# Patient Record
Sex: MALE | Race: BLACK OR AFRICAN AMERICAN | Employment: OTHER
[De-identification: names, ages, dates, MRNs, and addresses within clinical notes are randomized per-mention and may not be internally consistent; named-entity substitution may affect disease eponyms.]

---

## 2017-01-20 ENCOUNTER — OFFICE VISIT (OUTPATIENT)
Dept: INTERNAL MEDICINE | Facility: CLINIC | Age: 47
End: 2017-01-20

## 2017-01-20 VITALS
DIASTOLIC BLOOD PRESSURE: 90 MMHG | SYSTOLIC BLOOD PRESSURE: 125 MMHG | BODY MASS INDEX: 33.87 KG/M2 | HEART RATE: 90 BPM | HEIGHT: 67 IN | WEIGHT: 215.8 LBS

## 2017-01-20 DIAGNOSIS — K21.9 GASTROESOPHAGEAL REFLUX DISEASE WITHOUT ESOPHAGITIS: Primary | ICD-10-CM

## 2017-01-20 DIAGNOSIS — I10 ESSENTIAL HYPERTENSION: ICD-10-CM

## 2017-01-20 DIAGNOSIS — E11.9 TYPE 2 DIABETES MELLITUS WITHOUT COMPLICATION, WITHOUT LONG-TERM CURRENT USE OF INSULIN (HCC): ICD-10-CM

## 2017-01-20 DIAGNOSIS — Z11.4 SCREENING FOR HIV WITHOUT PRESENCE OF RISK FACTORS: ICD-10-CM

## 2017-01-20 DIAGNOSIS — M51.06 INTERVERTEBRAL LUMBAR DISC DISORDER WITH MYELOPATHY, LUMBAR REGION: Chronic | ICD-10-CM

## 2017-01-20 DIAGNOSIS — E78.00 PURE HYPERCHOLESTEROLEMIA: ICD-10-CM

## 2017-01-20 PROCEDURE — 99203 OFFICE O/P NEW LOW 30 MIN: CPT | Performed by: INTERNAL MEDICINE

## 2017-01-20 RX ORDER — ACETAMINOPHEN 500 MG
500 TABLET ORAL 3 TIMES DAILY PRN
Qty: 90 TABLET | Refills: 2 | Status: SHIPPED | OUTPATIENT
Start: 2017-01-20 | End: 2018-04-09

## 2017-01-20 RX ORDER — TIZANIDINE HYDROCHLORIDE 2 MG/1
2 CAPSULE, GELATIN COATED ORAL 2 TIMES DAILY PRN
Qty: 60 CAPSULE | Refills: 2 | Status: SHIPPED | OUTPATIENT
Start: 2017-01-20 | End: 2017-11-28 | Stop reason: SDUPTHER

## 2017-01-20 RX ORDER — MELOXICAM 7.5 MG/1
7.5 TABLET ORAL DAILY
Qty: 30 TABLET | Refills: 2 | Status: SHIPPED | OUTPATIENT
Start: 2017-01-20 | End: 2017-11-28 | Stop reason: SDUPTHER

## 2017-01-20 RX ORDER — LISINOPRIL 2.5 MG/1
2.5 TABLET ORAL DAILY
Qty: 30 TABLET | Refills: 2 | Status: SHIPPED | OUTPATIENT
Start: 2017-01-20 | End: 2017-11-28 | Stop reason: SDUPTHER

## 2017-01-20 RX ORDER — SIMVASTATIN 40 MG
40 TABLET ORAL NIGHTLY
Qty: 30 TABLET | Refills: 2 | Status: SHIPPED | OUTPATIENT
Start: 2017-01-20 | End: 2017-11-28 | Stop reason: SDUPTHER

## 2017-01-20 RX ORDER — GABAPENTIN 400 MG/1
400 CAPSULE ORAL 3 TIMES DAILY
Qty: 90 CAPSULE | Refills: 2 | Status: SHIPPED | OUTPATIENT
Start: 2017-01-20 | End: 2017-04-17 | Stop reason: SDUPTHER

## 2017-01-20 RX ORDER — OMEPRAZOLE 20 MG/1
20 CAPSULE, DELAYED RELEASE ORAL DAILY
Qty: 30 CAPSULE | Refills: 2 | Status: SHIPPED | OUTPATIENT
Start: 2017-01-20 | End: 2017-11-28 | Stop reason: SDUPTHER

## 2017-01-20 ASSESSMENT — PATIENT HEALTH QUESTIONNAIRE - PHQ9
1. LITTLE INTEREST OR PLEASURE IN DOING THINGS: 0
SUM OF ALL RESPONSES TO PHQ QUESTIONS 1-9: 0
2. FEELING DOWN, DEPRESSED OR HOPELESS: 0
SUM OF ALL RESPONSES TO PHQ9 QUESTIONS 1 & 2: 0

## 2017-03-06 ENCOUNTER — HOSPITAL ENCOUNTER (OUTPATIENT)
Age: 47
Setting detail: SPECIMEN
Discharge: HOME | End: 2017-03-06
Payer: MEDICAID

## 2017-03-06 DIAGNOSIS — Z11.4 SCREENING FOR HIV WITHOUT PRESENCE OF RISK FACTORS: ICD-10-CM

## 2017-03-06 DIAGNOSIS — E11.9 TYPE 2 DIABETES MELLITUS WITHOUT COMPLICATION, WITHOUT LONG-TERM CURRENT USE OF INSULIN (HCC): ICD-10-CM

## 2017-03-06 DIAGNOSIS — E78.00 PURE HYPERCHOLESTEROLEMIA: ICD-10-CM

## 2017-03-06 LAB
CHOLESTEROL/HDL RATIO: 4.2
CHOLESTEROL: 127 MG/DL
HDLC SERPL-MCNC: 30 MG/DL
HIV AG/AB: NONREACTIVE
LDL CHOLESTEROL: 49 MG/DL (ref 0–130)
TRIGL SERPL-MCNC: 238 MG/DL
VLDLC SERPL CALC-MCNC: ABNORMAL MG/DL (ref 1–30)

## 2017-03-06 PROCEDURE — 80061 LIPID PANEL: CPT

## 2017-03-06 PROCEDURE — 36415 COLL VENOUS BLD VENIPUNCTURE: CPT

## 2017-03-06 PROCEDURE — 87389 HIV-1 AG W/HIV-1&-2 AB AG IA: CPT

## 2017-04-17 DIAGNOSIS — M51.06 INTERVERTEBRAL LUMBAR DISC DISORDER WITH MYELOPATHY, LUMBAR REGION: Chronic | ICD-10-CM

## 2017-04-17 RX ORDER — GABAPENTIN 400 MG/1
CAPSULE ORAL
Qty: 90 CAPSULE | Refills: 0 | Status: SHIPPED | OUTPATIENT
Start: 2017-04-17 | End: 2017-11-28 | Stop reason: SDUPTHER

## 2017-04-18 DIAGNOSIS — M51.06 INTERVERTEBRAL LUMBAR DISC DISORDER WITH MYELOPATHY, LUMBAR REGION: Chronic | ICD-10-CM

## 2017-04-20 RX ORDER — GABAPENTIN 400 MG/1
CAPSULE ORAL
Qty: 90 CAPSULE | Refills: 0 | Status: SHIPPED | OUTPATIENT
Start: 2017-04-20 | End: 2017-07-17 | Stop reason: SDUPTHER

## 2017-04-25 ENCOUNTER — HOSPITAL ENCOUNTER (OUTPATIENT)
Age: 47
Setting detail: SPECIMEN
Discharge: HOME | End: 2017-04-25
Payer: MEDICAID

## 2017-04-25 LAB
ABSOLUTE EOS #: 0.2 K/UL (ref 0–0.4)
ABSOLUTE LYMPH #: 2.7 K/UL (ref 1–4.8)
ABSOLUTE MONO #: 0.7 K/UL (ref 0.1–1.2)
ALBUMIN SERPL-MCNC: 4.6 G/DL (ref 3.5–5.2)
ALBUMIN/GLOBULIN RATIO: 1.6 (ref 1–2.5)
ALP BLD-CCNC: 89 U/L (ref 40–129)
ALT SERPL-CCNC: 22 U/L (ref 5–41)
ANION GAP SERPL CALCULATED.3IONS-SCNC: 17 MMOL/L (ref 9–17)
AST SERPL-CCNC: 24 U/L
BASOPHILS # BLD: 1 %
BASOPHILS ABSOLUTE: 0.1 K/UL (ref 0–0.2)
BILIRUB SERPL-MCNC: 0.34 MG/DL (ref 0.3–1.2)
BUN BLDV-MCNC: 7 MG/DL (ref 6–20)
BUN/CREAT BLD: ABNORMAL (ref 9–20)
CALCIUM SERPL-MCNC: 9.6 MG/DL (ref 8.6–10.4)
CHLORIDE BLD-SCNC: 97 MMOL/L (ref 98–107)
CO2: 28 MMOL/L (ref 20–31)
CREAT SERPL-MCNC: 0.98 MG/DL (ref 0.7–1.2)
DIFFERENTIAL TYPE: NORMAL
EOSINOPHILS RELATIVE PERCENT: 3 %
ESTIMATED AVERAGE GLUCOSE: 126 MG/DL
GFR AFRICAN AMERICAN: >60 ML/MIN
GFR NON-AFRICAN AMERICAN: >60 ML/MIN
GFR SERPL CREATININE-BSD FRML MDRD: ABNORMAL ML/MIN/{1.73_M2}
GFR SERPL CREATININE-BSD FRML MDRD: ABNORMAL ML/MIN/{1.73_M2}
GLUCOSE BLD-MCNC: 99 MG/DL (ref 70–99)
HBA1C MFR BLD: 6 % (ref 4–6)
HCT VFR BLD CALC: 45.3 % (ref 41–53)
HEMOGLOBIN: 15.5 G/DL (ref 13.5–17.5)
LYMPHOCYTES # BLD: 34 %
MCH RBC QN AUTO: 27.4 PG (ref 26–34)
MCHC RBC AUTO-ENTMCNC: 34.2 G/DL (ref 31–37)
MCV RBC AUTO: 80.1 FL (ref 80–100)
MONOCYTES # BLD: 9 %
PDW BLD-RTO: 13.8 % (ref 12.5–15.4)
PLATELET # BLD: 212 K/UL (ref 140–450)
PLATELET ESTIMATE: NORMAL
PMV BLD AUTO: 9.8 FL (ref 6–12)
POTASSIUM SERPL-SCNC: 4.8 MMOL/L (ref 3.7–5.3)
RBC # BLD: 5.65 M/UL (ref 4.5–5.9)
RBC # BLD: NORMAL 10*6/UL
SEG NEUTROPHILS: 53 %
SEGMENTED NEUTROPHILS ABSOLUTE COUNT: 4.1 K/UL (ref 1.8–7.7)
SODIUM BLD-SCNC: 142 MMOL/L (ref 135–144)
TOTAL PROTEIN: 7.5 G/DL (ref 6.4–8.3)
TSH SERPL DL<=0.05 MIU/L-ACNC: 1.31 MIU/L (ref 0.3–5)
WBC # BLD: 7.8 K/UL (ref 3.5–11)
WBC # BLD: NORMAL 10*3/UL

## 2017-05-30 ENCOUNTER — HOSPITAL ENCOUNTER (OUTPATIENT)
Dept: CT IMAGING | Age: 47
Discharge: HOME | End: 2017-05-30
Payer: MEDICAID

## 2017-05-30 DIAGNOSIS — R41.3 MEMORY LOSS: ICD-10-CM

## 2017-05-30 PROCEDURE — 6360000004 HC RX CONTRAST MEDICATION: Performed by: NURSE PRACTITIONER

## 2017-05-30 PROCEDURE — 70470 CT HEAD/BRAIN W/O & W/DYE: CPT

## 2017-05-30 RX ADMIN — IOVERSOL 100 ML: 741 INJECTION INTRA-ARTERIAL; INTRAVENOUS at 17:19

## 2017-07-17 DIAGNOSIS — M51.06 INTERVERTEBRAL LUMBAR DISC DISORDER WITH MYELOPATHY, LUMBAR REGION: Chronic | ICD-10-CM

## 2017-07-17 RX ORDER — GABAPENTIN 400 MG/1
CAPSULE ORAL
Qty: 90 CAPSULE | Refills: 1 | Status: SHIPPED | OUTPATIENT
Start: 2017-07-17 | End: 2017-09-12 | Stop reason: SDUPTHER

## 2017-09-12 DIAGNOSIS — M51.06 INTERVERTEBRAL LUMBAR DISC DISORDER WITH MYELOPATHY, LUMBAR REGION: Chronic | ICD-10-CM

## 2017-09-12 RX ORDER — GABAPENTIN 400 MG/1
CAPSULE ORAL
Qty: 90 CAPSULE | Refills: 1 | Status: SHIPPED | OUTPATIENT
Start: 2017-09-12 | End: 2017-11-27 | Stop reason: SDUPTHER

## 2017-11-27 DIAGNOSIS — M51.06 INTERVERTEBRAL LUMBAR DISC DISORDER WITH MYELOPATHY, LUMBAR REGION: Chronic | ICD-10-CM

## 2017-11-27 RX ORDER — GABAPENTIN 400 MG/1
CAPSULE ORAL
Qty: 90 CAPSULE | Refills: 1 | Status: SHIPPED | OUTPATIENT
Start: 2017-11-27 | End: 2018-04-09 | Stop reason: SDUPTHER

## 2017-11-28 ENCOUNTER — OFFICE VISIT (OUTPATIENT)
Dept: INTERNAL MEDICINE | Age: 47
End: 2017-11-28
Payer: MEDICAID

## 2017-11-28 VITALS
DIASTOLIC BLOOD PRESSURE: 87 MMHG | BODY MASS INDEX: 29.82 KG/M2 | HEIGHT: 67 IN | WEIGHT: 190 LBS | SYSTOLIC BLOOD PRESSURE: 143 MMHG | HEART RATE: 92 BPM

## 2017-11-28 DIAGNOSIS — I10 ESSENTIAL HYPERTENSION: ICD-10-CM

## 2017-11-28 DIAGNOSIS — R73.03 PREDIABETES: ICD-10-CM

## 2017-11-28 DIAGNOSIS — E78.00 PURE HYPERCHOLESTEROLEMIA: ICD-10-CM

## 2017-11-28 DIAGNOSIS — M51.06 INTERVERTEBRAL LUMBAR DISC DISORDER WITH MYELOPATHY, LUMBAR REGION: Primary | Chronic | ICD-10-CM

## 2017-11-28 DIAGNOSIS — Z23 NEEDS FLU SHOT: ICD-10-CM

## 2017-11-28 DIAGNOSIS — K21.9 GASTROESOPHAGEAL REFLUX DISEASE WITHOUT ESOPHAGITIS: ICD-10-CM

## 2017-11-28 DIAGNOSIS — F17.200 SMOKER: ICD-10-CM

## 2017-11-28 LAB — HBA1C MFR BLD: 5.6 %

## 2017-11-28 PROCEDURE — 90688 IIV4 VACCINE SPLT 0.5 ML IM: CPT | Performed by: INTERNAL MEDICINE

## 2017-11-28 PROCEDURE — 4004F PT TOBACCO SCREEN RCVD TLK: CPT | Performed by: STUDENT IN AN ORGANIZED HEALTH CARE EDUCATION/TRAINING PROGRAM

## 2017-11-28 PROCEDURE — 99213 OFFICE O/P EST LOW 20 MIN: CPT | Performed by: STUDENT IN AN ORGANIZED HEALTH CARE EDUCATION/TRAINING PROGRAM

## 2017-11-28 PROCEDURE — 83036 HEMOGLOBIN GLYCOSYLATED A1C: CPT | Performed by: STUDENT IN AN ORGANIZED HEALTH CARE EDUCATION/TRAINING PROGRAM

## 2017-11-28 PROCEDURE — 90471 IMMUNIZATION ADMIN: CPT | Performed by: INTERNAL MEDICINE

## 2017-11-28 PROCEDURE — G8419 CALC BMI OUT NRM PARAM NOF/U: HCPCS | Performed by: STUDENT IN AN ORGANIZED HEALTH CARE EDUCATION/TRAINING PROGRAM

## 2017-11-28 PROCEDURE — G8484 FLU IMMUNIZE NO ADMIN: HCPCS | Performed by: STUDENT IN AN ORGANIZED HEALTH CARE EDUCATION/TRAINING PROGRAM

## 2017-11-28 PROCEDURE — G8427 DOCREV CUR MEDS BY ELIG CLIN: HCPCS | Performed by: STUDENT IN AN ORGANIZED HEALTH CARE EDUCATION/TRAINING PROGRAM

## 2017-11-28 RX ORDER — MELOXICAM 7.5 MG/1
7.5 TABLET ORAL DAILY
Qty: 30 TABLET | Refills: 3 | Status: SHIPPED | OUTPATIENT
Start: 2017-11-28 | End: 2018-03-18 | Stop reason: SDUPTHER

## 2017-11-28 RX ORDER — NICOTINE 21 MG/24HR
1 PATCH, TRANSDERMAL 24 HOURS TRANSDERMAL EVERY 24 HOURS
Qty: 30 PATCH | Refills: 3 | Status: SHIPPED | OUTPATIENT
Start: 2017-11-28 | End: 2018-12-10

## 2017-11-28 RX ORDER — SIMVASTATIN 40 MG
40 TABLET ORAL NIGHTLY
Qty: 30 TABLET | Refills: 3 | Status: SHIPPED | OUTPATIENT
Start: 2017-11-28 | End: 2018-03-18 | Stop reason: SDUPTHER

## 2017-11-28 RX ORDER — OMEPRAZOLE 20 MG/1
20 CAPSULE, DELAYED RELEASE ORAL DAILY
Qty: 30 CAPSULE | Refills: 2 | Status: SHIPPED | OUTPATIENT
Start: 2017-11-28 | End: 2018-08-16 | Stop reason: SDUPTHER

## 2017-11-28 RX ORDER — LISINOPRIL 2.5 MG/1
2.5 TABLET ORAL DAILY
Qty: 30 TABLET | Refills: 2 | Status: SHIPPED | OUTPATIENT
Start: 2017-11-28 | End: 2018-02-16 | Stop reason: SDUPTHER

## 2017-11-28 RX ORDER — TIZANIDINE HYDROCHLORIDE 2 MG/1
2 CAPSULE, GELATIN COATED ORAL 2 TIMES DAILY PRN
Qty: 60 CAPSULE | Refills: 3 | Status: SHIPPED | OUTPATIENT
Start: 2017-11-28 | End: 2018-10-04

## 2017-11-28 RX ORDER — GABAPENTIN 400 MG/1
CAPSULE ORAL
Qty: 90 CAPSULE | Refills: 1 | Status: SHIPPED | OUTPATIENT
Start: 2017-11-28 | End: 2018-03-18 | Stop reason: SDUPTHER

## 2017-11-28 ASSESSMENT — ENCOUNTER SYMPTOMS
SORE THROAT: 0
HEARTBURN: 0
SPUTUM PRODUCTION: 0
COUGH: 0
BLURRED VISION: 1
HEMOPTYSIS: 0
SINUS PAIN: 0
ORTHOPNEA: 0
VOMITING: 0
BACK PAIN: 1
NAUSEA: 0
SHORTNESS OF BREATH: 0
ABDOMINAL PAIN: 0
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
## 2017-12-06 ENCOUNTER — HOSPITAL ENCOUNTER (OUTPATIENT)
Dept: PHYSICAL THERAPY | Age: 47
Setting detail: THERAPIES SERIES
Discharge: HOME | End: 2017-12-06
Payer: MEDICAID

## 2017-12-06 NOTE — FLOWSHEET NOTE
[x] Reny Peres        Outpatient Physical                Therapy       [address]       Phone: [phone]       Fax: [phone] [] [facility] at [address] [facility]       Phone: [phone]       Fax: [phone] [] Breanna. [address] [place] [address]      Phone: [phone]      Fax:  [phone]     Physical Therapy Cancel/No Show note    Date: 2017  Patient: Ariana Marr  : 1970  MRN: [id number]    Cancels/No Shows to date:     For today's appointment patient:  []  Cancelled  []  Rescheduled appointment  [x]  No-show     Reason given by patient:  []  Patient ill  []  Conflicting appointment  []  No transportation    []  Conflict with work  []  No reason given  []  Weather related  []  Other:      Comments:   Evaluation not performed. []  Next appointment was confirmed    Electronically signed by: Lindsey Garces.  Teo Sherman

## 2018-01-23 ENCOUNTER — APPOINTMENT (OUTPATIENT)
Dept: GENERAL RADIOLOGY | Age: 48
End: 2018-01-23
Payer: MEDICAID

## 2018-01-23 ENCOUNTER — HOSPITAL ENCOUNTER (EMERGENCY)
Age: 48
Discharge: HOME | End: 2018-01-23
Attending: EMERGENCY MEDICINE
Payer: MEDICAID

## 2018-01-23 VITALS
RESPIRATION RATE: 21 BRPM | BODY MASS INDEX: 28.98 KG/M2 | HEART RATE: 84 BPM | DIASTOLIC BLOOD PRESSURE: 94 MMHG | TEMPERATURE: 97.3 F | WEIGHT: 185 LBS | OXYGEN SATURATION: 98 % | SYSTOLIC BLOOD PRESSURE: 146 MMHG

## 2018-01-23 DIAGNOSIS — J18.9 PNEUMONIA DUE TO ORGANISM: Primary | ICD-10-CM

## 2018-01-23 DIAGNOSIS — J02.9 ACUTE PHARYNGITIS, UNSPECIFIED ETIOLOGY: ICD-10-CM

## 2018-01-23 PROCEDURE — 6370000000 HC RX 637 (ALT 250 FOR IP): Performed by: EMERGENCY MEDICINE

## 2018-01-23 PROCEDURE — 99283 EMERGENCY DEPT VISIT LOW MDM: CPT

## 2018-01-23 PROCEDURE — 94640 AIRWAY INHALATION TREATMENT: CPT

## 2018-01-23 PROCEDURE — 71046 X-RAY EXAM CHEST 2 VIEWS: CPT

## 2018-01-23 RX ORDER — IPRATROPIUM BROMIDE AND ALBUTEROL SULFATE 2.5; .5 MG/3ML; MG/3ML
1 SOLUTION RESPIRATORY (INHALATION)
Status: DISCONTINUED | OUTPATIENT
Start: 2018-01-23 | End: 2018-01-24 | Stop reason: HOSPADM

## 2018-01-23 RX ORDER — ALBUTEROL SULFATE 90 UG/1
2 AEROSOL, METERED RESPIRATORY (INHALATION)
Status: DISCONTINUED | OUTPATIENT
Start: 2018-01-23 | End: 2018-01-24 | Stop reason: HOSPADM

## 2018-01-23 RX ORDER — PREDNISONE 20 MG/1
50 TABLET ORAL ONCE
Status: COMPLETED | OUTPATIENT
Start: 2018-01-23 | End: 2018-01-23

## 2018-01-23 RX ORDER — IBUPROFEN 800 MG/1
800 TABLET ORAL EVERY 8 HOURS PRN
Qty: 30 TABLET | Refills: 0 | Status: SHIPPED | OUTPATIENT
Start: 2018-01-23 | End: 2018-04-09

## 2018-01-23 RX ORDER — DOXYCYCLINE 100 MG/1
100 TABLET ORAL 2 TIMES DAILY
Qty: 20 TABLET | Refills: 0 | Status: SHIPPED | OUTPATIENT
Start: 2018-01-23 | End: 2018-02-02

## 2018-01-23 RX ORDER — DOXYCYCLINE HYCLATE 100 MG
100 TABLET ORAL ONCE
Status: COMPLETED | OUTPATIENT
Start: 2018-01-23 | End: 2018-01-23

## 2018-01-23 RX ORDER — PREDNISONE 50 MG/1
50 TABLET ORAL DAILY
Qty: 4 TABLET | Refills: 0 | Status: SHIPPED | OUTPATIENT
Start: 2018-01-23 | End: 2018-04-09

## 2018-01-23 RX ORDER — IBUPROFEN 800 MG/1
800 TABLET ORAL ONCE
Status: COMPLETED | OUTPATIENT
Start: 2018-01-23 | End: 2018-01-23

## 2018-01-23 RX ORDER — BENZONATATE 100 MG/1
100 CAPSULE ORAL 3 TIMES DAILY PRN
Qty: 15 CAPSULE | Refills: 0 | Status: SHIPPED | OUTPATIENT
Start: 2018-01-23 | End: 2018-01-30

## 2018-01-23 RX ORDER — ALBUTEROL SULFATE 2.5 MG/3ML
5 SOLUTION RESPIRATORY (INHALATION)
Status: DISCONTINUED | OUTPATIENT
Start: 2018-01-23 | End: 2018-01-24 | Stop reason: HOSPADM

## 2018-01-23 RX ORDER — BENZONATATE 100 MG/1
200 CAPSULE ORAL ONCE
Status: COMPLETED | OUTPATIENT
Start: 2018-01-23 | End: 2018-01-23

## 2018-01-23 RX ADMIN — BENZONATATE 200 MG: 100 CAPSULE ORAL at 22:14

## 2018-01-23 RX ADMIN — DOXYCYCLINE HYCLATE 100 MG: 100 TABLET, COATED ORAL at 23:09

## 2018-01-23 RX ADMIN — IBUPROFEN 800 MG: 800 TABLET, FILM COATED ORAL at 22:14

## 2018-01-23 RX ADMIN — ALBUTEROL SULFATE 2 PUFF: 90 AEROSOL, METERED RESPIRATORY (INHALATION) at 22:58

## 2018-01-23 RX ADMIN — PREDNISONE 50 MG: 20 TABLET ORAL at 22:14

## 2018-01-23 RX ADMIN — BENZOCAINE, MENTHOL 1 LOZENGE: 15; 3.6 LOZENGE ORAL at 22:14

## 2018-01-23 ASSESSMENT — ENCOUNTER SYMPTOMS
VOMITING: 0
EYE DISCHARGE: 0
ABDOMINAL PAIN: 0
CHEST TIGHTNESS: 0
SHORTNESS OF BREATH: 0
RHINORRHEA: 1
DIARRHEA: 0
NAUSEA: 0
COUGH: 1
SORE THROAT: 1

## 2018-01-23 ASSESSMENT — PAIN DESCRIPTION - DESCRIPTORS: DESCRIPTORS: SORE

## 2018-01-23 ASSESSMENT — PAIN SCALES - GENERAL
PAINLEVEL_OUTOF10: 10
PAINLEVEL_OUTOF10: 10

## 2018-01-23 ASSESSMENT — PAIN DESCRIPTION - LOCATION: LOCATION: THROAT

## 2018-01-24 NOTE — PROGRESS NOTES
Inhaler Education        [x] Served spacer    [x] Provided and reviewed booklet   [x] Good return demonstration per patient

## 2018-01-24 NOTE — ED PROVIDER NOTES
[id number]  [phone]  Go to   If symptoms worsen      DISCHARGE MEDICATIONS:  Discharge Medication List as of 1/23/2018 10:48 PM      START taking these medications    Details   Benzocaine-Menthol (CEPACOL EXTRA STRENGTH) 15-2.6 MG LOZG lozenge Take 1 lozenge by mouth every 2 hours as needed for Sore Throat, Disp-50 lozenge, R-0Print      benzonatate (TESSALON PERLES) 100 MG capsule Take 1 capsule by mouth 3 times daily as needed for Cough, Disp-15 capsule, R-0Print      ibuprofen (ADVIL;MOTRIN) 800 MG tablet Take 1 tablet by mouth every 8 hours as needed for Pain or Fever, Disp-30 tablet, R-0Print      doxycycline monohydrate (ADOXA) 100 MG tablet Take 1 tablet by mouth 2 times daily for 10 days Take with food. , Disp-20 tablet, R-0Print      predniSONE (DELTASONE) 50 MG tablet Take 1 tablet by mouth daily, Disp-4 tablet, R-0Print               Ananya Gamboa D.O.   PGY III,  Emergency Medicine Resident.   01/24/18    12:46 AM    (Please note that portions of this note were completed with a voice recognition program.  Efforts were made to edit the dictations but occasionally words are mis-transcribed.)       [address] DO  Resident  01/24/18 [id number]

## 2018-02-16 DIAGNOSIS — I10 ESSENTIAL HYPERTENSION: ICD-10-CM

## 2018-02-19 RX ORDER — LISINOPRIL 2.5 MG/1
TABLET ORAL
Qty: 30 TABLET | Refills: 2 | Status: SHIPPED | OUTPATIENT
Start: 2018-02-19 | End: 2018-04-09 | Stop reason: SDUPTHER

## 2018-03-10 ENCOUNTER — APPOINTMENT (OUTPATIENT)
Dept: GENERAL RADIOLOGY | Age: 48
End: 2018-03-10
Payer: MEDICAID

## 2018-03-10 ENCOUNTER — APPOINTMENT (OUTPATIENT)
Dept: CT IMAGING | Age: 48
End: 2018-03-10
Payer: MEDICAID

## 2018-03-10 ENCOUNTER — HOSPITAL ENCOUNTER (EMERGENCY)
Age: 48
Discharge: HOME | End: 2018-03-10
Attending: EMERGENCY MEDICINE
Payer: MEDICAID

## 2018-03-10 VITALS
HEART RATE: 67 BPM | DIASTOLIC BLOOD PRESSURE: 89 MMHG | OXYGEN SATURATION: 97 % | SYSTOLIC BLOOD PRESSURE: 159 MMHG | WEIGHT: 195 LBS | TEMPERATURE: 97.5 F | RESPIRATION RATE: 20 BRPM | BODY MASS INDEX: 30.54 KG/M2

## 2018-03-10 DIAGNOSIS — R10.9 ABDOMINAL PAIN, UNSPECIFIED ABDOMINAL LOCATION: Primary | ICD-10-CM

## 2018-03-10 LAB
-: NORMAL
ABSOLUTE EOS #: 0.09 K/UL (ref 0–0.44)
ABSOLUTE IMMATURE GRANULOCYTE: <0.03 K/UL (ref 0–0.3)
ABSOLUTE LYMPH #: 2.69 K/UL (ref 1.1–3.7)
ABSOLUTE MONO #: 0.68 K/UL (ref 0.1–1.2)
ALBUMIN SERPL-MCNC: 4.3 G/DL (ref 3.5–5.2)
ALBUMIN/GLOBULIN RATIO: 1.4 (ref 1–2.5)
ALP BLD-CCNC: 86 U/L (ref 40–129)
ALT SERPL-CCNC: 18 U/L (ref 5–41)
AMORPHOUS: NORMAL
ANION GAP SERPL CALCULATED.3IONS-SCNC: 12 MMOL/L (ref 9–17)
AST SERPL-CCNC: 19 U/L
BACTERIA: NORMAL
BASOPHILS # BLD: 1 % (ref 0–2)
BASOPHILS ABSOLUTE: 0.07 K/UL (ref 0–0.2)
BILIRUB SERPL-MCNC: 0.62 MG/DL (ref 0.3–1.2)
BILIRUBIN DIRECT: 0.21 MG/DL
BILIRUBIN URINE: ABNORMAL
BILIRUBIN, INDIRECT: 0.41 MG/DL (ref 0–1)
BUN BLDV-MCNC: 8 MG/DL (ref 6–20)
BUN/CREAT BLD: ABNORMAL (ref 9–20)
CALCIUM SERPL-MCNC: 9.4 MG/DL (ref 8.6–10.4)
CASTS UA: NORMAL /LPF (ref 0–8)
CHLORIDE BLD-SCNC: 100 MMOL/L (ref 98–107)
CO2: 27 MMOL/L (ref 20–31)
COLOR: ABNORMAL
COMMENT UA: ABNORMAL
CREAT SERPL-MCNC: 0.9 MG/DL (ref 0.7–1.2)
CRYSTALS, UA: NORMAL /HPF
DIFFERENTIAL TYPE: NORMAL
EOSINOPHILS RELATIVE PERCENT: 1 % (ref 1–4)
EPITHELIAL CELLS UA: NORMAL /HPF (ref 0–5)
GFR AFRICAN AMERICAN: >60 ML/MIN
GFR NON-AFRICAN AMERICAN: >60 ML/MIN
GFR SERPL CREATININE-BSD FRML MDRD: ABNORMAL ML/MIN/{1.73_M2}
GFR SERPL CREATININE-BSD FRML MDRD: ABNORMAL ML/MIN/{1.73_M2}
GLOBULIN: NORMAL G/DL (ref 1.5–3.8)
GLUCOSE BLD-MCNC: 115 MG/DL (ref 70–99)
GLUCOSE URINE: NEGATIVE
HCT VFR BLD CALC: 46.9 % (ref 40.7–50.3)
HEMOGLOBIN: 15.2 G/DL (ref 13–17)
IMMATURE GRANULOCYTES: 0 %
KETONES, URINE: ABNORMAL
LEUKOCYTE ESTERASE, URINE: ABNORMAL
LIPASE: 41 U/L (ref 13–60)
LYMPHOCYTES # BLD: 29 % (ref 24–43)
MCH RBC QN AUTO: 27 PG (ref 25.2–33.5)
MCHC RBC AUTO-ENTMCNC: 32.4 G/DL (ref 28.4–34.8)
MCV RBC AUTO: 83.5 FL (ref 82.6–102.9)
MONOCYTES # BLD: 7 % (ref 3–12)
MUCUS: NORMAL
NITRITE, URINE: NEGATIVE
NRBC AUTOMATED: 0 PER 100 WBC
OTHER OBSERVATIONS UA: NORMAL
PDW BLD-RTO: 13.5 % (ref 11.8–14.4)
PH UA: 5.5 (ref 5–8)
PLATELET # BLD: 229 K/UL (ref 138–453)
PLATELET ESTIMATE: NORMAL
PMV BLD AUTO: 10.9 FL (ref 8.1–13.5)
POTASSIUM SERPL-SCNC: 3.7 MMOL/L (ref 3.7–5.3)
PROTEIN UA: ABNORMAL
RBC # BLD: 5.62 M/UL (ref 4.21–5.77)
RBC # BLD: NORMAL 10*6/UL
RBC UA: NORMAL /HPF (ref 0–4)
RENAL EPITHELIAL, UA: NORMAL /HPF
SEG NEUTROPHILS: 62 % (ref 36–65)
SEGMENTED NEUTROPHILS ABSOLUTE COUNT: 5.84 K/UL (ref 1.5–8.1)
SODIUM BLD-SCNC: 139 MMOL/L (ref 135–144)
SPECIFIC GRAVITY UA: 1.03 (ref 1–1.03)
TOTAL PROTEIN: 7.4 G/DL (ref 6.4–8.3)
TRICHOMONAS: NORMAL
TURBIDITY: CLEAR
URINE HGB: NEGATIVE
UROBILINOGEN, URINE: NORMAL
WBC # BLD: 9.4 K/UL (ref 3.5–11.3)
WBC # BLD: NORMAL 10*3/UL
WBC UA: NORMAL /HPF (ref 0–5)
YEAST: NORMAL

## 2018-03-10 PROCEDURE — 96375 TX/PRO/DX INJ NEW DRUG ADDON: CPT

## 2018-03-10 PROCEDURE — 87086 URINE CULTURE/COLONY COUNT: CPT

## 2018-03-10 PROCEDURE — 2580000003 HC RX 258: Performed by: EMERGENCY MEDICINE

## 2018-03-10 PROCEDURE — 6360000004 HC RX CONTRAST MEDICATION: Performed by: EMERGENCY MEDICINE

## 2018-03-10 PROCEDURE — 74177 CT ABD & PELVIS W/CONTRAST: CPT

## 2018-03-10 PROCEDURE — 71045 X-RAY EXAM CHEST 1 VIEW: CPT

## 2018-03-10 PROCEDURE — 80076 HEPATIC FUNCTION PANEL: CPT

## 2018-03-10 PROCEDURE — 85025 COMPLETE CBC W/AUTO DIFF WBC: CPT

## 2018-03-10 PROCEDURE — 96376 TX/PRO/DX INJ SAME DRUG ADON: CPT

## 2018-03-10 PROCEDURE — 6360000002 HC RX W HCPCS: Performed by: EMERGENCY MEDICINE

## 2018-03-10 PROCEDURE — 81001 URINALYSIS AUTO W/SCOPE: CPT

## 2018-03-10 PROCEDURE — 99285 EMERGENCY DEPT VISIT HI MDM: CPT

## 2018-03-10 PROCEDURE — 96374 THER/PROPH/DIAG INJ IV PUSH: CPT

## 2018-03-10 PROCEDURE — 80048 BASIC METABOLIC PNL TOTAL CA: CPT

## 2018-03-10 PROCEDURE — 83690 ASSAY OF LIPASE: CPT

## 2018-03-10 RX ORDER — ONDANSETRON 2 MG/ML
4 INJECTION INTRAMUSCULAR; INTRAVENOUS ONCE
Status: COMPLETED | OUTPATIENT
Start: 2018-03-10 | End: 2018-03-10

## 2018-03-10 RX ORDER — DICYCLOMINE HCL 20 MG
20 TABLET ORAL EVERY 6 HOURS
Qty: 28 TABLET | Refills: 0 | Status: SHIPPED | OUTPATIENT
Start: 2018-03-10 | End: 2018-04-09 | Stop reason: ALTCHOICE

## 2018-03-10 RX ORDER — MORPHINE SULFATE 4 MG/ML
4 INJECTION, SOLUTION INTRAMUSCULAR; INTRAVENOUS ONCE
Status: COMPLETED | OUTPATIENT
Start: 2018-03-10 | End: 2018-03-10

## 2018-03-10 RX ORDER — 0.9 % SODIUM CHLORIDE 0.9 %
1000 INTRAVENOUS SOLUTION INTRAVENOUS ONCE
Status: COMPLETED | OUTPATIENT
Start: 2018-03-10 | End: 2018-03-10

## 2018-03-10 RX ADMIN — MORPHINE SULFATE 4 MG: 4 INJECTION INTRAVENOUS at 06:01

## 2018-03-10 RX ADMIN — IOPAMIDOL 75 ML: 755 INJECTION, SOLUTION INTRAVENOUS at 08:51

## 2018-03-10 RX ADMIN — ONDANSETRON 4 MG: 2 INJECTION INTRAMUSCULAR; INTRAVENOUS at 06:01

## 2018-03-10 RX ADMIN — MORPHINE SULFATE 4 MG: 4 INJECTION INTRAVENOUS at 08:20

## 2018-03-10 RX ADMIN — SODIUM CHLORIDE 1000 ML: 9 INJECTION, SOLUTION INTRAVENOUS at 06:02

## 2018-03-10 ASSESSMENT — ENCOUNTER SYMPTOMS
CHEST TIGHTNESS: 0
NAUSEA: 1
VOMITING: 1
DIARRHEA: 1
COUGH: 0
ABDOMINAL PAIN: 1
BACK PAIN: 0
SHORTNESS OF BREATH: 0

## 2018-03-10 ASSESSMENT — PAIN DESCRIPTION - PAIN TYPE
TYPE: ACUTE PAIN

## 2018-03-10 ASSESSMENT — PAIN DESCRIPTION - LOCATION
LOCATION: ABDOMEN

## 2018-03-10 ASSESSMENT — PAIN DESCRIPTION - ORIENTATION: ORIENTATION: LEFT

## 2018-03-10 ASSESSMENT — PAIN DESCRIPTION - FREQUENCY: FREQUENCY: CONTINUOUS

## 2018-03-10 ASSESSMENT — PAIN SCALES - GENERAL
PAINLEVEL_OUTOF10: 7
PAINLEVEL_OUTOF10: 8
PAINLEVEL_OUTOF10: 9
PAINLEVEL_OUTOF10: 8
PAINLEVEL_OUTOF10: 9

## 2018-03-10 ASSESSMENT — PAIN DESCRIPTION - PROGRESSION
CLINICAL_PROGRESSION: NOT CHANGED
CLINICAL_PROGRESSION: GRADUALLY IMPROVING

## 2018-03-10 ASSESSMENT — PAIN DESCRIPTION - DESCRIPTORS: DESCRIPTORS: ACHING;SHARP

## 2018-03-10 NOTE — ED NOTES
Pt arrives to ED with c/o LUQ abd pain. Pt states pain started today with 2 episodes of emesis. Pt states he has a history of pancreatitis and this feels like a flare up. Pt states he last had alcohol on Thursday. Pt states he had it under control for about 9 years b/c he stopped drinking. Pt states he started back up drinking d/t marital issues with his wife. Pt denies taking any meds for pain. Pt A&Ox4, resp even and non labored. Pt placed on, blood pressure cuff, pulse ox, alarms set.      Criselda Mane RN  03/10/18 [id number]

## 2018-03-10 NOTE — ED PROVIDER NOTES
vascular congestion. Visualized osseous structures appear intact and grossly unremarkable, given the non dedicated imaging. Resolved patchy airspace opacity in the periphery of the left mid lung is likely on the basis of resolved pneumonia with focus of residual scar. No new focal airspace consolidation or pulmonary vascular congestion. RECENT VITALS:     Temp: 97.5 °F (36.4 °C),  Pulse: 67, Resp: 20, BP: (!) 159/89, SpO2: 97 %    This patient is a 41 y.o. Male with abdominal pain and nausea. Lab work negative. Urinalysis pending. Plan is to follow-up with lab work and reassess. 8:18 AM  Patient was reassessed and continued to have epigastric belly pain. On exam, patient did exhibit some voluntary guarding in the epigastric region. As such, we'll plan to obtain a CT of the abdomen, treat his pain and reassess. 9:44 AM  Patient reassessed and updated on imaging studies. She is feeling a little better. Stated he felt comfortable going home. He is advised to follow-up with his PCP. Discussed if symptoms worsen or do not improve, should return to the ED. Patient demonstrated his understanding and is agreeable to plan. He stable for discharge. OUTSTANDING TASKS / RECOMMENDATIONS:    1. Follow-up with urinalysis and revisit disposition     FINAL IMPRESSION:     1.  Abdominal pain, unspecified abdominal location        DISPOSITION:         DISPOSITION:  [x]  Discharge   []  Transfer -    []  Admission -     []  Against Medical Advice   []  Eloped   FOLLOW-UP: Mbonu Josephine Claude, MD  [address] [place] [address]  [phone]    Schedule an appointment as soon as possible for a visit in 2 days       DISCHARGE MEDICATIONS: Discharge Medication List as of 3/10/2018  9:45 AM      START taking these medications    Details   dicyclomine (BENTYL) 20 MG tablet Take 1 tablet by mouth every 6 hours, Disp-28 tablet, R-0Print                 Cyndy Hampton MD  Emergency Medicine

## 2018-03-10 NOTE — ED PROVIDER NOTES
[facility] ED  Emergency Department Encounter  Emergency Medicine Resident     Pt Name: Mansi Christensen  MRN: [id number]  [place] 1970  Date of evaluation: 3/10/18  PCP:  Iram Jackson MD    [address] [place] [address]       Chief Complaint   Patient presents with    Abdominal Pain         HISTORY OF PRESENT ILLNESS  (Location/Symptom, Timing/Onset, Context/Setting, Quality, Duration, Modifying Factors, Severity.)      Mansi Christensen is a 41 y.o. male who presents with Abdominal pain and nausea. Patient states he has a history of pancreatitis. States he had several episodes ending 10 years ago when he quit drinking. Patient states he began drinking again recently due to troubles at home. States that beginning Wednesday he was started having epigastric and left upper quadrant pain. Since Wednesday he has 2 episodes of nonbloody nonbilious emesis as well as some loose stools. Denies blood or bile in the stools or vomit. Patient states he tried drinking ginger ale which has not helped. Patient is concerned he may be having pancreatitis flare. Patient states that he's had no alcohol today. Denies chest pain or shortness of breath, denies back pain and hematuria does state he has had dysuria starting today. PAST MEDICAL / SURGICAL / SOCIAL / FAMILY HISTORY      has a past medical history of Arthritis; Chronic sinusitis; Eczema; Environmental allergies; GERD (gastroesophageal reflux disease); Hyperlipidemia; Hypertension; and Snores. has a past surgical history that includes Upper gastrointestinal endoscopy and Colonoscopy. Social History     Social History    Marital status: Legally      Spouse name: N/A    Number of children: N/A    Years of education: N/A     Occupational History    Not on file.      Social History Main Topics    Smoking status: Current Every Day Smoker     Packs/day: 0.50     Years: 20.00     Types: Cigarettes    Smokeless tobacco: Never Used   [facility] Alcohol use No      Comment: quit 7 years ago    Drug use: No    Sexual activity: Not on file     Other Topics Concern    Not on file     Social History Narrative    No narrative on file       Patient advised to stop smoking or to avoid tobacco use. Family History   Problem Relation Age of Onset    Cancer Paternal Grandmother     High Blood Pressure Mother         Allergies:  Patient has no known allergies. Home Medications:  Prior to Admission medications    Medication Sig Start Date End Date Taking?  Authorizing Provider   lisinopril (PRINIVIL;ZESTRIL) 2.5 MG tablet take 1 tablet by mouth once daily 2/19/18   Iram Fox MD   Benzocaine-Menthol (CEPACOL EXTRA STRENGTH) 15-2.6 MG LOZG lozenge Take 1 lozenge by mouth every 2 hours as needed for Sore Throat 1/23/18   Jolly M Bejide, DO   ibuprofen (ADVIL;MOTRIN) 800 MG tablet Take 1 tablet by mouth every 8 hours as needed for Pain or Fever 1/23/18   Jolly M Bejide, DO   predniSONE (DELTASONE) 50 MG tablet Take 1 tablet by mouth daily 1/23/18   Jolly M Bejide, DO   gabapentin (NEURONTIN) 400 MG capsule Take 1 capsule by mouth 3 times daily 11/28/17   Sneha Gentile MD   omeprazole (PRILOSEC) 20 MG delayed release capsule Take 1 capsule by mouth daily 11/28/17   Sneha Gentile MD   simvastatin (ZOCOR) 40 MG tablet Take 1 tablet by mouth nightly 11/28/17   Sneha Gentile MD   tiZANidine (ZANAFLEX) 2 MG capsule Take 1 capsule by mouth 2 times daily as needed for Muscle spasms 11/28/17   Sneha Gentile MD   metFORMIN (GLUCOPHAGE) 500 MG tablet Take 1 tablet by mouth 2 times daily (with meals) 11/28/17   Sneha Gentile MD   meloxicam (MOBIC) 7.5 MG tablet Take 1 tablet by mouth daily 11/28/17   Sneha Gentile MD   nicotine (NICODERM CQ) 14 MG/24HR Place 1 patch onto the skin every 24 hours 11/28/17   Sneha Gentile MD   gabapentin (NEURONTIN) 400 MG capsule take 1 capsule by mouth three times a day 11/27/17   Iram CASTILLO Sangeetha Avila MD   acetaminophen (APAP EXTRA STRENGTH) 500 MG tablet Take 1 tablet by mouth 3 times daily as needed for Pain 1/20/17   Iram Gardner MD   Heating Pads (HEATING PAD DRY HEAT) PADS 1 Dose by Does not apply route every 6 hours as needed (pain/spasm) 10/8/16   Hardik Mendenhall,        REVIEW OF SYSTEMS    (2-9 systems for level 4, 10 or more for level 5)      Review of Systems   Constitutional: Negative for chills and fever. Respiratory: Negative for cough, chest tightness and shortness of breath. Cardiovascular: Negative for chest pain and leg swelling. Gastrointestinal: Positive for abdominal pain, diarrhea, nausea and vomiting. Genitourinary: Positive for dysuria. Negative for decreased urine volume, discharge, flank pain, hematuria, penile pain and urgency. Musculoskeletal: Negative for arthralgias and back pain. Skin: Negative for rash and wound. Allergic/Immunologic: Negative for food allergies and immunocompromised state. Neurological: Negative for dizziness, syncope, weakness and headaches. Psychiatric/Behavioral: Negative for suicidal ideas. The patient is not nervous/anxious. PHYSICAL EXAM   (up to 7 for level 4, 8 or more for level 5)      INITIAL VITALS:   BP (!) 170/92   Pulse 67   Temp 97.5 °F (36.4 °C) (Oral)   Resp 20   Wt 195 lb (88.5 kg)   SpO2 96%   BMI 30.54 kg/m²     Physical Exam   Constitutional: He is oriented to person, place, and time. He appears well-developed and well-nourished. No distress. HENT:   Head: Normocephalic and atraumatic. Right Ear: External ear normal.   Left Ear: External ear normal.   Nose: Nose normal.   Eyes: Conjunctivae and EOM are normal. Pupils are equal, round, and reactive to light. Right eye exhibits no discharge. Left eye exhibits no discharge. Neck: Normal range of motion. Neck supple. No JVD present. No tracheal deviation present. Cardiovascular: Normal rate, regular rhythm and normal heart sounds.   Exam NOT REPORTED    CBC Auto Differential   Result Value Ref Range    WBC 9.4 3.5 - 11.3 k/uL    RBC 5.62 4.21 - 5.77 m/uL    Hemoglobin 15.2 13.0 - 17.0 g/dL    Hematocrit 46.9 40.7 - 50.3 %    MCV 83.5 82.6 - 102.9 fL    MCH 27.0 25.2 - 33.5 pg    MCHC 32.4 28.4 - 34.8 g/dL    RDW 13.5 11.8 - 14.4 %    Platelets 977 254 - 192 k/uL    MPV 10.9 8.1 - 13.5 fL    NRBC Automated 0.0 0.0 per 100 WBC    Differential Type NOT REPORTED     Seg Neutrophils 62 36 - 65 %    Lymphocytes 29 24 - 43 %    Monocytes 7 3 - 12 %    Eosinophils % 1 1 - 4 %    Basophils 1 0 - 2 %    Immature Granulocytes 0 0 %    Segs Absolute 5.84 1.50 - 8.10 k/uL    Absolute Lymph # 2.69 1.10 - 3.70 k/uL    Absolute Mono # 0.68 0.10 - 1.20 k/uL    Absolute Eos # 0.09 0.00 - 0.44 k/uL    Basophils # 0.07 0.00 - 0.20 k/uL    Absolute Immature Granulocyte <0.03 0.00 - 0.30 k/uL    WBC Morphology NOT REPORTED     RBC Morphology NOT REPORTED     Platelet Estimate NOT REPORTED    Hepatic Function Panel   Result Value Ref Range    Alb 4.3 3.5 - 5.2 g/dL    Alkaline Phosphatase 86 40 - 129 U/L    ALT 18 5 - 41 U/L    AST 19 <40 U/L    Total Bilirubin 0.62 0.3 - 1.2 mg/dL    Bilirubin, Direct 0.21 <0.31 mg/dL    Bilirubin, Indirect 0.41 0.00 - 1.00 mg/dL    Total Protein 7.4 6.4 - 8.3 g/dL    Globulin NOT REPORTED 1.5 - 3.8 g/dL    Albumin/Globulin Ratio 1.4 1.0 - 2.5   Lipase   Result Value Ref Range    Lipase 41 13 - 60 U/L       IMPRESSION: 65-year-old male with a history of hepatitis presents with left upper quadrant pain as well as vomiting ×3 days. Patient has a history of pancreatitis. Also states he's been having dysuria. Physical exam reveals a well-appearing 65-year-old male blood pressure 170/92 remainder vital signs are normal and stable. Lungs sounds clear Auscultation, heart sounds regular rate and rhythm without murmur, abdomen is soft nondistended no rebound or guarding mild tenderness in the epigastrium and left upper quadrants.   Remainder

## 2018-03-11 LAB
CULTURE: NO GROWTH
CULTURE: NORMAL
Lab: NORMAL
SPECIMEN DESCRIPTION: NORMAL
STATUS: NORMAL

## 2018-03-13 ENCOUNTER — TELEPHONE (OUTPATIENT)
Dept: INTERNAL MEDICINE | Age: 48
End: 2018-03-13

## 2018-03-13 NOTE — TELEPHONE ENCOUNTER
----- Message from Ab Frausto sent at 3/13/2018  8:47 AM EDT -----  Contact: pt  [facility] @ 3:13pm in regards to wanting to change his physician back to Dr. Guillaume Jaimes. Per message he likes the way she practices and is more comfortable with Dr. Guillaume Jaimes. He currently sees Dr. Diann Fortune and has an appt scheduled with him for 3/21/18. Please call patient at [phone] and advise. Thank you.

## 2018-03-18 DIAGNOSIS — M51.06 INTERVERTEBRAL LUMBAR DISC DISORDER WITH MYELOPATHY, LUMBAR REGION: Chronic | ICD-10-CM

## 2018-03-18 DIAGNOSIS — R73.03 PREDIABETES: ICD-10-CM

## 2018-03-18 DIAGNOSIS — E78.00 PURE HYPERCHOLESTEROLEMIA: ICD-10-CM

## 2018-03-19 RX ORDER — MELOXICAM 7.5 MG/1
TABLET ORAL
Qty: 30 TABLET | Refills: 3 | Status: SHIPPED | OUTPATIENT
Start: 2018-03-19 | End: 2018-12-10

## 2018-03-19 RX ORDER — SIMVASTATIN 40 MG
TABLET ORAL
Qty: 30 TABLET | Refills: 3 | Status: SHIPPED | OUTPATIENT
Start: 2018-03-19 | End: 2018-07-18 | Stop reason: SDUPTHER

## 2018-03-19 RX ORDER — GABAPENTIN 400 MG/1
CAPSULE ORAL
Qty: 90 CAPSULE | Refills: 1 | Status: SHIPPED | OUTPATIENT
Start: 2018-03-19 | End: 2018-05-14 | Stop reason: SDUPTHER

## 2018-04-09 ENCOUNTER — OFFICE VISIT (OUTPATIENT)
Dept: INTERNAL MEDICINE | Age: 48
End: 2018-04-09
Payer: MEDICAID

## 2018-04-09 VITALS
HEART RATE: 83 BPM | BODY MASS INDEX: 28.25 KG/M2 | SYSTOLIC BLOOD PRESSURE: 154 MMHG | DIASTOLIC BLOOD PRESSURE: 90 MMHG | HEIGHT: 67 IN | WEIGHT: 180 LBS

## 2018-04-09 DIAGNOSIS — M51.06 INTERVERTEBRAL LUMBAR DISC DISORDER WITH MYELOPATHY, LUMBAR REGION: Chronic | ICD-10-CM

## 2018-04-09 DIAGNOSIS — R73.03 PREDIABETES: Primary | ICD-10-CM

## 2018-04-09 DIAGNOSIS — I10 ESSENTIAL HYPERTENSION: ICD-10-CM

## 2018-04-09 PROCEDURE — 99213 OFFICE O/P EST LOW 20 MIN: CPT | Performed by: INTERNAL MEDICINE

## 2018-04-09 PROCEDURE — G8427 DOCREV CUR MEDS BY ELIG CLIN: HCPCS | Performed by: INTERNAL MEDICINE

## 2018-04-09 PROCEDURE — 4004F PT TOBACCO SCREEN RCVD TLK: CPT | Performed by: INTERNAL MEDICINE

## 2018-04-09 PROCEDURE — 99211 OFF/OP EST MAY X REQ PHY/QHP: CPT

## 2018-04-09 PROCEDURE — G8419 CALC BMI OUT NRM PARAM NOF/U: HCPCS | Performed by: INTERNAL MEDICINE

## 2018-04-09 RX ORDER — TIZANIDINE 2 MG/1
TABLET ORAL
Refills: 0 | COMMUNITY
Start: 2018-03-19 | End: 2018-04-09 | Stop reason: SDUPTHER

## 2018-04-09 RX ORDER — LISINOPRIL 5 MG/1
5 TABLET ORAL DAILY
Qty: 30 TABLET | Refills: 2 | Status: SHIPPED | OUTPATIENT
Start: 2018-04-09 | End: 2018-07-18 | Stop reason: SDUPTHER

## 2018-04-09 ASSESSMENT — PATIENT HEALTH QUESTIONNAIRE - PHQ9
7. TROUBLE CONCENTRATING ON THINGS, SUCH AS READING THE NEWSPAPER OR WATCHING TELEVISION: 3
SUM OF ALL RESPONSES TO PHQ QUESTIONS 1-9: 18
9. THOUGHTS THAT YOU WOULD BE BETTER OFF DEAD, OR OF HURTING YOURSELF: 0
6. FEELING BAD ABOUT YOURSELF - OR THAT YOU ARE A FAILURE OR HAVE LET YOURSELF OR YOUR FAMILY DOWN: 2
1. LITTLE INTEREST OR PLEASURE IN DOING THINGS: 3
2. FEELING DOWN, DEPRESSED OR HOPELESS: 1
4. FEELING TIRED OR HAVING LITTLE ENERGY: 3
3. TROUBLE FALLING OR STAYING ASLEEP: 3
5. POOR APPETITE OR OVEREATING: 3
8. MOVING OR SPEAKING SO SLOWLY THAT OTHER PEOPLE COULD HAVE NOTICED. OR THE OPPOSITE, BEING SO FIGETY OR RESTLESS THAT YOU HAVE BEEN MOVING AROUND A LOT MORE THAN USUAL: 0
SUM OF ALL RESPONSES TO PHQ9 QUESTIONS 1 & 2: 4
10. IF YOU CHECKED OFF ANY PROBLEMS, HOW DIFFICULT HAVE THESE PROBLEMS MADE IT FOR YOU TO DO YOUR WORK, TAKE CARE OF THINGS AT HOME, OR GET ALONG WITH OTHER PEOPLE: 0

## 2018-05-08 ENCOUNTER — HOSPITAL ENCOUNTER (OUTPATIENT)
Age: 48
Setting detail: SPECIMEN
Discharge: HOME | End: 2018-05-08
Payer: MEDICAID

## 2018-05-08 DIAGNOSIS — R73.03 PREDIABETES: ICD-10-CM

## 2018-05-08 LAB
CHOLESTEROL/HDL RATIO: 2.3
CHOLESTEROL: 140 MG/DL
CREATININE URINE: 426.4 MG/DL (ref 39–259)
HDLC SERPL-MCNC: 62 MG/DL
LDL CHOLESTEROL: 8 MG/DL (ref 0–130)
MICROALBUMIN/CREAT 24H UR: 18 MG/L
MICROALBUMIN/CREAT UR-RTO: 4 MCG/MG CREAT
TRIGL SERPL-MCNC: 351 MG/DL
VLDLC SERPL CALC-MCNC: ABNORMAL MG/DL (ref 1–30)

## 2018-05-08 PROCEDURE — 82570 ASSAY OF URINE CREATININE: CPT

## 2018-05-08 PROCEDURE — 82043 UR ALBUMIN QUANTITATIVE: CPT

## 2018-05-08 PROCEDURE — 80061 LIPID PANEL: CPT

## 2018-05-08 PROCEDURE — 36415 COLL VENOUS BLD VENIPUNCTURE: CPT

## 2018-05-14 DIAGNOSIS — M51.06 INTERVERTEBRAL LUMBAR DISC DISORDER WITH MYELOPATHY, LUMBAR REGION: Chronic | ICD-10-CM

## 2018-05-14 RX ORDER — GABAPENTIN 400 MG/1
CAPSULE ORAL
Qty: 90 CAPSULE | Refills: 1 | Status: SHIPPED | OUTPATIENT
Start: 2018-05-14 | End: 2018-07-18 | Stop reason: SDUPTHER

## 2018-05-21 ENCOUNTER — OFFICE VISIT (OUTPATIENT)
Dept: INTERNAL MEDICINE | Age: 48
End: 2018-05-21
Payer: MEDICAID

## 2018-05-21 VITALS
DIASTOLIC BLOOD PRESSURE: 68 MMHG | SYSTOLIC BLOOD PRESSURE: 138 MMHG | BODY MASS INDEX: 27.1 KG/M2 | HEART RATE: 80 BPM | WEIGHT: 173 LBS

## 2018-05-21 DIAGNOSIS — F17.200 SMOKER: ICD-10-CM

## 2018-05-21 DIAGNOSIS — M25.562 CHRONIC PAIN OF LEFT KNEE: ICD-10-CM

## 2018-05-21 DIAGNOSIS — R73.03 PREDIABETES: Primary | ICD-10-CM

## 2018-05-21 DIAGNOSIS — M51.06 INTERVERTEBRAL LUMBAR DISC DISORDER WITH MYELOPATHY, LUMBAR REGION: Chronic | ICD-10-CM

## 2018-05-21 DIAGNOSIS — G89.29 CHRONIC PAIN OF LEFT KNEE: ICD-10-CM

## 2018-05-21 LAB — HBA1C MFR BLD: 5.8 %

## 2018-05-21 PROCEDURE — G8419 CALC BMI OUT NRM PARAM NOF/U: HCPCS | Performed by: INTERNAL MEDICINE

## 2018-05-21 PROCEDURE — 83036 HEMOGLOBIN GLYCOSYLATED A1C: CPT | Performed by: INTERNAL MEDICINE

## 2018-05-21 PROCEDURE — G8427 DOCREV CUR MEDS BY ELIG CLIN: HCPCS | Performed by: INTERNAL MEDICINE

## 2018-05-21 PROCEDURE — 99213 OFFICE O/P EST LOW 20 MIN: CPT | Performed by: INTERNAL MEDICINE

## 2018-05-21 PROCEDURE — 4004F PT TOBACCO SCREEN RCVD TLK: CPT | Performed by: INTERNAL MEDICINE

## 2018-05-21 PROCEDURE — 99214 OFFICE O/P EST MOD 30 MIN: CPT | Performed by: INTERNAL MEDICINE

## 2018-05-21 RX ORDER — GINSENG 100 MG
CAPSULE ORAL
Qty: 1 TUBE | Refills: 0 | Status: SHIPPED | OUTPATIENT
Start: 2018-05-21 | End: 2018-05-31

## 2018-05-21 RX ORDER — VARENICLINE TARTRATE 25 MG
KIT ORAL
Qty: 1 EACH | Refills: 0 | Status: SHIPPED | OUTPATIENT
Start: 2018-05-21 | End: 2018-12-10

## 2018-06-08 DIAGNOSIS — M25.562 LEFT KNEE PAIN, UNSPECIFIED CHRONICITY: Primary | ICD-10-CM

## 2018-06-15 RX ORDER — TIZANIDINE 2 MG/1
TABLET ORAL
Qty: 60 TABLET | Refills: 2 | Status: SHIPPED | OUTPATIENT
Start: 2018-06-15 | End: 2018-09-13 | Stop reason: SDUPTHER

## 2018-07-18 DIAGNOSIS — E78.00 PURE HYPERCHOLESTEROLEMIA: ICD-10-CM

## 2018-07-18 DIAGNOSIS — I10 ESSENTIAL HYPERTENSION: ICD-10-CM

## 2018-07-18 RX ORDER — LISINOPRIL 5 MG/1
TABLET ORAL
Qty: 30 TABLET | Refills: 2 | Status: SHIPPED | OUTPATIENT
Start: 2018-07-18 | End: 2018-10-18 | Stop reason: SDUPTHER

## 2018-07-18 RX ORDER — SIMVASTATIN 40 MG
TABLET ORAL
Qty: 30 TABLET | Refills: 3 | Status: SHIPPED | OUTPATIENT
Start: 2018-07-18 | End: 2018-11-17 | Stop reason: SDUPTHER

## 2018-08-16 DIAGNOSIS — K21.9 GASTROESOPHAGEAL REFLUX DISEASE WITHOUT ESOPHAGITIS: ICD-10-CM

## 2018-08-16 RX ORDER — OMEPRAZOLE 20 MG/1
CAPSULE, DELAYED RELEASE ORAL
Qty: 30 CAPSULE | Refills: 2 | Status: SHIPPED | OUTPATIENT
Start: 2018-08-16 | End: 2018-11-17 | Stop reason: SDUPTHER

## 2018-08-16 NOTE — TELEPHONE ENCOUNTER
E-Scribe request for OMEPRAZOLE DR 20 MG CAPSULE. Pt last seen 5/21/18    Next Visit Date:  Future Appointments  Date Time Provider Antno Masseyi   10/4/2018 1:30 PM Guru Calderon MD [address] [facility]   Topic Date Due    Diabetic retinal exam  10/14/1982    Flu vaccine (1) 09/01/2018    Potassium monitoring  03/10/2019    Creatinine monitoring  03/10/2019    Diabetic foot exam  04/09/2019    Diabetic microalbuminuria test  05/08/2019    Lipid screen  05/08/2019    A1C test (Diabetic or Prediabetic)  05/21/2019    DTaP/Tdap/Td vaccine (2 - Td) 10/08/2026    Pneumococcal med risk  Completed    HIV screen  Completed             (applicable per patient's age: Cancer Screenings, Depression Screening, Fall Risk Screening, Immunizations)    Hemoglobin A1C (%)   Date Value   05/21/2018 5.8   11/28/2017 5.6   04/25/2017 6.0     Microalb/Crt.  Ratio (mcg/mg creat)   Date Value   05/08/2018 4     LDL Cholesterol (mg/dL)   Date Value   05/08/2018 8     AST (U/L)   Date Value   03/10/2018 19     ALT (U/L)   Date Value   03/10/2018 18     BUN (mg/dL)   Date Value   03/10/2018 8      (goal A1C is < 7)   (goal LDL is <100) need 30-50% reduction from baseline     BP Readings from Last 3 Encounters:   05/21/18 138/68   04/09/18 (!) 154/90   03/10/18 (!) 159/89    (goal /80)      All Future Testing planned in [facility]:  Lab Frequency Next Occurrence   XR KNEE LEFT (MIN 4 VIEWS) Once 08/16/2018            Patient Active Problem List:     Intervertebral lumbar disc disorder with myelopathy, lumbar region     Gastroesophageal reflux disease without esophagitis     Essential hypertension     Pure hypercholesterolemia     Prediabetes

## 2018-09-13 DIAGNOSIS — M51.06 INTERVERTEBRAL LUMBAR DISC DISORDER WITH MYELOPATHY, LUMBAR REGION: Chronic | ICD-10-CM

## 2018-09-13 RX ORDER — GABAPENTIN 400 MG/1
CAPSULE ORAL
Qty: 90 CAPSULE | Refills: 1 | Status: SHIPPED | OUTPATIENT
Start: 2018-09-13 | End: 2018-11-17 | Stop reason: SDUPTHER

## 2018-09-13 RX ORDER — TIZANIDINE 2 MG/1
TABLET ORAL
Qty: 60 TABLET | Refills: 2 | Status: SHIPPED | OUTPATIENT
Start: 2018-09-13 | End: 2018-12-14 | Stop reason: SDUPTHER

## 2018-10-04 ENCOUNTER — OFFICE VISIT (OUTPATIENT)
Dept: INTERNAL MEDICINE | Age: 48
End: 2018-10-04
Payer: MEDICAID

## 2018-10-04 VITALS
SYSTOLIC BLOOD PRESSURE: 139 MMHG | DIASTOLIC BLOOD PRESSURE: 88 MMHG | BODY MASS INDEX: 30.73 KG/M2 | HEIGHT: 62 IN | HEART RATE: 94 BPM | WEIGHT: 167 LBS

## 2018-10-04 DIAGNOSIS — R73.03 PREDIABETES: ICD-10-CM

## 2018-10-04 DIAGNOSIS — L30.9 ECZEMA, UNSPECIFIED TYPE: ICD-10-CM

## 2018-10-04 DIAGNOSIS — F32.A DEPRESSION, UNSPECIFIED DEPRESSION TYPE: ICD-10-CM

## 2018-10-04 DIAGNOSIS — I10 ESSENTIAL HYPERTENSION: Primary | ICD-10-CM

## 2018-10-04 DIAGNOSIS — Z23 FLU VACCINE NEED: ICD-10-CM

## 2018-10-04 PROCEDURE — 4004F PT TOBACCO SCREEN RCVD TLK: CPT | Performed by: INTERNAL MEDICINE

## 2018-10-04 PROCEDURE — G8427 DOCREV CUR MEDS BY ELIG CLIN: HCPCS | Performed by: INTERNAL MEDICINE

## 2018-10-04 PROCEDURE — G8417 CALC BMI ABV UP PARAM F/U: HCPCS | Performed by: INTERNAL MEDICINE

## 2018-10-04 PROCEDURE — G8482 FLU IMMUNIZE ORDER/ADMIN: HCPCS | Performed by: INTERNAL MEDICINE

## 2018-10-04 PROCEDURE — 90686 IIV4 VACC NO PRSV 0.5 ML IM: CPT | Performed by: INTERNAL MEDICINE

## 2018-10-04 PROCEDURE — 99214 OFFICE O/P EST MOD 30 MIN: CPT | Performed by: INTERNAL MEDICINE

## 2018-10-04 PROCEDURE — 99211 OFF/OP EST MAY X REQ PHY/QHP: CPT | Performed by: INTERNAL MEDICINE

## 2018-10-04 RX ORDER — FOLIC ACID 1 MG/1
1 TABLET ORAL
COMMUNITY
Start: 2018-09-28 | End: 2018-12-10

## 2018-10-04 RX ORDER — DIAPER,BRIEF,INFANT-TODD,DISP
EACH MISCELLANEOUS
COMMUNITY
Start: 2018-09-27 | End: 2018-10-27

## 2018-10-04 RX ORDER — TRIAMCINOLONE ACETONIDE 1 MG/ML
LOTION TOPICAL
Qty: 2 BOTTLE | Refills: 1 | Status: SHIPPED | OUTPATIENT
Start: 2018-10-04 | End: 2018-12-10 | Stop reason: SDUPTHER

## 2018-10-04 RX ORDER — BUPROPION HYDROCHLORIDE 75 MG/1
75 TABLET ORAL 2 TIMES DAILY
Qty: 60 TABLET | Refills: 3 | Status: SHIPPED | OUTPATIENT
Start: 2018-10-04 | End: 2019-02-10 | Stop reason: SDUPTHER

## 2018-10-04 RX ORDER — PROMETHAZINE HYDROCHLORIDE 25 MG/1
25 TABLET ORAL
COMMUNITY
Start: 2018-09-27 | End: 2018-10-27

## 2018-10-04 RX ORDER — HYDROXYZINE HYDROCHLORIDE 25 MG/1
25 TABLET, FILM COATED ORAL EVERY 8 HOURS PRN
Qty: 30 TABLET | Refills: 0 | Status: SHIPPED | OUTPATIENT
Start: 2018-10-04 | End: 2018-10-18 | Stop reason: SDUPTHER

## 2018-10-04 NOTE — PATIENT INSTRUCTIONS
RTC on 12/10/18 . Medications e-scribe to pharmacy of pt's choice. Referral for dermatology sent to [facility]  they will call pt for appt, copy of referral with number and address given to pt. Pt should call referral number if not heard from within a couple of weeks. Referral for Opthalmology given to pt, pt will call and make appt at place of choice. An After Visit Summary was printed and given to the patient.  AMENA

## 2018-10-18 DIAGNOSIS — I10 ESSENTIAL HYPERTENSION: ICD-10-CM

## 2018-10-18 RX ORDER — LISINOPRIL 5 MG/1
TABLET ORAL
Qty: 30 TABLET | Refills: 0 | Status: SHIPPED | OUTPATIENT
Start: 2018-10-18 | End: 2018-11-17 | Stop reason: SDUPTHER

## 2018-10-18 RX ORDER — HYDROXYZINE HYDROCHLORIDE 25 MG/1
TABLET, FILM COATED ORAL
Qty: 30 TABLET | Refills: 0 | Status: SHIPPED | OUTPATIENT
Start: 2018-10-18 | End: 2018-11-17 | Stop reason: SDUPTHER

## 2018-10-18 NOTE — TELEPHONE ENCOUNTER
Hydroxyzine and lisinopril pending for refill         Health Maintenance   Topic Date Due    Diabetic retinal exam  10/14/1982    Potassium monitoring  03/10/2019    Creatinine monitoring  03/10/2019    Diabetic foot exam  04/09/2019    Diabetic microalbuminuria test  05/08/2019    Lipid screen  05/08/2019    A1C test (Diabetic or Prediabetic)  05/21/2019    DTaP/Tdap/Td vaccine (2 - Td) 10/08/2026    Flu vaccine  Completed    Pneumococcal med risk  Completed    HIV screen  Completed             (applicable per patient's age: Cancer Screenings, Depression Screening, Fall Risk Screening, Immunizations)    Hemoglobin A1C (%)   Date Value   05/21/2018 5.8   11/28/2017 5.6   04/25/2017 6.0     Microalb/Crt.  Ratio (mcg/mg creat)   Date Value   05/08/2018 4     LDL Cholesterol (mg/dL)   Date Value   05/08/2018 8     AST (U/L)   Date Value   03/10/2018 19     ALT (U/L)   Date Value   03/10/2018 18     BUN (mg/dL)   Date Value   03/10/2018 8      (goal A1C is < 7)   (goal LDL is <100) need 30-50% reduction from baseline     BP Readings from Last 3 Encounters:   10/04/18 139/88   05/21/18 138/68   04/09/18 (!) 154/90    (goal /80)      All Future Testing planned in [facility]:      Next Visit Date:  Future Appointments  Date Time Provider Anton Bledsoe   [facility]2018 2:[facility]   12[facility]10/2018 2:30 [facility]            Patient Active Problem List:     Depression     Intervertebral lumbar disc disorder with myelopathy, lumbar region     Gastroesophageal reflux disease without esophagitis     Essential hypertension     Pure hypercholesterolemia     Prediabetes

## 2018-11-17 DIAGNOSIS — M51.06 INTERVERTEBRAL LUMBAR DISC DISORDER WITH MYELOPATHY, LUMBAR REGION: Chronic | ICD-10-CM

## 2018-11-17 DIAGNOSIS — E78.00 PURE HYPERCHOLESTEROLEMIA: ICD-10-CM

## 2018-11-17 DIAGNOSIS — K21.9 GASTROESOPHAGEAL REFLUX DISEASE WITHOUT ESOPHAGITIS: ICD-10-CM

## 2018-11-17 DIAGNOSIS — I10 ESSENTIAL HYPERTENSION: ICD-10-CM

## 2018-11-19 RX ORDER — SIMVASTATIN 40 MG
TABLET ORAL
Qty: 30 TABLET | Refills: 0 | Status: SHIPPED | OUTPATIENT
Start: 2018-11-19 | End: 2018-12-15 | Stop reason: SDUPTHER

## 2018-11-19 RX ORDER — GABAPENTIN 400 MG/1
CAPSULE ORAL
Qty: 90 CAPSULE | Refills: 0 | Status: SHIPPED | OUTPATIENT
Start: 2018-11-19 | End: 2018-12-15 | Stop reason: SDUPTHER

## 2018-11-19 RX ORDER — LISINOPRIL 5 MG/1
TABLET ORAL
Qty: 30 TABLET | Refills: 0 | Status: SHIPPED | OUTPATIENT
Start: 2018-11-19 | End: 2018-12-15 | Stop reason: SDUPTHER

## 2018-11-19 RX ORDER — HYDROXYZINE HYDROCHLORIDE 25 MG/1
TABLET, FILM COATED ORAL
Qty: 30 TABLET | Refills: 0 | Status: SHIPPED | OUTPATIENT
Start: 2018-11-19 | End: 2018-12-10

## 2018-11-19 RX ORDER — OMEPRAZOLE 20 MG/1
CAPSULE, DELAYED RELEASE ORAL
Qty: 30 CAPSULE | Refills: 0 | Status: SHIPPED | OUTPATIENT
Start: 2018-11-19 | End: 2018-12-15 | Stop reason: SDUPTHER

## 2018-12-10 ENCOUNTER — OFFICE VISIT (OUTPATIENT)
Dept: INTERNAL MEDICINE | Age: 48
End: 2018-12-10
Payer: MEDICAID

## 2018-12-10 ENCOUNTER — HOSPITAL ENCOUNTER (OUTPATIENT)
Age: 48
Setting detail: SPECIMEN
Discharge: HOME | End: 2018-12-10
Payer: MEDICAID

## 2018-12-10 VITALS
HEART RATE: 100 BPM | WEIGHT: 178 LBS | SYSTOLIC BLOOD PRESSURE: 142 MMHG | DIASTOLIC BLOOD PRESSURE: 94 MMHG | BODY MASS INDEX: 32.56 KG/M2

## 2018-12-10 DIAGNOSIS — R73.03 PREDIABETES: ICD-10-CM

## 2018-12-10 DIAGNOSIS — F17.200 SMOKER: ICD-10-CM

## 2018-12-10 DIAGNOSIS — F32.A DEPRESSION, UNSPECIFIED DEPRESSION TYPE: Primary | ICD-10-CM

## 2018-12-10 DIAGNOSIS — R20.2 NUMBNESS AND TINGLING OF RIGHT ARM: ICD-10-CM

## 2018-12-10 DIAGNOSIS — I10 ESSENTIAL HYPERTENSION: ICD-10-CM

## 2018-12-10 DIAGNOSIS — R20.0 NUMBNESS AND TINGLING OF RIGHT ARM: ICD-10-CM

## 2018-12-10 DIAGNOSIS — K21.9 GASTROESOPHAGEAL REFLUX DISEASE WITHOUT ESOPHAGITIS: ICD-10-CM

## 2018-12-10 DIAGNOSIS — R21 RASH: ICD-10-CM

## 2018-12-10 LAB
ALBUMIN SERPL-MCNC: 4.1 G/DL (ref 3.5–5.2)
ALBUMIN/GLOBULIN RATIO: 1.4 (ref 1–2.5)
ALP BLD-CCNC: 118 U/L (ref 40–129)
ALT SERPL-CCNC: 60 U/L (ref 5–41)
AST SERPL-CCNC: 108 U/L
BILIRUB SERPL-MCNC: 0.25 MG/DL (ref 0.3–1.2)
BILIRUBIN DIRECT: 0.12 MG/DL
BILIRUBIN, INDIRECT: 0.13 MG/DL (ref 0–1)
GLOBULIN: ABNORMAL G/DL (ref 1.5–3.8)
TOTAL PROTEIN: 7.1 G/DL (ref 6.4–8.3)

## 2018-12-10 PROCEDURE — 99211 OFF/OP EST MAY X REQ PHY/QHP: CPT | Performed by: INTERNAL MEDICINE

## 2018-12-10 PROCEDURE — G8427 DOCREV CUR MEDS BY ELIG CLIN: HCPCS | Performed by: INTERNAL MEDICINE

## 2018-12-10 PROCEDURE — 36415 COLL VENOUS BLD VENIPUNCTURE: CPT

## 2018-12-10 PROCEDURE — G8482 FLU IMMUNIZE ORDER/ADMIN: HCPCS | Performed by: INTERNAL MEDICINE

## 2018-12-10 PROCEDURE — G8417 CALC BMI ABV UP PARAM F/U: HCPCS | Performed by: INTERNAL MEDICINE

## 2018-12-10 PROCEDURE — 99214 OFFICE O/P EST MOD 30 MIN: CPT | Performed by: INTERNAL MEDICINE

## 2018-12-10 PROCEDURE — 80076 HEPATIC FUNCTION PANEL: CPT

## 2018-12-10 PROCEDURE — 4004F PT TOBACCO SCREEN RCVD TLK: CPT | Performed by: INTERNAL MEDICINE

## 2018-12-10 RX ORDER — TRIAMCINOLONE ACETONIDE 1 MG/ML
LOTION TOPICAL
Qty: 2 BOTTLE | Refills: 1 | Status: SHIPPED | OUTPATIENT
Start: 2018-12-10 | End: 2018-12-17

## 2018-12-10 RX ORDER — BLOOD PRESSURE TEST KIT
1 KIT MISCELLANEOUS DAILY
Qty: 1 KIT | Refills: 0 | Status: SHIPPED | OUTPATIENT
Start: 2018-12-10 | End: 2019-04-23

## 2018-12-10 NOTE — PATIENT INSTRUCTIONS
RTC on 3/11/19. Script for lab given to pt, no fasting required. Pt will get labs done before next appt. Referral for Physical Therapy given to pt along with some locations, pt will have to chose location and make their own appt. Please take Referral to appt. Printed script for BP Kit given to pt. An After Visit Summary was printed and given to the patient. CB    LABORATORY INSTRUCTIONS    Your doctor has ordered blood or urine testing. You can get this testing done at the Lab located on the first floor of the [facility], or at any other [facility]. Please stop at Main Registration, before going to the lab, as you must be registered first.     Please get this lab done before your next visit.     You may eat or drink before this test.

## 2018-12-15 DIAGNOSIS — K21.9 GASTROESOPHAGEAL REFLUX DISEASE WITHOUT ESOPHAGITIS: ICD-10-CM

## 2018-12-15 DIAGNOSIS — E78.00 PURE HYPERCHOLESTEROLEMIA: ICD-10-CM

## 2018-12-15 DIAGNOSIS — M51.06 INTERVERTEBRAL LUMBAR DISC DISORDER WITH MYELOPATHY, LUMBAR REGION: Chronic | ICD-10-CM

## 2018-12-15 DIAGNOSIS — I10 ESSENTIAL HYPERTENSION: ICD-10-CM

## 2018-12-17 RX ORDER — OMEPRAZOLE 20 MG/1
CAPSULE, DELAYED RELEASE ORAL
Qty: 30 CAPSULE | Refills: 0 | Status: SHIPPED | OUTPATIENT
Start: 2018-12-17 | End: 2019-01-16 | Stop reason: SDUPTHER

## 2018-12-17 RX ORDER — SIMVASTATIN 40 MG
TABLET ORAL
Qty: 30 TABLET | Refills: 0 | Status: SHIPPED | OUTPATIENT
Start: 2018-12-17 | End: 2019-01-16 | Stop reason: SDUPTHER

## 2018-12-17 RX ORDER — HYDROXYZINE HYDROCHLORIDE 25 MG/1
TABLET, FILM COATED ORAL
Qty: 30 TABLET | Refills: 0 | Status: SHIPPED | OUTPATIENT
Start: 2018-12-17 | End: 2019-04-23 | Stop reason: SDUPTHER

## 2018-12-17 RX ORDER — LISINOPRIL 5 MG/1
TABLET ORAL
Qty: 30 TABLET | Refills: 0 | Status: SHIPPED | OUTPATIENT
Start: 2018-12-17 | End: 2019-01-16 | Stop reason: SDUPTHER

## 2018-12-17 RX ORDER — GABAPENTIN 400 MG/1
CAPSULE ORAL
Qty: 90 CAPSULE | Refills: 0 | Status: SHIPPED | OUTPATIENT
Start: 2018-12-17 | End: 2019-01-16 | Stop reason: SDUPTHER

## 2019-02-10 DIAGNOSIS — M51.06 INTERVERTEBRAL LUMBAR DISC DISORDER WITH MYELOPATHY, LUMBAR REGION: Chronic | ICD-10-CM

## 2019-02-11 RX ORDER — TIZANIDINE 2 MG/1
TABLET ORAL
Qty: 60 TABLET | Refills: 1 | Status: SHIPPED | OUTPATIENT
Start: 2019-02-11 | End: 2019-04-08 | Stop reason: SDUPTHER

## 2019-02-11 RX ORDER — BUPROPION HYDROCHLORIDE 75 MG/1
TABLET ORAL
Qty: 60 TABLET | Refills: 3 | Status: SHIPPED | OUTPATIENT
Start: 2019-02-11 | End: 2019-04-23

## 2019-04-10 ENCOUNTER — TELEPHONE (OUTPATIENT)
Dept: INTERNAL MEDICINE | Age: 49
End: 2019-04-10

## 2019-04-10 NOTE — LETTER
[address]  [address] [facility] [address] [facility]  Phone: [phone]  Fax: [phone]    Myra Cazares MD        April 10, 2019     Patient: Malou Kyle   YOB: 1970   Date of Visit: 4/10/2019       To Whom It May Concern: It is my medical opinion that Malou Kyle would benefit from having a comfort pet because of his medical problems. If you have any questions or concerns, please don't hesitate to call.     Sincerely,        Myra Cazares MD

## 2019-04-10 NOTE — TELEPHONE ENCOUNTER
Patient is calling stating that he has a puppy and his apartment is going to charge him with a deposit or make him get rid of the dog -- he is asking to get a letter stating that he could use a \"comfort pet\" due to having disabilities. Please write the letter if appropriate -- he would like it faxed to [phone] anamika Quinn or Divya Jose David    He states he needs it by Monday or they will make him get rid of the dog. Health Maintenance   Topic Date Due    Potassium monitoring  03/10/2019    Creatinine monitoring  03/10/2019    A1C test (Diabetic or Prediabetic)  05/21/2019    Lipid screen  05/08/2023    DTaP/Tdap/Td vaccine (2 - Td) 10/08/2026    Flu vaccine  Completed    Pneumococcal 0-64 years Vaccine  Completed    HIV screen  Completed             (applicable per patient's age: Cancer Screenings, Depression Screening, Fall Risk Screening, Immunizations)    Hemoglobin A1C (%)   Date Value   05/21/2018 5.8   11/28/2017 5.6   04/25/2017 6.0     Microalb/Crt.  Ratio (mcg/mg creat)   Date Value   05/08/2018 4     LDL Cholesterol (mg/dL)   Date Value   05/08/2018 8     AST (U/L)   Date Value   12/10/2018 108 (H)     ALT (U/L)   Date Value   12/10/2018 60 (H)     BUN (mg/dL)   Date Value   03/10/2018 8      (goal A1C is < 7)   (goal LDL is <100) need 30-50% reduction from baseline     BP Readings from Last 3 Encounters:   12/10/18 (!) 142/94   10/04/18 139/88   05/21/18 138/68    (goal /80)      All Future Testing planned in [facility]:      Next Visit Date:  Future Appointments   Date Time Provider Anton Bledsoe   4/23/2019  1:45 PM Jaymie Leal MD [id number]            Patient Active Problem List:     Depression     Intervertebral lumbar disc disorder with myelopathy, lumbar region     Gastroesophageal reflux disease without esophagitis     Essential hypertension     Pure hypercholesterolemia     Prediabetes     Smoker

## 2019-04-23 ENCOUNTER — OFFICE VISIT (OUTPATIENT)
Dept: INTERNAL MEDICINE | Age: 49
End: 2019-04-23
Payer: MEDICAID

## 2019-04-23 VITALS
HEART RATE: 89 BPM | DIASTOLIC BLOOD PRESSURE: 83 MMHG | BODY MASS INDEX: 26.21 KG/M2 | HEIGHT: 67 IN | WEIGHT: 167 LBS | SYSTOLIC BLOOD PRESSURE: 138 MMHG

## 2019-04-23 DIAGNOSIS — F17.200 SMOKER: ICD-10-CM

## 2019-04-23 DIAGNOSIS — K21.9 GASTROESOPHAGEAL REFLUX DISEASE WITHOUT ESOPHAGITIS: ICD-10-CM

## 2019-04-23 DIAGNOSIS — I10 ESSENTIAL HYPERTENSION: ICD-10-CM

## 2019-04-23 DIAGNOSIS — E78.00 PURE HYPERCHOLESTEROLEMIA: ICD-10-CM

## 2019-04-23 DIAGNOSIS — L30.8 OTHER ECZEMA: ICD-10-CM

## 2019-04-23 DIAGNOSIS — R73.03 PREDIABETES: Primary | ICD-10-CM

## 2019-04-23 PROCEDURE — 99214 OFFICE O/P EST MOD 30 MIN: CPT | Performed by: INTERNAL MEDICINE

## 2019-04-23 PROCEDURE — G8417 CALC BMI ABV UP PARAM F/U: HCPCS | Performed by: INTERNAL MEDICINE

## 2019-04-23 PROCEDURE — 99211 OFF/OP EST MAY X REQ PHY/QHP: CPT | Performed by: INTERNAL MEDICINE

## 2019-04-23 PROCEDURE — 4004F PT TOBACCO SCREEN RCVD TLK: CPT | Performed by: INTERNAL MEDICINE

## 2019-04-23 PROCEDURE — G8427 DOCREV CUR MEDS BY ELIG CLIN: HCPCS | Performed by: INTERNAL MEDICINE

## 2019-04-23 RX ORDER — SIMVASTATIN 40 MG
40 TABLET ORAL NIGHTLY
Qty: 30 TABLET | Refills: 3 | Status: SHIPPED | OUTPATIENT
Start: 2019-04-23 | End: 2019-10-24 | Stop reason: SDUPTHER

## 2019-04-23 RX ORDER — OMEPRAZOLE 20 MG/1
20 CAPSULE, DELAYED RELEASE ORAL DAILY
Qty: 30 CAPSULE | Refills: 3 | Status: SHIPPED | OUTPATIENT
Start: 2019-04-23 | End: 2019-10-24 | Stop reason: SDUPTHER

## 2019-04-23 RX ORDER — TRIAMCINOLONE ACETONIDE 1 MG/ML
LOTION TOPICAL
Qty: 1 BOTTLE | Refills: 1 | Status: SHIPPED | OUTPATIENT
Start: 2019-04-23 | End: 2019-04-30

## 2019-04-23 RX ORDER — HYDROXYZINE HYDROCHLORIDE 25 MG/1
25 TABLET, FILM COATED ORAL EVERY 8 HOURS PRN
Qty: 30 TABLET | Refills: 0 | Status: SHIPPED | OUTPATIENT
Start: 2019-04-23 | End: 2020-01-20 | Stop reason: ALTCHOICE

## 2019-04-23 RX ORDER — LISINOPRIL 5 MG/1
5 TABLET ORAL DAILY
Qty: 30 TABLET | Refills: 3 | Status: SHIPPED | OUTPATIENT
Start: 2019-04-23 | End: 2019-10-24 | Stop reason: SDUPTHER

## 2019-04-23 NOTE — PROGRESS NOTES
Visit Information    Have you changed or started any medications since your last visit including any over-the-counter medicines, vitamins, or herbal medicines? no   Have you stopped taking any of your medications? Is so, why? -  no  Are you having any side effects from any of your medications? - no    Have you seen any other physician or provider since your last visit?  no   Have you had any other diagnostic tests since your last visit?  no   Have you been seen in the emergency room and/or had an admission in a hospital since we last saw you?  no   Have you had your routine dental cleaning in the past 6 months?  no     Do you have an active MyChart account? If no, what is the barrier?   No:     Patient Care Team:  Everlean Cushing, MD as PCP - General (Internal Medicine)    Medical History Review  Past Medical, Family, and Social History reviewed and does not contribute to the patient presenting condition    Health Maintenance   Topic Date Due    Potassium monitoring  03/10/2019    Creatinine monitoring  03/10/2019    A1C test (Diabetic or Prediabetic)  05/21/2019    Lipid screen  05/08/2023    DTaP/Tdap/Td vaccine (2 - Td) 10/08/2026    Flu vaccine  Completed    Pneumococcal 0-64 years Vaccine  Completed    HIV screen  Completed

## 2019-04-23 NOTE — PATIENT INSTRUCTIONS
RTC on 6/19/19   Medications e-scribe to pharmacy of pt's choice. Script for lab given to pt, no fasting required. Pt will get labs done before next appt. Referral for Dermatology sent to [facility]  they will call pt for appt, copy of referral with number and address given to pt. Pt should call referral number if not heard from within a couple of weeks. An After Visit Summary was printed and given to the patient. CB    LABORATORY INSTRUCTIONS    Your doctor has ordered blood or urine testing. You can get this testing done at the Lab located on the first floor of the [facility], or at any other [facility]. Please stop at Main Registration, before going to the lab, as you must be registered first.     Please get this lab done before your next visit.     You may eat or drink before this test.

## 2019-05-08 ENCOUNTER — HOSPITAL ENCOUNTER (OUTPATIENT)
Age: 49
Setting detail: SPECIMEN
Discharge: HOME | End: 2019-05-08
Payer: MEDICAID

## 2019-05-08 DIAGNOSIS — I10 ESSENTIAL HYPERTENSION: ICD-10-CM

## 2019-05-08 LAB
ANION GAP SERPL CALCULATED.3IONS-SCNC: 14 MMOL/L (ref 9–17)
BUN BLDV-MCNC: 8 MG/DL (ref 6–20)
BUN/CREAT BLD: NORMAL (ref 9–20)
CALCIUM SERPL-MCNC: 8.8 MG/DL (ref 8.6–10.4)
CHLORIDE BLD-SCNC: 105 MMOL/L (ref 98–107)
CO2: 23 MMOL/L (ref 20–31)
CREAT SERPL-MCNC: 0.87 MG/DL (ref 0.7–1.2)
ESTIMATED AVERAGE GLUCOSE: 100 MG/DL
GFR AFRICAN AMERICAN: >60 ML/MIN
GFR NON-AFRICAN AMERICAN: >60 ML/MIN
GFR SERPL CREATININE-BSD FRML MDRD: NORMAL ML/MIN/{1.73_M2}
GFR SERPL CREATININE-BSD FRML MDRD: NORMAL ML/MIN/{1.73_M2}
GLUCOSE BLD-MCNC: 98 MG/DL (ref 70–99)
HBA1C MFR BLD: 5.1 % (ref 4–6)
HCT VFR BLD CALC: 43 % (ref 40.7–50.3)
HEMOGLOBIN: 13.2 G/DL (ref 13–17)
MCH RBC QN AUTO: 28.3 PG (ref 25.2–33.5)
MCHC RBC AUTO-ENTMCNC: 30.7 G/DL (ref 28.4–34.8)
MCV RBC AUTO: 92.3 FL (ref 82.6–102.9)
NRBC AUTOMATED: 0 PER 100 WBC
PDW BLD-RTO: 14.7 % (ref 11.8–14.4)
PLATELET # BLD: 236 K/UL (ref 138–453)
PMV BLD AUTO: 10.7 FL (ref 8.1–13.5)
POTASSIUM SERPL-SCNC: 4 MMOL/L (ref 3.7–5.3)
RBC # BLD: 4.66 M/UL (ref 4.21–5.77)
SODIUM BLD-SCNC: 142 MMOL/L (ref 135–144)
WBC # BLD: 9.6 K/UL (ref 3.5–11.3)

## 2019-05-08 PROCEDURE — 85027 COMPLETE CBC AUTOMATED: CPT

## 2019-05-08 PROCEDURE — 36415 COLL VENOUS BLD VENIPUNCTURE: CPT

## 2019-05-08 PROCEDURE — 83036 HEMOGLOBIN GLYCOSYLATED A1C: CPT

## 2019-05-08 PROCEDURE — 80048 BASIC METABOLIC PNL TOTAL CA: CPT

## 2019-06-12 DIAGNOSIS — M51.06 INTERVERTEBRAL LUMBAR DISC DISORDER WITH MYELOPATHY, LUMBAR REGION: Chronic | ICD-10-CM

## 2019-06-13 RX ORDER — BUPROPION HYDROCHLORIDE 75 MG/1
TABLET ORAL
Qty: 60 TABLET | Refills: 3 | Status: SHIPPED | OUTPATIENT
Start: 2019-06-13 | End: 2019-06-26 | Stop reason: ALTCHOICE

## 2019-06-13 RX ORDER — TIZANIDINE 2 MG/1
TABLET ORAL
Qty: 60 TABLET | Refills: 1 | Status: SHIPPED | OUTPATIENT
Start: 2019-06-13 | End: 2020-01-20 | Stop reason: SDUPTHER

## 2019-06-26 ENCOUNTER — OFFICE VISIT (OUTPATIENT)
Dept: DERMATOLOGY | Age: 49
End: 2019-06-26
Payer: MEDICAID

## 2019-06-26 VITALS
DIASTOLIC BLOOD PRESSURE: 83 MMHG | WEIGHT: 162 LBS | BODY MASS INDEX: 25.43 KG/M2 | OXYGEN SATURATION: 95 % | SYSTOLIC BLOOD PRESSURE: 165 MMHG | HEART RATE: 85 BPM | HEIGHT: 67 IN

## 2019-06-26 DIAGNOSIS — L01.1 SECONDARY IMPETIGINIZATION: ICD-10-CM

## 2019-06-26 DIAGNOSIS — L20.89 OTHER ATOPIC DERMATITIS: Primary | ICD-10-CM

## 2019-06-26 PROCEDURE — 99203 OFFICE O/P NEW LOW 30 MIN: CPT | Performed by: DERMATOLOGY

## 2019-06-26 PROCEDURE — G8417 CALC BMI ABV UP PARAM F/U: HCPCS | Performed by: DERMATOLOGY

## 2019-06-26 PROCEDURE — G8427 DOCREV CUR MEDS BY ELIG CLIN: HCPCS | Performed by: DERMATOLOGY

## 2019-06-26 PROCEDURE — 4004F PT TOBACCO SCREEN RCVD TLK: CPT | Performed by: DERMATOLOGY

## 2019-06-26 RX ORDER — CEPHALEXIN 500 MG/1
1000 CAPSULE ORAL 2 TIMES DAILY
Qty: 40 CAPSULE | Refills: 0 | Status: SHIPPED | OUTPATIENT
Start: 2019-06-26 | End: 2019-07-06

## 2019-06-26 RX ORDER — TACROLIMUS 0.3 MG/G
OINTMENT TOPICAL
Qty: 30 G | Refills: 2 | Status: SHIPPED | OUTPATIENT
Start: 2019-06-26 | End: 2019-11-18 | Stop reason: SDUPTHER

## 2019-06-26 RX ORDER — BETAMETHASONE DIPROPIONATE 0.05 %
OINTMENT (GRAM) TOPICAL
Qty: 50 G | Refills: 2 | Status: SHIPPED | OUTPATIENT
Start: 2019-06-26 | End: 2020-01-20

## 2019-06-26 NOTE — PATIENT INSTRUCTIONS
Patch Testing for Skin Allergies   A patch test is a skin test used to find the cause of a possible allergic reaction on the skin. This reaction is called allergic contact dermatitis. Contact dermatitis is a reaction to something that came into contact with the skin. This kind of allergic reaction usually causes inflammation (redness, itching). Patch testing is done by the dermatology clinic. During patch testing, different substances are placed on the skin and taped in place. The patches are typically placed on the back, left on for 2 days and then removed. The area of skin that was tested will be evaluated by the dermatologist 2 to 4 days after the patches are removed. You will need to return to the dermatology clinic 2 to 3 times during the week that the patch testing is done. Generally the test week is a total of 7 days. It is important to follow the guidelines below to get accurate results. Patch testing evaluates a slow, delayed skin reaction so it is important that the patches stay in place. The test site must also be protected throughout the entire test week. Preparation for the Test   Getting the skin ready for your patch test is important. Following these instructions will help you to get more accurate, reliable results. - Avoid sun exposure for 1 to 2 weeks before patch testing.   - Do not use topical medicines (creams and ointments) on the back and any other area where patches may be placed for at least 1 week before patch testing. You can continue to use these medicines on areas of the body where patches will not be placed. - You may use moisturizers on the skin until the day before patch testing.   - You may continue to take any prescribed antihistamines as usual before and during the testing. During the Test Week   - The patches and test area marked with ink must be kept dry throughout the entire test.     During the Test Week  - Do not apply anything to the test area.  This includes all soaps, creams, ointments and moisturizers. - Do not scratch, rub, loosen or remove the patches. - Avoid physical activities that will loosen the tape. - Do not expose the test area to sunlight. - Avoid hot areas and activities that cause excessive sweating. Continue to take your prescribed antihistamines. The patches must be kept dry. You should not take a shower or bath. Wash areas of the body where there are no patches with a washcloth or bath sponge. Be careful not to get the patches wet. The First Visit:   The patches are usually placed on your back and left in place for 2 days.   - You should wear loose, comfortable clothing.   - You should wear old, dark-colored clothes to avoid stains from the marker. Small sheets, or patches, holding different substances are placed on the skin. The nurses will use a marker to outline the edges of the sheets. Then the sheets will be secured with tape or another sticky bandage (dressing). The Second Visit:   The second visit is usually 2 days after the patches were put on. Typically you will come to the clinic to have the patches removed. The nurses will carefully remove the tape and patches. They will re-portillo the skin where the patches were. The nurses will check the area to see if the skin shows any signs of a reaction. In many cases a reaction may not have shown up yet. It is still important to come back to the clinic to have the area checked one more time. Continue to keep the skin dry where the patches were. Do not take a shower or bathe where the patches were. Do not apply creams, ointments, or moisturizers where the patches were. Do not scratch the skin where the patches were. You should avoid hot areas and activities that cause excessive sweating. Removing patches: Normally you should not remove patches yourself. Usually the nurses in the dermatology clinic remove them.  Occasionally the doctor will ask a family to remove the patches

## 2019-06-26 NOTE — PROGRESS NOTES
Dermatology Patient Note  [facility] DERMATOLOGY  [address]  [address] [place] [address]  Dept: [phone]  Dept Fax: [phone]      VISITDATE: 6/26/2019   REFERRING PROVIDER: Shelly Mata MD      Samantha Galvan is a 57 y.o. male  who presents today in the office for:    New Patient (Patient states he has eczema on his face, arms, and legs. Patient has been using Eucerin cream, with no relief. Patient has had eczema off and on for years.)      HISTORY OF PRESENT ILLNESS:  57 y.o. male presenting for eczema  Location: neck, arms, legs. Itchy on back but rash spares trunk  Duration: on and off since teenage years  Symptoms: itching, pain  Course: worsening  Exacerbating factors: sun, heat  Prior treatments: eucerin and triamcinolone      CURRENT MEDICATIONS:   Current Outpatient Medications   Medication Sig Dispense Refill    betamethasone dipropionate (DIPROLENE) 0.05 % ointment Apply topically twice daily to thickest areas of eczema.  Do not apply to face or skin folds 50 g 2    triamcinolone (KENALOG) 0.1 % ointment Apply to rash twice daily (not face, armpit or groin) 454 g 1    tacrolimus (PROTOPIC) 0.03 % ointment Apply to rash on face and neck twice daily 30 g 2    cephALEXin (KEFLEX) 500 MG capsule Take 2 capsules by mouth 2 times daily for 10 days 40 capsule 0    tiZANidine (ZANAFLEX) 2 MG tablet take 1 tablet by mouth twice a day if needed for muscle spasm 60 tablet 1    lisinopril (PRINIVIL;ZESTRIL) 5 MG tablet Take 1 tablet by mouth daily 30 tablet 3    simvastatin (ZOCOR) 40 MG tablet Take 1 tablet by mouth nightly 30 tablet 3    omeprazole (PRILOSEC) 20 MG delayed release capsule Take 1 capsule by mouth Daily 30 capsule 3    hydrOXYzine (ATARAX) 25 MG tablet Take 1 tablet by mouth every 8 hours as needed for Itching 30 tablet 0    gabapentin (NEURONTIN) 400 MG capsule take 1 capsule by mouth three times a day 90 capsule 2     No current facility-administered medications for this visit. ALLERGIES:   No Known Allergies    SOCIAL HISTORY:  Social History     Tobacco Use    Smoking status: Current Every Day Smoker     Packs/day: 0.50     Years: 20.00     Pack years: 10.00     Types: Cigarettes    Smokeless tobacco: Never Used   Substance Use Topics    Alcohol use: No     Alcohol/week: 0.0 oz     Comment: quit 7 years ago       REVIEW OF SYSTEMS:  Review of Systems  Skin: Denies any new changing, growing orbleeding lesions or rashes except as described in the HPI   Constitutional: Denies fevers, chills, and malaise. PHYSICAL EXAM:   BP (!) 165/83 (Site: Left Upper Arm, Position: Sitting, Cuff Size: Medium Adult)   Pulse 85   Ht 5' 7\" (1.702 m)   Wt 162 lb (73.5 kg)   SpO2 95%   BMI 25.37 kg/m²     General Exam:  General Appearance: No acute distress, Well nourished     Neuro: Alert and oriented to person, place and time  Psych: Normal affect   Lymph Node: Not performed    Cutaneous Exam: Performed as documented in clinic note below. Total skin excluding undergarment areas, which includes the head/face, neck, both arms, chest, back, abdomen, both legs, digits and/or nails, was examined. Pertinent Physical Exam Findings:  Physical Exam  Thick eczematous lichenifed plaques with inlying erosions on neck, arms, face (eleazar forehead), and sparing trunk with sharp cut off at shirt. Back has excorations    Photo surveillance performed: No    Medical Necessity of Exam Performed:   Widespread Rash    Additional Diagnostic Testing performed during exam: Not performed ,  Not performed    ASSESSMENT:   Diagnosis Orders   1. Other atopic dermatitis  betamethasone dipropionate (DIPROLENE) 0.05 % ointment    triamcinolone (KENALOG) 0.1 % ointment    tacrolimus (PROTOPIC) 0.03 % ointment   2. Secondary impetiginization  cephALEXin (KEFLEX) 500 MG capsule       Plan of Action is as Follows:  Assessment   1.  Atopic dermatitis, 40% BSA, severe  - sharp cut off at shirt suggests airborne contact dermatiits  - patch testing today, start topical regimen  - consider dupixent in the future   - betamethasone dipropionate (DIPROLENE) 0.05 % ointment; Apply topically twice daily to thickest areas of eczema. Do not apply to face or skin folds  Dispense: 50 g; Refill: 2  - triamcinolone (KENALOG) 0.1 % ointment; Apply to rash twice daily (not face, armpit or groin)  Dispense: 454 g; Refill: 1  - tacrolimus (PROTOPIC) 0.03 % ointment; Apply to rash on face and neck twice daily  Dispense: 30 g; Refill: 2    2. Secondary impetiginization  - cephALEXin (KEFLEX) 500 MG capsule; Take 2 capsules by mouth 2 times daily for 10 days  Dispense: 40 capsule; Refill: 0    RTC for patch read            Patient Instructions   Patch Testing for Skin Allergies   A patch test is a skin test used to find the cause of a possible allergic reaction on the skin. This reaction is called allergic contact dermatitis. Contact dermatitis is a reaction to something that came into contact with the skin. This kind of allergic reaction usually causes inflammation (redness, itching). Patch testing is done by the dermatology clinic. During patch testing, different substances are placed on the skin and taped in place. The patches are typically placed on the back, left on for 2 days and then removed. The area of skin that was tested will be evaluated by the dermatologist 2 to 4 days after the patches are removed. You will need to return to the dermatology clinic 2 to 3 times during the week that the patch testing is done. Generally the test week is a total of 7 days. It is important to follow the guidelines below to get accurate results. Patch testing evaluates a slow, delayed skin reaction so it is important that the patches stay in place. The test site must also be protected throughout the entire test week. Preparation for the Test   Getting the skin ready for your patch test is important.  Following these instructions will help you to get more accurate, reliable results. - Avoid sun exposure for 1 to 2 weeks before patch testing.   - Do not use topical medicines (creams and ointments) on the back and any other area where patches may be placed for at least 1 week before patch testing. You can continue to use these medicines on areas of the body where patches will not be placed. - You may use moisturizers on the skin until the day before patch testing.   - You may continue to take any prescribed antihistamines as usual before and during the testing. During the Test Week   - The patches and test area marked with ink must be kept dry throughout the entire test.     During the Test Week  - Do not apply anything to the test area. This includes all soaps, creams, ointments and moisturizers. - Do not scratch, rub, loosen or remove the patches. - Avoid physical activities that will loosen the tape. - Do not expose the test area to sunlight. - Avoid hot areas and activities that cause excessive sweating. Continue to take your prescribed antihistamines. The patches must be kept dry. You should not take a shower or bath. Wash areas of the body where there are no patches with a washcloth or bath sponge. Be careful not to get the patches wet. The First Visit:   The patches are usually placed on your back and left in place for 2 days.   - You should wear loose, comfortable clothing.   - You should wear old, dark-colored clothes to avoid stains from the marker. Small sheets, or patches, holding different substances are placed on the skin. The nurses will use a marker to outline the edges of the sheets. Then the sheets will be secured with tape or another sticky bandage (dressing). The Second Visit:   The second visit is usually 2 days after the patches were put on. Typically you will come to the clinic to have the patches removed. The nurses will carefully remove the tape and patches.  They will re-portillo the skin where the patches were. The nurses will check the area to see if the skin shows any signs of a reaction. In many cases a reaction may not have shown up yet. It is still important to come back to the clinic to have the area checked one more time. Continue to keep the skin dry where the patches were. Do not take a shower or bathe where the patches were. Do not apply creams, ointments, or moisturizers where the patches were. Do not scratch the skin where the patches were. You should avoid hot areas and activities that cause excessive sweating. Removing patches: Normally you should not remove patches yourself. Usually the nurses in the dermatology clinic remove them. Occasionally the doctor will ask a family to remove the patches at home. If you have been given this instruction you will remove the tape and clear bandage carefully. You will re-oprtillo the borders of the patch testing sheets with the marker you were given before you take them off the skin. Sometimes a patch may fall off or pull away so that it is not actually touching the skin. If this happens it is important to let your childs doctor know at your next appointment. The Third Visit:   The third visit is usually 7 days after the patches were put on. You will come to the clinic to have the skin checked. It is important to check the skin again to look for skin reactions. The nurses and doctor will look at the skin and tell you if there are any substances that you should avoid. After your visit you can restart topical creams, ointments, and moisturizers. You may shower and bathe normally. If you have any questions, be sure to ask your doctor or nurse. Follow-up: No follow-ups on file.       This note was created with the assistance of a speech-recognition program.  Although the intention is to generate a document that actually reflects the content of the visit, no guarantees can be provided that every mistake has been identified and corrected byediting.     Electronically signed by Jailene Diaz MD on 6/26/19 at 2:23 PM

## 2019-06-28 ENCOUNTER — NURSE ONLY (OUTPATIENT)
Dept: DERMATOLOGY | Age: 49
End: 2019-06-28

## 2019-06-28 DIAGNOSIS — L20.89 OTHER ATOPIC DERMATITIS: Primary | ICD-10-CM

## 2019-06-28 PROCEDURE — 99999 PR OFFICE/OUTPT VISIT,PROCEDURE ONLY: CPT | Performed by: DERMATOLOGY

## 2019-06-28 NOTE — PROGRESS NOTES
Dorothy Silver presents for patch removal on the back. Patches removed, areas outlined, picture obtained for the chart. Reviewed instructions to follow until follow up appointment with Doctor in the office to go over results and recommendations.

## 2019-07-03 ENCOUNTER — OFFICE VISIT (OUTPATIENT)
Dept: DERMATOLOGY | Age: 49
End: 2019-07-03
Payer: MEDICAID

## 2019-07-03 VITALS
DIASTOLIC BLOOD PRESSURE: 89 MMHG | BODY MASS INDEX: 25.43 KG/M2 | SYSTOLIC BLOOD PRESSURE: 155 MMHG | OXYGEN SATURATION: 97 % | HEART RATE: 90 BPM | HEIGHT: 67 IN | WEIGHT: 162 LBS

## 2019-07-03 DIAGNOSIS — L23.89 ALLERGIC CONTACT DERMATITIS DUE TO FRAGRANCE: Primary | ICD-10-CM

## 2019-07-03 DIAGNOSIS — L20.89 OTHER ATOPIC DERMATITIS: ICD-10-CM

## 2019-07-03 PROBLEM — K51.00 PANCOLITIS (HCC): Status: ACTIVE | Noted: 2018-09-23

## 2019-07-03 PROBLEM — K85.90 ACUTE PANCREATITIS: Status: ACTIVE | Noted: 2018-09-23

## 2019-07-03 PROCEDURE — 4004F PT TOBACCO SCREEN RCVD TLK: CPT | Performed by: DERMATOLOGY

## 2019-07-03 PROCEDURE — 99213 OFFICE O/P EST LOW 20 MIN: CPT | Performed by: DERMATOLOGY

## 2019-07-03 PROCEDURE — G8417 CALC BMI ABV UP PARAM F/U: HCPCS | Performed by: DERMATOLOGY

## 2019-07-03 PROCEDURE — G8427 DOCREV CUR MEDS BY ELIG CLIN: HCPCS | Performed by: DERMATOLOGY

## 2019-07-03 NOTE — PROGRESS NOTES
Dermatology Patient Note  [facility] DERMATOLOGY  [address]  [address] [place] [address]  Dept: [phone]  Dept Fax: [phone]      VISITDATE: 7/3/2019   REFERRING PROVIDER: No ref. provider found      Gilles Gracia is a 57 y.o. male  who presents today in the office for:    Patch Testing (Patch reading.)      HISTORY OF PRESENT ILLNESS:  Patients presents for f/u eczematous dermatitis and f/u patch testing  Interval history: improvement in rash with keflex and topicals, however still quite itchy  Patient assessment of progress: partial and inadequate  Current treatment and adherence: triamcinolone, betamethasone, Keflex  Prior treatments tried: see initial HPI  Patient states that he burns incense regularly at his home and also uses [employer] plugins    Patient completed patch testing, but reports that he does not have AC in his apartment and has sweat quite a bit. CURRENT MEDICATIONS:   Current Outpatient Medications   Medication Sig Dispense Refill    betamethasone dipropionate (DIPROLENE) 0.05 % ointment Apply topically twice daily to thickest areas of eczema.  Do not apply to face or skin folds 50 g 2    triamcinolone (KENALOG) 0.1 % ointment Apply to rash twice daily (not face, armpit or groin) 454 g 1    tacrolimus (PROTOPIC) 0.03 % ointment Apply to rash on face and neck twice daily 30 g 2    cephALEXin (KEFLEX) 500 MG capsule Take 2 capsules by mouth 2 times daily for 10 days 40 capsule 0    tiZANidine (ZANAFLEX) 2 MG tablet take 1 tablet by mouth twice a day if needed for muscle spasm 60 tablet 1    lisinopril (PRINIVIL;ZESTRIL) 5 MG tablet Take 1 tablet by mouth daily 30 tablet 3    simvastatin (ZOCOR) 40 MG tablet Take 1 tablet by mouth nightly 30 tablet 3    omeprazole (PRILOSEC) 20 MG delayed release capsule Take 1 capsule by mouth Daily 30 capsule 3    hydrOXYzine (ATARAX) 25 MG tablet Take 1 tablet by mouth every 8 hours as needed for Itching 30 tablet 0   

## 2019-07-11 DIAGNOSIS — M51.06 INTERVERTEBRAL LUMBAR DISC DISORDER WITH MYELOPATHY, LUMBAR REGION: Chronic | ICD-10-CM

## 2019-07-11 RX ORDER — GABAPENTIN 400 MG/1
400 CAPSULE ORAL 3 TIMES DAILY
Qty: 90 CAPSULE | Refills: 2 | Status: SHIPPED | OUTPATIENT
Start: 2019-07-11 | End: 2019-11-18 | Stop reason: SDUPTHER

## 2019-07-11 NOTE — TELEPHONE ENCOUNTER
Escribe request for Gabapentin . Please escribe if appropriate      Next Visit Date:  None, message on script to call for appt     Health Maintenance   Topic Date Due    Flu vaccine (1) 09/01/2019    A1C test (Diabetic or Prediabetic)  05/08/2020    Potassium monitoring  05/08/2020    Creatinine monitoring  05/08/2020    Lipid screen  05/08/2023    DTaP/Tdap/Td vaccine (2 - Td) 10/08/2026    Pneumococcal 0-64 years Vaccine  Completed    HIV screen  Completed       Hemoglobin A1C (%)   Date Value   05/08/2019 5.1   05/21/2018 5.8   11/28/2017 5.6             ( goal A1C is < 7)   Microalb/Crt.  Ratio (mcg/mg creat)   Date Value   05/08/2018 4     LDL Cholesterol (mg/dL)   Date Value   05/08/2018 8       (goal LDL is <100)   AST (U/L)   Date Value   12/10/2018 108 (H)     ALT (U/L)   Date Value   12/10/2018 60 (H)     BUN (mg/dL)   Date Value   05/08/2019 8     BP Readings from Last 3 Encounters:   07/03/19 (!) 155/89   06/26/19 (!) 165/83   04/23/19 138/83          (goal 120/80)          Patient Active Problem List:     Depression     Intervertebral lumbar disc disorder with myelopathy, lumbar region     Gastroesophageal reflux disease without esophagitis     Essential hypertension     Pure hypercholesterolemia     Prediabetes     Smoker     Acute pancreatitis     Pancolitis ([facility] [address]

## 2019-10-22 ENCOUNTER — OFFICE VISIT (OUTPATIENT)
Dept: DERMATOLOGY | Age: 49
End: 2019-10-22
Payer: MEDICAID

## 2019-10-22 VITALS
HEART RATE: 73 BPM | BODY MASS INDEX: 26.27 KG/M2 | SYSTOLIC BLOOD PRESSURE: 159 MMHG | DIASTOLIC BLOOD PRESSURE: 92 MMHG | WEIGHT: 167.4 LBS | HEIGHT: 67 IN | OXYGEN SATURATION: 98 %

## 2019-10-22 DIAGNOSIS — L29.9 PRURITUS: ICD-10-CM

## 2019-10-22 DIAGNOSIS — L20.84 INTRINSIC ATOPIC DERMATITIS: Primary | ICD-10-CM

## 2019-10-22 DIAGNOSIS — L23.89 OTHER ALLERGIC CONTACT DERMATITIS: ICD-10-CM

## 2019-10-22 PROCEDURE — G8427 DOCREV CUR MEDS BY ELIG CLIN: HCPCS | Performed by: DERMATOLOGY

## 2019-10-22 PROCEDURE — 99213 OFFICE O/P EST LOW 20 MIN: CPT | Performed by: DERMATOLOGY

## 2019-10-22 PROCEDURE — G8417 CALC BMI ABV UP PARAM F/U: HCPCS | Performed by: DERMATOLOGY

## 2019-10-22 PROCEDURE — 4004F PT TOBACCO SCREEN RCVD TLK: CPT | Performed by: DERMATOLOGY

## 2019-10-22 PROCEDURE — G8484 FLU IMMUNIZE NO ADMIN: HCPCS | Performed by: DERMATOLOGY

## 2019-10-24 DIAGNOSIS — K21.9 GASTROESOPHAGEAL REFLUX DISEASE WITHOUT ESOPHAGITIS: ICD-10-CM

## 2019-10-24 DIAGNOSIS — E78.00 PURE HYPERCHOLESTEROLEMIA: ICD-10-CM

## 2019-10-24 DIAGNOSIS — I10 ESSENTIAL HYPERTENSION: ICD-10-CM

## 2019-10-24 RX ORDER — SIMVASTATIN 40 MG
40 TABLET ORAL NIGHTLY
Qty: 30 TABLET | Refills: 0 | Status: SHIPPED | OUTPATIENT
Start: 2019-10-24 | End: 2019-10-28 | Stop reason: SDUPTHER

## 2019-10-24 RX ORDER — LISINOPRIL 5 MG/1
5 TABLET ORAL DAILY
Qty: 30 TABLET | Refills: 0 | Status: SHIPPED | OUTPATIENT
Start: 2019-10-24 | End: 2019-10-28 | Stop reason: SDUPTHER

## 2019-10-24 RX ORDER — OMEPRAZOLE 20 MG/1
20 CAPSULE, DELAYED RELEASE ORAL DAILY
Qty: 30 CAPSULE | Refills: 0 | Status: SHIPPED | OUTPATIENT
Start: 2019-10-24 | End: 2019-10-28 | Stop reason: SDUPTHER

## 2019-10-28 ENCOUNTER — HOSPITAL ENCOUNTER (OUTPATIENT)
Age: 49
Setting detail: SPECIMEN
Discharge: HOME | End: 2019-10-28
Payer: MEDICAID

## 2019-10-28 ENCOUNTER — OFFICE VISIT (OUTPATIENT)
Dept: INTERNAL MEDICINE | Age: 49
End: 2019-10-28
Payer: MEDICAID

## 2019-10-28 VITALS
HEART RATE: 90 BPM | BODY MASS INDEX: 27.91 KG/M2 | SYSTOLIC BLOOD PRESSURE: 162 MMHG | WEIGHT: 177.8 LBS | HEIGHT: 67 IN | DIASTOLIC BLOOD PRESSURE: 94 MMHG

## 2019-10-28 DIAGNOSIS — I10 ESSENTIAL HYPERTENSION: Primary | ICD-10-CM

## 2019-10-28 DIAGNOSIS — Z13.220 SCREENING FOR HYPERLIPIDEMIA: ICD-10-CM

## 2019-10-28 DIAGNOSIS — E78.00 PURE HYPERCHOLESTEROLEMIA: ICD-10-CM

## 2019-10-28 DIAGNOSIS — K21.9 GASTROESOPHAGEAL REFLUX DISEASE WITHOUT ESOPHAGITIS: ICD-10-CM

## 2019-10-28 DIAGNOSIS — K51.00 PANCOLITIS (HCC): ICD-10-CM

## 2019-10-28 DIAGNOSIS — Z23 FLU VACCINE NEED: ICD-10-CM

## 2019-10-28 LAB
CHOLESTEROL, FASTING: 183 MG/DL
CHOLESTEROL/HDL RATIO: 2.3
HDLC SERPL-MCNC: 80 MG/DL
LDL CHOLESTEROL: 71 MG/DL (ref 0–130)
TRIGLYCERIDE, FASTING: 158 MG/DL
VLDLC SERPL CALC-MCNC: ABNORMAL MG/DL (ref 1–30)

## 2019-10-28 PROCEDURE — 36415 COLL VENOUS BLD VENIPUNCTURE: CPT

## 2019-10-28 PROCEDURE — 4004F PT TOBACCO SCREEN RCVD TLK: CPT | Performed by: NURSE PRACTITIONER

## 2019-10-28 PROCEDURE — G8482 FLU IMMUNIZE ORDER/ADMIN: HCPCS | Performed by: NURSE PRACTITIONER

## 2019-10-28 PROCEDURE — G8417 CALC BMI ABV UP PARAM F/U: HCPCS | Performed by: NURSE PRACTITIONER

## 2019-10-28 PROCEDURE — 80061 LIPID PANEL: CPT

## 2019-10-28 PROCEDURE — 99214 OFFICE O/P EST MOD 30 MIN: CPT | Performed by: NURSE PRACTITIONER

## 2019-10-28 PROCEDURE — 90688 IIV4 VACCINE SPLT 0.5 ML IM: CPT | Performed by: NURSE PRACTITIONER

## 2019-10-28 PROCEDURE — 99211 OFF/OP EST MAY X REQ PHY/QHP: CPT | Performed by: NURSE PRACTITIONER

## 2019-10-28 PROCEDURE — G8427 DOCREV CUR MEDS BY ELIG CLIN: HCPCS | Performed by: NURSE PRACTITIONER

## 2019-10-28 RX ORDER — OMEPRAZOLE 20 MG/1
20 CAPSULE, DELAYED RELEASE ORAL DAILY
Qty: 30 CAPSULE | Refills: 3 | Status: SHIPPED | OUTPATIENT
Start: 2019-10-28 | End: 2020-01-20 | Stop reason: SDUPTHER

## 2019-10-28 RX ORDER — LISINOPRIL 10 MG/1
10 TABLET ORAL DAILY
Qty: 30 TABLET | Refills: 3 | Status: SHIPPED | OUTPATIENT
Start: 2019-10-28 | End: 2020-01-20 | Stop reason: SDUPTHER

## 2019-10-28 RX ORDER — SIMVASTATIN 40 MG
40 TABLET ORAL NIGHTLY
Qty: 30 TABLET | Refills: 3 | Status: SHIPPED | OUTPATIENT
Start: 2019-10-28 | End: 2020-01-20 | Stop reason: SDUPTHER

## 2019-10-29 ASSESSMENT — ENCOUNTER SYMPTOMS
SORE THROAT: 0
GLOBUS SENSATION: 0
BLURRED VISION: 0
WATER BRASH: 0
ABDOMINAL PAIN: 0
NAUSEA: 0
HEARTBURN: 1
WHEEZING: 0
BELCHING: 0
ORTHOPNEA: 0
SHORTNESS OF BREATH: 0
COUGH: 0
CHOKING: 0
STRIDOR: 0
HOARSE VOICE: 0

## 2019-11-04 ENCOUNTER — NURSE ONLY (OUTPATIENT)
Dept: INTERNAL MEDICINE | Age: 49
End: 2019-11-04
Payer: MEDICAID

## 2019-11-04 VITALS — HEART RATE: 95 BPM | SYSTOLIC BLOOD PRESSURE: 156 MMHG | DIASTOLIC BLOOD PRESSURE: 92 MMHG

## 2019-11-04 DIAGNOSIS — I10 ESSENTIAL HYPERTENSION: ICD-10-CM

## 2019-11-04 PROCEDURE — 99211 OFF/OP EST MAY X REQ PHY/QHP: CPT | Performed by: NURSE PRACTITIONER

## 2019-11-05 ENCOUNTER — TELEPHONE (OUTPATIENT)
Dept: DERMATOLOGY | Age: 49
End: 2019-11-05

## 2019-11-05 DIAGNOSIS — L20.84 INTRINSIC ATOPIC DERMATITIS: Primary | ICD-10-CM

## 2019-11-15 ENCOUNTER — NURSE ONLY (OUTPATIENT)
Dept: DERMATOLOGY | Age: 49
End: 2019-11-15
Payer: MEDICAID

## 2019-11-15 DIAGNOSIS — Z71.89 OTHER SPECIFIED COUNSELING: ICD-10-CM

## 2019-11-15 DIAGNOSIS — L20.84 INTRINSIC ATOPIC DERMATITIS: Primary | ICD-10-CM

## 2019-11-15 PROCEDURE — 96372 THER/PROPH/DIAG INJ SC/IM: CPT | Performed by: DERMATOLOGY

## 2019-11-18 DIAGNOSIS — L20.89 OTHER ATOPIC DERMATITIS: ICD-10-CM

## 2019-11-18 DIAGNOSIS — L20.84 INTRINSIC ATOPIC DERMATITIS: ICD-10-CM

## 2019-11-18 DIAGNOSIS — M51.06 INTERVERTEBRAL LUMBAR DISC DISORDER WITH MYELOPATHY, LUMBAR REGION: Chronic | ICD-10-CM

## 2019-11-18 RX ORDER — GABAPENTIN 400 MG/1
CAPSULE ORAL
Qty: 90 CAPSULE | Refills: 0 | Status: SHIPPED | OUTPATIENT
Start: 2019-11-18 | End: 2020-01-20 | Stop reason: SDUPTHER

## 2019-11-18 RX ORDER — TACROLIMUS 0.3 MG/G
OINTMENT TOPICAL
Qty: 30 G | Refills: 2 | Status: SHIPPED | OUTPATIENT
Start: 2019-11-18 | End: 2020-01-20 | Stop reason: SDUPTHER

## 2020-01-20 ENCOUNTER — OFFICE VISIT (OUTPATIENT)
Dept: INTERNAL MEDICINE | Age: 50
End: 2020-01-20
Payer: MEDICAID

## 2020-01-20 VITALS
BODY MASS INDEX: 25.58 KG/M2 | SYSTOLIC BLOOD PRESSURE: 177 MMHG | HEIGHT: 67 IN | WEIGHT: 163 LBS | HEART RATE: 74 BPM | DIASTOLIC BLOOD PRESSURE: 99 MMHG

## 2020-01-20 PROCEDURE — G8427 DOCREV CUR MEDS BY ELIG CLIN: HCPCS | Performed by: NURSE PRACTITIONER

## 2020-01-20 PROCEDURE — G8417 CALC BMI ABV UP PARAM F/U: HCPCS | Performed by: NURSE PRACTITIONER

## 2020-01-20 PROCEDURE — G8482 FLU IMMUNIZE ORDER/ADMIN: HCPCS | Performed by: NURSE PRACTITIONER

## 2020-01-20 PROCEDURE — 4004F PT TOBACCO SCREEN RCVD TLK: CPT | Performed by: NURSE PRACTITIONER

## 2020-01-20 PROCEDURE — 99211 OFF/OP EST MAY X REQ PHY/QHP: CPT | Performed by: NURSE PRACTITIONER

## 2020-01-20 PROCEDURE — 99214 OFFICE O/P EST MOD 30 MIN: CPT | Performed by: NURSE PRACTITIONER

## 2020-01-20 RX ORDER — GABAPENTIN 400 MG/1
CAPSULE ORAL
Qty: 90 CAPSULE | Refills: 3 | Status: SHIPPED | OUTPATIENT
Start: 2020-01-20 | End: 2020-04-15

## 2020-01-20 RX ORDER — SIMVASTATIN 40 MG
40 TABLET ORAL NIGHTLY
Qty: 30 TABLET | Refills: 3 | Status: SHIPPED | OUTPATIENT
Start: 2020-01-20 | End: 2020-04-15

## 2020-01-20 RX ORDER — VARENICLINE TARTRATE 25 MG
KIT ORAL
Qty: 1 BOX | Refills: 0 | Status: SHIPPED | OUTPATIENT
Start: 2020-01-20 | End: 2020-10-30

## 2020-01-20 RX ORDER — TACROLIMUS 0.3 MG/G
OINTMENT TOPICAL
Qty: 30 G | Refills: 2 | Status: SHIPPED | OUTPATIENT
Start: 2020-01-20 | End: 2020-06-30 | Stop reason: SDUPTHER

## 2020-01-20 RX ORDER — OMEPRAZOLE 20 MG/1
20 CAPSULE, DELAYED RELEASE ORAL DAILY
Qty: 30 CAPSULE | Refills: 3 | Status: SHIPPED | OUTPATIENT
Start: 2020-01-20 | End: 2020-04-15

## 2020-01-20 RX ORDER — LISINOPRIL 10 MG/1
10 TABLET ORAL DAILY
Qty: 30 TABLET | Refills: 3 | Status: SHIPPED | OUTPATIENT
Start: 2020-01-20 | End: 2020-04-15

## 2020-01-20 RX ORDER — TIZANIDINE 2 MG/1
TABLET ORAL
Qty: 60 TABLET | Refills: 1 | Status: SHIPPED | OUTPATIENT
Start: 2020-01-20 | End: 2020-10-30

## 2020-01-20 ASSESSMENT — PATIENT HEALTH QUESTIONNAIRE - PHQ9
SUM OF ALL RESPONSES TO PHQ QUESTIONS 1-9: 0
2. FEELING DOWN, DEPRESSED OR HOPELESS: 0
1. LITTLE INTEREST OR PLEASURE IN DOING THINGS: 0
SUM OF ALL RESPONSES TO PHQ9 QUESTIONS 1 & 2: 0
SUM OF ALL RESPONSES TO PHQ QUESTIONS 1-9: 0

## 2020-01-20 ASSESSMENT — ENCOUNTER SYMPTOMS: BACK PAIN: 1

## 2020-01-20 NOTE — PROGRESS NOTES
Subjective:      Patient ID: Jack Bedolla is a 54 y.o. male. Hypertension   This is a chronic problem. The current episode started more than 1 year ago. The problem has been waxing and waning since onset. The problem is uncontrolled. Pertinent negatives include no anxiety, blurred vision, chest pain, headaches, malaise/fatigue, neck pain, orthopnea, palpitations, peripheral edema, PND, shortness of breath or sweats. There are no associated agents to hypertension. Risk factors for coronary artery disease include dyslipidemia, male gender, sedentary lifestyle and smoking/tobacco exposure. Past treatments include ACE inhibitors. The current treatment provides mild improvement. Compliance problems include diet and exercise. There is no history of angina, kidney disease, CAD/MI, CVA, heart failure, left ventricular hypertrophy, PVD or retinopathy. There is no history of chronic renal disease. Gastroesophageal Reflux   He complains of heartburn. He reports no abdominal pain, no belching, no chest pain, no choking, no coughing, no dysphagia, no early satiety, no globus sensation, no hoarse voice, no nausea, no sore throat, no stridor, no tooth decay, no water brash or no wheezing. This is a chronic problem. The current episode started more than 1 year ago. The problem occurs occasionally. The problem has been waxing and waning. The heartburn duration is less than a minute. The heartburn is located in the substernum. The heartburn is of mild intensity. The symptoms are aggravated by certain foods and ETOH. Pertinent negatives include no weight loss. Risk factors include caffeine use, ETOH use, smoking/tobacco exposure and lack of exercise. He has tried a PPI for the symptoms. The treatment provided moderate relief. Past procedures include an EGD. Past procedures do not include an abdominal ultrasound, esophageal manometry, esophageal pH monitoring, H. pylori antibody titer or a UGI.    Hyperlipidemia   This is a by mouth daily  Dispense: 30 tablet; Refill: 3    2. Gastroesophageal reflux disease without esophagitis  - omeprazole (PRILOSEC) 20 MG delayed release capsule; Take 1 capsule by mouth Daily  Dispense: 30 capsule; Refill: 3    3. Pure hypercholesterolemia  - simvastatin (ZOCOR) 40 MG tablet; Take 1 tablet by mouth nightly  Dispense: 30 tablet; Refill: 3    4. Intervertebral lumbar disc disorder with myelopathy, lumbar region  - tiZANidine (ZANAFLEX) 2 MG tablet; take 1 tablet by mouth twice a day if needed for muscle spasm  Dispense: 60 tablet; Refill: 1  - gabapentin (NEURONTIN) 400 MG capsule; take 1 capsule by mouth three times a day  Dispense: 90 capsule; Refill: 3    5. Other atopic dermatitis  - tacrolimus (PROTOPIC) 0.03 % ointment; Apply topically 2 times daily. Dispense: 30 g; Refill: 2  - triamcinolone (KENALOG) 0.1 % ointment; Apply to rash twice daily (not face, armpit or groin)  Dispense: 454 g; Refill: 1    6. Tobacco use  - varenicline (CHANTIX STARTING MONTH PAK) 0.5 MG X 11 & 1 MG X 42 tablet; Take by mouth. Dispense: 1 box; Refill: 0    7.  Alcohol use, unspecified with unspecified alcohol-induced disorder ([facility] [address]      RV 3 months       LATOSHA Trent - CNP

## 2020-01-20 NOTE — PATIENT INSTRUCTIONS
Follow-up appointment scheduled for   04/20/20  , AVS given to patient. Medications e-scribed to pharmacy of patient's choice.     tv

## 2020-01-27 PROBLEM — F10.99 ALCOHOL USE, UNSPECIFIED WITH UNSPECIFIED ALCOHOL-INDUCED DISORDER (HCC): Status: ACTIVE | Noted: 2020-01-27

## 2020-01-27 PROBLEM — K51.00 PANCOLITIS (HCC): Status: RESOLVED | Noted: 2018-09-23 | Resolved: 2020-01-27

## 2020-01-27 ASSESSMENT — ENCOUNTER SYMPTOMS
CHOKING: 0
NAUSEA: 0
HOARSE VOICE: 0
SHORTNESS OF BREATH: 0
HEARTBURN: 1
BLURRED VISION: 0
BELCHING: 0
BOWEL INCONTINENCE: 0
GLOBUS SENSATION: 0
SORE THROAT: 0
WATER BRASH: 0
ORTHOPNEA: 0
WHEEZING: 0
ABDOMINAL PAIN: 0
STRIDOR: 0
COUGH: 0

## 2020-04-14 NOTE — TELEPHONE ENCOUNTER
Refill request for pended medications. If appropriate please send medication(s) to patients pharmacy. Next appt: 04/20/2020      Health Maintenance   Topic Date Due    A1C test (Diabetic or Prediabetic)  05/08/2020    Potassium monitoring  05/08/2020    Creatinine monitoring  05/08/2020    Lipid screen  10/28/2020    DTaP/Tdap/Td vaccine (2 - Td) 10/08/2026    Flu vaccine  Completed    Pneumococcal 0-64 years Vaccine  Completed    HIV screen  Completed    Hepatitis A vaccine  Aged Out    Hepatitis B vaccine  Aged Out    Hib vaccine  Aged Out    Meningococcal (ACWY) vaccine  Aged Out       Hemoglobin A1C (%)   Date Value   05/08/2019 5.1   05/21/2018 5.8   11/28/2017 5.6             ( goal A1C is < 7)   Microalb/Crt.  Ratio (mcg/mg creat)   Date Value   05/08/2018 4     LDL Cholesterol (mg/dL)   Date Value   10/28/2019 71       (goal LDL is <100)   AST (U/L)   Date Value   12/10/2018 108 (H)     ALT (U/L)   Date Value   12/10/2018 60 (H)     BUN (mg/dL)   Date Value   05/08/2019 8     BP Readings from Last 3 Encounters:   01/20/20 (!) 177/99   11/04/19 (!) 156/92   10/28/19 (!) 162/94          (goal 120/80)          Patient Active Problem List:     Depression     Intervertebral lumbar disc disorder with myelopathy, lumbar region     Gastroesophageal reflux disease without esophagitis     Essential hypertension     Pure hypercholesterolemia     Prediabetes     Smoker     Acute pancreatitis     Alcohol use, unspecified with unspecified alcohol-induced disorder ([facility] [address]

## 2020-04-15 RX ORDER — GABAPENTIN 400 MG/1
CAPSULE ORAL
Qty: 90 CAPSULE | Refills: 3 | Status: SHIPPED | OUTPATIENT
Start: 2020-04-15 | End: 2020-07-01 | Stop reason: SDUPTHER

## 2020-04-15 RX ORDER — LISINOPRIL 10 MG/1
TABLET ORAL
Qty: 30 TABLET | Refills: 3 | Status: SHIPPED | OUTPATIENT
Start: 2020-04-15 | End: 2020-07-01 | Stop reason: SDUPTHER

## 2020-04-15 RX ORDER — SIMVASTATIN 40 MG
TABLET ORAL
Qty: 30 TABLET | Refills: 3 | Status: SHIPPED | OUTPATIENT
Start: 2020-04-15 | End: 2020-07-01 | Stop reason: SDUPTHER

## 2020-04-15 RX ORDER — OMEPRAZOLE 20 MG/1
CAPSULE, DELAYED RELEASE ORAL
Qty: 30 CAPSULE | Refills: 3 | Status: SHIPPED | OUTPATIENT
Start: 2020-04-15 | End: 2020-10-30

## 2020-05-11 RX ORDER — DUPILUMAB 300 MG/2ML
INJECTION, SOLUTION SUBCUTANEOUS
Qty: 4 ML | Refills: 0 | OUTPATIENT
Start: 2020-05-11

## 2020-06-30 ENCOUNTER — NURSE TRIAGE (OUTPATIENT)
Dept: OTHER | Facility: CLINIC | Age: 50
End: 2020-06-30

## 2020-06-30 ENCOUNTER — OFFICE VISIT (OUTPATIENT)
Dept: DERMATOLOGY | Age: 50
End: 2020-06-30
Payer: MEDICAID

## 2020-06-30 VITALS
OXYGEN SATURATION: 96 % | BODY MASS INDEX: 25.74 KG/M2 | HEART RATE: 68 BPM | HEIGHT: 67 IN | DIASTOLIC BLOOD PRESSURE: 100 MMHG | TEMPERATURE: 97.9 F | SYSTOLIC BLOOD PRESSURE: 170 MMHG | WEIGHT: 164 LBS

## 2020-06-30 PROCEDURE — 99213 OFFICE O/P EST LOW 20 MIN: CPT | Performed by: DERMATOLOGY

## 2020-06-30 PROCEDURE — 4004F PT TOBACCO SCREEN RCVD TLK: CPT | Performed by: DERMATOLOGY

## 2020-06-30 PROCEDURE — G8417 CALC BMI ABV UP PARAM F/U: HCPCS | Performed by: DERMATOLOGY

## 2020-06-30 PROCEDURE — G8427 DOCREV CUR MEDS BY ELIG CLIN: HCPCS | Performed by: DERMATOLOGY

## 2020-06-30 RX ORDER — TACROLIMUS 0.3 MG/G
OINTMENT TOPICAL
Qty: 30 G | Refills: 2 | Status: SHIPPED | OUTPATIENT
Start: 2020-06-30

## 2020-06-30 RX ORDER — DUPILUMAB 300 MG/2ML
INJECTION, SOLUTION SUBCUTANEOUS
Qty: 4 ML | Refills: 5 | Status: SHIPPED | OUTPATIENT
Start: 2020-06-30 | End: 2020-11-16

## 2020-06-30 NOTE — PROGRESS NOTES
capsule 3    tiZANidine (ZANAFLEX) 2 MG tablet take 1 tablet by mouth twice a day if needed for muscle spasm (Patient not taking: Reported on 6/30/2020) 60 tablet 1    varenicline (CHANTIX STARTING MONTH PAK) 0.5 MG X 11 & 1 MG X 42 tablet Take by mouth. (Patient not taking: Reported on 6/30/2020) 1 box 0     No current facility-administered medications for this visit. ALLERGIES:   No Known Allergies    SOCIAL HISTORY:  Social History     Tobacco Use    Smoking status: Current Every Day Smoker     Packs/day: 0.50     Years: 20.00     Pack years: 10.00     Types: Cigarettes    Smokeless tobacco: Never Used   Substance Use Topics    Alcohol use: No     Alcohol/week: 0.0 standard drinks     Comment: quit 7 years ago       REVIEW OF SYSTEMS:  Review of Systems  Skin: Denies any new changing, growing orbleeding lesions or rashes except as described in the HPI   Constitutional: Denies fevers, chills, and malaise. PHYSICAL EXAM:   BP (!) 170/100   Pulse 68   Temp 97.9 °F (36.6 °C)   Ht 5' 7\" (1.702 m)   Wt 164 lb (74.4 kg)   SpO2 96%   BMI 25.69 kg/m²     General Exam:  General Appearance: No acute distress, Well nourished     Neuro: Alert and oriented to person, place and time  Psych: Normal affect   Lymph Node: Not performed    Cutaneous Exam: Performed as documented in clinic note below. Head/face,neck, both arms, chest, back, abdomen, digits and/or nails, and limited lower extremities (that which is visible with pants/shorts and shoes/socks on) was examined. Pertinent Physical Exam Findings:  Physical Exam  Thin eczematous plaques of elbows, arms, neck    Photo surveillance performed: No    Medical Necessity of Exam Performed:   Distribution of patient concerns    Additional Diagnostic Testing performed during exam: Not performed ,  Not performed    ASSESSMENT:   Diagnosis Orders   1. Intrinsic atopic dermatitis  dupilumab (DUPIXENT) 300 MG/2ML SOSY injection   2.  Other atopic dermatitis

## 2020-08-29 ENCOUNTER — HOSPITAL ENCOUNTER (OUTPATIENT)
Age: 50
Setting detail: OBSERVATION
Discharge: HOME | End: 2020-08-30
Attending: EMERGENCY MEDICINE | Admitting: EMERGENCY MEDICINE
Payer: MEDICAID

## 2020-08-29 ENCOUNTER — APPOINTMENT (OUTPATIENT)
Dept: CT IMAGING | Age: 50
End: 2020-08-29
Payer: MEDICAID

## 2020-08-29 PROBLEM — K85.90 ACUTE PANCREATITIS WITHOUT INFECTION OR NECROSIS: Status: ACTIVE | Noted: 2020-08-29

## 2020-08-29 LAB
ABSOLUTE EOS #: 0.21 K/UL (ref 0–0.44)
ABSOLUTE IMMATURE GRANULOCYTE: 0.06 K/UL (ref 0–0.3)
ABSOLUTE LYMPH #: 2.73 K/UL (ref 1.1–3.7)
ABSOLUTE MONO #: 0.93 K/UL (ref 0.1–1.2)
ALBUMIN SERPL-MCNC: 3.7 G/DL (ref 3.5–5.2)
ALBUMIN/GLOBULIN RATIO: 1.3 (ref 1–2.5)
ALP BLD-CCNC: 90 U/L (ref 40–129)
ALT SERPL-CCNC: 23 U/L (ref 5–41)
ANION GAP SERPL CALCULATED.3IONS-SCNC: 16 MMOL/L (ref 9–17)
AST SERPL-CCNC: 27 U/L
BASOPHILS # BLD: 1 % (ref 0–2)
BASOPHILS ABSOLUTE: 0.08 K/UL (ref 0–0.2)
BILIRUB SERPL-MCNC: 0.35 MG/DL (ref 0.3–1.2)
BUN BLDV-MCNC: 4 MG/DL (ref 6–20)
BUN/CREAT BLD: ABNORMAL (ref 9–20)
CALCIUM SERPL-MCNC: 8.6 MG/DL (ref 8.6–10.4)
CHLORIDE BLD-SCNC: 103 MMOL/L (ref 98–107)
CO2: 21 MMOL/L (ref 20–31)
CREAT SERPL-MCNC: 1.03 MG/DL (ref 0.7–1.2)
DIFFERENTIAL TYPE: ABNORMAL
EOSINOPHILS RELATIVE PERCENT: 2 % (ref 1–4)
GFR AFRICAN AMERICAN: >60 ML/MIN
GFR NON-AFRICAN AMERICAN: >60 ML/MIN
GFR SERPL CREATININE-BSD FRML MDRD: ABNORMAL ML/MIN/{1.73_M2}
GFR SERPL CREATININE-BSD FRML MDRD: ABNORMAL ML/MIN/{1.73_M2}
GLUCOSE BLD-MCNC: 110 MG/DL (ref 70–99)
HCT VFR BLD CALC: 47.6 % (ref 40.7–50.3)
HEMOGLOBIN: 15.7 G/DL (ref 13–17)
IMMATURE GRANULOCYTES: 1 %
LACTATE DEHYDROGENASE: 298 U/L (ref 135–225)
LIPASE: 75 U/L (ref 13–60)
LYMPHOCYTES # BLD: 22 % (ref 24–43)
MAGNESIUM: 2.4 MG/DL (ref 1.6–2.6)
MCH RBC QN AUTO: 28.5 PG (ref 25.2–33.5)
MCHC RBC AUTO-ENTMCNC: 33 G/DL (ref 28.4–34.8)
MCV RBC AUTO: 86.4 FL (ref 82.6–102.9)
MONOCYTES # BLD: 8 % (ref 3–12)
NRBC AUTOMATED: 0 PER 100 WBC
PDW BLD-RTO: 13.8 % (ref 11.8–14.4)
PLATELET # BLD: 277 K/UL (ref 138–453)
PLATELET ESTIMATE: ABNORMAL
PMV BLD AUTO: 10.1 FL (ref 8.1–13.5)
POTASSIUM SERPL-SCNC: 3.3 MMOL/L (ref 3.7–5.3)
RBC # BLD: 5.51 M/UL (ref 4.21–5.77)
RBC # BLD: ABNORMAL 10*6/UL
SEG NEUTROPHILS: 66 % (ref 36–65)
SEGMENTED NEUTROPHILS ABSOLUTE COUNT: 8.31 K/UL (ref 1.5–8.1)
SODIUM BLD-SCNC: 140 MMOL/L (ref 135–144)
TOTAL PROTEIN: 6.5 G/DL (ref 6.4–8.3)
WBC # BLD: 12.3 K/UL (ref 3.5–11.3)
WBC # BLD: ABNORMAL 10*3/UL

## 2020-08-29 PROCEDURE — 83615 LACTATE (LD) (LDH) ENZYME: CPT

## 2020-08-29 PROCEDURE — 6360000002 HC RX W HCPCS: Performed by: HEALTH CARE PROVIDER

## 2020-08-29 PROCEDURE — G0378 HOSPITAL OBSERVATION PER HR: HCPCS

## 2020-08-29 PROCEDURE — 6360000002 HC RX W HCPCS: Performed by: STUDENT IN AN ORGANIZED HEALTH CARE EDUCATION/TRAINING PROGRAM

## 2020-08-29 PROCEDURE — 80053 COMPREHEN METABOLIC PANEL: CPT

## 2020-08-29 PROCEDURE — 96366 THER/PROPH/DIAG IV INF ADDON: CPT

## 2020-08-29 PROCEDURE — 74177 CT ABD & PELVIS W/CONTRAST: CPT

## 2020-08-29 PROCEDURE — 83690 ASSAY OF LIPASE: CPT

## 2020-08-29 PROCEDURE — 6370000000 HC RX 637 (ALT 250 FOR IP): Performed by: STUDENT IN AN ORGANIZED HEALTH CARE EDUCATION/TRAINING PROGRAM

## 2020-08-29 PROCEDURE — 83735 ASSAY OF MAGNESIUM: CPT

## 2020-08-29 PROCEDURE — 99285 EMERGENCY DEPT VISIT HI MDM: CPT

## 2020-08-29 PROCEDURE — 96365 THER/PROPH/DIAG IV INF INIT: CPT

## 2020-08-29 PROCEDURE — 96375 TX/PRO/DX INJ NEW DRUG ADDON: CPT

## 2020-08-29 PROCEDURE — 93005 ELECTROCARDIOGRAM TRACING: CPT | Performed by: STUDENT IN AN ORGANIZED HEALTH CARE EDUCATION/TRAINING PROGRAM

## 2020-08-29 PROCEDURE — 2580000003 HC RX 258: Performed by: HEALTH CARE PROVIDER

## 2020-08-29 PROCEDURE — 85025 COMPLETE CBC W/AUTO DIFF WBC: CPT

## 2020-08-29 PROCEDURE — 96376 TX/PRO/DX INJ SAME DRUG ADON: CPT

## 2020-08-29 PROCEDURE — 6360000004 HC RX CONTRAST MEDICATION: Performed by: EMERGENCY MEDICINE

## 2020-08-29 PROCEDURE — 96372 THER/PROPH/DIAG INJ SC/IM: CPT

## 2020-08-29 PROCEDURE — 2580000003 HC RX 258: Performed by: STUDENT IN AN ORGANIZED HEALTH CARE EDUCATION/TRAINING PROGRAM

## 2020-08-29 RX ORDER — ONDANSETRON 2 MG/ML
4 INJECTION INTRAMUSCULAR; INTRAVENOUS ONCE
Status: COMPLETED | OUTPATIENT
Start: 2020-08-29 | End: 2020-08-29

## 2020-08-29 RX ORDER — PROMETHAZINE HYDROCHLORIDE 25 MG/1
12.5 TABLET ORAL EVERY 6 HOURS PRN
Status: DISCONTINUED | OUTPATIENT
Start: 2020-08-29 | End: 2020-08-30 | Stop reason: HOSPADM

## 2020-08-29 RX ORDER — ACETAMINOPHEN 325 MG/1
650 TABLET ORAL EVERY 6 HOURS PRN
Status: DISCONTINUED | OUTPATIENT
Start: 2020-08-29 | End: 2020-08-30 | Stop reason: HOSPADM

## 2020-08-29 RX ORDER — MORPHINE SULFATE 4 MG/ML
2 INJECTION, SOLUTION INTRAMUSCULAR; INTRAVENOUS
Status: DISCONTINUED | OUTPATIENT
Start: 2020-08-29 | End: 2020-08-30

## 2020-08-29 RX ORDER — PROMETHAZINE HYDROCHLORIDE 25 MG/ML
6.25 INJECTION, SOLUTION INTRAMUSCULAR; INTRAVENOUS EVERY 6 HOURS PRN
Status: DISCONTINUED | OUTPATIENT
Start: 2020-08-29 | End: 2020-08-30 | Stop reason: HOSPADM

## 2020-08-29 RX ORDER — ONDANSETRON 2 MG/ML
4 INJECTION INTRAMUSCULAR; INTRAVENOUS EVERY 6 HOURS PRN
Status: DISCONTINUED | OUTPATIENT
Start: 2020-08-29 | End: 2020-08-30 | Stop reason: HOSPADM

## 2020-08-29 RX ORDER — MORPHINE SULFATE 4 MG/ML
2 INJECTION, SOLUTION INTRAMUSCULAR; INTRAVENOUS ONCE
Status: COMPLETED | OUTPATIENT
Start: 2020-08-29 | End: 2020-08-29

## 2020-08-29 RX ORDER — MORPHINE SULFATE 4 MG/ML
4 INJECTION, SOLUTION INTRAMUSCULAR; INTRAVENOUS ONCE
Status: COMPLETED | OUTPATIENT
Start: 2020-08-29 | End: 2020-08-29

## 2020-08-29 RX ORDER — SODIUM CHLORIDE, SODIUM LACTATE, POTASSIUM CHLORIDE, CALCIUM CHLORIDE 600; 310; 30; 20 MG/100ML; MG/100ML; MG/100ML; MG/100ML
1000 INJECTION, SOLUTION INTRAVENOUS ONCE
Status: COMPLETED | OUTPATIENT
Start: 2020-08-29 | End: 2020-08-29

## 2020-08-29 RX ORDER — SODIUM CHLORIDE 0.9 % (FLUSH) 0.9 %
10 SYRINGE (ML) INJECTION PRN
Status: DISCONTINUED | OUTPATIENT
Start: 2020-08-29 | End: 2020-08-30 | Stop reason: HOSPADM

## 2020-08-29 RX ORDER — ATORVASTATIN CALCIUM 80 MG/1
40 TABLET, FILM COATED ORAL DAILY
Status: DISCONTINUED | OUTPATIENT
Start: 2020-08-29 | End: 2020-08-30 | Stop reason: HOSPADM

## 2020-08-29 RX ORDER — POLYETHYLENE GLYCOL 3350 17 G/17G
17 POWDER, FOR SOLUTION ORAL DAILY PRN
Status: DISCONTINUED | OUTPATIENT
Start: 2020-08-29 | End: 2020-08-30 | Stop reason: HOSPADM

## 2020-08-29 RX ORDER — SODIUM CHLORIDE 0.9 % (FLUSH) 0.9 %
10 SYRINGE (ML) INJECTION EVERY 12 HOURS SCHEDULED
Status: DISCONTINUED | OUTPATIENT
Start: 2020-08-29 | End: 2020-08-30 | Stop reason: HOSPADM

## 2020-08-29 RX ORDER — SODIUM CHLORIDE 9 MG/ML
INJECTION, SOLUTION INTRAVENOUS CONTINUOUS
Status: DISCONTINUED | OUTPATIENT
Start: 2020-08-29 | End: 2020-08-30 | Stop reason: HOSPADM

## 2020-08-29 RX ORDER — ACETAMINOPHEN 325 MG/1
650 TABLET ORAL EVERY 4 HOURS PRN
Status: DISCONTINUED | OUTPATIENT
Start: 2020-08-29 | End: 2020-08-30 | Stop reason: HOSPADM

## 2020-08-29 RX ORDER — POTASSIUM CHLORIDE 7.45 MG/ML
10 INJECTION INTRAVENOUS
Status: COMPLETED | OUTPATIENT
Start: 2020-08-29 | End: 2020-08-29

## 2020-08-29 RX ORDER — LISINOPRIL 10 MG/1
10 TABLET ORAL DAILY
Status: DISCONTINUED | OUTPATIENT
Start: 2020-08-29 | End: 2020-08-30 | Stop reason: HOSPADM

## 2020-08-29 RX ORDER — FENTANYL CITRATE 50 UG/ML
50 INJECTION, SOLUTION INTRAMUSCULAR; INTRAVENOUS
Status: DISCONTINUED | OUTPATIENT
Start: 2020-08-29 | End: 2020-08-30 | Stop reason: HOSPADM

## 2020-08-29 RX ORDER — ACETAMINOPHEN 650 MG/1
650 SUPPOSITORY RECTAL EVERY 6 HOURS PRN
Status: DISCONTINUED | OUTPATIENT
Start: 2020-08-29 | End: 2020-08-30 | Stop reason: HOSPADM

## 2020-08-29 RX ADMIN — FENTANYL CITRATE 50 MCG: 50 INJECTION, SOLUTION INTRAMUSCULAR; INTRAVENOUS at 16:50

## 2020-08-29 RX ADMIN — MORPHINE SULFATE 4 MG: 4 INJECTION INTRAVENOUS at 06:24

## 2020-08-29 RX ADMIN — SODIUM CHLORIDE: 9 INJECTION, SOLUTION INTRAVENOUS at 10:45

## 2020-08-29 RX ADMIN — ATORVASTATIN CALCIUM 40 MG: 80 TABLET, FILM COATED ORAL at 10:44

## 2020-08-29 RX ADMIN — SODIUM CHLORIDE, POTASSIUM CHLORIDE, SODIUM LACTATE AND CALCIUM CHLORIDE 1000 ML: 600; 310; 30; 20 INJECTION, SOLUTION INTRAVENOUS at 06:23

## 2020-08-29 RX ADMIN — POTASSIUM CHLORIDE 10 MEQ: 10 INJECTION, SOLUTION INTRAVENOUS at 14:29

## 2020-08-29 RX ADMIN — FENTANYL CITRATE 50 MCG: 50 INJECTION, SOLUTION INTRAMUSCULAR; INTRAVENOUS at 21:09

## 2020-08-29 RX ADMIN — IOHEXOL 75 ML: 350 INJECTION, SOLUTION INTRAVENOUS at 07:08

## 2020-08-29 RX ADMIN — MORPHINE SULFATE 2 MG: 4 INJECTION INTRAVENOUS at 07:47

## 2020-08-29 RX ADMIN — LISINOPRIL 10 MG: 10 TABLET ORAL at 10:44

## 2020-08-29 RX ADMIN — Medication 10 ML: at 21:16

## 2020-08-29 RX ADMIN — FENTANYL CITRATE 50 MCG: 50 INJECTION, SOLUTION INTRAMUSCULAR; INTRAVENOUS at 10:53

## 2020-08-29 RX ADMIN — POTASSIUM CHLORIDE 10 MEQ: 10 INJECTION, SOLUTION INTRAVENOUS at 17:25

## 2020-08-29 RX ADMIN — ENOXAPARIN SODIUM 40 MG: 40 INJECTION SUBCUTANEOUS at 10:44

## 2020-08-29 RX ADMIN — ONDANSETRON 4 MG: 2 INJECTION INTRAMUSCULAR; INTRAVENOUS at 06:23

## 2020-08-29 RX ADMIN — FENTANYL CITRATE 50 MCG: 50 INJECTION, SOLUTION INTRAMUSCULAR; INTRAVENOUS at 14:29

## 2020-08-29 RX ADMIN — POTASSIUM CHLORIDE 10 MEQ: 10 INJECTION, SOLUTION INTRAVENOUS at 12:25

## 2020-08-29 RX ADMIN — POTASSIUM CHLORIDE 10 MEQ: 10 INJECTION, SOLUTION INTRAVENOUS at 15:52

## 2020-08-29 RX ADMIN — MORPHINE SULFATE 2 MG: 4 INJECTION INTRAVENOUS at 22:33

## 2020-08-29 ASSESSMENT — ENCOUNTER SYMPTOMS
CONSTIPATION: 0
VOMITING: 1
NAUSEA: 1
COUGH: 0
SORE THROAT: 0
RHINORRHEA: 0
BACK PAIN: 0
SHORTNESS OF BREATH: 0
DIARRHEA: 1
CHEST TIGHTNESS: 0
ABDOMINAL PAIN: 1

## 2020-08-29 ASSESSMENT — PAIN SCALES - GENERAL
PAINLEVEL_OUTOF10: 10
PAINLEVEL_OUTOF10: 9
PAINLEVEL_OUTOF10: 10
PAINLEVEL_OUTOF10: 10
PAINLEVEL_OUTOF10: 9
PAINLEVEL_OUTOF10: 9

## 2020-08-29 ASSESSMENT — PAIN DESCRIPTION - DESCRIPTORS
DESCRIPTORS: DISCOMFORT;SHOOTING;ACHING
DESCRIPTORS: PATIENT UNABLE TO DESCRIBE

## 2020-08-29 ASSESSMENT — PAIN DESCRIPTION - PAIN TYPE
TYPE: ACUTE PAIN;CHRONIC PAIN
TYPE: CHRONIC PAIN

## 2020-08-29 ASSESSMENT — PAIN DESCRIPTION - ONSET: ONSET: ON-GOING

## 2020-08-29 ASSESSMENT — PAIN DESCRIPTION - LOCATION
LOCATION: ABDOMEN
LOCATION: ABDOMEN

## 2020-08-29 ASSESSMENT — PAIN DESCRIPTION - ORIENTATION: ORIENTATION: UPPER

## 2020-08-29 ASSESSMENT — PAIN DESCRIPTION - FREQUENCY: FREQUENCY: CONTINUOUS

## 2020-08-29 ASSESSMENT — PAIN DESCRIPTION - PROGRESSION: CLINICAL_PROGRESSION: GRADUALLY WORSENING

## 2020-08-29 NOTE — ED PROVIDER NOTES
[facility] ED  Emergency Department Encounter  Emergency Medicine Resident     Pt Name: Joe Tyler  MRN: [id number]  [place] 1970  Date of evaluation: 8/29/20  PCP:  LATOSHA Cleaning [id number]       Chief Complaint   Patient presents with    Abdominal Pain       HISTORY [facility]  (Location/Symptom, Timing/Onset, Context/Setting, Quality, Duration, Modifying Factors,Severity.)      Joe Tyler is a 54 y. o.yo male w/ a hx of recurrent pancreatitis, HTN, hyperlipidemia who presents with four days of epigastric abdominal pain, nausea and vomiting. Patient reports that he vomited seven times yesterday. Vomit was non bloody and non bilious. Patient also reports several episodes of diarrhea without blood and decreased PO intake. Eating significantly exacerbates his pain so he has not eaten anything for two days. Patient feels dehydrated. He is a daily every day drinker of approximately 2-3 beers. His last drink was last night. He denies fever, rhinorrhea, congestion, chest pain or SOB. Patient reports a history of pancreatitis and states his symptoms today \"are exactly the same as last time I had pancreatitis\". PAST MEDICAL / SURGICAL / SOCIAL / FAMILY HISTORY      has a past medical history of Arthritis, Chronic sinusitis, Eczema, Environmental allergies, GERD (gastroesophageal reflux disease), Hyperlipidemia, Hypertension, and Snores. has a past surgical history that includes Upper gastrointestinal endoscopy and Colonoscopy. Social:  reports that he has been smoking cigarettes. He has a 10.00 pack-year smoking history. He has never used smokeless tobacco. He reports that he drinks 2-3 beers daily and does not use drugs. Family Hx:   Family History   Problem Relation Age of Onset    Cancer Paternal Grandmother     High Blood Pressure Mother        Allergies:  Patient has no known allergies.     Home Medications:  Prior to Admission medications Skin: Negative for rash. Neurological: Negative for dizziness, numbness and headaches. All other systems reviewed and are negative. PHYSICAL EXAM   (up to 7 for level 4, 8 or more forlevel 5)      INITIAL VITALS:   Vitals:    08/29/20 0729   BP:    Pulse:    Resp:    Temp:    SpO2: 98%        Physical Exam  Vitals signs and nursing note reviewed. Constitutional:       General: He is not in acute distress. Appearance: Normal appearance. He is not toxic-appearing. Comments: Adult male lying in bed appearing uncomfortable. He speaks in full sentences and answers questions appropriately. HENT:      Head: Normocephalic and atraumatic. Right Ear: External ear normal.      Left Ear: External ear normal.      Nose: Nose normal. No congestion or rhinorrhea. Mouth/Throat:      Mouth: Mucous membranes are dry. Pharynx: Oropharynx is clear. No oropharyngeal exudate or posterior oropharyngeal erythema. Comments: No tongue tremor  Eyes:      Conjunctiva/sclera: Conjunctivae normal.      Pupils: Pupils are equal, round, and reactive to light. Neck:      Musculoskeletal: Normal range of motion and neck supple. Cardiovascular:      Rate and Rhythm: Regular rhythm. Tachycardia present. Pulses: Normal pulses. Heart sounds: No murmur. No friction rub. No gallop. Pulmonary:      Effort: Pulmonary effort is normal. No respiratory distress. Breath sounds: Normal breath sounds. No stridor. No wheezing, rhonchi or rales. Abdominal:      General: Abdomen is flat. Bowel sounds are normal. There is no distension. Palpations: Abdomen is soft. There is no mass. Tenderness: There is abdominal tenderness. There is guarding. Comments: Moderate epigastric tenderness to palpation with guarding. No distension. No rigidity. Musculoskeletal: Normal range of motion. General: No swelling, tenderness, deformity or signs of injury. Right lower leg: No edema. NRBC Automated 0.0 0.0 per 100 WBC    Differential Type NOT REPORTED     Seg Neutrophils 66 (H) 36 - 65 %    Lymphocytes 22 (L) 24 - 43 %    Monocytes 8 3 - 12 %    Eosinophils % 2 1 - 4 %    Basophils 1 0 - 2 %    Immature Granulocytes 1 (H) 0 %    Segs Absolute 8.31 (H) 1.50 - 8.10 k/uL    Absolute Lymph # 2.73 1.10 - 3.70 k/uL    Absolute Mono # 0.93 0.10 - 1.20 k/uL    Absolute Eos # 0.21 0.00 - 0.44 k/uL    Basophils Absolute 0.08 0.00 - 0.20 k/uL    Absolute Immature Granulocyte 0.06 0.00 - 0.30 k/uL    WBC Morphology NOT REPORTED     RBC Morphology NOT REPORTED     Platelet Estimate NOT REPORTED    Comprehensive Metabolic Panel w/ Reflex to MG   Result Value Ref Range    Glucose 110 (H) 70 - 99 mg/dL    BUN 4 (L) 6 - 20 mg/dL    CREATININE 1.03 0.70 - 1.20 mg/dL    Bun/Cre Ratio NOT REPORTED 9 - 20    Calcium 8.6 8.6 - 10.4 mg/dL    Sodium 140 135 - 144 mmol/L    Potassium 3.3 (L) 3.7 - 5.3 mmol/L    Chloride 103 98 - 107 mmol/L    CO2 21 20 - 31 mmol/L    Anion Gap 16 9 - 17 mmol/L    Alkaline Phosphatase 90 40 - 129 U/L    ALT 23 5 - 41 U/L    AST 27 <40 U/L    Total Bilirubin 0.35 0.3 - 1.2 mg/dL    Total Protein 6.5 6.4 - 8.3 g/dL    Alb 3.7 3.5 - 5.2 g/dL    Albumin/Globulin Ratio 1.3 1.0 - 2.5    GFR Non-African American >60 >60 mL/min    GFR African American >60 >60 mL/min    GFR Comment          GFR Staging NOT REPORTED    Lipase   Result Value Ref Range    Lipase 75 (H) 13 - 60 U/L   Magnesium   Result Value Ref Range    Magnesium 2.4 1.6 - 2.6 mg/dL         RADIOLOGY:  Ct Abdomen Pelvis W Iv Contrast Additional Contrast? None    Result Date: 8/29/2020  EXAMINATION: CT OF THE ABDOMEN AND PELVIS WITH CONTRAST 8/29/2020 7:03 am TECHNIQUE: CT of the abdomen and pelvis was performed with the administration of intravenous contrast. Multiplanar reformatted images are provided for review.  Dose modulation, iterative reconstruction, and/or weight based adjustment of the mA/kV was utilized to reduce the radiation dose to as low as reasonably achievable. COMPARISON: 03/10/2018 HISTORY: ORDERING SYSTEM PROVIDED HISTORY: pain TECHNOLOGIST PROVIDED HISTORY: pain FINDINGS: Lower Chest: No acute findings. Organs: Mild inflammatory change around the tail of the pancreas is noted, suggestive of mild pancreatitis. No organized fluid collection. No pancreatic duct dilatation. Findings suggest a mild panic steatosis. The gallbladder, spleen, adrenals and kidneys reveal no acute findings. GI/Bowel: There is no bowel dilatation or wall thickening identified. Normal appendix. Pelvis: No acute findings. Peritoneum/Retroperitoneum: No free air or free fluid. The aorta is normal in caliber. The visceral branches are patent. No lymphadenopathy. Bones/Soft Tissues: L5 pars defects with grade 1 anterolisthesis and associated moderately severe disc disease     1. Findings compatible with mild acute pancreatitis involving the tail of the pancreas. 2.  Findings suggestive of mild hepatic steatosis. 3.  L5 pars defects with grade 1 anterolisthesis. EKG  EKG Interpretation    Interpreted by emergency department physician    Rhythm: normal sinus   Rate: normal  Axis: normal  Ectopy: none  Conduction: normal  ST Segments: no acute change  T Waves: no acute change  Q Waves: none    Clinical Impression: normal EKG    Shen Little      All EKG's are interpreted by the Emergency Department Physicianwho either signs or Co-signs this chart in the absence of a cardiologist.    EMERGENCY DEPARTMENT COURSE:  ED Course as of Aug 29 0947   Sat Aug 29, 2020   0728 Work up is remarkable for mildly elevated lipase of 75. CT is positive for mild acute on chronic pancreatitis. [KA]   0745 On re-evaluation patient is still significantly uncomfortable after morphine and zofran. Does report nausea has improved and he has had no episodes of vomiting in the department.  Because of patient's pain and inability to tolerate PO will admit him to observation unit for pain control and IVF. [KA]      ED Course User Index  [KA] Mi Erickson DO         PROCEDURES:  None    CONSULTS:  None      FINAL IMPRESSION      1.  Alcohol-induced acute pancreatitis without infection or necrosis        DISPOSITION / PLAN     DISPOSITION Admitted 08/29/2020 07:42:02 AM      PATIENT REFERRED TO:  Suleman Solis, APRN - CNP  [address] [facility] [address]  [address], [address] [facility]  [phone] [id number] [phone]            DISCHARGE MEDICATIONS:  New Prescriptions    No medications on file       Mi Erickson DO  Emergency Medicine Resident    (Please note that portions of this note were completed with a voice recognition program.Efforts were made to edit the dictations but occasionally words are mis-transcribed.)        Mi Erickson DO  Resident  08/29/20 1518

## 2020-08-29 NOTE — CARE COORDINATION
DRAKE consulted for 54 yr old pt with hx of etoh abuse. SW met with pt who states that he had been sober for 11yrs but started drinking again about 2 yrs ago following separation from his wife. Pt reports previous treatment through [address]. Sw provided pt with list of area treatment programs. Provided support and encouraged pt to follow up.   GENIE KUMAR

## 2020-08-29 NOTE — H&P
[address] [facility]  CDU / OBSERVATION eNCOUnter  Resident Note     Pt Name: Errol Salgado  MRN: [id number]  [place] 1970  Date of evaluation: 8/29/20  Patient's PCP is :  LATOSHA Arevalo - [facility]    CHIEF COMPLAINT       Chief Complaint   Patient presents with    Abdominal Pain         HISTORY OF PRESENT ILLNESS    Errol Salgado is a 54 y.o. male who presents a 4-day history of mid abdominal pain following ingestion of alcohol. Patient states he had approximately 2 beers shortly preceding the onset of symptoms. Patient complains of constant, 10/10 burning pain in his mid abdomen. It does not radiate to other areas. No exacerbating or alleviating factors. Patient states he had one episode of nausea and vomiting 2 days ago, but none since. Patient has a history of multiple episodes of acute pancreatitis secondary to alcohol use. Last episode was approximately 2 years ago. Patient reports that he is no longer a heavy drinker and avoids all hard liquor, but still has approximately 2 beers every single day. He denies fevers, chills, dizziness, chest pain, shortness of breath, diarrhea or constipation.     Location/Symptom: Mid abdominal pain  Timing/Onset: 4 days ago  Provocation: Alcohol ingestion  Quality: Burning  Radiation: None  Severity: 10/10  Timing/Duration: Constant  Modifying Factors: None    REVIEW OF SYSTEMS       General ROS - No fevers, No malaise   Ophthalmic ROS - No discharge, No changes in vision  ENT ROS -  No sore throat, No rhinorrhea,   Respiratory ROS - no shortness of breath, no cough, no  wheezing  Cardiovascular ROS - No chest pain, no dyspnea on exertion  Gastrointestinal ROS - No abdominal pain, no nausea or vomiting, no change in bowel habits, no black or bloody stools  Genito-Urinary ROS - No dysuria, trouble voiding, or hematuria  Musculoskeletal ROS - No myalgias, No arthalgias  Neurological ROS - No headache, no dizziness/lightheadedness, No focal weakness, no loss of sensation  Dermatological ROS - No lesions, No rash     (PQRS) Advance directives on face sheet per hospital policy. No change unless specifically mentioned in chart    PAST MEDICAL HISTORY    has a past medical history of Arthritis, Chronic sinusitis, Eczema, Environmental allergies, GERD (gastroesophageal reflux disease), Hyperlipidemia, Hypertension, and Snores. I have reviewed the past medical history with the patient and it is pertinent to this complaint. SURGICAL HISTORY      has a past surgical history that includes Upper gastrointestinal endoscopy and Colonoscopy. I have reviewed and agree with Surgical History entered and it is pertinent to this complaint. CURRENT MEDICATIONS     0.9 % sodium chloride infusion, Continuous      All medication charted and reviewed. ALLERGIES     has No Known Allergies. FAMILY HISTORY     He indicated that his mother is alive. He indicated that his father is . He indicated that the status of his paternal grandmother is unknown.     family history includes Cancer in his paternal grandmother; High Blood Pressure in his mother. The patient denies any pertinent family history. I have reviewed and agree with the family history entered. I have reviewed the Family History and it is not significant to the case    SOCIAL HISTORY     10-pack-year smoking history, current alcohol use of 2 beers per day. Denies illicit drug use. PHYSICAL EXAM     INITIAL VITALS:  oral temperature is 98.6 °F (37 °C). His blood pressure is 145/88 (abnormal) and his pulse is 99. His respiration is 16 and oxygen saturation is 98%.       CONSTITUTIONAL: AOx4, no apparent distress, appears stated age    HEAD: normocephalic, atraumatic   EYES: PERRLA, EOMI    ENT: moist mucous membranes, uvula midline   NECK: supple, symmetric   BACK: symmetric   LUNGS: clear to auscultation bilaterally   CARDIOVASCULAR: regular rate and rhythm, no murmurs, rubs or branches are patent. No lymphadenopathy. Bones/Soft Tissues: L5 pars defects with grade 1 anterolisthesis and associated moderately severe disc disease     1. Findings compatible with mild acute pancreatitis involving the tail of the pancreas. 2.  Findings suggestive of mild hepatic steatosis. 3.  L5 pars defects with grade 1 anterolisthesis. LABS:  I have reviewed and interpreted all available lab results. Labs Reviewed   CBC WITH AUTO DIFFERENTIAL - Abnormal; Notable for the following components:       Result Value    WBC 12.3 (*)     Seg Neutrophils 66 (*)     Lymphocytes 22 (*)     Immature Granulocytes 1 (*)     Segs Absolute 8.31 (*)     All other components within normal limits   COMPREHENSIVE METABOLIC PANEL W/ REFLEX TO MG FOR LOW K - Abnormal; Notable for the following components:    Glucose 110 (*)     BUN 4 (*)     Potassium 3.3 (*)     All other components within normal limits   LIPASE - Abnormal; Notable for the following components:    Lipase 75 (*)     All other components within normal limits   MAGNESIUM   LACTATE DEHYDROGENASE         CDU IMPRESSION / PLAN      Joe Tyler is a 54 y.o. male who presents with mild acute pancreatitis secondary to EtOH use. Patient has 0 [place] criteria at the present time. · NPO  · IVF - NS @ 70mL/hr  · Redraw labs in AM  · Counseled Pt re complete EtOH cessation  · Continue home medications and pain control  · Monitor vitals, labs, and imaging  · DISPO: pending consults and clinical improvement    CONSULTS:    None    PROCEDURES:  Not indicated           --  Raheel Clarke MD   Emergency Medicine Resident     This dictation was generated by voice recognition computer software. Although all attempts are made to edit the dictation for accuracy, there may be errors in the transcription that are not intended.

## 2020-08-29 NOTE — ED PROVIDER NOTES
[facility] ED  Emergency Department  Emergency Medicine Resident Sign-out     Care of Ady Waldron was assumed from Dr. Sheldon Carlin and is being seen for Abdominal Pain  . The patient's initial evaluation and plan have been discussed with the prior provider who initially evaluated the patient. EMERGENCY DEPARTMENT COURSE / MEDICAL DECISION MAKING:       MEDICATIONS GIVEN:  Orders Placed This Encounter   Medications    lactated ringers infusion 1,000 mL    ondansetron (ZOFRAN) injection 4 mg    morphine injection 4 mg    iohexol (OMNIPAQUE 350) solution 75 mL    morphine injection 2 mg       LABS / RADIOLOGY:     Labs Reviewed   CBC WITH AUTO DIFFERENTIAL - Abnormal; Notable for the following components:       Result Value    WBC 12.3 (*)     Seg Neutrophils 66 (*)     Lymphocytes 22 (*)     Immature Granulocytes 1 (*)     Segs Absolute 8.31 (*)     All other components within normal limits   COMPREHENSIVE METABOLIC PANEL W/ REFLEX TO MG FOR LOW K - Abnormal; Notable for the following components:    Glucose 110 (*)     BUN 4 (*)     Potassium 3.3 (*)     All other components within normal limits   LIPASE - Abnormal; Notable for the following components:    Lipase 75 (*)     All other components within normal limits   MAGNESIUM       Ct Abdomen Pelvis W Iv Contrast Additional Contrast? None    Result Date: 8/29/2020  EXAMINATION: CT OF THE ABDOMEN AND PELVIS WITH CONTRAST 8/29/2020 7:03 am TECHNIQUE: CT of the abdomen and pelvis was performed with the administration of intravenous contrast. Multiplanar reformatted images are provided for review. Dose modulation, iterative reconstruction, and/or weight based adjustment of the mA/kV was utilized to reduce the radiation dose to as low as reasonably achievable. COMPARISON: 03/10/2018 HISTORY: ORDERING SYSTEM PROVIDED HISTORY: pain TECHNOLOGIST PROVIDED HISTORY: pain FINDINGS: Lower Chest: No acute findings.  Organs: Mild inflammatory change around MD  Resident  08/29/20 8706

## 2020-08-29 NOTE — ED PROVIDER NOTES
[address] HANDOFF       Handoff taken on the following patient from prior Attending Physician:  Pt Name: Angel Tyler  PCP:  LAOTSHA Mark CNP    Attestation  I was available and discussed any additional care issues that arose and coordinated the management plans with the resident(s) caring for the patient during my duty period. Any areas of disagreement with resident's documentation of care or procedures are noted on the chart. I was personally present for the key portions of any/all procedures during my duty period. I have documented in the chart those procedures where I was not present during the key portions.             Thanh Mojica MD  08/29/20 9028

## 2020-08-29 NOTE — ED NOTES
Patient ambulates to and from restroom with steady gait. Observation resident at bedside.      Aron Martínez RN  08/29/20 6574

## 2020-08-29 NOTE — ED TRIAGE NOTES
Pt presents to ED with c/o upper rt/lt quad ab pain x 3 days. Pt states he has a hx of pancreatitis and feels as though this may be another flare up. Pt states he has had N/V/D for 3 days, however the vomiting subsided on the first day. Pt denies any fever/chills. Pt is in NAD.

## 2020-08-29 NOTE — CARE COORDINATION
Case Management Initial Discharge Plan  Jesenia Garcia,             Met with:patient to discuss discharge plans. Information verified: address, contacts, phone number, , insurance Yes    Emergency Contact/Next of Kin name & number: Billie Kaplan [phone]    PCP: LATOSHA Denny - CNP  Date of last visit: 4 months     Insurance Provider: [facility]     Discharge Planning    Living Arrangements:  Children   Support Systems:  Children, Friends/Neighbors    Home has 1 stories  0 stairs to climb to get into front door    Patient able to perform ADL's:Independent    Current Services (outpatient & in home) none   DME equipment: na  DME provider: na    Receiving oral anticoagulation therapy? No    If indicated:   Physician managing anticoagulation treatment: na  Where does patient obtain lab work for ATC treatment? na      Potential Assistance Needed:  N/A    Patient agreeable to home care: No  [place] of choice provided:  n/a    Prior SNF/Rehab Placement and Facility: None   Agreeable to SNF/Rehab: No  [place] of choice provided: n/a     Evaluation: yes    Expected Discharge date:       Patient expects to be discharged to:  Home  Follow Up Appointment: Best Day/ Time:      Transportation provider: Jing   Transportation arrangements needed for discharge: Yes, cab with [facility]     Readmission Risk              Risk of Unplanned Readmission:        0             Does patient have a readmission risk score greater than 14?: BAYLEE   If yes, follow-up appointment must be made within 7 days of discharge.      Goals of Care: Stop drinking       Discharge Plan: Home with cab ride           Electronically signed by Donald Marvin RN on 20 at 7:05 PM EDT

## 2020-08-29 NOTE — PROGRESS NOTES
[address] [facility] [employer]  CDU / OBSERVATION eNCOUnter  Attending NOte       I performed a history and physical examination of the patient and discussed management with the resident. I reviewed the residents note and agree with the documented findings and plan of care. Any areas of disagreement are noted on the chart. I was personally present for the key portions of any procedures. I have documented in the chart those procedures where I was not present during the key portions. I have reviewed the nurses notes. I agree with the chief complaint, past medical history, past surgical history, allergies, medications, social and family history as documented unless otherwise noted below. The Family history, social history, and ROS are effectively unchanged since admission unless noted elsewhere in the chart. Patient presents with abdominal pain and evaluated in emergency department for ongoing discomfort. Lipase slight elevation at 75 but CT imaging shows signs of pancreatitis. Patient has had pancreatitis previously but it has been several years. Patient does not need to frequently come to emergency department. Prior history of GE reflux, depression, hypertension, chronic low back pain. Patient has had pancreatitis previously and has cut down on alcohol use since. Patient smoker previously. Patient also with prediabetes but treated by primary care physician and doing better from that standpoint. Clinically the patient has ongoing pain but somewhat controlled. Will provide for IV fluids and symptomatic treatment. Currently n.p.o. Will advance diet slowly.     Maximino Caceres MD  Attending Emergency  Physician

## 2020-08-30 VITALS
TEMPERATURE: 97.9 F | HEART RATE: 74 BPM | BODY MASS INDEX: 24.48 KG/M2 | OXYGEN SATURATION: 98 % | SYSTOLIC BLOOD PRESSURE: 146 MMHG | DIASTOLIC BLOOD PRESSURE: 101 MMHG | RESPIRATION RATE: 16 BRPM | HEIGHT: 67 IN | WEIGHT: 156 LBS

## 2020-08-30 LAB
ALBUMIN SERPL-MCNC: 3.2 G/DL (ref 3.5–5.2)
ALBUMIN/GLOBULIN RATIO: 1.3 (ref 1–2.5)
ALP BLD-CCNC: 78 U/L (ref 40–129)
ALT SERPL-CCNC: 17 U/L (ref 5–41)
ANION GAP SERPL CALCULATED.3IONS-SCNC: 13 MMOL/L (ref 9–17)
AST SERPL-CCNC: 22 U/L
BILIRUB SERPL-MCNC: 0.35 MG/DL (ref 0.3–1.2)
BUN BLDV-MCNC: 3 MG/DL (ref 6–20)
BUN/CREAT BLD: ABNORMAL (ref 9–20)
CALCIUM SERPL-MCNC: 8.6 MG/DL (ref 8.6–10.4)
CHLORIDE BLD-SCNC: 104 MMOL/L (ref 98–107)
CO2: 25 MMOL/L (ref 20–31)
CREAT SERPL-MCNC: 0.93 MG/DL (ref 0.7–1.2)
GFR AFRICAN AMERICAN: >60 ML/MIN
GFR NON-AFRICAN AMERICAN: >60 ML/MIN
GFR SERPL CREATININE-BSD FRML MDRD: ABNORMAL ML/MIN/{1.73_M2}
GFR SERPL CREATININE-BSD FRML MDRD: ABNORMAL ML/MIN/{1.73_M2}
GLUCOSE BLD-MCNC: 95 MG/DL (ref 70–99)
LACTATE DEHYDROGENASE: 225 U/L (ref 135–225)
LIPASE: 49 U/L (ref 13–60)
POTASSIUM SERPL-SCNC: 4 MMOL/L (ref 3.7–5.3)
SODIUM BLD-SCNC: 142 MMOL/L (ref 135–144)
TOTAL PROTEIN: 5.7 G/DL (ref 6.4–8.3)

## 2020-08-30 PROCEDURE — 36415 COLL VENOUS BLD VENIPUNCTURE: CPT

## 2020-08-30 PROCEDURE — 96376 TX/PRO/DX INJ SAME DRUG ADON: CPT

## 2020-08-30 PROCEDURE — 96372 THER/PROPH/DIAG INJ SC/IM: CPT

## 2020-08-30 PROCEDURE — 80053 COMPREHEN METABOLIC PANEL: CPT

## 2020-08-30 PROCEDURE — 6360000002 HC RX W HCPCS: Performed by: HEALTH CARE PROVIDER

## 2020-08-30 PROCEDURE — 6370000000 HC RX 637 (ALT 250 FOR IP): Performed by: STUDENT IN AN ORGANIZED HEALTH CARE EDUCATION/TRAINING PROGRAM

## 2020-08-30 PROCEDURE — 83615 LACTATE (LD) (LDH) ENZYME: CPT

## 2020-08-30 PROCEDURE — 6370000000 HC RX 637 (ALT 250 FOR IP): Performed by: EMERGENCY MEDICINE

## 2020-08-30 PROCEDURE — 6360000002 HC RX W HCPCS: Performed by: STUDENT IN AN ORGANIZED HEALTH CARE EDUCATION/TRAINING PROGRAM

## 2020-08-30 PROCEDURE — 83690 ASSAY OF LIPASE: CPT

## 2020-08-30 PROCEDURE — 2580000003 HC RX 258: Performed by: HEALTH CARE PROVIDER

## 2020-08-30 PROCEDURE — G0378 HOSPITAL OBSERVATION PER HR: HCPCS

## 2020-08-30 RX ORDER — PANTOPRAZOLE SODIUM 40 MG/1
40 TABLET, DELAYED RELEASE ORAL
Qty: 30 TABLET | Refills: 3 | Status: SHIPPED | OUTPATIENT
Start: 2020-08-31 | End: 2020-10-30 | Stop reason: SDUPTHER

## 2020-08-30 RX ORDER — OXYCODONE HYDROCHLORIDE 5 MG/1
5 TABLET ORAL EVERY 4 HOURS PRN
Status: DISCONTINUED | OUTPATIENT
Start: 2020-08-30 | End: 2020-08-30 | Stop reason: HOSPADM

## 2020-08-30 RX ORDER — OXYCODONE HYDROCHLORIDE 5 MG/1
5 TABLET ORAL EVERY 4 HOURS PRN
Qty: 12 TABLET | Refills: 0 | Status: SHIPPED | OUTPATIENT
Start: 2020-08-30 | End: 2020-09-02

## 2020-08-30 RX ORDER — PROMETHAZINE HYDROCHLORIDE 12.5 MG/1
12.5 TABLET ORAL EVERY 6 HOURS PRN
Qty: 12 TABLET | Refills: 0 | Status: SHIPPED | OUTPATIENT
Start: 2020-08-30 | End: 2020-09-06

## 2020-08-30 RX ORDER — PANTOPRAZOLE SODIUM 40 MG/1
40 TABLET, DELAYED RELEASE ORAL
Status: DISCONTINUED | OUTPATIENT
Start: 2020-08-31 | End: 2020-08-30 | Stop reason: HOSPADM

## 2020-08-30 RX ADMIN — ATORVASTATIN CALCIUM 40 MG: 80 TABLET, FILM COATED ORAL at 08:51

## 2020-08-30 RX ADMIN — FENTANYL CITRATE 50 MCG: 50 INJECTION, SOLUTION INTRAMUSCULAR; INTRAVENOUS at 06:38

## 2020-08-30 RX ADMIN — OXYCODONE HYDROCHLORIDE 5 MG: 5 TABLET ORAL at 15:01

## 2020-08-30 RX ADMIN — SODIUM CHLORIDE: 9 INJECTION, SOLUTION INTRAVENOUS at 01:00

## 2020-08-30 RX ADMIN — ENOXAPARIN SODIUM 40 MG: 40 INJECTION SUBCUTANEOUS at 08:51

## 2020-08-30 RX ADMIN — FENTANYL CITRATE 50 MCG: 50 INJECTION, SOLUTION INTRAMUSCULAR; INTRAVENOUS at 01:00

## 2020-08-30 RX ADMIN — FENTANYL CITRATE 50 MCG: 50 INJECTION, SOLUTION INTRAMUSCULAR; INTRAVENOUS at 08:38

## 2020-08-30 RX ADMIN — LISINOPRIL 10 MG: 10 TABLET ORAL at 08:51

## 2020-08-30 RX ADMIN — MORPHINE SULFATE 2 MG: 4 INJECTION INTRAVENOUS at 02:36

## 2020-08-30 RX ADMIN — OXYCODONE HYDROCHLORIDE 5 MG: 5 TABLET ORAL at 11:01

## 2020-08-30 ASSESSMENT — PAIN SCALES - GENERAL
PAINLEVEL_OUTOF10: 9
PAINLEVEL_OUTOF10: 9
PAINLEVEL_OUTOF10: 7
PAINLEVEL_OUTOF10: 10
PAINLEVEL_OUTOF10: 10
PAINLEVEL_OUTOF10: 7

## 2020-08-30 ASSESSMENT — PAIN DESCRIPTION - FREQUENCY: FREQUENCY: CONTINUOUS

## 2020-08-30 ASSESSMENT — PAIN DESCRIPTION - PAIN TYPE: TYPE: ACUTE PAIN

## 2020-08-30 ASSESSMENT — PAIN DESCRIPTION - LOCATION: LOCATION: ABDOMEN

## 2020-08-30 ASSESSMENT — PAIN DESCRIPTION - ONSET: ONSET: ON-GOING

## 2020-08-30 ASSESSMENT — PAIN DESCRIPTION - DESCRIPTORS: DESCRIPTORS: ACHING;CONSTANT

## 2020-08-30 ASSESSMENT — PAIN DESCRIPTION - PROGRESSION: CLINICAL_PROGRESSION: NOT CHANGED

## 2020-08-30 ASSESSMENT — PAIN DESCRIPTION - ORIENTATION: ORIENTATION: MID

## 2020-08-30 ASSESSMENT — PAIN - FUNCTIONAL ASSESSMENT: PAIN_FUNCTIONAL_ASSESSMENT: PREVENTS OR INTERFERES SOME ACTIVE ACTIVITIES AND ADLS

## 2020-08-30 NOTE — DISCHARGE SUMMARY
CDU Discharge Summary        Patient:  Jesenia Garcia  YOB: 1970    MRN: [id number]   Acct: [de-identified]    Primary Care Physician: LATOSHA Denny CNP    Admit date:  8/29/2020  5:31 AM  Discharge date: 8/30/2020  3:25 PM      Discharge Diagnoses:     1.)  Acute pancreatitis, exacerbation of chronic pancreatitis, etiology uncertain, relieved with IV fluids and supportive care. 2.  History of chronic pancreatitis with occasional acute exacerbations. Follow-up:  Call today/tomorrow for a follow up appointment with LATOSHA Denny CNP , or return to the Emergency Room with worsening symptoms    Stressed to patient the importance of following up with primary care doctor for further workup/management of symptoms. Pt verbalizes understanding and agrees with plan. Discharge Medication Changes:       Medication List      START taking these medications    oxyCODONE 5 MG immediate release tablet  Commonly known as:  ROXICODONE  Take 1 tablet by mouth every 4 hours as needed for Pain for up to 3 days.      pantoprazole 40 MG tablet  Commonly known as:  PROTONIX  Take 1 tablet by mouth every morning (before breakfast)     promethazine 12.5 MG tablet  Commonly known as:  PHENERGAN  Take 1 tablet by mouth every 6 hours as needed for Nausea        CONTINUE taking these medications    Dupixent 300 MG/2ML Sosy injection  Generic drug:  dupilumab  INJECT 300MG SUBCUTANEOUSLY EVERY OTHER WEEK     gabapentin 400 MG capsule  Commonly known as:  NEURONTIN  TAKE 1 CAPSULE BY MOUTH 3 TIMES A DAY AS DIRECTED     lisinopril 10 MG tablet  Commonly known as:  PRINIVIL;ZESTRIL  Take 1 tablet by mouth daily     omeprazole 20 MG delayed release capsule  Commonly known as:  PRILOSEC  TAKE 1 CAPSULE BY MOUTH ONCE DAILY AS DIRECTED     simvastatin 40 MG tablet  Commonly known as:  ZOCOR  Take 1 tablet by mouth nightly     tacrolimus 0.03 % ointment  Commonly known as:  PROTOPIC  Apply topically 2 times daily.     tiZANidine 2 MG tablet  Commonly known as:  Orville Joseph  take 1 tablet by mouth twice a day if needed for muscle spasm     triamcinolone 0.1 % ointment  Commonly known as:  KENALOG  Apply to rash twice daily (not face, armpit or groin)     varenicline 0.5 MG X 11 & 1 MG X 42 tablet  Commonly known as:  Chantix Starting Month Jonel  Take by mouth.            Where to Get Your Medications      You can get these medications from any pharmacy    Bring a paper prescription for each of these medications  · oxyCODONE 5 MG immediate release tablet  · pantoprazole 40 MG tablet  · promethazine 12.5 MG tablet         Diet:  No diet orders on file, advance as tolerated     Activity:  As tolerated    Consultants: IP CONSULT TO SOCIAL WORK    Procedures:  Not indicated      Diagnostic Test:   Results for orders placed or performed during the hospital encounter of 08/29/20   CBC Auto Differential   Result Value Ref Range    WBC 12.3 (H) 3.5 - 11.3 k/uL    RBC 5.51 4.21 - 5.77 m/uL    Hemoglobin 15.7 13.0 - 17.0 g/dL    Hematocrit 47.6 40.7 - 50.3 %    MCV 86.4 82.6 - 102.9 fL    MCH 28.5 25.2 - 33.5 pg    MCHC 33.0 28.4 - 34.8 g/dL    RDW 13.8 11.8 - 14.4 %    Platelets 109 486 - 889 k/uL    MPV 10.1 8.1 - 13.5 fL    NRBC Automated 0.0 0.0 per 100 WBC    Differential Type NOT REPORTED     Seg Neutrophils 66 (H) 36 - 65 %    Lymphocytes 22 (L) 24 - 43 %    Monocytes 8 3 - 12 %    Eosinophils % 2 1 - 4 %    Basophils 1 0 - 2 %    Immature Granulocytes 1 (H) 0 %    Segs Absolute 8.31 (H) 1.50 - 8.10 k/uL    Absolute Lymph # 2.73 1.10 - 3.70 k/uL    Absolute Mono # 0.93 0.10 - 1.20 k/uL    Absolute Eos # 0.21 0.00 - 0.44 k/uL    Basophils Absolute 0.08 0.00 - 0.20 k/uL    Absolute Immature Granulocyte 0.06 0.00 - 0.30 k/uL    WBC Morphology NOT REPORTED     RBC Morphology NOT REPORTED     Platelet Estimate NOT REPORTED    Comprehensive Metabolic Panel w/ Reflex to MG   Result Value Ref Range    Glucose 110 (H) 70 - 99 mg/dL BUN 4 (L) 6 - 20 mg/dL    CREATININE 1.03 0.70 - 1.20 mg/dL    Bun/Cre Ratio NOT REPORTED 9 - 20    Calcium 8.6 8.6 - 10.4 mg/dL    Sodium 140 135 - 144 mmol/L    Potassium 3.3 (L) 3.7 - 5.3 mmol/L    Chloride 103 98 - 107 mmol/L    CO2 21 20 - 31 mmol/L    Anion Gap 16 9 - 17 mmol/L    Alkaline Phosphatase 90 40 - 129 U/L    ALT 23 5 - 41 U/L    AST 27 <40 U/L    Total Bilirubin 0.35 0.3 - 1.2 mg/dL    Total Protein 6.5 6.4 - 8.3 g/dL    Alb 3.7 3.5 - 5.2 g/dL    Albumin/Globulin Ratio 1.3 1.0 - 2.5    GFR Non-African American >60 >60 mL/min    GFR African American >60 >60 mL/min    GFR Comment          GFR Staging NOT REPORTED    Lipase   Result Value Ref Range    Lipase 75 (H) 13 - 60 U/L   Magnesium   Result Value Ref Range    Magnesium 2.4 1.6 - 2.6 mg/dL   Lactate Dehydrogenase   Result Value Ref Range     (H) 135 - 225 U/L   COMPREHENSIVE METABOLIC PANEL   Result Value Ref Range    Glucose 95 70 - 99 mg/dL    BUN 3 (L) 6 - 20 mg/dL    CREATININE 0.93 0.70 - 1.20 mg/dL    Bun/Cre Ratio NOT REPORTED 9 - 20    Calcium 8.6 8.6 - 10.4 mg/dL    Sodium 142 135 - 144 mmol/L    Potassium 4.0 3.7 - 5.3 mmol/L    Chloride 104 98 - 107 mmol/L    CO2 25 20 - 31 mmol/L    Anion Gap 13 9 - 17 mmol/L    Alkaline Phosphatase 78 40 - 129 U/L    ALT 17 5 - 41 U/L    AST 22 <40 U/L    Total Bilirubin 0.35 0.3 - 1.2 mg/dL    Total Protein 5.7 (L) 6.4 - 8.3 g/dL    Alb 3.2 (L) 3.5 - 5.2 g/dL    Albumin/Globulin Ratio 1.3 1.0 - 2.5    GFR Non-African American >60 >60 mL/min    GFR African American >60 >60 mL/min    GFR Comment          GFR Staging NOT REPORTED    LACTATE DEHYDROGENASE   Result Value Ref Range     135 - 225 U/L   LIPASE   Result Value Ref Range    Lipase 49 13 - 60 U/L   EKG 12 Lead   Result Value Ref Range    Ventricular Rate 85 BPM    Atrial Rate 85 BPM    P-R Interval 160 ms    QRS Duration 80 ms    Q-T Interval 400 ms    QTc Calculation (Bazett) 476 ms    P Axis 72 degrees    R Axis 33 degrees    T Axis 59 degrees     Ct Abdomen Pelvis W Iv Contrast Additional Contrast? None    Result Date: 8/29/2020  EXAMINATION: CT OF THE ABDOMEN AND PELVIS WITH CONTRAST 8/29/2020 7:03 am TECHNIQUE: CT of the abdomen and pelvis was performed with the administration of intravenous contrast. Multiplanar reformatted images are provided for review. Dose modulation, iterative reconstruction, and/or weight based adjustment of the mA/kV was utilized to reduce the radiation dose to as low as reasonably achievable. COMPARISON: 03/10/2018 HISTORY: ORDERING SYSTEM PROVIDED HISTORY: pain TECHNOLOGIST PROVIDED HISTORY: pain FINDINGS: Lower Chest: No acute findings. Organs: Mild inflammatory change around the tail of the pancreas is noted, suggestive of mild pancreatitis. No organized fluid collection. No pancreatic duct dilatation. Findings suggest a mild panic steatosis. The gallbladder, spleen, adrenals and kidneys reveal no acute findings. GI/Bowel: There is no bowel dilatation or wall thickening identified. Normal appendix. Pelvis: No acute findings. Peritoneum/Retroperitoneum: No free air or free fluid. The aorta is normal in caliber. The visceral branches are patent. No lymphadenopathy. Bones/Soft Tissues: L5 pars defects with grade 1 anterolisthesis and associated moderately severe disc disease     1. Findings compatible with mild acute pancreatitis involving the tail of the pancreas. 2.  Findings suggestive of mild hepatic steatosis. 3.  L5 pars defects with grade 1 anterolisthesis. Physical Exam:  At time of discharge  General appearance - NAD, AOx 3    Lungs -CTAB, no R/R/R  Heart - RRR, no M/R/G  Abdomen - Soft, NT/ND  Neurological:  MAEx4, No focal motor deficit, sensory loss  Extremities - Cap refil <2 sec in all ext., no edema  Skin -warm, dry      Hospital Course:  Clinical course has improved, labs and imaging reviewed.      Lala Villa originally presented to the hospital on 8/29/2020  5:31 AM with history of acute pancreatitis. .  At that time it was determined that He required further observation and patient had IV fluids was made n.p.o. was given multiple IV antiemetics and analgesics. Patient was kept on IV fluids until he was tolerating p.o. and with a slight improvement. Patient was able to take a full diet prior to discharge. Patient was advanced slowly with improvement. . Labs and imaging were followed daily. Imaging results as above. He is medically stable to be discharged. Disposition: Home    Patient stated that they will not drive themselves home from the hospital if they have gotten pain killers/ narcotics earlier that day and that they will arrange for transportation on their own or work with the  for a ride. Patient counseled NOT to drive while under the influence of narcotics/ pain killers. Condition: Good    Patient stable and ready for discharge home. I have discussed plan of care with patient and they are in understanding. They were instructed to read discharge paperwork. All of their questions and concerns were addressed. Time Spent: 0 day      --  Bubba Petit MD  Emergency Medicine Attending Physician    This dictation was generated by voice recognition computer software. Although all attempts are made to edit the dictation for accuracy, there may be errors in the transcription that are not intended.

## 2020-08-30 NOTE — PROGRESS NOTES
OBS/CDU   RESIDENT NOTE      Patients PCP is:  Poncho Baxter, LATOSHA - ELIU        SUBJECTIVE      No acute events overnight. Has been able to tolerate a full diet without nausea or vomiting. The patient is urinating on his own and is passing flatus. Denies fever, chills, nausea, vomiting, chest pain, shortness of breath, abdominal pain, focal weakness, numbness, tingling, urinary/bowel symptoms, vision changes, visual hallucinations, or headache. PHYSICAL EXAM      General: NAD, AO X 3  Heent: EMOI, PERRL  Neck: SUPPLE, NO JVD  Cardiovascular: RRR, S1S2  Pulmonary: CTAB, NO SOB  Abdomen: SOFT, NTTP, ND, +BS  Extremities: +2/4 PULSES DISTAL, NO SWELLING  Neuro / Psych: NO NUMBNESS OR TINGLING, MENTATION AT BASELINE    PERTINENT TEST /EXAMS      I have reviewed all available laboratory results. MEDICATIONS CURRENT   [START ON 8/31/2020] pantoprazole (PROTONIX) tablet 40 mg, QAM AC  oxyCODONE (ROXICODONE) immediate release tablet 5 mg, Q4H PRN  sodium chloride flush 0.9 % injection 10 mL, 2 times per day  sodium chloride flush 0.9 % injection 10 mL, PRN  acetaminophen (TYLENOL) tablet 650 mg, Q4H PRN  enoxaparin (LOVENOX) injection 40 mg, Daily  promethazine (PHENERGAN) injection 6.25 mg, Q6H PRN  lisinopril (PRINIVIL;ZESTRIL) tablet 10 mg, Daily  atorvastatin (LIPITOR) tablet 40 mg, Daily  0.9 % sodium chloride infusion, Continuous  fentaNYL (SUBLIMAZE) injection 50 mcg, Q2H PRN  sodium chloride flush 0.9 % injection 10 mL, 2 times per day  sodium chloride flush 0.9 % injection 10 mL, PRN  acetaminophen (TYLENOL) tablet 650 mg, Q6H PRN    Or  acetaminophen (TYLENOL) suppository 650 mg, Q6H PRN  polyethylene glycol (GLYCOLAX) packet 17 g, Daily PRN  promethazine (PHENERGAN) tablet 12.5 mg, Q6H PRN    Or  ondansetron (ZOFRAN) injection 4 mg, Q6H PRN        All medication charted and reviewed.     CONSULTS      IP CONSULT TO SOCIAL WORK    ASSESSMENT/PLAN       Alyssa Bhatia is a 54 y.o. male who presents with mild acute recurrent pancreatitis. His pain is well controlled and shows no signs of worsening pancreatitis. · Transition to oral pain medication from IV  · Advance diet  · Continue home medications and pain control  · Monitor vitals, labs, and imaging  · DISPO: pending clinical improvement. Possible discharge home later today. --  Elvira Enriquez  Emergency Medicine Resident Physician     This dictation was generated by voice recognition computer software. Although all attempts are made to edit the dictation for accuracy, there may be errors in the transcription that are not intended.

## 2020-08-30 NOTE — PROGRESS NOTES
[address] [facility]  CDU / OBSERVATION eNCOUnter  Attending NOte       I performed a history and physical examination of the patient and discussed management with the resident. I reviewed the residents note and agree with the documented findings and plan of care. Any areas of disagreement are noted on the chart. I was personally present for the key portions of any procedures. I have documented in the chart those procedures where I was not present during the key portions. I have reviewed the nurses notes. I agree with the chief complaint, past medical history, past surgical history, allergies, medications, social and family history as documented unless otherwise noted below. The Family history, social history, and ROS are effectively unchanged since admission unless noted elsewhere in the chart. Previous history of pancreatitis. Patient had cut back on alcohol abuse but has been drinking due to social stresses lately. Social work consult done. Patient with exacerbation of pancreatitis. Improved this morning. Patient with diet advanced. Patient started on PPI. Antiemetics orally started. Patient states doing better and will be reevaluated this afternoon for possible discharge.     Christianne Gannon MD  Attending Emergency  Physician

## 2020-08-30 NOTE — PLAN OF CARE
Problem: Pain:  Goal: Pain level will decrease  Description: Pain level will decrease  8/30/2020 1000 by Agustín Ibarra RN  Outcome: Ongoing  8/30/2020 1000 by Agustín Ibarra RN  Outcome: Ongoing  8/30/2020 0959 by Agustín Ibarra RN  Outcome: Ongoing  8/30/2020 0649 by Phill Troy RN  Outcome: Ongoing  Goal: Control of acute pain  Description: Control of acute pain  8/30/2020 1000 by Agustín Ibarra RN  Outcome: Ongoing  8/30/2020 1000 by Agustín Ibarra RN  Outcome: Ongoing  8/30/2020 0959 by Agustín Ibarra RN  Outcome: Ongoing  8/30/2020 0649 by Phill Troy RN  Outcome: Ongoing  Goal: Control of chronic pain  Description: Control of chronic pain  8/30/2020 1000 by Agustín Ibarra RN  Outcome: Ongoing  8/30/2020 1000 by Agustín Ibarra RN  Outcome: Ongoing  8/30/2020 0959 by Agustín Ibarra RN  Outcome: Ongoing  8/30/2020 0649 by Phill Troy RN  Outcome: Ongoing

## 2020-08-31 ENCOUNTER — TELEPHONE (OUTPATIENT)
Dept: FAMILY MEDICINE CLINIC | Age: 50
End: 2020-08-31

## 2020-08-31 LAB
EKG ATRIAL RATE: 85 BPM
EKG P AXIS: 72 DEGREES
EKG P-R INTERVAL: 160 MS
EKG Q-T INTERVAL: 400 MS
EKG QRS DURATION: 80 MS
EKG QTC CALCULATION (BAZETT): 476 MS
EKG R AXIS: 33 DEGREES
EKG T AXIS: 59 DEGREES
EKG VENTRICULAR RATE: 85 BPM

## 2020-08-31 PROCEDURE — 93010 ELECTROCARDIOGRAM REPORT: CPT | Performed by: INTERNAL MEDICINE

## 2020-09-18 ENCOUNTER — TELEPHONE (OUTPATIENT)
Dept: INTERNAL MEDICINE | Age: 50
End: 2020-09-18

## 2020-09-18 NOTE — TELEPHONE ENCOUNTER
Phone call to patient - he will pay for the script out of pocket, he is fine with that. Patient is unable to go to ED as he is home with 5 kids. He states he is in pain, but it is not emergent. Patient is having trouble eating because of the pain.

## 2020-09-18 NOTE — TELEPHONE ENCOUNTER
Patient calling in regards to roxicodone script given at ED  when he was seen for pancreatitis. He was unable to get to the pharmacy in time & the script is . ED advised he get a new script from you. He states he is in a lot of pain & has 5 kids at home. Patient has an appt Monday. Please advise.

## 2020-09-18 NOTE — TELEPHONE ENCOUNTER
Script cannot be filled without authorization from prescribing doctor. Patient called ED & was told he would have to start the process all over again.

## 2020-10-11 ENCOUNTER — HOSPITAL ENCOUNTER (OUTPATIENT)
Age: 50
Setting detail: OBSERVATION
Discharge: HOME | End: 2020-10-12
Attending: EMERGENCY MEDICINE | Admitting: EMERGENCY MEDICINE
Payer: MEDICAID

## 2020-10-11 PROBLEM — K85.20 ALCOHOL-INDUCED ACUTE PANCREATITIS: Status: ACTIVE | Noted: 2020-10-11

## 2020-10-11 LAB
-: NORMAL
ABSOLUTE EOS #: 0.13 K/UL (ref 0–0.44)
ABSOLUTE IMMATURE GRANULOCYTE: 0.03 K/UL (ref 0–0.3)
ABSOLUTE LYMPH #: 2.24 K/UL (ref 1.1–3.7)
ABSOLUTE MONO #: 0.95 K/UL (ref 0.1–1.2)
ALBUMIN SERPL-MCNC: 3.5 G/DL (ref 3.5–5.2)
ALBUMIN/GLOBULIN RATIO: 1.2 (ref 1–2.5)
ALP BLD-CCNC: 81 U/L (ref 40–129)
ALT SERPL-CCNC: 11 U/L (ref 5–41)
ANION GAP SERPL CALCULATED.3IONS-SCNC: 10 MMOL/L (ref 9–17)
AST SERPL-CCNC: 16 U/L
BASOPHILS # BLD: 1 % (ref 0–2)
BASOPHILS ABSOLUTE: 0.07 K/UL (ref 0–0.2)
BILIRUB SERPL-MCNC: 0.33 MG/DL (ref 0.3–1.2)
BUN BLDV-MCNC: 7 MG/DL (ref 6–20)
BUN/CREAT BLD: ABNORMAL (ref 9–20)
CALCIUM SERPL-MCNC: 9.1 MG/DL (ref 8.6–10.4)
CHLORIDE BLD-SCNC: 105 MMOL/L (ref 98–107)
CO2: 22 MMOL/L (ref 20–31)
CREAT SERPL-MCNC: 0.95 MG/DL (ref 0.7–1.2)
DIFFERENTIAL TYPE: NORMAL
EOSINOPHILS RELATIVE PERCENT: 2 % (ref 1–4)
GFR AFRICAN AMERICAN: >60 ML/MIN
GFR NON-AFRICAN AMERICAN: >60 ML/MIN
GFR SERPL CREATININE-BSD FRML MDRD: ABNORMAL ML/MIN/{1.73_M2}
GFR SERPL CREATININE-BSD FRML MDRD: ABNORMAL ML/MIN/{1.73_M2}
GLUCOSE BLD-MCNC: 105 MG/DL (ref 70–99)
HCT VFR BLD CALC: 50.1 % (ref 40.7–50.3)
HEMOGLOBIN: 16.6 G/DL (ref 13–17)
IMMATURE GRANULOCYTES: 0 %
LIPASE: 94 U/L (ref 13–60)
LYMPHOCYTES # BLD: 26 % (ref 24–43)
MCH RBC QN AUTO: 28.8 PG (ref 25.2–33.5)
MCHC RBC AUTO-ENTMCNC: 33.1 G/DL (ref 28.4–34.8)
MCV RBC AUTO: 86.8 FL (ref 82.6–102.9)
MONOCYTES # BLD: 11 % (ref 3–12)
NRBC AUTOMATED: 0 PER 100 WBC
PDW BLD-RTO: 13.6 % (ref 11.8–14.4)
PLATELET # BLD: 287 K/UL (ref 138–453)
PLATELET ESTIMATE: NORMAL
PMV BLD AUTO: 10.6 FL (ref 8.1–13.5)
POTASSIUM SERPL-SCNC: 3.3 MMOL/L (ref 3.7–5.3)
RBC # BLD: 5.77 M/UL (ref 4.21–5.77)
RBC # BLD: NORMAL 10*6/UL
REASON FOR REJECTION: NORMAL
SEG NEUTROPHILS: 60 % (ref 36–65)
SEGMENTED NEUTROPHILS ABSOLUTE COUNT: 5.18 K/UL (ref 1.5–8.1)
SODIUM BLD-SCNC: 137 MMOL/L (ref 135–144)
TOTAL PROTEIN: 6.4 G/DL (ref 6.4–8.3)
WBC # BLD: 8.6 K/UL (ref 3.5–11.3)
WBC # BLD: NORMAL 10*3/UL
ZZ NTE CLEAN UP: ORDERED TEST: NORMAL
ZZ NTE WITH NAME CLEAN UP: SPECIMEN SOURCE: NORMAL

## 2020-10-11 PROCEDURE — G0378 HOSPITAL OBSERVATION PER HR: HCPCS

## 2020-10-11 PROCEDURE — 85025 COMPLETE CBC W/AUTO DIFF WBC: CPT

## 2020-10-11 PROCEDURE — 2580000003 HC RX 258: Performed by: STUDENT IN AN ORGANIZED HEALTH CARE EDUCATION/TRAINING PROGRAM

## 2020-10-11 PROCEDURE — 80053 COMPREHEN METABOLIC PANEL: CPT

## 2020-10-11 PROCEDURE — 6360000002 HC RX W HCPCS: Performed by: STUDENT IN AN ORGANIZED HEALTH CARE EDUCATION/TRAINING PROGRAM

## 2020-10-11 PROCEDURE — 99284 EMERGENCY DEPT VISIT MOD MDM: CPT

## 2020-10-11 PROCEDURE — 6370000000 HC RX 637 (ALT 250 FOR IP): Performed by: STUDENT IN AN ORGANIZED HEALTH CARE EDUCATION/TRAINING PROGRAM

## 2020-10-11 PROCEDURE — 83690 ASSAY OF LIPASE: CPT

## 2020-10-11 PROCEDURE — 96375 TX/PRO/DX INJ NEW DRUG ADDON: CPT

## 2020-10-11 PROCEDURE — 96374 THER/PROPH/DIAG INJ IV PUSH: CPT

## 2020-10-11 PROCEDURE — 96365 THER/PROPH/DIAG IV INF INIT: CPT

## 2020-10-11 PROCEDURE — 96372 THER/PROPH/DIAG INJ SC/IM: CPT

## 2020-10-11 PROCEDURE — 96376 TX/PRO/DX INJ SAME DRUG ADON: CPT

## 2020-10-11 PROCEDURE — 96366 THER/PROPH/DIAG IV INF ADDON: CPT

## 2020-10-11 RX ORDER — SODIUM CHLORIDE, SODIUM LACTATE, POTASSIUM CHLORIDE, AND CALCIUM CHLORIDE .6; .31; .03; .02 G/100ML; G/100ML; G/100ML; G/100ML
1000 INJECTION, SOLUTION INTRAVENOUS ONCE
Status: COMPLETED | OUTPATIENT
Start: 2020-10-11 | End: 2020-10-11

## 2020-10-11 RX ORDER — MORPHINE SULFATE 4 MG/ML
4 INJECTION, SOLUTION INTRAMUSCULAR; INTRAVENOUS ONCE
Status: DISCONTINUED | OUTPATIENT
Start: 2020-10-11 | End: 2020-10-11

## 2020-10-11 RX ORDER — KETOROLAC TROMETHAMINE 30 MG/ML
30 INJECTION, SOLUTION INTRAMUSCULAR; INTRAVENOUS ONCE
Status: COMPLETED | OUTPATIENT
Start: 2020-10-11 | End: 2020-10-11

## 2020-10-11 RX ORDER — PROCHLORPERAZINE EDISYLATE 5 MG/ML
10 INJECTION INTRAMUSCULAR; INTRAVENOUS
Status: COMPLETED | OUTPATIENT
Start: 2020-10-11 | End: 2020-10-11

## 2020-10-11 RX ORDER — LISINOPRIL 10 MG/1
10 TABLET ORAL DAILY
Status: DISCONTINUED | OUTPATIENT
Start: 2020-10-11 | End: 2020-10-12 | Stop reason: HOSPADM

## 2020-10-11 RX ORDER — SODIUM CHLORIDE 0.9 % (FLUSH) 0.9 %
10 SYRINGE (ML) INJECTION EVERY 12 HOURS SCHEDULED
Status: DISCONTINUED | OUTPATIENT
Start: 2020-10-11 | End: 2020-10-12 | Stop reason: HOSPADM

## 2020-10-11 RX ORDER — ONDANSETRON 2 MG/ML
4 INJECTION INTRAMUSCULAR; INTRAVENOUS ONCE
Status: COMPLETED | OUTPATIENT
Start: 2020-10-11 | End: 2020-10-11

## 2020-10-11 RX ORDER — FENTANYL CITRATE 50 UG/ML
100 INJECTION, SOLUTION INTRAMUSCULAR; INTRAVENOUS ONCE
Status: COMPLETED | OUTPATIENT
Start: 2020-10-11 | End: 2020-10-11

## 2020-10-11 RX ORDER — ATORVASTATIN CALCIUM 40 MG/1
40 TABLET, FILM COATED ORAL DAILY
Status: DISCONTINUED | OUTPATIENT
Start: 2020-10-11 | End: 2020-10-12 | Stop reason: HOSPADM

## 2020-10-11 RX ORDER — SODIUM CHLORIDE, SODIUM LACTATE, POTASSIUM CHLORIDE, CALCIUM CHLORIDE 600; 310; 30; 20 MG/100ML; MG/100ML; MG/100ML; MG/100ML
INJECTION, SOLUTION INTRAVENOUS CONTINUOUS
Status: DISCONTINUED | OUTPATIENT
Start: 2020-10-11 | End: 2020-10-12 | Stop reason: HOSPADM

## 2020-10-11 RX ORDER — TIZANIDINE 2 MG/1
2 TABLET ORAL 3 TIMES DAILY
Status: DISCONTINUED | OUTPATIENT
Start: 2020-10-11 | End: 2020-10-12 | Stop reason: HOSPADM

## 2020-10-11 RX ORDER — GABAPENTIN 400 MG/1
400 CAPSULE ORAL 3 TIMES DAILY
Status: DISCONTINUED | OUTPATIENT
Start: 2020-10-11 | End: 2020-10-12 | Stop reason: HOSPADM

## 2020-10-11 RX ORDER — FENTANYL CITRATE 50 UG/ML
50 INJECTION, SOLUTION INTRAMUSCULAR; INTRAVENOUS ONCE
Status: COMPLETED | OUTPATIENT
Start: 2020-10-11 | End: 2020-10-11

## 2020-10-11 RX ORDER — KETOROLAC TROMETHAMINE 30 MG/ML
15 INJECTION, SOLUTION INTRAMUSCULAR; INTRAVENOUS EVERY 6 HOURS PRN
Status: DISCONTINUED | OUTPATIENT
Start: 2020-10-11 | End: 2020-10-12

## 2020-10-11 RX ORDER — FENTANYL CITRATE 50 UG/ML
75 INJECTION, SOLUTION INTRAMUSCULAR; INTRAVENOUS ONCE
Status: COMPLETED | OUTPATIENT
Start: 2020-10-11 | End: 2020-10-11

## 2020-10-11 RX ORDER — FENTANYL CITRATE 50 UG/ML
50 INJECTION, SOLUTION INTRAMUSCULAR; INTRAVENOUS
Status: DISCONTINUED | OUTPATIENT
Start: 2020-10-11 | End: 2020-10-11

## 2020-10-11 RX ORDER — ACETAMINOPHEN 500 MG
1000 TABLET ORAL EVERY 8 HOURS SCHEDULED
Status: DISCONTINUED | OUTPATIENT
Start: 2020-10-11 | End: 2020-10-12 | Stop reason: HOSPADM

## 2020-10-11 RX ORDER — POTASSIUM CHLORIDE 20 MEQ/1
40 TABLET, EXTENDED RELEASE ORAL ONCE
Status: DISCONTINUED | OUTPATIENT
Start: 2020-10-11 | End: 2020-10-12 | Stop reason: HOSPADM

## 2020-10-11 RX ORDER — PANTOPRAZOLE SODIUM 40 MG/1
40 TABLET, DELAYED RELEASE ORAL
Status: DISCONTINUED | OUTPATIENT
Start: 2020-10-12 | End: 2020-10-12 | Stop reason: HOSPADM

## 2020-10-11 RX ORDER — SODIUM CHLORIDE, SODIUM LACTATE, POTASSIUM CHLORIDE, AND CALCIUM CHLORIDE .6; .31; .03; .02 G/100ML; G/100ML; G/100ML; G/100ML
2000 INJECTION, SOLUTION INTRAVENOUS ONCE
Status: COMPLETED | OUTPATIENT
Start: 2020-10-11 | End: 2020-10-11

## 2020-10-11 RX ORDER — SODIUM CHLORIDE 0.9 % (FLUSH) 0.9 %
10 SYRINGE (ML) INJECTION PRN
Status: DISCONTINUED | OUTPATIENT
Start: 2020-10-11 | End: 2020-10-12 | Stop reason: HOSPADM

## 2020-10-11 RX ORDER — MORPHINE SULFATE 4 MG/ML
4 INJECTION, SOLUTION INTRAMUSCULAR; INTRAVENOUS
Status: DISCONTINUED | OUTPATIENT
Start: 2020-10-11 | End: 2020-10-12

## 2020-10-11 RX ADMIN — ENOXAPARIN SODIUM 40 MG: 40 INJECTION SUBCUTANEOUS at 17:58

## 2020-10-11 RX ADMIN — TIZANIDINE 2 MG: 2 TABLET ORAL at 21:28

## 2020-10-11 RX ADMIN — SODIUM CHLORIDE, POTASSIUM CHLORIDE, SODIUM LACTATE AND CALCIUM CHLORIDE 1000 ML: 600; 310; 30; 20 INJECTION, SOLUTION INTRAVENOUS at 11:12

## 2020-10-11 RX ADMIN — FENTANYL CITRATE 50 MCG: 50 INJECTION, SOLUTION INTRAMUSCULAR; INTRAVENOUS at 11:11

## 2020-10-11 RX ADMIN — ONDANSETRON 4 MG: 2 INJECTION INTRAMUSCULAR; INTRAVENOUS at 13:56

## 2020-10-11 RX ADMIN — KETOROLAC TROMETHAMINE 30 MG: 30 INJECTION, SOLUTION INTRAMUSCULAR; INTRAVENOUS at 15:17

## 2020-10-11 RX ADMIN — ONDANSETRON 4 MG: 2 INJECTION INTRAMUSCULAR; INTRAVENOUS at 08:20

## 2020-10-11 RX ADMIN — FENTANYL CITRATE 50 MCG: 50 INJECTION, SOLUTION INTRAMUSCULAR; INTRAVENOUS at 13:56

## 2020-10-11 RX ADMIN — MORPHINE SULFATE 4 MG: 4 INJECTION INTRAVENOUS at 21:28

## 2020-10-11 RX ADMIN — FENTANYL CITRATE 100 MCG: 50 INJECTION, SOLUTION INTRAMUSCULAR; INTRAVENOUS at 10:15

## 2020-10-11 RX ADMIN — FENTANYL CITRATE 75 MCG: 50 INJECTION, SOLUTION INTRAMUSCULAR; INTRAVENOUS at 08:22

## 2020-10-11 RX ADMIN — GABAPENTIN 400 MG: 400 CAPSULE ORAL at 17:58

## 2020-10-11 RX ADMIN — MORPHINE SULFATE 4 MG: 4 INJECTION INTRAVENOUS at 17:58

## 2020-10-11 RX ADMIN — LISINOPRIL 10 MG: 10 TABLET ORAL at 13:56

## 2020-10-11 RX ADMIN — SODIUM CHLORIDE, POTASSIUM CHLORIDE, SODIUM LACTATE AND CALCIUM CHLORIDE 2000 ML: 600; 310; 30; 20 INJECTION, SOLUTION INTRAVENOUS at 08:19

## 2020-10-11 RX ADMIN — TIZANIDINE 2 MG: 2 TABLET ORAL at 17:58

## 2020-10-11 RX ADMIN — SODIUM CHLORIDE, POTASSIUM CHLORIDE, SODIUM LACTATE AND CALCIUM CHLORIDE: 600; 310; 30; 20 INJECTION, SOLUTION INTRAVENOUS at 17:59

## 2020-10-11 RX ADMIN — GABAPENTIN 400 MG: 400 CAPSULE ORAL at 21:28

## 2020-10-11 RX ADMIN — PROCHLORPERAZINE EDISYLATE 10 MG: 5 INJECTION INTRAMUSCULAR; INTRAVENOUS at 08:30

## 2020-10-11 ASSESSMENT — ENCOUNTER SYMPTOMS
NAUSEA: 1
COUGH: 0
SORE THROAT: 0
VOMITING: 1
DIARRHEA: 0
SHORTNESS OF BREATH: 0
EYE PAIN: 0
APNEA: 0
WHEEZING: 0
ABDOMINAL PAIN: 1
RHINORRHEA: 0

## 2020-10-11 ASSESSMENT — PAIN SCALES - GENERAL
PAINLEVEL_OUTOF10: 10
PAINLEVEL_OUTOF10: 8
PAINLEVEL_OUTOF10: 10
PAINLEVEL_OUTOF10: 8

## 2020-10-11 ASSESSMENT — PAIN DESCRIPTION - FREQUENCY
FREQUENCY: CONTINUOUS
FREQUENCY: CONTINUOUS

## 2020-10-11 ASSESSMENT — PAIN DESCRIPTION - DESCRIPTORS
DESCRIPTORS: ACHING;SHARP
DESCRIPTORS: BURNING;PRESSURE
DESCRIPTORS: SHARP

## 2020-10-11 ASSESSMENT — PAIN DESCRIPTION - LOCATION
LOCATION: ABDOMEN

## 2020-10-11 ASSESSMENT — PAIN - FUNCTIONAL ASSESSMENT
PAIN_FUNCTIONAL_ASSESSMENT: PREVENTS OR INTERFERES WITH MANY ACTIVE NOT PASSIVE ACTIVITIES
PAIN_FUNCTIONAL_ASSESSMENT: ACTIVITIES ARE NOT PREVENTED

## 2020-10-11 ASSESSMENT — PAIN DESCRIPTION - PROGRESSION: CLINICAL_PROGRESSION: NOT CHANGED

## 2020-10-11 ASSESSMENT — PAIN DESCRIPTION - ORIENTATION: ORIENTATION: MID

## 2020-10-11 ASSESSMENT — PAIN DESCRIPTION - PAIN TYPE
TYPE: ACUTE PAIN

## 2020-10-11 ASSESSMENT — PAIN DESCRIPTION - ONSET: ONSET: ON-GOING

## 2020-10-11 NOTE — ED NOTES
Pt presented to ed via self c/o pancreatitis like pain for 3 days. Pt is AOx4 with even non labored breaths. Pt ambulated to room with steady gait. Pt denies nausea at this time. Pt placed on bp cuff and pulse ox.       Alexis Moya RN  10/11/20 3291

## 2020-10-11 NOTE — ED NOTES
Pt updated on plan of care. Pt to be admitted. Denies needs at this time.       Malika Cortez RN  10/11/20 4621

## 2020-10-11 NOTE — ED PROVIDER NOTES
[facility]  Emergency Department  Faculty Attestation     I performed a history and physical examination of the patient and discussed management with the resident. I reviewed the residents note and agree with the documented findings and plan of care. Any areas of disagreement are noted on the chart. I was personally present for the key portions of any procedures. I have documented in the chart those procedures where I was not present during the key portions. I have reviewed the emergency nurses triage note. I agree with the chief complaint, past medical history, past surgical history, allergies, medications, social and family history as documented unless otherwise noted below. For Physician Assistant/ Nurse Practitioner cases/documentation I have personally evaluated this patient and have completed at least one if not all key elements of the E/M (history, physical exam, and MDM). Additional findings are as noted. Primary Care Physician:  LATOSHA Cardoza - CNP    Screenings:  [unfilled]    CHIEF COMPLAINT       Chief Complaint   Patient presents with    Pancreatitis       RECENT VITALS:   Temp: 97.5 °F (36.4 °C),  Pulse: 117, Resp: 18, BP: (!) 176/116    LABS:  Labs Reviewed   CBC WITH AUTO DIFFERENTIAL   COMPREHENSIVE METABOLIC PANEL   LIPASE       Radiology  No orders to display       Attending Physician Additional  Notes    She has been ill for the past 3 days with severe worsening epigastric pain very similar to his prior pericarditis. No history of stones or cholecystectomy. He has had alcohol use recently. No vomiting or blood in his stools. He has no/orange stool which is loose. Unable to keep down fluids or eat or drink because of the pain. On exam he is tachycardic hypertensive afebrile anicteric. Abdomen is diffuse tenderness greatest in the epigastrium with some voluntary guarding. No distention or tympany.   Impression is acute abdominal pain likely pancreatitis exacerbation, dehydration. Plan is IV fluids, analgesics, antiemetics, laboratory studies, reassess, anticipate admission. Kiera Sanchez.  Dean Eduardo MD, [address] [facility]  Attending Emergency  Physician                Eloisa Elaine MD  10/11/20 0767

## 2020-10-11 NOTE — ED PROVIDER NOTES
Faculty Sign-Out Attestation  Handoff taken on the following patient from prior Attending Physician: Jodie Lombardi    I was available and discussed any additional care issues that arose and coordinated the management plans with the resident(s) caring for the patient during my duty period. Any areas of disagreement with residents documentation of care or procedures are noted on the chart. I was personally present for the key portions of any/all procedures during my duty period. I have documented in the chart those procedures where I was not present during the key portions. Admitted for pancreatitis.     Ben Barraza MD  Attending Physician       Ben Barraza MD  10/11/20 0046

## 2020-10-11 NOTE — H&P
[address]  CDU / OBSERVATION ENCOUNTER  ATTENDING NOTE     Pt Name: Valerie Lo  MRN: [id number]  [place] 1970  Date of evaluation: 10/11/20  Patient's PCP is :  LATOSHA Young - CNP    CHIEF COMPLAINT       Chief Complaint   Patient presents with    Pancreatitis         HISTORY OF PRESENT ILLNESS    Valerie Lo is a 54 y.o. male who presents with complaints of abdominal pain in the epigastric area secondary to alcohol intake. Patient has history of pancreatitis. Pain feels the same. Patient with nausea vomiting and elevation of lipase. Patient with some improvement in terms of hydration status and feeling slightly better after IV fluids. Patient with ongoing pain. Patient has had some difficulty with various pain medications in terms of them causing nausea as well per    Location/Symptom: Epigastric discomfort  Timing/Onset: Over 2 to 3 days of  Provocation: Alcohol intake  Quality: Burning pain  Radiation: Through to back  Severity: Moderate to severe  Timing/Duration: 3 days  Modifying Factors: Unclear. Unable to eat. REVIEW OF SYSTEMS        General ROS - No fevers, No malaise   Ophthalmic ROS - No discharge, No changes in vision  ENT ROS -  No sore throat, No rhinorrhea,   Respiratory ROS - no shortness of breath, no cough, no  wheezing  Cardiovascular ROS - No chest pain, no dyspnea on exertion  Gastrointestinal ROS - abdominal pain, nausea or vomiting, no change in bowel habits, no black or bloody stools  Genito-Urinary ROS - No dysuria, trouble voiding, or hematuria  Musculoskeletal ROS - No myalgias, No arthalgias  Neurological ROS - No headache, no dizziness/lightheadedness, No focal weakness, no loss of sensation  Dermatological ROS - No lesions, No rash     (PQRS) Advance directives on face sheet per hospital policy.  No change unless specifically mentioned in chart    PAST MEDICAL HISTORY    has a past medical history of Arthritis, Chronic sinusitis, Eczema, Environmental allergies, GERD (gastroesophageal reflux disease), Hyperlipidemia, Hypertension, and Snores. I have reviewed the past medical history with the patient and it is  pertinent to this complaint. SURGICAL HISTORY      has a past surgical history that includes Upper gastrointestinal endoscopy and Colonoscopy. I have reviewed and agree with Surgical History entered and it is  pertinent to this complaint. CURRENT MEDICATIONS     lactated ringers infusion, Continuous        All medication charted and reviewed. ALLERGIES     has No Known Allergies. FAMILY HISTORY     He indicated that his mother is alive. He indicated that his father is . He indicated that the status of his paternal grandmother is unknown.     family history includes Cancer in his paternal grandmother; High Blood Pressure in his mother. The patient denies any pertinent family history. I have reviewed and agree with the family history entered. I have reviewed the Family History and it is not significant to the case    SOCIAL HISTORY      reports that he has been smoking cigarettes. He has a 10.00 pack-year smoking history. He has never used smokeless tobacco. He reports that he does not drink alcohol or use drugs. I have reviewed and agree with all Social.  There are no  concerns for substance abuse/use. PHYSICAL EXAM     INITIAL VITALS:  oral temperature is 97.5 °F (36.4 °C). His blood pressure is 181/103 (abnormal) and his pulse is 86. His respiration is 15 and oxygen saturation is 97%. CONSTITUTIONAL: AOx4, no apparent distress, appears stated age     HEAD: normocephalic, atraumatic   EYES: PERRLA, EOMI    ENT: moist mucous membranes, uvula midline   NECK: supple, symmetric   BACK: symmetric   LUNGS: clear to auscultation bilaterally   CARDIOVASCULAR: regular rate and rhythm, no murmurs, rubs or gallops   ABDOMEN:  Epigastric tenderness. No rebound. Nonsurgical abdomen.    NEUROLOGIC:  MAEx4, no focal sensory or motor deficits   MUSCULOSKELETAL: no clubbing, cyanosis or edema   SKIN: no rash or wounds       DIFFERENTIAL DIAGNOSIS/MDM:     Patient with history of pancreatitis. Patient states he has been off for 3 days with worsening epigastric pain similar to prior pancreatitis. Patient has no history of gallbladder disease and has had alcohol use recently. Patient was unable to keep anything down in ED. Patient being admitted for IV fluids and analgesia. Lipase elevated 94. Patient is a frequent drinker. No history of GI bleed or withdrawal previously. DIAGNOSTIC RESULTS     EKG: All EKG's are interpreted by the Observation Physician who either signs or Co-signs this chart in the absence of a cardiologist.    EKG Interpretation    Interpreted by observation physician    Rhythm: normal sinus   Rate: normal  Axis: normal  Ectopy: none  Conduction: normal  ST Segments: no acute change  T Waves: no acute change  Q Waves: nonspecific    Clinical Impression: no acute changes    Mathew Mojica MD         RADIOLOGY:   I directly visualized the following  images and reviewed the radiologist interpretations:    No results found. LABS:  I have reviewed and interpreted all available lab results. Labs Reviewed   COMPREHENSIVE METABOLIC PANEL - Abnormal; Notable for the following components:       Result Value    Glucose 105 (*)     Potassium 3.3 (*)     All other components within normal limits   LIPASE - Abnormal; Notable for the following components:    Lipase 94 (*)     All other components within normal limits   CBC WITH AUTO DIFFERENTIAL   SPECIMEN REJECTION   PREVIOUS SPECIMEN         CDU IMPRESSION / PLAN      Carlos Manuel Patel is a 54 y.o. male who presents with       · Acute onset pancreatitis likely secondary to alcohol intake, possible gastritis. No evidence of GI bleed. · Alcohol abuse. Patient with history of frequent alcohol use per social history from the ED.   · IV fluids antiemetics, analgesics. · Monitor for progression of disease versus ability to handle p.o. · Ice chips given in ED  · Continue home medications and pain control  · Monitor vitals, labs, and imaging  · DISPO: pending consults and clinical improvement    CONSULTS:    None    PROCEDURES:  Not indicated        PATIENT REFERRED TO:    No follow-up provider specified. --  Cyndi Celeste MD   Emergency Medicine Attending    This dictation was generated by voice recognition computer software. Although all attempts are made to edit the dictation for accuracy, there may be errors in the transcription that are not intended.

## 2020-10-11 NOTE — ED PROVIDER NOTES
[facility] ED  Emergency Department Encounter  EmergencyMedicineResident     This patient was seen during the COVID-19 crisis. There were limited resources and those resources we did have had to be conserved for the sickest of patients. Pt Name: Carmen Payne  MRN: [id number]  [place] 1970  Date of evaluation: 10/11/20  PCP: LATOSHA Vogel CNP    CHIEF COMPLAINT       Chief Complaint   Patient presents with    Pancreatitis       HISTORY OF PRESENT ILLNESS  (Location/Symptom, Timing/Onset, Context/Setting, Quality, Duration, Modifying Factors, Severity.)      Carmen Payne is a 54 y.o. male who presents to the emergency department with upper abdominal pain that started on Wednesday. Patient has a past medical history of pancreatitis and states that the symptoms feel very similar to his usual pancreatitis episodes. Patient had one episode of emesis, constant nausea. 10/10 pain, sharp in nature. States that his oral intake is decreased due to pain and nausea. His urine is orange now. No drug use. Half pack per day smoking for the last 10 years. States that he usually drinks about 3x24 ounce beers every day and that his last drink was last Monday. No history of DTs or withdrawals. Patient denies chest pain, shortness of breath, fever, chills, headache, dizziness, lightheadedness, vision changes, dysuria, hematuria, changes in bowel movements. PAST MEDICAL / SURGICAL /SOCIAL / FAMILY HISTORY      has a past medical history of Arthritis, Chronic sinusitis, Eczema, Environmental allergies, GERD (gastroesophageal reflux disease), Hyperlipidemia, Hypertension, and Snores. has a past surgical history that includes Upper gastrointestinal endoscopy and Colonoscopy.       Social History     Socioeconomic History    Marital status: Legally      Spouse name: Not on file    Number of children: Not on file    Years of education: Not on file    Highest education level: Not on file   Occupational History    Not on file   Social Needs    Financial resource strain: Not on file    Food insecurity     Worry: Not on file     Inability: Not on file    Transportation needs     Medical: Not on file     Non-medical: Not on file   Tobacco Use    Smoking status: Current Every Day Smoker     Packs/day: 0.50     Years: 20.00     Pack years: 10.00     Types: Cigarettes    Smokeless tobacco: Never Used   Substance and Sexual Activity    Alcohol use: No     Alcohol/week: 0.0 standard drinks     Comment: quit 7 years ago    Drug use: No    Sexual activity: Not on file   Lifestyle    Physical activity     Days per week: Not on file     Minutes per session: Not on file    Stress: Not on file   Relationships    Social connections     Talks on phone: Not on file     Gets together: Not on file     Attends Worship service: Not on file     Active member of club or organization: Not on file     Attends meetings of clubs or organizations: Not on file     Relationship status: Not on file    Intimate partner violence     Fear of current or ex partner: Not on file     Emotionally abused: Not on file     Physically abused: Not on file     Forced sexual activity: Not on file   Other Topics Concern    Not on file   Social History Narrative    Not on file       Family History   Problem Relation Age of Onset    Cancer Paternal Grandmother     High Blood Pressure Mother        Allergies:  Patient has no known allergies. Home Medications:  Prior to Admission medications    Medication Sig Start Date End Date Taking?  Authorizing Provider   pantoprazole (PROTONIX) 40 MG tablet Take 1 tablet by mouth every morning (before breakfast) 8/31/20   Krystin Bower MD   simvastatin (ZOCOR) 40 MG tablet Take 1 tablet by mouth nightly 7/2/20   Edvin Dorsey APRN - CNP   lisinopril (PRINIVIL;ZESTRIL) 10 MG tablet Take 1 tablet by mouth daily 7/2/20   Edvin Dorsey APRN - CNP gabapentin (NEURONTIN) 400 MG capsule TAKE 1 CAPSULE BY MOUTH 3 TIMES A DAY AS DIRECTED 7/2/20 10/31/20  LATOSHA Beckwith CNP   dupilumab (DUPIXENT) 300 MG/2ML SOSY injection INJECT 300MG SUBCUTANEOUSLY EVERY OTHER WEEK 6/30/20   Roderick Montes MD   triamcinolone (KENALOG) 0.1 % ointment Apply to rash twice daily (not face, armpit or groin) 6/30/20   Amita Suarez MD   tacrolimus (PROTOPIC) 0.03 % ointment Apply topically 2 times daily. 6/30/20   Suyapa Montes MD   omeprazole (PRILOSEC) 20 MG delayed release capsule TAKE 1 CAPSULE BY MOUTH ONCE DAILY AS DIRECTED 4/15/20   LATOSHA Beckwith CNP   tiZANidine (ZANAFLEX) 2 MG tablet take 1 tablet by mouth twice a day if needed for muscle spasm 1/20/20   LATOSHA Beckwith CNP   varenicline (CHANTIX STARTING MONTH PAK) 0.5 MG X 11 & 1 MG X 42 tablet Take by mouth. 1/20/20   LATOSHA Beckwith CNP       REVIEW OF SYSTEMS    (2-9 systems for level 4, 10 or more forlevel 5)      Review of Systems   Constitutional: Positive for appetite change. Negative for activity change, chills, fatigue and fever. HENT: Negative for congestion, rhinorrhea and sore throat. Eyes: Negative for pain. Respiratory: Negative for apnea, cough, shortness of breath and wheezing. Cardiovascular: Negative for chest pain. Gastrointestinal: Positive for abdominal pain, nausea and vomiting. Negative for diarrhea. Genitourinary: Negative for decreased urine volume, difficulty urinating, dysuria and hematuria. Musculoskeletal: Negative for neck pain and neck stiffness. Skin: Negative for rash. Neurological: Negative for dizziness, weakness, light-headedness and headaches.        PHYSICAL EXAM   (up to 7 for level 4, 8 or more forlevel 5)      ED TRIAGE VITALS BP: (!) 176/116, Temp: 97.5 °F (36.4 °C), Pulse: 117, Resp: 18, SpO2: 99 %    Vitals:    10/11/20 0845 10/11/20 1016 10/11/20 1112 10/11/20 1116   BP: (!) 174/101 (!) 186/157 (!) 180/105 (!) 181/103   Pulse: 79 90 80 86   Resp: 17 15 16 15   Temp:       TempSrc:       SpO2: 95% 98% 95% 97%         Physical Exam  Constitutional:       Appearance: He is well-developed. He is not ill-appearing, toxic-appearing or diaphoretic. Comments: Appears uncomfortable, not ill or toxic. HENT:      Head: Normocephalic and atraumatic. Right Ear: External ear normal.      Left Ear: External ear normal.      Nose: Nose normal.      Mouth/Throat:      Mouth: Mucous membranes are moist.   Eyes:      General: No scleral icterus. Right eye: No discharge. Left eye: No discharge. Extraocular Movements: Extraocular movements intact. Pupils: Pupils are equal, round, and reactive to light. Neck:      Musculoskeletal: Normal range of motion. Trachea: No tracheal deviation. Cardiovascular:      Rate and Rhythm: Regular rhythm. Tachycardia present. Heart sounds: Normal heart sounds. No murmur. No friction rub. No gallop. Comments: Mild tachycardia  Pulmonary:      Effort: Pulmonary effort is normal. No respiratory distress. Breath sounds: Normal breath sounds. No wheezing, rhonchi or rales. Abdominal:      General: Bowel sounds are normal. There is no distension. Palpations: Abdomen is soft. There is no mass. Tenderness: There is abdominal tenderness (Generalized tenderness, worst in the epigastric region. No guarding or rigidity, non-peritoneal.). There is no guarding. Musculoskeletal: Normal range of motion. Skin:     General: Skin is warm and dry. Capillary Refill: Capillary refill takes less than 2 seconds. Findings: No rash. Neurological:      Mental Status: He is alert and oriented to person, place, and time. Motor: No abnormal muscle tone.       Coordination: Coordination normal.           DIFFERENTIAL  DIAGNOSIS     PLAN (LABS / IMAGING / EKG):  Orders Placed This Encounter   Procedures    CBC WITH AUTO DIFFERENTIAL    SPECIMEN REJECTION    Comprehensive Metabolic Panel    Lipase    PREVIOUS SPECIMEN    PATIENT STATUS (FROM ED OR OR/PROCEDURAL) Observation       MEDICATIONS ORDERED:  ED Medication Orders (From admission, onward)    Start Ordered     Status Ordering Provider    10/11/20 1115 10/11/20 1107  lactated ringers bolus  ONCE      Last MAR action:  New Bag - by Carrol Cuevas on 10/11/20 at [facility]    10/11/20 1115 10/11/20 1107  lactated ringers infusion  CONTINUOUS      Acknowledged NAIF SHI    10/11/20 1115 10/11/20 1107  fentaNYL (SUBLIMAZE) injection 50 mcg  ONCE      Last MAR action:  Given - by Carrol Cuevas on 10/11/20 at [facility]    10/11/20 1015 10/11/20 1013  fentaNYL (SUBLIMAZE) injection 100 mcg  ONCE      Last MAR action:  Given - by Carrol Cuevas on 10/11/20 at [address] [place] [address]    10/11/20 0830 10/11/20 0815  lactated ringers bolus  ONCE      Last MAR action:  Stopped - by Carrol Cuevas on 10/11/20 at [facility]    10/11/20 0830 10/11/20 0815  ondansetron (ZOFRAN) injection 4 mg  ONCE      Last MAR action:  Given - by Carrol Cuevas on 10/11/20 at [facility]    10/11/20 0830 10/11/20 0821  fentaNYL (SUBLIMAZE) injection 75 mcg  ONCE      Last MAR action:  Given - by Carrol Cuevas on 10/11/20 at [address] [facility]    10/11/20 0823 10/11/20 0823  prochlorperazine (COMPAZINE) injection 10 mg  ONCE PRN      Last MAR action:  Given - by Carrol Cuevas on 10/11/20 at [facility]          DDX: Pancreatitis, gastritis, cholecystitis, colitis    DIAGNOSTIC RESULTS / EMERGENCY DEPARTMENT COURSE / MDM     IMPRESSION & INITIAL PLAN:  24-year-old male, has a history of pancreatitis, presents emergency department with epigastric abdominal pain, consistent with prior pancreatitis episodes. Emesis and nausea. Decreased oral intake. On exam, patient appears uncomfortable, not ill or toxic.  Cardiac RRR, no murmurs, rubs, gallops, Lungs are CTA-B, no wheezes, rales, rhonchi, Abd soft, generalized abdominal tenderness worst in the epigastric region, no guarding or rigidity, non-peritoneal, nondistended. Plan for basic labs, fluids, pain control. Suspect pancreatitis. Low suspicion for AAA given his age. No concerning symptoms for infectious etiology of pain. LABS:  Results for orders placed or performed during the hospital encounter of 10/11/20   CBC WITH AUTO DIFFERENTIAL   Result Value Ref Range    WBC 8.6 3.5 - 11.3 k/uL    RBC 5.77 4.21 - 5.77 m/uL    Hemoglobin 16.6 13.0 - 17.0 g/dL    Hematocrit 50.1 40.7 - 50.3 %    MCV 86.8 82.6 - 102.9 fL    MCH 28.8 25.2 - 33.5 pg    MCHC 33.1 28.4 - 34.8 g/dL    RDW 13.6 11.8 - 14.4 %    Platelets 384 790 - 461 k/uL    MPV 10.6 8.1 - 13.5 fL    NRBC Automated 0.0 0.0 per 100 WBC    Differential Type NOT REPORTED     Seg Neutrophils 60 36 - 65 %    Lymphocytes 26 24 - 43 %    Monocytes 11 3 - 12 %    Eosinophils % 2 1 - 4 %    Basophils 1 0 - 2 %    Immature Granulocytes 0 0 %    Segs Absolute 5.18 1.50 - 8.10 k/uL    Absolute Lymph # 2.24 1.10 - 3.70 k/uL    Absolute Mono # 0.95 0.10 - 1.20 k/uL    Absolute Eos # 0.13 0.00 - 0.44 k/uL    Basophils Absolute 0.07 0.00 - 0.20 k/uL    Absolute Immature Granulocyte 0.03 0.00 - 0.30 k/uL    WBC Morphology NOT REPORTED     RBC Morphology NOT REPORTED     Platelet Estimate NOT REPORTED    SPECIMEN REJECTION   Result Value Ref Range    Specimen Source . BLOOD     Ordered Test CP LIP     Reason for Rejection Unable to perform testing: Specimen hemolyzed.      - NOT REPORTED    Comprehensive Metabolic Panel   Result Value Ref Range    Glucose 105 (H) 70 - 99 mg/dL    BUN 7 6 - 20 mg/dL    CREATININE 0.95 0.70 - 1.20 mg/dL    Bun/Cre Ratio NOT REPORTED 9 - 20    Calcium 9.1 8.6 - 10.4 mg/dL    Sodium 137 135 - 144 mmol/L    Potassium 3.3 (L) 3.7 - 5.3 mmol/L    Chloride 105 98 - 107 mmol/L    CO2 22 20 - 31 mmol/L    Anion Gap 10 9 - 17 mmol/L    Alkaline Phosphatase 81 40 - 129 U/L    ALT 11 5 - 41 U/L    AST 16 <40 U/L    Total Bilirubin 0.33 0.3 - 1.2 mg/dL    Total Protein 6.4 6.4 - 8.3 g/dL    Alb 3.5 3.5 - 5.2 g/dL    Albumin/Globulin Ratio 1.2 1.0 - 2.5    GFR Non-African American >60 >60 mL/min    GFR African American >60 >60 mL/min    GFR Comment          GFR Staging NOT REPORTED    Lipase   Result Value Ref Range    Lipase 94 (H) 13 - 60 U/L       RADIOLOGY:  No orders to display       ECG:  None     All EKG's are interpreted by the Emergency Department Physician who either signsor Co-signs this chart in the absence of a cardiologist.    BEDSIDE ULTRASOUND:  None     EMERGENCY DEPARTMENT COURSE:    ED Course as of Oct 11 1134   Sun Oct 11, 2020   1028 Lipase 94. Patient's pain is still uncontrolled. Receiving another dose of fentanyl. Has received 1 L of fluids, next liter of LR will be hung now. [SM]   1100 Patient reevaluated. He is sleeping comfortably. When awakened, he states he continues to have pain. Will admit to ETU for continued pain control.    [SM]      ED Course User Index  [SM] Gilmar Frey MD        PROCEDURES:  None     CONSULTS:  None    CRITICAL CARE:  See attending physician note    FINAL IMPRESSION      1. Alcohol-induced acute pancreatitis, unspecified complication status          DISPOSITION / PLAN     DISPOSITION Admitted 10/11/2020 11:16:33 AM      PATIENT REFERRED TO:  No follow-up provider specified.     DISCHARGE MEDICATIONS:  New Prescriptions    No medications on file     Modified Medications    No medications on file        Gilmar Frey MD  Emergency Medicine Resident    (Please note that portions of this note were completed with a voice recognition program.  Efforts were made to edit the dictations but occasionally words are mis-transcribed.)       Gilmar Frey MD  Resident  10/12/20 [phone]

## 2020-10-12 VITALS
DIASTOLIC BLOOD PRESSURE: 102 MMHG | HEART RATE: 65 BPM | RESPIRATION RATE: 16 BRPM | OXYGEN SATURATION: 100 % | SYSTOLIC BLOOD PRESSURE: 167 MMHG | WEIGHT: 149 LBS | TEMPERATURE: 97.3 F | BODY MASS INDEX: 23.39 KG/M2 | HEIGHT: 67 IN

## 2020-10-12 PROCEDURE — 6360000002 HC RX W HCPCS: Performed by: STUDENT IN AN ORGANIZED HEALTH CARE EDUCATION/TRAINING PROGRAM

## 2020-10-12 PROCEDURE — 96376 TX/PRO/DX INJ SAME DRUG ADON: CPT

## 2020-10-12 PROCEDURE — 6360000002 HC RX W HCPCS: Performed by: EMERGENCY MEDICINE

## 2020-10-12 PROCEDURE — 6370000000 HC RX 637 (ALT 250 FOR IP): Performed by: STUDENT IN AN ORGANIZED HEALTH CARE EDUCATION/TRAINING PROGRAM

## 2020-10-12 PROCEDURE — G0378 HOSPITAL OBSERVATION PER HR: HCPCS

## 2020-10-12 PROCEDURE — 76937 US GUIDE VASCULAR ACCESS: CPT

## 2020-10-12 PROCEDURE — 90686 IIV4 VACC NO PRSV 0.5 ML IM: CPT | Performed by: EMERGENCY MEDICINE

## 2020-10-12 PROCEDURE — G0008 ADMIN INFLUENZA VIRUS VAC: HCPCS | Performed by: EMERGENCY MEDICINE

## 2020-10-12 PROCEDURE — 2580000003 HC RX 258: Performed by: STUDENT IN AN ORGANIZED HEALTH CARE EDUCATION/TRAINING PROGRAM

## 2020-10-12 RX ORDER — ONDANSETRON 4 MG/1
4 TABLET, ORALLY DISINTEGRATING ORAL EVERY 8 HOURS PRN
Qty: 15 TABLET | Refills: 0 | Status: SHIPPED | OUTPATIENT
Start: 2020-10-12 | End: 2020-10-17

## 2020-10-12 RX ORDER — OXYCODONE HYDROCHLORIDE 5 MG/1
5 TABLET ORAL EVERY 8 HOURS PRN
Qty: 15 TABLET | Refills: 0 | Status: SHIPPED | OUTPATIENT
Start: 2020-10-12 | End: 2020-10-17

## 2020-10-12 RX ORDER — ONDANSETRON 4 MG/1
4 TABLET, ORALLY DISINTEGRATING ORAL EVERY 6 HOURS PRN
Status: DISCONTINUED | OUTPATIENT
Start: 2020-10-12 | End: 2020-10-12 | Stop reason: HOSPADM

## 2020-10-12 RX ORDER — OXYCODONE HYDROCHLORIDE 5 MG/1
5 TABLET ORAL EVERY 4 HOURS PRN
Status: DISCONTINUED | OUTPATIENT
Start: 2020-10-12 | End: 2020-10-12 | Stop reason: HOSPADM

## 2020-10-12 RX ORDER — OXYCODONE HYDROCHLORIDE 5 MG/1
10 TABLET ORAL EVERY 4 HOURS PRN
Status: DISCONTINUED | OUTPATIENT
Start: 2020-10-12 | End: 2020-10-12 | Stop reason: HOSPADM

## 2020-10-12 RX ADMIN — TIZANIDINE 2 MG: 2 TABLET ORAL at 09:05

## 2020-10-12 RX ADMIN — MORPHINE SULFATE 4 MG: 4 INJECTION INTRAVENOUS at 06:25

## 2020-10-12 RX ADMIN — ACETAMINOPHEN 1000 MG: 500 TABLET ORAL at 00:29

## 2020-10-12 RX ADMIN — DESMOPRESSIN ACETATE 40 MG: 0.2 TABLET ORAL at 09:05

## 2020-10-12 RX ADMIN — INFLUENZA A VIRUS A/VICTORIA/2454/2019 IVR-207 (H1N1) ANTIGEN (PROPIOLACTONE INACTIVATED), INFLUENZA A VIRUS A/HONG KONG/2671/2019 IVR-208 (H3N2) ANTIGEN (PROPIOLACTONE INACTIVATED), INFLUENZA B VIRUS B/VICTORIA/705/2018 BVR-11 ANTIGEN (PROPIOLACTONE INACTIVATED), INFLUENZA B VIRUS B/PHUKET/3073/2013 BVR-1B ANTIGEN (PROPIOLACTONE INACTIVATED) 0.5 ML: 15; 15; 15; 15 INJECTION, SUSPENSION INTRAMUSCULAR at 17:32

## 2020-10-12 RX ADMIN — OXYCODONE HYDROCHLORIDE 10 MG: 5 TABLET ORAL at 09:06

## 2020-10-12 RX ADMIN — KETOROLAC TROMETHAMINE 15 MG: 30 INJECTION, SOLUTION INTRAMUSCULAR; INTRAVENOUS at 00:30

## 2020-10-12 RX ADMIN — LISINOPRIL 10 MG: 10 TABLET ORAL at 09:05

## 2020-10-12 RX ADMIN — SODIUM CHLORIDE, POTASSIUM CHLORIDE, SODIUM LACTATE AND CALCIUM CHLORIDE: 600; 310; 30; 20 INJECTION, SOLUTION INTRAVENOUS at 00:50

## 2020-10-12 RX ADMIN — GABAPENTIN 400 MG: 400 CAPSULE ORAL at 09:05

## 2020-10-12 RX ADMIN — MORPHINE SULFATE 4 MG: 4 INJECTION INTRAVENOUS at 02:38

## 2020-10-12 RX ADMIN — PANTOPRAZOLE SODIUM 40 MG: 40 TABLET, DELAYED RELEASE ORAL at 06:42

## 2020-10-12 RX ADMIN — OXYCODONE HYDROCHLORIDE 5 MG: 5 TABLET ORAL at 15:49

## 2020-10-12 RX ADMIN — ACETAMINOPHEN 1000 MG: 500 TABLET ORAL at 06:24

## 2020-10-12 RX ADMIN — MORPHINE SULFATE 4 MG: 4 INJECTION INTRAVENOUS at 11:16

## 2020-10-12 ASSESSMENT — PAIN SCALES - GENERAL
PAINLEVEL_OUTOF10: 5
PAINLEVEL_OUTOF10: 7
PAINLEVEL_OUTOF10: 5
PAINLEVEL_OUTOF10: 5
PAINLEVEL_OUTOF10: 6
PAINLEVEL_OUTOF10: 5
PAINLEVEL_OUTOF10: 7

## 2020-10-12 NOTE — PROGRESS NOTES
[facility]  CDU / OBSERVATION eNCOUnter  Attending NOte       I performed a history and physical examination of the patient and discussed management with the resident. I reviewed the residents note and agree with the documented findings and plan of care. Any areas of disagreement are noted on the chart. I was personally present for the key portions of any procedures. I have documented in the chart those procedures where I was not present during the key portions. I have reviewed the nurses notes. I agree with the chief complaint, past medical history, past surgical history, allergies, medications, social and family history as documented unless otherwise noted below. The Family history, social history, and ROS are effectively unchanged since admission unless noted elsewhere in the chart. Patient has improvement in pancreatitis symptoms. Patient starting to handling p.o. Will do oral challenge. If oral challenge going well will discharge patient. Analgesia and antiemetics for home. Discussed dietary restrictions with patient.     Chi Ortiz MD  Attending Emergency  Physician

## 2020-10-12 NOTE — DISCHARGE INSTR - ACTIVITY
As tolerated. You may return to your normal daily activities, however you may want to take it easy for the next couple of days.

## 2020-10-12 NOTE — PLAN OF CARE
Pt continues to experience mild abdominal pain. Analgesic ordered for discharge. Precautions for use of narcotic pan medication given. Printed discharge instructions given and explained to pt. Pt relates understanding and cooperation.

## 2020-10-12 NOTE — PLAN OF CARE
Problem: Infection:  Goal: Will remain free from infection  Description: Will remain free from infection  10/12/2020 0715 by Scarlet Prader, RN  Outcome: Ongoing  10/11/2020 1743 by Genny Velazquez RN  Outcome: Ongoing     Problem: Safety:  Goal: Free from accidental physical injury  Description: Free from accidental physical injury  10/12/2020 0715 by Scarlet Prader, RN  Outcome: Ongoing  10/11/2020 1743 by Genny Velazquez RN  Outcome: Ongoing  Goal: Free from intentional harm  Description: Free from intentional harm  10/12/2020 0715 by Scarlet Prader, RN  Outcome: Ongoing  10/11/2020 1743 by Genny Velazquez RN  Outcome: Ongoing     Problem: Daily Care:  Goal: Daily care needs are met  Description: Daily care needs are met  10/12/2020 0715 by Scarlet Prader, RN  Outcome: Ongoing  10/11/2020 1743 by Genny Velazquez RN  Outcome: Ongoing     Problem: Pain:  Goal: Patient's pain/discomfort is manageable  Description: Patient's pain/discomfort is manageable  10/12/2020 0715 by Scarlet Prader, RN  Outcome: Ongoing  10/11/2020 1743 by Genny Velazquez RN  Outcome: Ongoing  Goal: Pain level will decrease  Description: Pain level will decrease  Outcome: Ongoing  Goal: Control of acute pain  Description: Control of acute pain  Outcome: Ongoing  Goal: Control of chronic pain  Description: Control of chronic pain  Outcome: Ongoing     Problem: Skin Integrity:  Goal: Skin integrity will stabilize  Description: Skin integrity will stabilize  10/12/2020 0715 by Scarlet Prader, RN  Outcome: Ongoing  10/11/2020 1743 by Genny Velazquez RN  Outcome: Ongoing     Problem: Discharge Planning:  Goal: Patients continuum of care needs are met  Description: Patients continuum of care needs are met  10/12/2020 0715 by Scarlet Prader, RN  Outcome: Ongoing  10/11/2020 1743 by Genny Velazquez RN  Outcome: Ongoing

## 2020-10-14 NOTE — PROGRESS NOTES
OBS/CDU   RESIDENT NOTE      Patients PCP is:  LATOSHA Riley CNP        SUBJECTIVE      No acute events overnight. Patient reports feeling improved. Rates pain 5/10. Still nauseous but no vomiting. States he is ready to advance diet. Feels he will be ready for discharge if he can tolerate this diet and after receiving additional IV fluids. No additional complaints. PHYSICAL EXAM      General: NAD, AO X 3  Heent:  PERRL, moist mucous membranes  Neck: SUPPLE, NO JVD  Cardiovascular: RRR, S1S2, no murmurs, gallops, rubs  Pulmonary: CTAB,  no wheezes, rales, rhonchi  Abdomen: SOFT, ND, +BS, mild tenderness to palpation of epigastrium and periumbilical region  Extremities: +2/4 PULSES DISTAL, NO SWELLING  Neuro / Psych: Awake, alert, speaking in full sentences, no focal neurological deficit    PERTINENT TEST /EXAMS      I have reviewed all available laboratory results. MEDICATIONS CURRENT   Tylenol 1000 mg p.o. 3 times daily  Lipitor 40 mg p.o. daily  Lovenox 40 mg subcu daily  Fentanyl 100 mcg IV once  Fentanyl 50 mcg IV once  Fentanyl 75 mcg IV once  Gabapentin 400 mg p.o. 3 times daily  Flu shot  Toradol 30 mg IV once  Lactated Ringer's 1000 mL IV once x2  Lactated Ringer's 150 mL/h IV continuous  Lisinopril 10 mg p.o. daily  Zofran 4 mg IV once x2  Protonix 40 mg p.o. daily  Potassium chloride 40 mEq p.o. once  Tizanidine 2 mg p.o. 3 times daily  Toradol 15 mg IV every 6 hours as needed  morphine 4 mg IV every 3 hours as needed  Oxycodone 5 mg p.o. every 4 hours as needed  Oxycodone 10 mg every 4 hours as needed  Compazine 10 mg IV once as needed        All medication charted and reviewed. CONSULTS      IP CONSULT TO IV TEAM    ASSESSMENT/PLAN        Syed Reddy is a 54 y.o. male who presents with acute onset pancreatitis likely secondary to alcohol intake, possible gastritis. No evidence of GI bleed. · Alcohol abuse.   Patient with history of frequent alcohol use per social history from the ED. · IV fluids antiemetics, analgesics. · Monitor for progression of disease versus ability to handle p.o. · Advance to clear liquids  · Continue home medications and pain control  · Monitor vitals, labs, and imaging  · DISPO: pending consults and clinical improvement  --  Mitesh Said  Emergency Medicine Resident Physician     This dictation was generated by voice recognition computer software. Although all attempts are made to edit the dictation for accuracy, there may be errors in the transcription that are not intended.

## 2020-10-14 NOTE — DISCHARGE SUMMARY
CDU Discharge Summary        Patient:  Carlos Manuel Patel  YOB: 1970    MRN: [id number]   Acct: [de-identified]    Primary Care Physician: LATOSHA Serna CNP    Admit date:  10/11/2020  7:49 AM  Discharge date: 10/12/2020  5:43 PM     Discharge Diagnoses:     Acute on chronic abdominal pain, nausea, vomiting due to acute on chronic pancreatitis  Improved with IV fluids, antiemetics, pain medication, Tylenol, Toradol    Follow-up:  Call today/tomorrow for a follow up appointment with LATOSHA Serna CNP , or return to the Emergency Room with worsening symptoms    Stressed to patient the importance of following up with primary care doctor for further workup/management of symptoms. Pt verbalizes understanding and agrees with plan. Discharge Medications:  Changes to medications         Ab Presume   Home Medication Instructions INM:[id number]    Printed on:10/13/20 1747   Medication Information                      dupilumab (DUPIXENT) 300 MG/2ML SOSY injection  INJECT 300MG SUBCUTANEOUSLY EVERY OTHER WEEK             gabapentin (NEURONTIN) 400 MG capsule  TAKE 1 CAPSULE BY MOUTH 3 TIMES A DAY AS DIRECTED             lisinopril (PRINIVIL;ZESTRIL) 10 MG tablet  Take 1 tablet by mouth daily             omeprazole (PRILOSEC) 20 MG delayed release capsule  TAKE 1 CAPSULE BY MOUTH ONCE DAILY AS DIRECTED             ondansetron (ZOFRAN-ODT) 4 MG disintegrating tablet  Take 1 tablet by mouth every 8 hours as needed for Nausea or Vomiting             oxyCODONE (ROXICODONE) 5 MG immediate release tablet  Take 1 tablet by mouth every 8 hours as needed for Pain for up to 5 days. pantoprazole (PROTONIX) 40 MG tablet  Take 1 tablet by mouth every morning (before breakfast)             simvastatin (ZOCOR) 40 MG tablet  Take 1 tablet by mouth nightly             tacrolimus (PROTOPIC) 0.03 % ointment  Apply topically 2 times daily.              tiZANidine (ZANAFLEX) 2 MG tablet  take 1 mmol/L    Chloride 105 98 - 107 mmol/L    CO2 22 20 - 31 mmol/L    Anion Gap 10 9 - 17 mmol/L    Alkaline Phosphatase 81 40 - 129 U/L    ALT 11 5 - 41 U/L    AST 16 <40 U/L    Total Bilirubin 0.33 0.3 - 1.2 mg/dL    Total Protein 6.4 6.4 - 8.3 g/dL    Alb 3.5 3.5 - 5.2 g/dL    Albumin/Globulin Ratio 1.2 1.0 - 2.5    GFR Non-African American >60 >60 mL/min    GFR African American >60 >60 mL/min    GFR Comment          GFR Staging NOT REPORTED    Lipase   Result Value Ref Range    Lipase 94 (H) 13 - 60 U/L             Physical Exam:    General appearance - NAD, AOx 3   Lungs -CTAB, no R/R/R  Heart - RRR, no M/R/G  Abdomen - Soft, NT/ND  Neurological:  MAEx4, No focal motor deficit, sensory loss  Extremities - Cap refil <2 sec in all ext., no edema  Skin -warm, dry      Hospital Course:  Clinical course has improved, labs and imaging reviewed. Emma Chandra originally presented to the hospital on 10/11/2020  7:49 AM. with abdominal pain, nausea, vomiting.symptoms are consistent with patient's chronic recurrent pancreatitis. Pain was difficult to control in the emergency department and at that time it was determined that He required further observation and admission for IV fluids, antiemetics and analgesia. He was admitted and labs were followed daily. Patient symptoms improved after treatment and he was able to tolerate p.o. He is medically stable to be discharged. Disposition: Home    Patient stated that they will not drive themselves home from the hospital if they have gotten pain killers/ narcotics earlier that day and that they will arrange for transportation on their own or work with the  for a ride. Patient counseled NOT to drive while under the influence of narcotics/ pain killers. Condition: Good    Patient stable and ready for discharge home. I have discussed plan of care with patient and they are in understanding. They were instructed to read discharge paperwork.  All of their questions and concerns were addressed. Time Spent: 0 day      --  Shakir Streeter DO  Emergency Medicine Resident Physician    This dictation was generated by voice recognition computer software. Although all attempts are made to edit the dictation for accuracy, there may be errors in the transcription that are not intended.

## 2020-10-15 ENCOUNTER — TELEPHONE (OUTPATIENT)
Dept: INTERNAL MEDICINE | Age: 50
End: 2020-10-15

## 2020-10-15 NOTE — TELEPHONE ENCOUNTER
[facility] Transitions Initial Follow Up Call    Call within 2 business days of discharge: Yes     Patient: Valerie Lo Patient : 1970 MRN: [id number]    [unfilled]    [facility] No [facility] recorded     Spoke with: Dusty Chavez, pt states he is doing better now that he is back home. Pt reports having all meds he was d/c on and has no questions or concerns about his medications or d/c instructions. Writer made apt for pt with PCP    Discharge department/facility: [facility]    Non-face-to-face services provided:  Scheduled appointment with PCP-10/30/2020  Obtained and reviewed discharge summary and/or continuity of care documents  Education of patient/family/caregiver/guardian to support self-management-reviewed all d/c instructions withthe pt. Assessment and support for treatment adherence and medication management-reviewed all d/c meds with pt .     Follow Up  Future Appointments   Date Time Provider Anton Bledsoe   10/30/2020  2:20 PM LATOSHA Young - CNP [facility]   2021 11:45 AM Sy Smith MD  kaila Mckinley RN

## 2020-10-30 ENCOUNTER — VIRTUAL VISIT (OUTPATIENT)
Dept: INTERNAL MEDICINE | Age: 50
End: 2020-10-30
Payer: MEDICAID

## 2020-10-30 PROCEDURE — 1111F DSCHRG MED/CURRENT MED MERGE: CPT | Performed by: NURSE PRACTITIONER

## 2020-10-30 PROCEDURE — 99214 OFFICE O/P EST MOD 30 MIN: CPT | Performed by: NURSE PRACTITIONER

## 2020-10-30 RX ORDER — GABAPENTIN 400 MG/1
CAPSULE ORAL
Qty: 90 CAPSULE | Refills: 0 | Status: SHIPPED | OUTPATIENT
Start: 2020-10-30 | End: 2021-01-19

## 2020-10-30 RX ORDER — SIMVASTATIN 40 MG
40 TABLET ORAL NIGHTLY
Qty: 30 TABLET | Refills: 0 | Status: SHIPPED | OUTPATIENT
Start: 2020-10-30 | End: 2020-12-09 | Stop reason: SDUPTHER

## 2020-10-30 RX ORDER — BLOOD PRESSURE TEST KIT
KIT MISCELLANEOUS
Qty: 1 KIT | Refills: 0 | Status: SHIPPED | OUTPATIENT
Start: 2020-10-30 | End: 2020-11-06 | Stop reason: SDUPTHER

## 2020-10-30 RX ORDER — LISINOPRIL 10 MG/1
10 TABLET ORAL DAILY
Qty: 30 TABLET | Refills: 0 | Status: SHIPPED | OUTPATIENT
Start: 2020-10-30 | End: 2020-12-09 | Stop reason: SDUPTHER

## 2020-10-30 RX ORDER — PANTOPRAZOLE SODIUM 40 MG/1
40 TABLET, DELAYED RELEASE ORAL
Qty: 30 TABLET | Refills: 0 | Status: SHIPPED | OUTPATIENT
Start: 2020-10-30 | End: 2020-12-09 | Stop reason: SDUPTHER

## 2020-10-30 NOTE — PATIENT INSTRUCTIONS
Return To Clinic 11/23/2020 for 3 week follow up in office. The medication list included in this document is our record of what you are currently taking, including any changes that were made at today's visit. If you find any differences when compared to your medications at home, or have any questions that were not answered at your visit, please contact the office. It is very important for your care that you keep your appointment. If for some reason you are unable to keep your appointment, it is equally important that you call our office at [phone] to cancel your appointment and reschedule. Failure to do so may result in your termination from our practice. After Visit Summary mailed to the patient along with appointment card and all other paperwork/orders. Medications have been e-scribed to pharmacy of patients choice. LABORATORY INSTRUCTIONS    Your doctor has ordered blood or urine testing. You can get this testing done at the Lab located on the first floor of the [facility], or at any other [facility]. Please stop at Main Registration, before going to the lab, as you must be registered first.     Please get this lab done before your next visit.     You may eat or drink before this test.    -LUCILA Lopez

## 2020-10-30 NOTE — PROGRESS NOTES
10/30/2020    TELEHEALTH EVALUATION -- Audio/Visual (During ZAOOX-84 public health emergency)    HPI:    Felix Barnett (:  1970) has requested an audio/video evaluation for the following concern(s):    Hypertension   This is a chronic problem. The current episode started more than 1 year ago. The problem has been waxing and waning since onset. The problem is controlled. Pertinent negatives include no anxiety, blurred vision, chest pain, headaches, malaise/fatigue, neck pain, orthopnea, palpitations, peripheral edema, PND, shortness of breath or sweats. There are no associated agents to hypertension. Risk factors for coronary artery disease include male gender, sedentary lifestyle, smoking/tobacco exposure and dyslipidemia. Past treatments include ACE inhibitors. The current treatment provides moderate improvement. Compliance problems include diet and exercise. There is no history of angina, kidney disease, CAD/MI, CVA, heart failure, left ventricular hypertrophy, PVD or retinopathy. Review of Systems   Constitutional: Negative for malaise/fatigue. Eyes: Negative for blurred vision. Respiratory: Negative for shortness of breath. Cardiovascular: Negative for chest pain, palpitations, orthopnea and PND. Musculoskeletal: Negative for neck pain. Neurological: Negative for headaches. All other systems reviewed and are negative. Prior to Visit Medications    Medication Sig Taking?  Authorizing Provider   pantoprazole (PROTONIX) 40 MG tablet Take 1 tablet by mouth every morning (before breakfast) Yes Jorge Loaiza, APRN - CNP   simvastatin (ZOCOR) 40 MG tablet Take 1 tablet by mouth nightly Yes Jorge Loaiza, APRN - CNP   lisinopril (PRINIVIL;ZESTRIL) 10 MG tablet Take 1 tablet by mouth daily Yes Jorge Loaiza, APRN - CNP   gabapentin (NEURONTIN) 400 MG capsule TAKE 1 CAPSULE BY MOUTH 3 TIMES A DAY AS DIRECTED Yes Jorge Loaiza, APRN - CNP   Blood Pressure KIT Use to monitor blood pressure daily and as needed Yes LATOSHA Hurt - CNP   dupilumab (DUPIXENT) 300 MG/2ML SOSY injection INJECT 300MG SUBCUTANEOUSLY EVERY OTHER WEEK Yes Yandel Cooper MD   triamcinolone (KENALOG) 0.1 % ointment Apply to rash twice daily (not face, armpit or groin) Yes Yandel Cooper MD   tacrolimus (PROTOPIC) 0.03 % ointment Apply topically 2 times daily. Yes Yandel Cooper MD       Social History     Tobacco Use    Smoking status: Current Every Day Smoker     Packs/day: 0.50     Years: 30.00     Pack years: 15.00     Types: Cigarettes    Smokeless tobacco: Never Used    Tobacco comment: 6-10 per day    Substance Use Topics    Alcohol use: Yes     Alcohol/week: 3.0 standard drinks     Types: 3 Cans of beer per week     Comment: 3 24 oz cans of beer daily    Drug use: No        No Known Allergies  Past Medical History:   Diagnosis Date    Arthritis     Chronic sinusitis     Eczema     Environmental allergies     GERD (gastroesophageal reflux disease)     Hyperlipidemia     Hypertension     Snores      Current Outpatient Medications   Medication Sig Dispense Refill    pantoprazole (PROTONIX) 40 MG tablet Take 1 tablet by mouth every morning (before breakfast) 30 tablet 0    simvastatin (ZOCOR) 40 MG tablet Take 1 tablet by mouth nightly 30 tablet 0    lisinopril (PRINIVIL;ZESTRIL) 10 MG tablet Take 1 tablet by mouth daily 30 tablet 0    gabapentin (NEURONTIN) 400 MG capsule TAKE 1 CAPSULE BY MOUTH 3 TIMES A DAY AS DIRECTED 90 capsule 0    Blood Pressure KIT Use to monitor blood pressure daily and as needed 1 kit 0    dupilumab (DUPIXENT) 300 MG/2ML SOSY injection INJECT 300MG SUBCUTANEOUSLY EVERY OTHER WEEK 4 mL 5    triamcinolone (KENALOG) 0.1 % ointment Apply to rash twice daily (not face, armpit or groin) 454 g 1    tacrolimus (PROTOPIC) 0.03 % ointment Apply topically 2 times daily. 30 g 2     No current facility-administered medications for this visit.           PHYSICAL EXAMINATION:  [ INSTRUCTIONS:  \"[x]\" Indicates a positive item  \"[]\" Indicates a negative item  -- DELETE ALL ITEMS NOT EXAMINED]  Vital Signs: (As obtained by patient/caregiver or practitioner observation)    Blood pressure-  Heart rate-    Respiratory rate-    Temperature-  Pulse oximetry-     Constitutional: [x] Appears well-developed and well-nourished [x] No apparent distress      [] Abnormal-   Mental status  [x] Alert and awake  [x] Oriented to person/place/time [x]Able to follow commands      Eyes:  EOM    []  Normal  [] Abnormal-  Sclera  [x]  Normal  [] Abnormal -         Discharge [x]  None visible  [] Abnormal -    HENT:   [x] Normocephalic, atraumatic. [] Abnormal   [] Mouth/Throat: Mucous membranes are moist.     External Ears [x] Normal  [] Abnormal-     Neck: [x] No visualized mass     Pulmonary/Chest: [x] Respiratory effort normal.  [x] No visualized signs of difficulty breathing or respiratory distress        [] Abnormal-      Musculoskeletal:   [] Normal gait with no signs of ataxia         [x] Normal range of motion of neck        [] Abnormal-       Neurological:        [x] No Facial Asymmetry (Cranial nerve 7 motor function) (limited exam to video visit)          [x] No gaze palsy        [] Abnormal-         Skin:        [x] No significant exanthematous lesions or discoloration noted on facial skin         [] Abnormal-            Psychiatric:       [x] Normal Affect [x] No Hallucinations        [] Abnormal-     Other pertinent observable physical exam findings-     ASSESSMENT/PLAN:  1. Essential hypertension  - lisinopril (PRINIVIL;ZESTRIL) 10 MG tablet; Take 1 tablet by mouth daily  Dispense: 30 tablet; Refill: 0  - Blood Pressure KIT; Use to monitor blood pressure daily and as needed  Dispense: 1 kit; Refill: 0    2. Pure hypercholesterolemia  - Lipid Panel; Future  - simvastatin (ZOCOR) 40 MG tablet; Take 1 tablet by mouth nightly  Dispense: 30 tablet; Refill: 0    3.  Intervertebral lumbar disc disorder with myelopathy, lumbar region  - gabapentin (NEURONTIN) 400 MG capsule; TAKE 1 CAPSULE BY MOUTH 3 TIMES A DAY AS DIRECTED  Dispense: 90 capsule; Refill: 0    4. Gastroesophageal reflux disease without esophagitis  - pantoprazole (PROTONIX) 40 MG tablet; Take 1 tablet by mouth every morning (before breakfast)  Dispense: 30 tablet; Refill: 0    5. Screening for diabetes mellitus  - Hemoglobin A1C; Future    6. Alcohol-induced acute pancreatitis, unspecified complication status  - NM DISCHARGE MEDS RECONCILED W/ CURRENT OUTPATIENT MED LIST      Return in about 2 weeks (around 11/13/2020). Tammi Lemons is a 52 y.o. male being evaluated by a Virtual Visit (video visit) encounter to address concerns as mentioned above. A caregiver was present when appropriate. Due to this being a TeleHealth encounter (During hospitalsNA-73 public health emergency), evaluation of the following organ systems was limited: Vitals/Constitutional/EENT/Resp/CV/GI//MS/Neuro/Skin/Heme-Lymph-Imm. Pursuant to the emergency declaration under the [address] [place] [address], [address] [place] [address] authority and the "[employer] and [employer] Act, this Virtual Visit was conducted with patient's (and/or legal guardian's) consent, to reduce the patient's risk of exposure to COVID-19 and provide necessary medical care. The patient (and/or legal guardian) has also been advised to contact this office for worsening conditions or problems, and seek emergency medical treatment and/or call 911 if deemed necessary. Patient identification was verified at the start of the visit: Yes    Total time spent on this encounter: Not billed by time    Services were provided through a video synchronous discussion virtually to substitute for in-person clinic visit. Patient and provider were located at their individual homes.     --LATOSHA Yanez CNP on 10/30/2020 at 2:41 PM    An electronic signature was used to authenticate this note.

## 2020-11-06 RX ORDER — BLOOD PRESSURE TEST KIT
KIT MISCELLANEOUS
Qty: 1 KIT | Refills: 0 | Status: SHIPPED | OUTPATIENT
Start: 2020-11-06

## 2020-11-08 ASSESSMENT — ENCOUNTER SYMPTOMS
BLURRED VISION: 0
ORTHOPNEA: 0
SHORTNESS OF BREATH: 0

## 2020-12-09 ENCOUNTER — HOSPITAL ENCOUNTER (OUTPATIENT)
Age: 50
Setting detail: SPECIMEN
Discharge: HOME | End: 2020-12-09
Payer: MEDICAID

## 2020-12-09 LAB
CHOLESTEROL/HDL RATIO: 2.1
CHOLESTEROL: 161 MG/DL
ESTIMATED AVERAGE GLUCOSE: 117 MG/DL
HBA1C MFR BLD: 5.7 % (ref 4–6)
HDLC SERPL-MCNC: 76 MG/DL
LDL CHOLESTEROL: 58 MG/DL (ref 0–130)
TRIGL SERPL-MCNC: 137 MG/DL
VLDLC SERPL CALC-MCNC: NORMAL MG/DL (ref 1–30)

## 2020-12-09 RX ORDER — PANTOPRAZOLE SODIUM 40 MG/1
40 TABLET, DELAYED RELEASE ORAL
Qty: 30 TABLET | Refills: 0 | Status: SHIPPED | OUTPATIENT
Start: 2020-12-09 | End: 2021-01-19

## 2020-12-09 RX ORDER — SIMVASTATIN 40 MG
40 TABLET ORAL NIGHTLY
Qty: 30 TABLET | Refills: 0 | Status: SHIPPED | OUTPATIENT
Start: 2020-12-09 | End: 2021-01-19

## 2020-12-09 RX ORDER — LISINOPRIL 10 MG/1
10 TABLET ORAL DAILY
Qty: 30 TABLET | Refills: 0 | Status: SHIPPED | OUTPATIENT
Start: 2020-12-09 | End: 2021-01-19

## 2020-12-09 NOTE — TELEPHONE ENCOUNTER
Refill request for pended medications. If appropriate please send medication(s) to patients pharmacy. Next appt: 12/15/20      Health Maintenance   Topic Date Due    A1C test (Diabetic or Prediabetic)  05/08/2020    Lipid screen  10/28/2020    Potassium monitoring  10/11/2021    Creatinine monitoring  10/11/2021    DTaP/Tdap/Td vaccine (2 - Td) 10/08/2026    Flu vaccine  Completed    Pneumococcal 0-64 years Vaccine  Completed    HIV screen  Completed    Hepatitis A vaccine  Aged Out    Hepatitis B vaccine  Aged Out    Hib vaccine  Aged Out    Meningococcal (ACWY) vaccine  Aged Out       Hemoglobin A1C (%)   Date Value   05/08/2019 5.1   05/21/2018 5.8   11/28/2017 5.6             ( goal A1C is < 7)   Microalb/Crt.  Ratio (mcg/mg creat)   Date Value   05/08/2018 4     LDL Cholesterol (mg/dL)   Date Value   10/28/2019 71       (goal LDL is <100)   AST (U/L)   Date Value   10/11/2020 16     ALT (U/L)   Date Value   10/11/2020 11     BUN (mg/dL)   Date Value   10/11/2020 7     BP Readings from Last 3 Encounters:   10/12/20 (!) 167/102   08/30/20 (!) 146/101   06/30/20 (!) 170/100          (goal 120/80)          Patient Active Problem List:     Depression     Intervertebral lumbar disc disorder with myelopathy, lumbar region     Gastroesophageal reflux disease without esophagitis     Essential hypertension     Pure hypercholesterolemia     Prediabetes     Smoker     Acute pancreatitis     Alcohol use, unspecified with unspecified alcohol-induced disorder ([facility] [address]     Acute pancreatitis without infection or necrosis     Alcohol-induced acute pancreatitis

## 2020-12-18 RX ORDER — DUPILUMAB 300 MG/2ML
INJECTION, SOLUTION SUBCUTANEOUS
Qty: 4 ML | Refills: 2 | Status: SHIPPED | OUTPATIENT
Start: 2020-12-18

## 2020-12-18 RX ORDER — DUPILUMAB 300 MG/2ML
INJECTION, SOLUTION SUBCUTANEOUS
Qty: 4 ML | Refills: 2 | Status: CANCELLED | OUTPATIENT
Start: 2020-12-18

## 2020-12-18 RX ORDER — DUPILUMAB 300 MG/2ML
INJECTION, SOLUTION SUBCUTANEOUS
Qty: 4 ML | Refills: 0 | Status: ON HOLD | OUTPATIENT
Start: 2020-12-18 | End: 2022-06-09

## 2020-12-21 ENCOUNTER — TELEPHONE (OUTPATIENT)
Dept: DERMATOLOGY | Age: 50
End: 2020-12-21

## 2021-01-16 DIAGNOSIS — K21.9 GASTROESOPHAGEAL REFLUX DISEASE WITHOUT ESOPHAGITIS: ICD-10-CM

## 2021-01-16 DIAGNOSIS — I10 ESSENTIAL HYPERTENSION: ICD-10-CM

## 2021-01-16 DIAGNOSIS — M51.06 INTERVERTEBRAL LUMBAR DISC DISORDER WITH MYELOPATHY, LUMBAR REGION: Chronic | ICD-10-CM

## 2021-01-16 DIAGNOSIS — E78.00 PURE HYPERCHOLESTEROLEMIA: ICD-10-CM

## 2021-01-16 NOTE — TELEPHONE ENCOUNTER
Refill request for pended medications. If appropriate please send medication(s) to patients pharmacy. Next appt: 2/15/2021      Health Maintenance   Topic Date Due    Hepatitis C screen  1970    Potassium monitoring  10/11/2021    Creatinine monitoring  10/11/2021    A1C test (Diabetic or Prediabetic)  12/09/2021    Lipid screen  12/09/2021    DTaP/Tdap/Td vaccine (2 - Td) 10/08/2026    Flu vaccine  Completed    Pneumococcal 0-64 years Vaccine  Completed    HIV screen  Completed    Hepatitis A vaccine  Aged Out    Hepatitis B vaccine  Aged Out    Hib vaccine  Aged Out    Meningococcal (ACWY) vaccine  Aged Out       Hemoglobin A1C (%)   Date Value   12/09/2020 5.7   05/08/2019 5.1   05/21/2018 5.8             ( goal A1C is < 7)   Microalb/Crt.  Ratio (mcg/mg creat)   Date Value   05/08/2018 4     LDL Cholesterol (mg/dL)   Date Value   12/09/2020 58       (goal LDL is <100)   AST (U/L)   Date Value   10/11/2020 16     ALT (U/L)   Date Value   10/11/2020 11     BUN (mg/dL)   Date Value   10/11/2020 7     BP Readings from Last 3 Encounters:   10/12/20 (!) 167/102   08/30/20 (!) 146/101   06/30/20 (!) 170/100          (goal 120/80)          Patient Active Problem List:     Depression     Intervertebral lumbar disc disorder with myelopathy, lumbar region     Gastroesophageal reflux disease without esophagitis     Essential hypertension     Pure hypercholesterolemia     Prediabetes     Smoker     Acute pancreatitis     Alcohol use, unspecified with unspecified alcohol-induced disorder ([facility] [address]     Acute pancreatitis without infection or necrosis     Alcohol-induced acute pancreatitis

## 2021-01-18 DIAGNOSIS — K21.9 GASTROESOPHAGEAL REFLUX DISEASE WITHOUT ESOPHAGITIS: ICD-10-CM

## 2021-01-18 NOTE — TELEPHONE ENCOUNTER
Request for Pantoprazole. Next Visit Date:  Future Appointments   Date Time Provider Anton Bledsoe   2/15/2021  2:20 PM Tristian Meehan, APRN - CNP [facility]   Topic Date Due    Hepatitis C screen  1970    Potassium monitoring  10/11/2021    Creatinine monitoring  10/11/2021    A1C test (Diabetic or Prediabetic)  12/09/2021    Lipid screen  12/09/2021    DTaP/Tdap/Td vaccine (2 - Td) 10/08/2026    Flu vaccine  Completed    Pneumococcal 0-64 years Vaccine  Completed    HIV screen  Completed    Hepatitis A vaccine  Aged Out    Hepatitis B vaccine  Aged Out    Hib vaccine  Aged Out    Meningococcal (ACWY) vaccine  Aged Out       Hemoglobin A1C (%)   Date Value   12/09/2020 5.7   05/08/2019 5.1   05/21/2018 5.8             ( goal A1C is < 7)   Microalb/Crt.  Ratio (mcg/mg creat)   Date Value   05/08/2018 4     LDL Cholesterol (mg/dL)   Date Value   12/09/2020 58       (goal LDL is <100)   AST (U/L)   Date Value   10/11/2020 16     ALT (U/L)   Date Value   10/11/2020 11     BUN (mg/dL)   Date Value   10/11/2020 7     BP Readings from Last 3 Encounters:   10/12/20 (!) 167/102   08/30/20 (!) 146/101   06/30/20 (!) 170/100          (goal 120/80)    All Future Testing planned in [facility]        Patient Active Problem List:     Depression     Intervertebral lumbar disc disorder with myelopathy, lumbar region     Gastroesophageal reflux disease without esophagitis     Essential hypertension     Pure hypercholesterolemia     Prediabetes     Smoker     Acute pancreatitis     Alcohol use, unspecified with unspecified alcohol-induced disorder ([facility] [address]     Acute pancreatitis without infection or necrosis     Alcohol-induced acute pancreatitis

## 2021-01-19 RX ORDER — PANTOPRAZOLE SODIUM 40 MG/1
40 TABLET, DELAYED RELEASE ORAL
Qty: 30 TABLET | Refills: 0 | Status: SHIPPED | OUTPATIENT
Start: 2021-01-19 | End: 2021-08-18

## 2021-01-19 RX ORDER — PANTOPRAZOLE SODIUM 40 MG/1
40 TABLET, DELAYED RELEASE ORAL
Qty: 30 TABLET | Refills: 0 | Status: SHIPPED | OUTPATIENT
Start: 2021-01-19 | End: 2021-08-18 | Stop reason: SDUPTHER

## 2021-01-19 RX ORDER — LISINOPRIL 10 MG/1
TABLET ORAL
Qty: 30 TABLET | Refills: 0 | Status: SHIPPED | OUTPATIENT
Start: 2021-01-19 | End: 2021-04-22 | Stop reason: SDUPTHER

## 2021-01-19 RX ORDER — GABAPENTIN 400 MG/1
CAPSULE ORAL
Qty: 90 CAPSULE | Refills: 0 | Status: SHIPPED | OUTPATIENT
Start: 2021-01-19 | End: 2021-08-18 | Stop reason: SDUPTHER

## 2021-01-19 RX ORDER — SIMVASTATIN 40 MG
TABLET ORAL
Qty: 30 TABLET | Refills: 0 | Status: SHIPPED | OUTPATIENT
Start: 2021-01-19 | End: 2021-04-26 | Stop reason: SDUPTHER

## 2021-04-22 DIAGNOSIS — I10 ESSENTIAL HYPERTENSION: ICD-10-CM

## 2021-04-22 DIAGNOSIS — E78.00 PURE HYPERCHOLESTEROLEMIA: ICD-10-CM

## 2021-04-22 NOTE — TELEPHONE ENCOUNTER
Refill request for lisinopril (PRINIVIL;ZESTRIL) 10 MG tablet and simvastatin (ZOCOR) 40 MG tablet. If appropriate please send medication(s) to patients pharmacy. Next appt: 4/30/2021      Health Maintenance   Topic Date Due    Hepatitis C screen  Never done    COVID-19 Vaccine (1) Never done    Potassium monitoring  10/11/2021    Creatinine monitoring  10/11/2021    A1C test (Diabetic or Prediabetic)  12/09/2021    Lipid screen  12/09/2021    DTaP/Tdap/Td vaccine (2 - Td) 10/08/2026    Flu vaccine  Completed    Pneumococcal 0-64 years Vaccine  Completed    HIV screen  Completed    Hepatitis A vaccine  Aged Out    Hepatitis B vaccine  Aged Out    Hib vaccine  Aged Out    Meningococcal (ACWY) vaccine  Aged Out       Hemoglobin A1C (%)   Date Value   12/09/2020 5.7   05/08/2019 5.1   05/21/2018 5.8             ( goal A1C is < 7)   Microalb/Crt.  Ratio (mcg/mg creat)   Date Value   05/08/2018 4     LDL Cholesterol (mg/dL)   Date Value   12/09/2020 58       (goal LDL is <100)   AST (U/L)   Date Value   10/11/2020 16     ALT (U/L)   Date Value   10/11/2020 11     BUN (mg/dL)   Date Value   10/11/2020 7     BP Readings from Last 3 Encounters:   10/12/20 (!) 167/102   08/30/20 (!) 146/101   06/30/20 (!) 170/100          (goal 120/80)          Patient Active Problem List:     Depression     Intervertebral lumbar disc disorder with myelopathy, lumbar region     Gastroesophageal reflux disease without esophagitis     Essential hypertension     Pure hypercholesterolemia     Prediabetes     Smoker     Acute pancreatitis     Alcohol use, unspecified with unspecified alcohol-induced disorder ([facility] [address]     Acute pancreatitis without infection or necrosis     Alcohol-induced acute pancreatitis

## 2021-04-26 RX ORDER — SIMVASTATIN 40 MG
40 TABLET ORAL NIGHTLY
Qty: 30 TABLET | Refills: 3 | Status: SHIPPED | OUTPATIENT
Start: 2021-04-26

## 2021-04-26 RX ORDER — LISINOPRIL 10 MG/1
10 TABLET ORAL DAILY
Qty: 30 TABLET | Refills: 3 | Status: SHIPPED | OUTPATIENT
Start: 2021-04-26 | End: 2021-08-18 | Stop reason: SDUPTHER

## 2021-08-17 DIAGNOSIS — M51.06 INTERVERTEBRAL LUMBAR DISC DISORDER WITH MYELOPATHY, LUMBAR REGION: Chronic | ICD-10-CM

## 2021-08-17 DIAGNOSIS — I10 ESSENTIAL HYPERTENSION: ICD-10-CM

## 2021-08-17 DIAGNOSIS — K21.9 GASTROESOPHAGEAL REFLUX DISEASE WITHOUT ESOPHAGITIS: ICD-10-CM

## 2021-08-18 RX ORDER — LISINOPRIL 10 MG/1
10 TABLET ORAL DAILY
Qty: 30 TABLET | Refills: 3 | Status: ON HOLD | OUTPATIENT
Start: 2021-08-18 | End: 2022-06-09 | Stop reason: SDUPTHER

## 2021-08-18 RX ORDER — GABAPENTIN 400 MG/1
CAPSULE ORAL
Qty: 90 CAPSULE | Refills: 0 | Status: SHIPPED | OUTPATIENT
Start: 2021-08-18 | End: 2021-09-14

## 2021-08-18 RX ORDER — PANTOPRAZOLE SODIUM 40 MG/1
40 TABLET, DELAYED RELEASE ORAL
Qty: 30 TABLET | Refills: 0 | Status: SHIPPED | OUTPATIENT
Start: 2021-08-18 | End: 2021-09-14

## 2021-08-30 ENCOUNTER — APPOINTMENT (OUTPATIENT)
Dept: GENERAL RADIOLOGY | Age: 51
DRG: 282 | End: 2021-08-30
Payer: MEDICAID

## 2021-08-30 ENCOUNTER — APPOINTMENT (OUTPATIENT)
Dept: CT IMAGING | Age: 51
DRG: 282 | End: 2021-08-30
Payer: MEDICAID

## 2021-08-30 ENCOUNTER — HOSPITAL ENCOUNTER (INPATIENT)
Age: 51
LOS: 2 days | Discharge: LEFT BEFORE DISCHARGE | DRG: 282 | End: 2021-09-01
Attending: EMERGENCY MEDICINE
Payer: MEDICAID

## 2021-08-30 DIAGNOSIS — K20.90 ESOPHAGITIS: ICD-10-CM

## 2021-08-30 DIAGNOSIS — R10.13 ABDOMINAL PAIN, EPIGASTRIC: Primary | ICD-10-CM

## 2021-08-30 DIAGNOSIS — I10 ESSENTIAL HYPERTENSION: ICD-10-CM

## 2021-08-30 DIAGNOSIS — K85.00 IDIOPATHIC ACUTE PANCREATITIS, UNSPECIFIED COMPLICATION STATUS: ICD-10-CM

## 2021-08-30 PROBLEM — I16.0 HYPERTENSIVE URGENCY: Status: ACTIVE | Noted: 2021-08-30

## 2021-08-30 LAB
-: NORMAL
ABSOLUTE EOS #: 0.09 K/UL (ref 0–0.44)
ABSOLUTE IMMATURE GRANULOCYTE: 0.05 K/UL (ref 0–0.3)
ABSOLUTE LYMPH #: 0.84 K/UL (ref 1.1–3.7)
ABSOLUTE MONO #: 0.97 K/UL (ref 0.1–1.2)
ALBUMIN SERPL-MCNC: 3.7 G/DL (ref 3.5–5.2)
ALBUMIN SERPL-MCNC: NORMAL G/DL (ref 3.5–5.2)
ALBUMIN/GLOBULIN RATIO: 1.2 (ref 1–2.5)
ALBUMIN/GLOBULIN RATIO: NORMAL (ref 1–2.5)
ALP BLD-CCNC: 107 U/L (ref 40–129)
ALP BLD-CCNC: NORMAL U/L (ref 40–129)
ALT SERPL-CCNC: 12 U/L (ref 5–41)
ALT SERPL-CCNC: NORMAL U/L (ref 5–41)
AMPHETAMINE SCREEN URINE: NEGATIVE
ANION GAP SERPL CALCULATED.3IONS-SCNC: 12 MMOL/L (ref 9–17)
AST SERPL-CCNC: 19 U/L
AST SERPL-CCNC: NORMAL U/L
BARBITURATE SCREEN URINE: NEGATIVE
BASOPHILS # BLD: 0 % (ref 0–2)
BASOPHILS ABSOLUTE: 0.04 K/UL (ref 0–0.2)
BENZODIAZEPINE SCREEN, URINE: NEGATIVE
BILIRUB SERPL-MCNC: 0.57 MG/DL (ref 0.3–1.2)
BILIRUB SERPL-MCNC: NORMAL MG/DL (ref 0.3–1.2)
BILIRUBIN DIRECT: 0.14 MG/DL
BILIRUBIN DIRECT: NORMAL MG/DL
BILIRUBIN, INDIRECT: 0.43 MG/DL (ref 0–1)
BILIRUBIN, INDIRECT: NORMAL MG/DL (ref 0–1)
BNP INTERPRETATION: ABNORMAL
BNP INTERPRETATION: ABNORMAL
BUN BLDV-MCNC: 8 MG/DL (ref 6–20)
BUN/CREAT BLD: ABNORMAL (ref 9–20)
BUPRENORPHINE URINE: ABNORMAL
CALCIUM SERPL-MCNC: 8.7 MG/DL (ref 8.6–10.4)
CANNABINOID SCREEN URINE: NEGATIVE
CHLORIDE BLD-SCNC: 101 MMOL/L (ref 98–107)
CO2: 24 MMOL/L (ref 20–31)
COCAINE METABOLITE, URINE: POSITIVE
CREAT SERPL-MCNC: 0.88 MG/DL (ref 0.7–1.2)
DIFFERENTIAL TYPE: ABNORMAL
EKG ATRIAL RATE: 68 BPM
EKG P AXIS: 70 DEGREES
EKG P-R INTERVAL: 152 MS
EKG Q-T INTERVAL: 422 MS
EKG QRS DURATION: 78 MS
EKG QTC CALCULATION (BAZETT): 448 MS
EKG R AXIS: 2 DEGREES
EKG T AXIS: 61 DEGREES
EKG VENTRICULAR RATE: 68 BPM
EOSINOPHILS RELATIVE PERCENT: 1 % (ref 1–4)
GFR AFRICAN AMERICAN: >60 ML/MIN
GFR NON-AFRICAN AMERICAN: >60 ML/MIN
GFR SERPL CREATININE-BSD FRML MDRD: ABNORMAL ML/MIN/{1.73_M2}
GFR SERPL CREATININE-BSD FRML MDRD: ABNORMAL ML/MIN/{1.73_M2}
GLOBULIN: NORMAL G/DL (ref 1.5–3.8)
GLOBULIN: NORMAL G/DL (ref 1.5–3.8)
GLUCOSE BLD-MCNC: 128 MG/DL (ref 70–99)
HCT VFR BLD CALC: 52.9 % (ref 40.7–50.3)
HEMOGLOBIN: 17 G/DL (ref 13–17)
IMMATURE GRANULOCYTES: 0 %
LIPASE: 87 U/L (ref 13–60)
LYMPHOCYTES # BLD: 7 % (ref 24–43)
MCH RBC QN AUTO: 27.3 PG (ref 25.2–33.5)
MCHC RBC AUTO-ENTMCNC: 32.1 G/DL (ref 28.4–34.8)
MCV RBC AUTO: 85 FL (ref 82.6–102.9)
MDMA URINE: ABNORMAL
METHADONE SCREEN, URINE: NEGATIVE
METHAMPHETAMINE, URINE: ABNORMAL
MONOCYTES # BLD: 8 % (ref 3–12)
NRBC AUTOMATED: 0 PER 100 WBC
OPIATES, URINE: POSITIVE
OXYCODONE SCREEN URINE: NEGATIVE
PDW BLD-RTO: 14.7 % (ref 11.8–14.4)
PHENCYCLIDINE, URINE: NEGATIVE
PLATELET # BLD: ABNORMAL K/UL (ref 138–453)
PLATELET ESTIMATE: ABNORMAL
PLATELET, FLUORESCENCE: NORMAL K/UL (ref 138–453)
PLATELET, IMMATURE FRACTION: NORMAL % (ref 1.1–10.3)
PMV BLD AUTO: ABNORMAL FL (ref 8.1–13.5)
POTASSIUM SERPL-SCNC: 3.6 MMOL/L (ref 3.7–5.3)
PRO-BNP: 434 PG/ML
PRO-BNP: 555 PG/ML
PROPOXYPHENE, URINE: ABNORMAL
RBC # BLD: 6.22 M/UL (ref 4.21–5.77)
RBC # BLD: ABNORMAL 10*6/UL
REASON FOR REJECTION: NORMAL
SARS-COV-2, RAPID: NOT DETECTED
SEG NEUTROPHILS: 83 % (ref 36–65)
SEGMENTED NEUTROPHILS ABSOLUTE COUNT: 9.53 K/UL (ref 1.5–8.1)
SODIUM BLD-SCNC: 137 MMOL/L (ref 135–144)
SPECIMEN DESCRIPTION: NORMAL
TEST INFORMATION: ABNORMAL
TOTAL PROTEIN: 6.9 G/DL (ref 6.4–8.3)
TOTAL PROTEIN: NORMAL G/DL (ref 6.4–8.3)
TRICYCLIC ANTIDEPRESSANTS, UR: ABNORMAL
TROPONIN INTERP: NORMAL
TROPONIN T: NORMAL NG/ML
TROPONIN, HIGH SENSITIVITY: 10 NG/L (ref 0–22)
TROPONIN, HIGH SENSITIVITY: 11 NG/L (ref 0–22)
TROPONIN, HIGH SENSITIVITY: 11 NG/L (ref 0–22)
TROPONIN, HIGH SENSITIVITY: 14 NG/L (ref 0–22)
WBC # BLD: 11.5 K/UL (ref 3.5–11.3)
WBC # BLD: ABNORMAL 10*3/UL
ZZ NTE CLEAN UP: ORDERED TEST: NORMAL
ZZ NTE WITH NAME CLEAN UP: SPECIMEN SOURCE: NORMAL

## 2021-08-30 PROCEDURE — 83880 ASSAY OF NATRIURETIC PEPTIDE: CPT

## 2021-08-30 PROCEDURE — 2500000003 HC RX 250 WO HCPCS: Performed by: FAMILY MEDICINE

## 2021-08-30 PROCEDURE — 87635 SARS-COV-2 COVID-19 AMP PRB: CPT

## 2021-08-30 PROCEDURE — 96375 TX/PRO/DX INJ NEW DRUG ADDON: CPT

## 2021-08-30 PROCEDURE — 36415 COLL VENOUS BLD VENIPUNCTURE: CPT

## 2021-08-30 PROCEDURE — 2580000003 HC RX 258: Performed by: FAMILY MEDICINE

## 2021-08-30 PROCEDURE — 99254 IP/OBS CNSLTJ NEW/EST MOD 60: CPT | Performed by: NURSE PRACTITIONER

## 2021-08-30 PROCEDURE — 2580000003 HC RX 258: Performed by: NURSE PRACTITIONER

## 2021-08-30 PROCEDURE — 6360000004 HC RX CONTRAST MEDICATION: Performed by: STUDENT IN AN ORGANIZED HEALTH CARE EDUCATION/TRAINING PROGRAM

## 2021-08-30 PROCEDURE — 6360000002 HC RX W HCPCS: Performed by: NURSE PRACTITIONER

## 2021-08-30 PROCEDURE — 2580000003 HC RX 258: Performed by: STUDENT IN AN ORGANIZED HEALTH CARE EDUCATION/TRAINING PROGRAM

## 2021-08-30 PROCEDURE — 85055 RETICULATED PLATELET ASSAY: CPT

## 2021-08-30 PROCEDURE — 74177 CT ABD & PELVIS W/CONTRAST: CPT

## 2021-08-30 PROCEDURE — 80048 BASIC METABOLIC PNL TOTAL CA: CPT

## 2021-08-30 PROCEDURE — 6370000000 HC RX 637 (ALT 250 FOR IP): Performed by: NURSE PRACTITIONER

## 2021-08-30 PROCEDURE — 6360000002 HC RX W HCPCS: Performed by: STUDENT IN AN ORGANIZED HEALTH CARE EDUCATION/TRAINING PROGRAM

## 2021-08-30 PROCEDURE — 80307 DRUG TEST PRSMV CHEM ANLYZR: CPT

## 2021-08-30 PROCEDURE — 93005 ELECTROCARDIOGRAM TRACING: CPT | Performed by: STUDENT IN AN ORGANIZED HEALTH CARE EDUCATION/TRAINING PROGRAM

## 2021-08-30 PROCEDURE — 71045 X-RAY EXAM CHEST 1 VIEW: CPT

## 2021-08-30 PROCEDURE — 2500000003 HC RX 250 WO HCPCS: Performed by: STUDENT IN AN ORGANIZED HEALTH CARE EDUCATION/TRAINING PROGRAM

## 2021-08-30 PROCEDURE — 96374 THER/PROPH/DIAG INJ IV PUSH: CPT

## 2021-08-30 PROCEDURE — 99222 1ST HOSP IP/OBS MODERATE 55: CPT | Performed by: FAMILY MEDICINE

## 2021-08-30 PROCEDURE — 99285 EMERGENCY DEPT VISIT HI MDM: CPT

## 2021-08-30 PROCEDURE — 83690 ASSAY OF LIPASE: CPT

## 2021-08-30 PROCEDURE — 96376 TX/PRO/DX INJ SAME DRUG ADON: CPT

## 2021-08-30 PROCEDURE — 80076 HEPATIC FUNCTION PANEL: CPT

## 2021-08-30 PROCEDURE — 84484 ASSAY OF TROPONIN QUANT: CPT

## 2021-08-30 PROCEDURE — 85025 COMPLETE CBC W/AUTO DIFF WBC: CPT

## 2021-08-30 PROCEDURE — 2060000000 HC ICU INTERMEDIATE R&B

## 2021-08-30 PROCEDURE — 93010 ELECTROCARDIOGRAM REPORT: CPT | Performed by: INTERNAL MEDICINE

## 2021-08-30 RX ORDER — MORPHINE SULFATE 4 MG/ML
4 INJECTION, SOLUTION INTRAMUSCULAR; INTRAVENOUS ONCE
Status: COMPLETED | OUTPATIENT
Start: 2021-08-30 | End: 2021-08-30

## 2021-08-30 RX ORDER — LORAZEPAM 2 MG/ML
2 INJECTION INTRAMUSCULAR
Status: DISCONTINUED | OUTPATIENT
Start: 2021-08-30 | End: 2021-09-02 | Stop reason: HOSPADM

## 2021-08-30 RX ORDER — LABETALOL HYDROCHLORIDE 5 MG/ML
10 INJECTION, SOLUTION INTRAVENOUS ONCE
Status: COMPLETED | OUTPATIENT
Start: 2021-08-30 | End: 2021-08-30

## 2021-08-30 RX ORDER — LORAZEPAM 2 MG/1
4 TABLET ORAL
Status: DISCONTINUED | OUTPATIENT
Start: 2021-08-30 | End: 2021-09-02 | Stop reason: HOSPADM

## 2021-08-30 RX ORDER — SODIUM CHLORIDE 0.9 % (FLUSH) 0.9 %
5-40 SYRINGE (ML) INJECTION PRN
Status: DISCONTINUED | OUTPATIENT
Start: 2021-08-30 | End: 2021-09-02 | Stop reason: HOSPADM

## 2021-08-30 RX ORDER — SODIUM CHLORIDE 9 MG/ML
25 INJECTION, SOLUTION INTRAVENOUS PRN
Status: DISCONTINUED | OUTPATIENT
Start: 2021-08-30 | End: 2021-09-02 | Stop reason: HOSPADM

## 2021-08-30 RX ORDER — 0.9 % SODIUM CHLORIDE 0.9 %
1000 INTRAVENOUS SOLUTION INTRAVENOUS ONCE
Status: COMPLETED | OUTPATIENT
Start: 2021-08-30 | End: 2021-08-30

## 2021-08-30 RX ORDER — ONDANSETRON 2 MG/ML
4 INJECTION INTRAMUSCULAR; INTRAVENOUS ONCE
Status: COMPLETED | OUTPATIENT
Start: 2021-08-30 | End: 2021-08-30

## 2021-08-30 RX ORDER — ATORVASTATIN CALCIUM 20 MG/1
20 TABLET, FILM COATED ORAL DAILY
Status: DISCONTINUED | OUTPATIENT
Start: 2021-08-30 | End: 2021-09-02 | Stop reason: HOSPADM

## 2021-08-30 RX ORDER — SODIUM CHLORIDE 0.9 % (FLUSH) 0.9 %
5-40 SYRINGE (ML) INJECTION EVERY 12 HOURS SCHEDULED
Status: DISCONTINUED | OUTPATIENT
Start: 2021-08-30 | End: 2021-09-02 | Stop reason: HOSPADM

## 2021-08-30 RX ORDER — MAGNESIUM SULFATE 1 G/100ML
1000 INJECTION INTRAVENOUS PRN
Status: DISCONTINUED | OUTPATIENT
Start: 2021-08-30 | End: 2021-09-02 | Stop reason: HOSPADM

## 2021-08-30 RX ORDER — POTASSIUM CHLORIDE 7.45 MG/ML
10 INJECTION INTRAVENOUS PRN
Status: DISCONTINUED | OUTPATIENT
Start: 2021-08-30 | End: 2021-09-02 | Stop reason: HOSPADM

## 2021-08-30 RX ORDER — LORAZEPAM 2 MG/ML
1 INJECTION INTRAMUSCULAR
Status: DISCONTINUED | OUTPATIENT
Start: 2021-08-30 | End: 2021-09-02 | Stop reason: HOSPADM

## 2021-08-30 RX ORDER — HYDRALAZINE HYDROCHLORIDE 20 MG/ML
10 INJECTION INTRAMUSCULAR; INTRAVENOUS ONCE
Status: COMPLETED | OUTPATIENT
Start: 2021-08-30 | End: 2021-08-30

## 2021-08-30 RX ORDER — METOPROLOL TARTRATE 5 MG/5ML
5 INJECTION INTRAVENOUS EVERY 6 HOURS PRN
Status: DISCONTINUED | OUTPATIENT
Start: 2021-08-30 | End: 2021-08-31

## 2021-08-30 RX ORDER — SODIUM CHLORIDE, SODIUM LACTATE, POTASSIUM CHLORIDE, CALCIUM CHLORIDE 600; 310; 30; 20 MG/100ML; MG/100ML; MG/100ML; MG/100ML
INJECTION, SOLUTION INTRAVENOUS CONTINUOUS
Status: DISCONTINUED | OUTPATIENT
Start: 2021-08-30 | End: 2021-09-02 | Stop reason: HOSPADM

## 2021-08-30 RX ORDER — ONDANSETRON 4 MG/1
4 TABLET, ORALLY DISINTEGRATING ORAL EVERY 8 HOURS PRN
Status: DISCONTINUED | OUTPATIENT
Start: 2021-08-30 | End: 2021-09-02 | Stop reason: HOSPADM

## 2021-08-30 RX ORDER — LORAZEPAM 2 MG/ML
4 INJECTION INTRAMUSCULAR
Status: DISCONTINUED | OUTPATIENT
Start: 2021-08-30 | End: 2021-09-02 | Stop reason: HOSPADM

## 2021-08-30 RX ORDER — HYDRALAZINE HYDROCHLORIDE 20 MG/ML
20 INJECTION INTRAMUSCULAR; INTRAVENOUS EVERY 6 HOURS PRN
Status: DISCONTINUED | OUTPATIENT
Start: 2021-08-30 | End: 2021-08-31

## 2021-08-30 RX ORDER — KETOROLAC TROMETHAMINE 15 MG/ML
15 INJECTION, SOLUTION INTRAMUSCULAR; INTRAVENOUS ONCE
Status: DISCONTINUED | OUTPATIENT
Start: 2021-08-30 | End: 2021-08-30

## 2021-08-30 RX ORDER — MORPHINE SULFATE 4 MG/ML
8 INJECTION, SOLUTION INTRAMUSCULAR; INTRAVENOUS ONCE
Status: COMPLETED | OUTPATIENT
Start: 2021-08-30 | End: 2021-08-30

## 2021-08-30 RX ORDER — SODIUM CHLORIDE 9 MG/ML
INJECTION, SOLUTION INTRAVENOUS CONTINUOUS
Status: DISCONTINUED | OUTPATIENT
Start: 2021-08-30 | End: 2021-08-30

## 2021-08-30 RX ORDER — LORAZEPAM 2 MG/ML
3 INJECTION INTRAMUSCULAR
Status: DISCONTINUED | OUTPATIENT
Start: 2021-08-30 | End: 2021-09-02 | Stop reason: HOSPADM

## 2021-08-30 RX ORDER — ACETAMINOPHEN 650 MG/1
650 SUPPOSITORY RECTAL EVERY 6 HOURS PRN
Status: DISCONTINUED | OUTPATIENT
Start: 2021-08-30 | End: 2021-09-02 | Stop reason: HOSPADM

## 2021-08-30 RX ORDER — ONDANSETRON 2 MG/ML
4 INJECTION INTRAMUSCULAR; INTRAVENOUS EVERY 6 HOURS PRN
Status: DISCONTINUED | OUTPATIENT
Start: 2021-08-30 | End: 2021-09-02 | Stop reason: HOSPADM

## 2021-08-30 RX ORDER — PANTOPRAZOLE SODIUM 40 MG/1
40 TABLET, DELAYED RELEASE ORAL
Status: DISCONTINUED | OUTPATIENT
Start: 2021-08-30 | End: 2021-09-02 | Stop reason: HOSPADM

## 2021-08-30 RX ORDER — HYDRALAZINE HYDROCHLORIDE 20 MG/ML
10 INJECTION INTRAMUSCULAR; INTRAVENOUS EVERY 6 HOURS PRN
Status: DISCONTINUED | OUTPATIENT
Start: 2021-08-30 | End: 2021-08-31

## 2021-08-30 RX ORDER — LISINOPRIL 10 MG/1
10 TABLET ORAL DAILY
Status: DISCONTINUED | OUTPATIENT
Start: 2021-08-30 | End: 2021-08-31

## 2021-08-30 RX ORDER — ACETAMINOPHEN 325 MG/1
650 TABLET ORAL EVERY 6 HOURS PRN
Status: DISCONTINUED | OUTPATIENT
Start: 2021-08-30 | End: 2021-09-02 | Stop reason: HOSPADM

## 2021-08-30 RX ORDER — LORAZEPAM 1 MG/1
1 TABLET ORAL
Status: DISCONTINUED | OUTPATIENT
Start: 2021-08-30 | End: 2021-09-02 | Stop reason: HOSPADM

## 2021-08-30 RX ORDER — LORAZEPAM 2 MG/1
2 TABLET ORAL
Status: DISCONTINUED | OUTPATIENT
Start: 2021-08-30 | End: 2021-09-02 | Stop reason: HOSPADM

## 2021-08-30 RX ORDER — GABAPENTIN 400 MG/1
400 CAPSULE ORAL 3 TIMES DAILY
Status: DISCONTINUED | OUTPATIENT
Start: 2021-08-30 | End: 2021-09-02 | Stop reason: HOSPADM

## 2021-08-30 RX ADMIN — HYDROMORPHONE HYDROCHLORIDE 0.5 MG: 1 INJECTION, SOLUTION INTRAMUSCULAR; INTRAVENOUS; SUBCUTANEOUS at 20:14

## 2021-08-30 RX ADMIN — HYDROMORPHONE HYDROCHLORIDE 0.5 MG: 1 INJECTION, SOLUTION INTRAMUSCULAR; INTRAVENOUS; SUBCUTANEOUS at 13:46

## 2021-08-30 RX ADMIN — MORPHINE SULFATE 4 MG: 4 INJECTION INTRAVENOUS at 03:49

## 2021-08-30 RX ADMIN — SODIUM CHLORIDE, PRESERVATIVE FREE 10 ML: 5 INJECTION INTRAVENOUS at 20:14

## 2021-08-30 RX ADMIN — LISINOPRIL 10 MG: 10 TABLET ORAL at 10:33

## 2021-08-30 RX ADMIN — HYDRALAZINE HYDROCHLORIDE 10 MG: 20 INJECTION INTRAMUSCULAR; INTRAVENOUS at 03:49

## 2021-08-30 RX ADMIN — SODIUM CHLORIDE: 9 INJECTION, SOLUTION INTRAVENOUS at 07:06

## 2021-08-30 RX ADMIN — Medication 10 MG: at 02:31

## 2021-08-30 RX ADMIN — MORPHINE SULFATE 4 MG: 4 INJECTION INTRAVENOUS at 02:29

## 2021-08-30 RX ADMIN — METOPROLOL TARTRATE 5 MG: 1 INJECTION, SOLUTION INTRAVENOUS at 10:33

## 2021-08-30 RX ADMIN — HYDROMORPHONE HYDROCHLORIDE 0.5 MG: 1 INJECTION, SOLUTION INTRAMUSCULAR; INTRAVENOUS; SUBCUTANEOUS at 16:46

## 2021-08-30 RX ADMIN — MORPHINE SULFATE 8 MG: 4 INJECTION INTRAVENOUS at 05:01

## 2021-08-30 RX ADMIN — HYDRALAZINE HYDROCHLORIDE 20 MG: 20 INJECTION INTRAMUSCULAR; INTRAVENOUS at 07:09

## 2021-08-30 RX ADMIN — SODIUM CHLORIDE, POTASSIUM CHLORIDE, SODIUM LACTATE AND CALCIUM CHLORIDE: 600; 310; 30; 20 INJECTION, SOLUTION INTRAVENOUS at 18:51

## 2021-08-30 RX ADMIN — HYDROMORPHONE HYDROCHLORIDE 0.5 MG: 1 INJECTION, SOLUTION INTRAMUSCULAR; INTRAVENOUS; SUBCUTANEOUS at 07:10

## 2021-08-30 RX ADMIN — SODIUM CHLORIDE, POTASSIUM CHLORIDE, SODIUM LACTATE AND CALCIUM CHLORIDE: 600; 310; 30; 20 INJECTION, SOLUTION INTRAVENOUS at 10:43

## 2021-08-30 RX ADMIN — SODIUM CHLORIDE 1000 ML: 9 INJECTION, SOLUTION INTRAVENOUS at 02:36

## 2021-08-30 RX ADMIN — DEXTROSE MONOHYDRATE 12.5 MG/HR: 50 INJECTION, SOLUTION INTRAVENOUS at 20:03

## 2021-08-30 RX ADMIN — METOPROLOL TARTRATE 5 MG: 1 INJECTION, SOLUTION INTRAVENOUS at 16:44

## 2021-08-30 RX ADMIN — ONDANSETRON 4 MG: 2 INJECTION INTRAMUSCULAR; INTRAVENOUS at 02:28

## 2021-08-30 RX ADMIN — FAMOTIDINE 20 MG: 10 INJECTION, SOLUTION INTRAVENOUS at 05:28

## 2021-08-30 RX ADMIN — IOPAMIDOL 75 ML: 755 INJECTION, SOLUTION INTRAVENOUS at 04:25

## 2021-08-30 RX ADMIN — HYDROMORPHONE HYDROCHLORIDE 0.5 MG: 1 INJECTION, SOLUTION INTRAMUSCULAR; INTRAVENOUS; SUBCUTANEOUS at 10:33

## 2021-08-30 RX ADMIN — SODIUM CHLORIDE 1000 ML: 9 INJECTION, SOLUTION INTRAVENOUS at 02:28

## 2021-08-30 RX ADMIN — HYDROMORPHONE HYDROCHLORIDE 0.5 MG: 1 INJECTION, SOLUTION INTRAMUSCULAR; INTRAVENOUS; SUBCUTANEOUS at 23:32

## 2021-08-30 RX ADMIN — HYDRALAZINE HYDROCHLORIDE 10 MG: 20 INJECTION, SOLUTION INTRAMUSCULAR; INTRAVENOUS at 20:13

## 2021-08-30 RX ADMIN — DEXTROSE MONOHYDRATE 5 MG/HR: 50 INJECTION, SOLUTION INTRAVENOUS at 13:48

## 2021-08-30 ASSESSMENT — ENCOUNTER SYMPTOMS
SHORTNESS OF BREATH: 0
ABDOMINAL PAIN: 1
SORE THROAT: 0
COUGH: 0
VOMITING: 1
BLOOD IN STOOL: 0
NAUSEA: 1

## 2021-08-30 ASSESSMENT — PAIN DESCRIPTION - LOCATION
LOCATION: ABDOMEN

## 2021-08-30 ASSESSMENT — PAIN SCALES - GENERAL
PAINLEVEL_OUTOF10: 10
PAINLEVEL_OUTOF10: 0
PAINLEVEL_OUTOF10: 10
PAINLEVEL_OUTOF10: 9
PAINLEVEL_OUTOF10: 9
PAINLEVEL_OUTOF10: 10
PAINLEVEL_OUTOF10: 10
PAINLEVEL_OUTOF10: 9

## 2021-08-30 ASSESSMENT — PAIN DESCRIPTION - PAIN TYPE
TYPE: ACUTE PAIN;CHRONIC PAIN
TYPE: ACUTE PAIN
TYPE: ACUTE PAIN

## 2021-08-30 NOTE — ED PROVIDER NOTES
[facility]  Emergency Department  Emergency Medicine Resident Sign-out     Care of Antonette Can was assumed from Dr. Allie Koch and is being seen for Pancreatitis  . The patient's initial evaluation and plan have been discussed with the prior provider who initially evaluated the patient.      EMERGENCY DEPARTMENT COURSE / MEDICAL DECISION MAKING:       MEDICATIONS GIVEN:  Orders Placed This Encounter   Medications    0.9 % sodium chloride bolus    ondansetron (ZOFRAN) injection 4 mg    morphine injection 4 mg    DISCONTD: ketorolac (TORADOL) injection 15 mg    0.9 % sodium chloride bolus    labetalol (NORMODYNE;TRANDATE) injection 10 mg    morphine injection 4 mg    hydrALAZINE (APRESOLINE) injection 10 mg    iopamidol (ISOVUE-370) 76 % injection 75 mL    morphine (PF) injection 8 mg       LABS / RADIOLOGY:     Labs Reviewed   CBC WITH AUTO DIFFERENTIAL - Abnormal; Notable for the following components:       Result Value    WBC 11.5 (*)     RBC 6.22 (*)     Hematocrit 52.9 (*)     RDW 14.7 (*)     Seg Neutrophils 83 (*)     Lymphocytes 7 (*)     Segs Absolute 9.53 (*)     Absolute Lymph # 0.84 (*)     All other components within normal limits   BASIC METABOLIC PANEL W/ REFLEX TO MG FOR LOW K - Abnormal; Notable for the following components:    Glucose 128 (*)     Potassium 3.6 (*)     All other components within normal limits   LIPASE - Abnormal; Notable for the following components:    Lipase 87 (*)     All other components within normal limits   HEPATIC FUNCTION PANEL   TROPONIN   TROPONIN   IMMATURE PLATELET FRACTION   SPECIMEN REJECTION   HEPATIC FUNCTION PANEL   PREVIOUS SPECIMEN       XR CHEST PORTABLE    Result Date: 8/30/2021  EXAMINATION: ONE XRAY VIEW OF THE CHEST 8/30/2021 2:21 am COMPARISON: Abdomen and pelvis CT 08/29/2020, chest radiograph 03/10/2018 HISTORY: ORDERING SYSTEM PROVIDED HISTORY: epigastric abd pain TECHNOLOGIST PROVIDED HISTORY: epigastric abd pain Reason for Exam: epigastric pain FINDINGS: Clear lungs. No definite findings of pneumothorax or pleural effusion. Normal mediastinal, hilar, and cardiac contours. Colonic gas subjacent to the left hemidiaphragm. No obvious acute fracture. Joints maintain anatomic alignment. No acute findings in the chest.       RECENT VITALS:     Temp: 98.6 °F (37 °C),  Pulse: 103, Resp: 15, BP: (!) 205/116, SpO2: 98 %      This patient is a 52 y.o. Male with acute on chronic pancreatitis. Patient drank 2 days ago had pain 1 day ago and then began having nausea vomiting with streaked emesis. Concern for Carol Ann-Beltran tear. Patient been treated with morphine, Zofran and fluids. Patient's also presented hypertensive 2 oh/134. History of hypertension is often taking lisinopril. Patient treated with labetalol here in the emergency department. Patient is required multiple rounds of morphine for pain control, patient additionally has required hydralazine and labetalol IV for blood pressure control. CT chest showing mild interstitial edematous pancreatitis and a questionable circumferential wall thickening of the distal thoracic esophagus likely for esophagitis. Patient will be admitted to the Intermed service. ED Course as of Aug 30 0516   Mon Aug 30, 2021   0309 Appears to have free air under the diaphragm. Ct orered. Pt signed out to Dr. Estrada Lyle while awaiting labs and imaging    XR CHEST PORTABLE [CS]   0310 Troponin, High Sensitivity: 10 [CS]      ED Course User Index  [CS] Eleuterio Salguero,        OUTSTANDING TASKS / RECOMMENDATIONS:    1. Labs  2. CT chest     FINAL IMPRESSION:     1. Abdominal pain, epigastric    2. Essential hypertension    3. Idiopathic acute pancreatitis, unspecified complication status    4. Esophagitis        DISPOSITION:         DISPOSITION:  []  Discharge   []  Transfer -    [x]  Admission  []  Against Medical Advice   []  Eloped   FOLLOW-UP: No follow-up provider specified. DISCHARGE MEDICATIONS: New Prescriptions    No medications on file          Latricia Law MD  Emergency Medicine Resident  [facility]       Latricia Law MD  Resident  08/30/21 Zahira Taylor MD  Resident  08/30/21 [id number]

## 2021-08-30 NOTE — PROGRESS NOTES
[address]\""  Occupational Therapy Not Seen Note    DATE: 2021  Name: Antonette Can  : 1970  MRN: [id number]    Patient not available for Occupational Therapy due to:    [x] Other: BR until BP is stable, hypertension with current BP ~220/140, hold OT eval.      Next Scheduled Treatment: Attempt in pm or  as appropriate.     Mliss Baljinder, OT OTR/L

## 2021-08-30 NOTE — ED PROVIDER NOTES
[facility]     Emergency Department     Faculty Attestation    I performed a history and physical examination of the patient and discussed management with the resident. I have reviewed and agree with the residents findings including all diagnostic interpretations, and treatment plans as written. Any areas of disagreement are noted on the chart. I was personally present for the key portions of any procedures. I have documented in the chart those procedures where I was not present during the key portions. I have reviewed the emergency nurses triage note. I agree with the chief complaint, past medical history, past surgical history, allergies, medications, social and family history as documented unless otherwise noted below. Documentation of the HPI, Physical Exam and Medical Decision Making performed by scribwilton is based on my personal performance of the HPI, PE and MDM. For Physician Assistant/ Nurse Practitioner cases/documentation I have personally evaluated this patient and have completed at least one if not all key elements of the E/M (history, physical exam, and MDM). Additional findings are as noted. 53 yo M c/o upper abdominal pain, vomiting, no fever, no cp,   Last drink 48 h prior, denies injury,   Pt relates being daily alcohol use  pe gcs 15,   moderate epigastric tenderness, + guarding, no rebound, no distension, no calf swelling, no calf tenderness,     Admit for pancreatitis, htn, ct mild pancreatitis,     EKG Interpretation    Interpreted by me  Sinus, heart rate 68, no ischemia, normal axis, QT corrected 448    CRITICAL CARE: There was a high probability of clinically significant/life threatening deterioration in this patient's condition which required my urgent intervention. Total critical care time was 5 minutes. This excludes any time for separately reportable procedures.        Hubert 24, DO  08/30/21 [id number],   08/30/21 [address],   08/30/21 4710

## 2021-08-30 NOTE — H&P
abdominal pain. No other complaints at this time     Past Medical History:     Past Medical History:   Diagnosis Date    Arthritis     Chronic sinusitis     Eczema     Environmental allergies     GERD (gastroesophageal reflux disease)     Hyperlipidemia     Hypertension     Snores         Past Surgical History:     Past Surgical History:   Procedure Laterality Date    COLONOSCOPY      UPPER GASTROINTESTINAL ENDOSCOPY          Medications Prior to Admission:     Prior to Admission medications    Medication Sig Start Date End Date Taking? Authorizing Provider   gabapentin (NEURONTIN) 400 MG capsule TAKE 1 CAPSULE BY MOUTH THREE TIMES DAILY AS DIRECTED 8/18/21 9/17/21  Rob Love MD   lisinopril (PRINIVIL;ZESTRIL) 10 MG tablet Take 1 tablet by mouth daily 8/18/21   Rob Love MD   pantoprazole (PROTONIX) 40 MG tablet Take 1 tablet by mouth every morning (before breakfast) 8/18/21   Rob Love MD   simvastatin (ZOCOR) 40 MG tablet Take 1 tablet by mouth nightly 4/26/21   Jacqualine Seip, APRN - CNP   dupilumab (DUPIXENT) 300 MG/2ML SOSY injection Inject 600mg SQ at week 0. Begin Maintenance Dose at week 2 12/18/20   Suyapa Montes MD   dupilumab (DUPIXENT) 300 MG/2ML SOSY injection Inject 300mg SQ at every 2 weeks 12/18/20   Lidia Garibay MD   DUPIXENT 300 MG/2ML SOSY injection INJECT 300MG SUBCUTANEOUSLY EVERY OTHER WEEK 11/16/20   Juliana Montes MD   Blood Pressure KIT Use to monitor blood pressure daily and as needed 11/6/20   Jacqualine Seip, APRN - CNP   triamcinolone (KENALOG) 0.1 % ointment Apply to rash twice daily (not face, armpit or groin) 6/30/20   Lidia Garibay MD   tacrolimus (PROTOPIC) 0.03 % ointment Apply topically 2 times daily. 6/30/20   Lidia Garibay MD        Allergies:     Patient has no known allergies. Social History:     Tobacco:    reports that he has been smoking cigarettes. He has a 15.00 pack-year smoking history.  He has never used smokeless tobacco.  Alcohol: reports current alcohol use of about 3.0 standard drinks of alcohol per week. Drug Use:  reports no history of drug use. Family History:     Family History   Problem Relation Age of Onset    Cancer Paternal Grandmother     High Blood Pressure Mother        Review of Systems:     Positive and Negative as described in HPI. 12 point ROS performed and negative for anything other than what was stated in HPI    Physical Exam:   BP (!) 197/113   Pulse 94   Temp 98.6 °F (37 °C) (Oral)   Resp 20   SpO2 97%   Temp (24hrs), Av.6 °F (37 °C), Min:98.6 °F (37 °C), Max:98.6 °F (37 °C)    No results for input(s): POCGLU in the last 72 hours.     Intake/Output Summary (Last 24 hours) at 2021 0816  Last data filed at 2021 0459  Gross per 24 hour   Intake 2000 ml   Output --   Net 2000 ml       General Appearance: alert, appears uncomfortably due to pain   Mental status: oriented to person, place, and time  Head: normocephalic, atraumatic  Eye: no icterus, redness, pupils equal and reactive, extraocular eye movements intat  Ear: normal external ear, no discharge, hearing intact  Nose: no drainage noted  Mouth: mucous membranes moist  Neck: supple, no carotid bruits, thyroid not palpable  Lungs: Bilateral equal air entry, clear to ausculation, no wheezing, rales or rhonchi, normal effort  Cardiovascular: normal rate, regular rhythm, no murmur, gallop, rub  Abdomen: Soft, TTP w/ guarding, BS normal   Neurologic: There are no new focal motor or sensory deficits, normal muscle tone and bulk, no abnormal sensation, normal speech  Skin: No gross lesions, rashes, bruising or bleeding on exposed skin area  Extremities: peripheral pulses palpable, no pedal edema or calf pain with palpation  Psych: normal affect    Investigations:      Laboratory Testing:  Recent Results (from the past 24 hour(s))   EKG 12 Lead    Collection Time: 21  2:13 AM   Result Value Ref Range    Ventricular Rate 68 BPM    Atrial Rate 68 BPM    P-R Interval 152 ms    QRS Duration 78 ms    Q-T Interval 422 ms    QTc Calculation (Bazett) 448 ms    P Axis 70 degrees    R Axis 2 degrees    T Axis 61 degrees   CBC Auto Differential    Collection Time: 08/30/21  2:27 AM   Result Value Ref Range    WBC 11.5 (H) 3.5 - 11.3 k/uL    RBC 6.22 (H) 4.21 - 5.77 m/uL    Hemoglobin 17.0 13.0 - 17.0 g/dL    Hematocrit 52.9 (H) 40.7 - 50.3 %    MCV 85.0 82.6 - 102.9 fL    MCH 27.3 25.2 - 33.5 pg    MCHC 32.1 28.4 - 34.8 g/dL    RDW 14.7 (H) 11.8 - 14.4 %    Platelets See Reflexed IPF Result 138 - 453 k/uL    MPV NOT REPORTED 8.1 - 13.5 fL    NRBC Automated 0.0 0.0 per 100 WBC    Differential Type NOT REPORTED     WBC Morphology NOT REPORTED     RBC Morphology ANISOCYTOSIS PRESENT     Platelet Estimate NOT REPORTED     Seg Neutrophils 83 (H) 36 - 65 %    Lymphocytes 7 (L) 24 - 43 %    Monocytes 8 3 - 12 %    Eosinophils % 1 1 - 4 %    Basophils 0 0 - 2 %    Immature Granulocytes 0 0 %    Segs Absolute 9.53 (H) 1.50 - 8.10 k/uL    Absolute Lymph # 0.84 (L) 1.10 - 3.70 k/uL    Absolute Mono # 0.97 0.10 - 1.20 k/uL    Absolute Eos # 0.09 0.00 - 0.44 k/uL    Basophils Absolute 0.04 0.00 - 0.20 k/uL    Absolute Immature Granulocyte 0.05 0.00 - 0.30 k/uL   Hepatic Function Panel    Collection Time: 08/30/21  2:27 AM   Result Value Ref Range    Albumin DISREGARD RESULTS, SPECIMEN GROSSLY HEMOLYZED. 3.5 - 5.2 g/dL    Alkaline Phosphatase DISREGARD RESULTS, SPECIMEN GROSSLY HEMOLYZED. 40 - 129 U/L    ALT DISREGARD RESULTS, SPECIMEN GROSSLY HEMOLYZED. 5 - 41 U/L    AST DISREGARD RESULTS, SPECIMEN GROSSLY HEMOLYZED. <40 U/L    Total Bilirubin DISREGARD RESULTS, SPECIMEN GROSSLY HEMOLYZED. 0.3 - 1.2 mg/dL    Bilirubin, Direct DISREGARD RESULTS, SPECIMEN GROSSLY HEMOLYZED. <0.31 mg/dL    Bilirubin, Indirect NOT REPORTED 0.00 - 1.00 mg/dL    Total Protein DISREGARD RESULTS, SPECIMEN GROSSLY HEMOLYZED.  6.4 - 8.3 g/dL    Globulin NOT REPORTED 1.5 - 3.8 g/dL    Albumin/Globulin Ratio CANNOT BE CALCULATED 1.0 - 2.5   Troponin    Collection Time: 08/30/21  2:27 AM   Result Value Ref Range    Troponin, High Sensitivity 10 0 - 22 ng/L    Troponin T NOT REPORTED <0.03 ng/mL    Troponin Interp NOT REPORTED    Immature Platelet Fraction    Collection Time: 08/30/21  2:27 AM   Result Value Ref Range    Platelet, Immature Fraction NOT REPORTED 1.1 - 10.3 %    Platelet, Fluorescence Platelet clumps present, count appears adequate. 138 - 453 k/uL   SPECIMEN REJECTION    Collection Time: 08/30/21  2:27 AM   Result Value Ref Range    Specimen Source . BLOOD     Ordered Test LIP,BMPX,LIVP     Reason for Rejection Unable to perform testing: Specimen hemolyzed.      - NOT REPORTED    Troponin    Collection Time: 08/30/21  3:14 AM   Result Value Ref Range    Troponin, High Sensitivity 14 0 - 22 ng/L    Troponin T NOT REPORTED <0.03 ng/mL    Troponin Interp NOT REPORTED    Basic Metabolic Panel w/ Reflex to MG    Collection Time: 08/30/21  3:14 AM   Result Value Ref Range    Glucose 128 (H) 70 - 99 mg/dL    BUN 8 6 - 20 mg/dL    CREATININE 0.88 0.70 - 1.20 mg/dL    Bun/Cre Ratio NOT REPORTED 9 - 20    Calcium 8.7 8.6 - 10.4 mg/dL    Sodium 137 135 - 144 mmol/L    Potassium 3.6 (L) 3.7 - 5.3 mmol/L    Chloride 101 98 - 107 mmol/L    CO2 24 20 - 31 mmol/L    Anion Gap 12 9 - 17 mmol/L    GFR Non-African American >60 >60 mL/min    GFR African American >60 >60 mL/min    GFR Comment          GFR Staging NOT REPORTED    Lipase    Collection Time: 08/30/21  3:14 AM   Result Value Ref Range    Lipase 87 (H) 13 - 60 U/L   Hepatic Function Panel    Collection Time: 08/30/21  3:14 AM   Result Value Ref Range    Albumin 3.7 3.5 - 5.2 g/dL    Alkaline Phosphatase 107 40 - 129 U/L    ALT 12 5 - 41 U/L    AST 19 <40 U/L    Total Bilirubin 0.57 0.3 - 1.2 mg/dL    Bilirubin, Direct 0.14 <0.31 mg/dL    Bilirubin, Indirect 0.43 0.00 - 1.00 mg/dL    Total Protein 6.9 6.4 - 8.3 g/dL    Globulin NOT REPORTED 1.5 - 3.8 g/dL Albumin/Globulin Ratio 1.2 1.0 - 2.5       Imaging/Diagnostics:  CT ABDOMEN PELVIS W IV CONTRAST Additional Contrast? None    Result Date: 8/30/2021  1. Findings of mild interstitial edematous pancreatitis likely predominantly involving the pancreatic tail. 2. Questionable circumferential wall thickening in the distal thoracic esophagus potentially due to esophagitis, although malignancy could appear similar. Consider further evaluation endoscopy. XR CHEST PORTABLE    Result Date: 8/30/2021  No acute findings in the chest.       Assessment :      Hospital Problems         Last Modified POA    Acute pancreatitis 8/30/2021 Yes          Plan:     Patient status inpatient in the Med/Surge    1. Acute on chronic alcoholic pancreatitis   - GI consulted given abnormal CT findings which indicated possible malignancy in distal esophagus   - NPO   - pain control w/ dilaudid on board  - IVF, decrease rate at this time to 75 cc/hr  - zofran prn nausea   - CIWA   - PPI    2. HTN   - b/p uncontrolled. Likely related to pain   - lopressor IV prn on board  - v/s per unit protocol     3. DVT prophylaxis  - lovenox     4. Elevated pBNP  - echo   - trend pBNP    Consultations:   IP CONSULT TO HOSPITALIST  IP CONSULT TO GI      Patient is admitted as inpatient status because of co-morbidities listed above, severity of signs and symptoms as outlined, requirement for current medical therapies and most importantly because of direct risk to patient if care not provided in a hospital setting. Expected length of stay > 48 hours.     Sara Desai MD  8/30/2021  8:16 AM    Copy sent to Dr. Oralia Cannon, APRN - CNP

## 2021-08-30 NOTE — ED PROVIDER NOTES
[facility] ED  Emergency Department Encounter  EmergencyMedicine Resident     Pt Kathya Velez  MRN: [id number]  Birthdate 1970  Date of evaluation: 8/30/21  PCP:  LATOSHA Vaughan CNP    CHIEF COMPLAINT       Chief Complaint   Patient presents with    Pancreatitis       HISTORY OF PRESENT ILLNESS  (Location/Symptom, Timing/Onset, Context/Setting, Quality, Duration, Modifying Factors, Severity.)      Mery Rolle is a 52 y.o. male who presents with epigastric abdominal pain. Patient has a history of alcohol induced pancreatitis. States his last drink was on Friday, woke up Saturday morning with epigastric abdominal pain nausea, vomiting. He reports that his pain is exactly like his previous pancreatitis flares. Patient states he has not had a bowel movement in 5 days but is still passing gas. No dark stools or diarrhea. Patient reports he is unable to keep anything down, endorses streaks of blood in the vomit. No fevers, no chest pain or shortness of breath. CT abdomen pelvis performed approximately 1 year ago showed a normal caliber of the aorta. Patient denies getting the shakes or having alcohol withdrawal seizures. PAST MEDICAL / SURGICAL / SOCIAL / FAMILY HISTORY      has a past medical history of Arthritis, Chronic sinusitis, Eczema, Environmental allergies, GERD (gastroesophageal reflux disease), Hyperlipidemia, Hypertension, and Snores. has a past surgical history that includes Upper gastrointestinal endoscopy and Colonoscopy.     Social History     Socioeconomic History    Marital status: Legally      Spouse name: Not on file    Number of children: Not on file    Years of education: Not on file    Highest education level: Not on file   Occupational History    Not on file   Tobacco Use    Smoking status: Current Every Day Smoker     Packs/day: 0.50     Years: 30.00     Pack years: 15.00     Types: Cigarettes    Smokeless tobacco: Never Used    Tobacco comment: 6-10 per day    Substance and Sexual Activity    Alcohol use: Yes     Alcohol/week: 3.0 standard drinks     Types: 3 Cans of beer per week     Comment: 3 24 oz cans of beer daily    Drug use: No    Sexual activity: Not on file   Other Topics Concern    Not on file   Social History Narrative    Not on file     Social Determinants of Health     Financial Resource Strain:     Difficulty of Paying Living Expenses:    Food Insecurity:     Worried About Running Out of Food in the Last Year:     Ran Out of Food in the Last Year:    Transportation Needs:     Lack of Transportation (Medical):  Lack of Transportation (Non-Medical):    Physical Activity:     Days of Exercise per Week:     Minutes of Exercise per Session:    Stress:     Feeling of Stress :    Social Connections:     Frequency of Communication with Friends and Family:     Frequency of Social Gatherings with Friends and Family:     Attends Temple Services:     Active Member of Clubs or Organizations:     Attends Club or Organization Meetings:     Marital Status:    Intimate Partner Violence:     Fear of Current or Ex-Partner:     Emotionally Abused:     Physically Abused:     Sexually Abused:        Family History   Problem Relation Age of Onset    Cancer Paternal Grandmother     High Blood Pressure Mother        Allergies:  Patient has no known allergies. Home Medications:  Prior to Admission medications    Medication Sig Start Date End Date Taking?  Authorizing Provider   gabapentin (NEURONTIN) 400 MG capsule TAKE 1 CAPSULE BY MOUTH THREE TIMES DAILY AS DIRECTED 8/18/21 9/17/21  Julius Shannon MD   lisinopril (PRINIVIL;ZESTRIL) 10 MG tablet Take 1 tablet by mouth daily 8/18/21   Julius Shannon MD   pantoprazole (PROTONIX) 40 MG tablet Take 1 tablet by mouth every morning (before breakfast) 8/18/21   Julius Shannon MD   simvastatin (ZOCOR) 40 MG tablet Take 1 tablet by mouth nightly 4/26/21   [facility] LATOSHA Bello CNP   dupilumab (DUPIXENT) 300 MG/2ML SOSY injection Inject 600mg SQ at week 0. Begin Maintenance Dose at week 2 12/18/20   Suyapa Montes MD   dupilumab (DUPIXENT) 300 MG/2ML SOSY injection Inject 300mg SQ at every 2 weeks 12/18/20   Ade Coreas MD   DUPIXENT 300 MG/2ML SOSY injection INJECT 300MG SUBCUTANEOUSLY EVERY OTHER WEEK 11/16/20   Tra Montes MD   Blood Pressure KIT Use to monitor blood pressure daily and as needed 11/6/20   LATOSHA Cardoza CNP   triamcinolone (KENALOG) 0.1 % ointment Apply to rash twice daily (not face, armpit or groin) 6/30/20   Ade Coreas MD   tacrolimus (PROTOPIC) 0.03 % ointment Apply topically 2 times daily. 6/30/20   Tra Montes MD       REVIEW OF SYSTEMS    (2-9 systems for level 4, 10 or more for level 5)      Review of Systems   Constitutional: Negative for chills and fever. HENT: Negative for congestion and sore throat. Respiratory: Negative for cough and shortness of breath. Cardiovascular: Negative for chest pain. Gastrointestinal: Positive for abdominal pain, nausea and vomiting. Negative for blood in stool. Genitourinary: Negative for dysuria and frequency. Musculoskeletal: Negative for neck stiffness. Skin: Negative for rash. Neurological: Negative for headaches. PHYSICAL EXAM   (up to 7 for level 4, 8 or more for level 5)      INITIAL VITALS:   BP (!) 214/124   Pulse 76   Temp 98.6 °F (37 °C) (Oral)   Resp 15   SpO2 99%      Vitals:    08/30/21 0203 08/30/21 0219 08/30/21 0310   BP:  (!) 204/134 (!) 214/124   Pulse: 86 88 76   Resp: 18 12 15   Temp: 98.6 °F (37 °C)     TempSrc: Oral     SpO2: 100% 100% 99%        Physical Exam  Vitals reviewed. Constitutional:       General: He is not in acute distress. Appearance: He is well-developed. Comments: Uncomfortable appearing. HENT:      Head: Normocephalic and atraumatic. Eyes:      Extraocular Movements: Extraocular movements intact. Platelets See Reflexed IPF Result 138 - 453 k/uL    MPV NOT REPORTED 8.1 - 13.5 fL    NRBC Automated 0.0 0.0 per 100 WBC    Differential Type NOT REPORTED     WBC Morphology NOT REPORTED     RBC Morphology ANISOCYTOSIS PRESENT     Platelet Estimate NOT REPORTED     Seg Neutrophils 83 (H) 36 - 65 %    Lymphocytes 7 (L) 24 - 43 %    Monocytes 8 3 - 12 %    Eosinophils % 1 1 - 4 %    Basophils 0 0 - 2 %    Immature Granulocytes 0 0 %    Segs Absolute 9.53 (H) 1.50 - 8.10 k/uL    Absolute Lymph # 0.84 (L) 1.10 - 3.70 k/uL    Absolute Mono # 0.97 0.10 - 1.20 k/uL    Absolute Eos # 0.09 0.00 - 0.44 k/uL    Basophils Absolute 0.04 0.00 - 0.20 k/uL    Absolute Immature Granulocyte 0.05 0.00 - 0.30 k/uL   Hepatic Function Panel   Result Value Ref Range    Albumin DISREGARD RESULTS, SPECIMEN GROSSLY HEMOLYZED. 3.5 - 5.2 g/dL    Alkaline Phosphatase DISREGARD RESULTS, SPECIMEN GROSSLY HEMOLYZED. 40 - 129 U/L    ALT DISREGARD RESULTS, SPECIMEN GROSSLY HEMOLYZED. 5 - 41 U/L    AST DISREGARD RESULTS, SPECIMEN GROSSLY HEMOLYZED. <40 U/L    Total Bilirubin DISREGARD RESULTS, SPECIMEN GROSSLY HEMOLYZED. 0.3 - 1.2 mg/dL    Bilirubin, Direct DISREGARD RESULTS, SPECIMEN GROSSLY HEMOLYZED. <0.31 mg/dL    Bilirubin, Indirect NOT REPORTED 0.00 - 1.00 mg/dL    Total Protein DISREGARD RESULTS, SPECIMEN GROSSLY HEMOLYZED. 6.4 - 8.3 g/dL    Globulin NOT REPORTED 1.5 - 3.8 g/dL    Albumin/Globulin Ratio CANNOT BE CALCULATED 1.0 - 2.5   Troponin   Result Value Ref Range    Troponin, High Sensitivity 10 0 - 22 ng/L    Troponin T NOT REPORTED <0.03 ng/mL    Troponin Interp NOT REPORTED    Immature Platelet Fraction   Result Value Ref Range    Platelet, Immature Fraction NOT REPORTED 1.1 - 10.3 %    Platelet, Fluorescence Platelet clumps present, count appears adequate. 138 - 453 k/uL   SPECIMEN REJECTION   Result Value Ref Range    Specimen Source . BLOOD     Ordered Test LIP,BMPX,LIVP     Reason for Rejection Unable to perform testing: Specimen hemolyzed. - NOT REPORTED          RADIOLOGY:  XR CHEST PORTABLE   Final Result   No acute findings in the chest.         CT ABDOMEN PELVIS W IV CONTRAST Additional Contrast? None    (Results Pending)        EKG  EKG Interpretation    Interpreted by me    Rhythm: normal sinus   Rate: normal  Axis: normal  Ectopy: none  Conduction: LVH pattern  ST Segments: no acute change  T Waves: no acute change  Q Waves: none    Clinical Impression: LVH pattern otherwise unremarkable EKG    All EKG's are interpreted by the Emergency Department Physician who either signs or Co-signs this chart in the absence of a cardiologist.      [facility]:    Patient here with epigastric abdominal pain likely due to alcohol induced acute on chronic pancreatitis. Plan for abdominal labs, cardiac rule out, portable chest x-ray. Suspect hematemesis due to Carol Ann-Beltran tear. Will consider CT abdomen pelvis. Treat with fluids, Zofran, pain control. ED Course as of Aug 30 0317   Mon Aug 30, 2021   0309 Appears to have free air under the diaphragm. Ct orered. Pt signed out to Dr. Mika Wilder while awaiting labs and imaging    XR CHEST PORTABLE [CS]   0310 Troponin, High Sensitivity: 10 [CS]      ED Course User Index  [CS] Swapna Soria DO         PROCEDURES:      CONSULTS:  None    CRITICAL CARE:  Please see attending note    FINAL IMPRESSION      1. Abdominal pain, epigastric    2. Essential hypertension          DISPOSITION / PLAN     DISPOSITION        PATIENT REFERRED TO:  No follow-up provider specified.     DISCHARGE MEDICATIONS:  New Prescriptions    No medications on file       Swapna Soria DO  Emergency Medicine Resident    (Please note that portions of thisnote were completed with a voice recognition program.  Efforts were made to edit the dictations but occasionally words are mis-transcribed.)        Swapna Soria DO  Resident  08/30/21 4610

## 2021-08-30 NOTE — PROGRESS NOTES
Physical Therapy        Physical Therapy Cancel Note      DATE: 2021    NAME: Ronda Thompson  MRN: [id number]   : 1970      Patient not seen this date for Physical Therapy due to:     Other: Pt BP ~220/140, hold PT eval.       Electronically signed by Dulce Treviño PT on 2021 at 8:12 AM

## 2021-08-30 NOTE — CARE COORDINATION
Case Management Initial Discharge Plan  Sabrina Johnson,             Met with:patient to discuss discharge plans. Information verified: address, contacts, phone number, , insurance Yes  Insurance Provider: [employer]    Emergency Contact/Next of Kin name & number: Antionette Sunshine on chart  Who are involved in patient's support system? Parent    PCP: LATOSHA Vargas CNP  Date of last visit: 9 months ago      Discharge Planning    Living Arrangements:        Home has 2 stories  4 stairs to climb to get into front door, 1 flight of stairs to climb to reach second floor  Location of bedroom/bathroom in home second floor    Patient able to perform ADL's:Independent    Current Services (outpatient & in home) None  DME equipment: none  DME provider: None    Is patient receiving oral anticoagulation therapy? No      Potential Assistance Needed:       Patient agreeable to home care: No  [place] of choice provided:  n/a    Prior SNF/Rehab Placement and Facility: None  Agreeable to SNF/Rehab: No  [place] of choice provided: n/a     Evaluation: n/a    Expected Discharge date:       Patient expects to be discharged to: If home: is the family and/or caregiver wiling & able to provide support at home? Yes per patient  Who will be providing this support? family    Follow Up Appointment: Best Day/ Time:      Transportation provider: Ellen  Transportation arrangements needed for discharge: Yes    Readmission Risk              Risk of Unplanned Readmission:  11             Does patient have a readmission risk score greater than 14?: No  If yes, follow-up appointment must be made within 7 days of discharge. Goals of Care: Increased comfort      Educated patient on transitional options, provided freedom of choice and are agreeable with plan      Discharge Plan: Plan to return home at discharge independently; has PCP. May need ride. Denying HC or SNF.            Electronically signed by Moira Richards RN on 8/30/21 at 5:42 PM EDT

## 2021-08-30 NOTE — CONSULTS
[facility]  Consultation Note     . REASON FOR CONSULTATION:    Pancreatitis with esophagitis possible malignancy    HISTORY OF PRESENT ILLNESS:     This is a 52 y.o. male with PMH including hypertension, acute pancreatitis 2018, alcohol misuse disorder with 9 years of sobriety. Patient presents to the emergency room with complaints of severe sharp stabbing constant radiating pain that starts in the mid epigastric area and radiates around to the left side of his ribs. He states that on Friday night he was drinking, smoking cigarettes, and used cocaine. Saturday a.m. he woke up with this severe unbearable pain and presented to the ER for treatment. Once in the ER, patient was noted to be in hypertensive crisis, started on medications and this a.m. his blood pressure was still greater than 872 systolic and 939. Patient states that he recently relapsed and currently drinks 4/24 ounce cans of beer daily, smokes cocaine couple times a week, smokes pack and a half of cigarettes per day. Patient clearly denied any other drug use such as marijuana, heroin, or ecstasy. Patient denies recent weight loss, but does admit to poor appetite due to alcohol consumption. Patient denies hospitalizations for alcohol withdrawal in the past and/or seizures.      Summary of imaging completed at this time:  8/30/2021 CT abdomen and pelvis with IV contrast had findings of mild interstitial edematous pancreatitis prominently in the pancreatic tail  Additionally, questionable circumferential wall thickening in the distal thoracic esophagus potentially due to esophagitis although malignancy cannot be excluded      Summary of abnormal labs completed at this time:    Metabolic: Unremarkable  Hematology: WBC is 11.5, hemoglobin 17-most likely concentrated  Coags: NA  Liver profile: LFTs unremarkable, T bili WNL, lipase 87      On physician exam: Patient writhing in bed complaining of sharp stabbing burning constant radiating pain originating in the epigastric area radiating around into the left side of the chest.  Patient does not appear to be in alcohol withdrawal at this time      Previous GI history:   2018 colonoscopy at [address] [place] [address] patient had polypectomy. Actual report not found  Patient denies personal or family history of GI malignancy or other chronic disorders    Past Medical/Social/Family History:  Past Medical History:   Diagnosis Date    Arthritis     Chronic sinusitis     Eczema     Environmental allergies     GERD (gastroesophageal reflux disease)     Hyperlipidemia     Hypertension     Snores      Past Surgical History:   Procedure Laterality Date    COLONOSCOPY      UPPER GASTROINTESTINAL ENDOSCOPY       Family History   Problem Relation Age of Onset    Cancer Paternal Grandmother     High Blood Pressure Mother      Previous records/history/ and notes were reviewed    Allergies:  No Known Allergies    Home medications:  Prior to Admission medications    Medication Sig Start Date End Date Taking? Authorizing Provider   gabapentin (NEURONTIN) 400 MG capsule TAKE 1 CAPSULE BY MOUTH THREE TIMES DAILY AS DIRECTED 8/18/21 9/17/21  Humberto Quiroz MD   lisinopril (PRINIVIL;ZESTRIL) 10 MG tablet Take 1 tablet by mouth daily 8/18/21   Humberto Quiroz MD   pantoprazole (PROTONIX) 40 MG tablet Take 1 tablet by mouth every morning (before breakfast) 8/18/21   Humberto Quiroz MD   simvastatin (ZOCOR) 40 MG tablet Take 1 tablet by mouth nightly 4/26/21   Mark Alexandra, APRN - CNP   dupilumab (DUPIXENT) 300 MG/2ML SOSY injection Inject 600mg SQ at week 0.  Begin Maintenance Dose at week 2 12/18/20   Suyapa Montes MD   dupilumab (DUPIXENT) 300 MG/2ML SOSY injection Inject 300mg SQ at every 2 weeks 12/18/20   Frankie Baumgarten, MD   DUPIXENT 300 MG/2ML SOSY injection INJECT 300MG SUBCUTANEOUSLY EVERY OTHER WEEK 11/16/20   Frankie Baumgarten, MD   Blood Pressure KIT Use to monitor blood pressure daily and as needed 11/6/20   Norah Tuttle, APRN - CNP   triamcinolone (KENALOG) 0.1 % ointment Apply to rash twice daily (not face, armpit or groin) 6/30/20   Suyapa Montes MD   tacrolimus (PROTOPIC) 0.03 % ointment Apply topically 2 times daily. 6/30/20   Isma Montes MD     .  Current Medications:  Scheduled Meds:   gabapentin  400 mg Oral TID    lisinopril  10 mg Oral Daily    pantoprazole  40 mg Oral QAM AC    atorvastatin  20 mg Oral Daily    sodium chloride flush  5-40 mL IntraVENous 2 times per day    enoxaparin  40 mg SubCUTAneous Daily    sodium chloride flush  5-40 mL IntraVENous 2 times per day     . Continuous Infusions:   sodium chloride      sodium chloride      lactated ringers 250 mL/hr at 08/30/21 1043    niCARdipene (CARDENE) 50 mg in dextrose 5% 250 mL infusion ([id number])       . PRN Meds:sodium chloride flush, sodium chloride, acetaminophen **OR** acetaminophen, hydrALAZINE **OR** hydrALAZINE, potassium chloride, magnesium sulfate, HYDROmorphone **OR** HYDROmorphone, ondansetron **OR** ondansetron, metoprolol, sodium chloride flush, sodium chloride, LORazepam **OR** LORazepam **OR** LORazepam **OR** LORazepam **OR** LORazepam **OR** LORazepam **OR** LORazepam **OR** LORazepam    REVIEW OF SYSTEMS:     Constitutional: No fever, no chills, no lethargy, no weakness, no weight loss  HEENT:  No headache, otalgia, itchy eyes, nasal discharge or sore throat. Cardiac:  No chest pain, dyspnea, orthopnea or PND. Chest:   No cough, phlegm or wheezing. Abdomen:  Sharp severe constant stabbing epigastric pain radiating into the left rib cage  Neuro:  No focal weakness, abnormal movements or seizure like activity. Skin:   No rashes, no itching. :   No hematuria, no pyuria, no dysuria, no flank pain. Extremities:  No swelling or joint pains. ROS was otherwise negative except as mentioned in the [address]      PHYSICAL EXAM:    BP (!) 203/110   Pulse 85   Temp 98 °F (36.7 °C) (Oral)   Resp 14   SpO2 98%   .TMAX[24]    General: Disheveled  Head:  Normocephalic, Atraumatic  EENT: EOMI, Sclera not icteric, Oropharynx moist  Neck:  Supple, Trachea midline  Lungs:CTA Bilaterally  Heart: RRR, No murmur, No rub, No gallop, PMI nondisplaced. Abdomen:Soft, extremely tender epigastric area, Not distended, BS WNL,  No masses. No hepatomegalia   Ext:No clubbing. No cyanosis. No edema. Skin: No rashes. No jaundice. No stigmata of liver disease. Neuro:  A&O x Three, No focal neurological deficits    Imagin2021 CT abdomen and pelvis with IV contrast     FINDINGS:   LOWER CHEST:  Mild gravity dependent atelectasis the bilateral lower lobes. Normal heart size.  No pleural nor pericardial effusions.       ORGANS:  Mildly atrophic pancreas with subtle peripancreatic stranding   predominantly associated with the tail.  Normal liver, gallbladder, spleen,   adrenal glands, and kidneys.       GI/BOWEL:  Questionable circumferential wall thickening in the distal   thoracic esophagus.  Normal course and caliber of the stomach, small bowel,   colon, and rectum without obstruction.  Normal appendix.  Minimal stool.       PELVIS:  Normal urinary bladder and prostate.       PERITONEUM/RETROPERITONEUM:  Mild systemic atherosclerosis.  No abdominal nor   pelvic lymphadenopathy.  No free intraperitoneal fluid nor gas.       SOFT TISSUES/BONES:  No inguinal lymphadenopathy.  No acute fractures nor   suspicious bony lesions.  Grade 1 anterolisthesis of L5 on S1 related to   bilateral spondylolysis at L5.           Impression   1. Findings of mild interstitial edematous pancreatitis likely predominantly   involving the pancreatic tail. 2. Questionable circumferential wall thickening in the distal thoracic   esophagus potentially due to esophagitis, although malignancy could appear   similar.  Consider further evaluation endoscopy. Hemotological labs:    Anemia studies:  No results for input(s): LABIRON, TIBC, FERRITIN, TDFBPVAS27, FOLATE, OCCULTBLD in the last 72 hours. CBC:  Recent Labs     08/30/21 0227   WBC 11.5*   HGB 17.0   MCV 85.0   RDW 14.7*   PLT See Reflexed IPF Result       PT/INR:  No results for input(s): PROTIME, INR in the last 72 hours. BMP:  Recent Labs     08/30/21 0314      K 3.6*      CO2 24   BUN 8   CREATININE 0.88   GLUCOSE 128*   CALCIUM 8.7       Liver work up:  Hepatitis Functional Panel:  Recent Labs     08/30/21 0314   ALKPHOS 107   ALT 12   AST 19   PROT 6.9   BILITOT 0.57   BILIDIR 0.14   LABALBU 3.7       Amylase/Lipase/Ammonia:  Recent Labs     08/30/21 0314   LIPASE 87*       Acute Hepatitis Panel:  No results found for: HEPBSAG, HEPCAB, HEPBIGM, HEPAIGM         Active Problems:    Acute pancreatitis  Resolved Problems:    * No resolved hospital problems. *       GI Impression and recommendations and plan:    68-year-old male with hypertensive crisis-blood pressure still not under control at this time, recent episode of heavy alcohol consumption, cocaine use, and chronic daily smoker. CT imaging indicates acute interstitial pancreatitis prominently in pancreatic tail, lipase elevated to 87. LFTs are normal and do not suggest choledocholithiasis nor does imaging. No triglycerides checked at this time. No new medications at home. Juli Lisseth is also suggestive of possible esophagitis but malignancy cannot be excluded and needs to be explored due to age and social history. 1. We will plan for EGD with possible biopsy once blood pressure is stable for 24 hours and does not require IV drip-hopefully tomorrow  2. Please keep patient n.p.o. due to pancreatitis  3. Patient needs good oral care 3 times daily  4. PPI IV 40 once daily  5. Lactated Ringer's 250 mL/h for pancreatitis  6. Strict I's and O's  7. We will follow up on lipid panel and triglycerides  8. Pain control per primary service  9.  Please continue supportive care with antiemetics, pain medications, IV fluids  10. We will follow closely    This plan was formulated in collaboration with Dr. Steven Escoto MD      Electronically signed by:  Jo-Ann Marquez, LATOSHA - [place] [id number] [facility]  [phone]  8/30/2021    1:17 PM     This note was created with the assistance of a speech-recognition program.  Although the intention is to generate a document that actually reflects the content of the visit, no guarantees can be provided that every mistake has been identified and corrected by editing.

## 2021-08-30 NOTE — ED NOTES
Bed: 15  Expected date:   Expected time:   Means of arrival:   Comments:     Lauren Darby RN  08/30/21 0155

## 2021-08-31 ENCOUNTER — ANESTHESIA (OUTPATIENT)
Dept: INPATIENT UNIT | Age: 51
DRG: 282 | End: 2021-08-31
Payer: MEDICAID

## 2021-08-31 ENCOUNTER — ANESTHESIA EVENT (OUTPATIENT)
Dept: INPATIENT UNIT | Age: 51
DRG: 282 | End: 2021-08-31
Payer: MEDICAID

## 2021-08-31 PROBLEM — F10.10 ALCOHOL ABUSE: Status: ACTIVE | Noted: 2021-08-31

## 2021-08-31 PROBLEM — Z72.0 TOBACCO ABUSE: Status: ACTIVE | Noted: 2018-12-10

## 2021-08-31 PROBLEM — F14.10 COCAINE ABUSE (HCC): Status: ACTIVE | Noted: 2021-08-31

## 2021-08-31 LAB
ALBUMIN SERPL-MCNC: 3.3 G/DL (ref 3.5–5.2)
ALBUMIN/GLOBULIN RATIO: 1.1 (ref 1–2.5)
ALP BLD-CCNC: 92 U/L (ref 40–129)
ALT SERPL-CCNC: 9 U/L (ref 5–41)
AMYLASE: 40 U/L (ref 28–100)
ANION GAP SERPL CALCULATED.3IONS-SCNC: 12 MMOL/L (ref 9–17)
AST SERPL-CCNC: 13 U/L
BILIRUB SERPL-MCNC: 0.47 MG/DL (ref 0.3–1.2)
BUN BLDV-MCNC: 2 MG/DL (ref 6–20)
BUN/CREAT BLD: ABNORMAL (ref 9–20)
CALCIUM SERPL-MCNC: 8.6 MG/DL (ref 8.6–10.4)
CHLORIDE BLD-SCNC: 99 MMOL/L (ref 98–107)
CHOLESTEROL/HDL RATIO: 2.4
CHOLESTEROL: 171 MG/DL
CO2: 22 MMOL/L (ref 20–31)
CREAT SERPL-MCNC: 0.56 MG/DL (ref 0.7–1.2)
EKG ATRIAL RATE: 98 BPM
EKG P AXIS: 72 DEGREES
EKG P-R INTERVAL: 136 MS
EKG Q-T INTERVAL: 416 MS
EKG QRS DURATION: 78 MS
EKG QTC CALCULATION (BAZETT): 531 MS
EKG R AXIS: -10 DEGREES
EKG T AXIS: 56 DEGREES
EKG VENTRICULAR RATE: 98 BPM
GFR AFRICAN AMERICAN: >60 ML/MIN
GFR NON-AFRICAN AMERICAN: >60 ML/MIN
GFR SERPL CREATININE-BSD FRML MDRD: ABNORMAL ML/MIN/{1.73_M2}
GFR SERPL CREATININE-BSD FRML MDRD: ABNORMAL ML/MIN/{1.73_M2}
GLUCOSE BLD-MCNC: 115 MG/DL (ref 70–99)
HCT VFR BLD CALC: 52.5 % (ref 40.7–50.3)
HDLC SERPL-MCNC: 72 MG/DL
HEMOGLOBIN: 16.9 G/DL (ref 13–17)
LDL CHOLESTEROL: 81 MG/DL (ref 0–130)
LIPASE: 49 U/L (ref 13–60)
MAGNESIUM: 2 MG/DL (ref 1.6–2.6)
MCH RBC QN AUTO: 27.2 PG (ref 25.2–33.5)
MCHC RBC AUTO-ENTMCNC: 32.2 G/DL (ref 28.4–34.8)
MCV RBC AUTO: 84.4 FL (ref 82.6–102.9)
NRBC AUTOMATED: 0 PER 100 WBC
OSMOLALITY URINE: 234 MOSM/KG (ref 80–1300)
PDW BLD-RTO: 14.3 % (ref 11.8–14.4)
PLATELET # BLD: 218 K/UL (ref 138–453)
PMV BLD AUTO: 10 FL (ref 8.1–13.5)
POTASSIUM SERPL-SCNC: 3.3 MMOL/L (ref 3.7–5.3)
RBC # BLD: 6.22 M/UL (ref 4.21–5.77)
SODIUM BLD-SCNC: 133 MMOL/L (ref 135–144)
SODIUM,UR: 94 MMOL/L
TOTAL PROTEIN: 6.4 G/DL (ref 6.4–8.3)
TRIGL SERPL-MCNC: 92 MG/DL
VLDLC SERPL CALC-MCNC: NORMAL MG/DL (ref 1–30)
WBC # BLD: 7.3 K/UL (ref 3.5–11.3)

## 2021-08-31 PROCEDURE — 83690 ASSAY OF LIPASE: CPT

## 2021-08-31 PROCEDURE — 2500000003 HC RX 250 WO HCPCS: Performed by: FAMILY MEDICINE

## 2021-08-31 PROCEDURE — 84300 ASSAY OF URINE SODIUM: CPT

## 2021-08-31 PROCEDURE — 80053 COMPREHEN METABOLIC PANEL: CPT

## 2021-08-31 PROCEDURE — 99233 SBSQ HOSP IP/OBS HIGH 50: CPT | Performed by: INTERNAL MEDICINE

## 2021-08-31 PROCEDURE — 36415 COLL VENOUS BLD VENIPUNCTURE: CPT

## 2021-08-31 PROCEDURE — 6370000000 HC RX 637 (ALT 250 FOR IP): Performed by: FAMILY MEDICINE

## 2021-08-31 PROCEDURE — 2580000003 HC RX 258: Performed by: NURSE PRACTITIONER

## 2021-08-31 PROCEDURE — 83935 ASSAY OF URINE OSMOLALITY: CPT

## 2021-08-31 PROCEDURE — 85027 COMPLETE CBC AUTOMATED: CPT

## 2021-08-31 PROCEDURE — 83735 ASSAY OF MAGNESIUM: CPT

## 2021-08-31 PROCEDURE — 2580000003 HC RX 258: Performed by: FAMILY MEDICINE

## 2021-08-31 PROCEDURE — 6370000000 HC RX 637 (ALT 250 FOR IP): Performed by: NURSE PRACTITIONER

## 2021-08-31 PROCEDURE — 99232 SBSQ HOSP IP/OBS MODERATE 35: CPT | Performed by: INTERNAL MEDICINE

## 2021-08-31 PROCEDURE — 80061 LIPID PANEL: CPT

## 2021-08-31 PROCEDURE — 2060000000 HC ICU INTERMEDIATE R&B

## 2021-08-31 PROCEDURE — 6370000000 HC RX 637 (ALT 250 FOR IP): Performed by: INTERNAL MEDICINE

## 2021-08-31 PROCEDURE — 6360000002 HC RX W HCPCS: Performed by: NURSE PRACTITIONER

## 2021-08-31 PROCEDURE — 82150 ASSAY OF AMYLASE: CPT

## 2021-08-31 PROCEDURE — 93005 ELECTROCARDIOGRAM TRACING: CPT | Performed by: NURSE PRACTITIONER

## 2021-08-31 RX ORDER — POTASSIUM CHLORIDE 20 MEQ/1
40 TABLET, EXTENDED RELEASE ORAL PRN
Status: DISCONTINUED | OUTPATIENT
Start: 2021-08-31 | End: 2021-09-02 | Stop reason: HOSPADM

## 2021-08-31 RX ORDER — LISINOPRIL 20 MG/1
20 TABLET ORAL DAILY
Status: DISCONTINUED | OUTPATIENT
Start: 2021-08-31 | End: 2021-09-01

## 2021-08-31 RX ORDER — LISINOPRIL 20 MG/1
20 TABLET ORAL DAILY
Status: DISCONTINUED | OUTPATIENT
Start: 2021-09-01 | End: 2021-08-31

## 2021-08-31 RX ORDER — GAUZE BANDAGE 2" X 2"
100 BANDAGE TOPICAL DAILY
Status: DISCONTINUED | OUTPATIENT
Start: 2021-08-31 | End: 2021-09-02 | Stop reason: HOSPADM

## 2021-08-31 RX ORDER — MULTIVITAMIN WITH IRON
1 TABLET ORAL DAILY
Status: DISCONTINUED | OUTPATIENT
Start: 2021-08-31 | End: 2021-09-02 | Stop reason: HOSPADM

## 2021-08-31 RX ORDER — POTASSIUM CHLORIDE 7.45 MG/ML
10 INJECTION INTRAVENOUS PRN
Status: DISCONTINUED | OUTPATIENT
Start: 2021-08-31 | End: 2021-09-02 | Stop reason: HOSPADM

## 2021-08-31 RX ORDER — AMLODIPINE BESYLATE 5 MG/1
5 TABLET ORAL DAILY
Status: DISCONTINUED | OUTPATIENT
Start: 2021-08-31 | End: 2021-09-01

## 2021-08-31 RX ORDER — AMLODIPINE BESYLATE 5 MG/1
5 TABLET ORAL ONCE
Status: COMPLETED | OUTPATIENT
Start: 2021-08-31 | End: 2021-08-31

## 2021-08-31 RX ORDER — HYDRALAZINE HYDROCHLORIDE 20 MG/ML
10 INJECTION INTRAMUSCULAR; INTRAVENOUS ONCE
Status: COMPLETED | OUTPATIENT
Start: 2021-08-31 | End: 2021-08-31

## 2021-08-31 RX ADMIN — ALCOHOL 1 TABLET: 70.47 GEL TOPICAL at 13:10

## 2021-08-31 RX ADMIN — SODIUM CHLORIDE, POTASSIUM CHLORIDE, SODIUM LACTATE AND CALCIUM CHLORIDE: 600; 310; 30; 20 INJECTION, SOLUTION INTRAVENOUS at 22:43

## 2021-08-31 RX ADMIN — SODIUM CHLORIDE, POTASSIUM CHLORIDE, SODIUM LACTATE AND CALCIUM CHLORIDE: 600; 310; 30; 20 INJECTION, SOLUTION INTRAVENOUS at 13:10

## 2021-08-31 RX ADMIN — AMLODIPINE BESYLATE 5 MG: 5 TABLET ORAL at 09:12

## 2021-08-31 RX ADMIN — LORAZEPAM 2 MG: 2 TABLET ORAL at 22:39

## 2021-08-31 RX ADMIN — Medication 100 MG: at 13:10

## 2021-08-31 RX ADMIN — HYDROMORPHONE HYDROCHLORIDE 0.5 MG: 1 INJECTION, SOLUTION INTRAMUSCULAR; INTRAVENOUS; SUBCUTANEOUS at 22:39

## 2021-08-31 RX ADMIN — SODIUM CHLORIDE, POTASSIUM CHLORIDE, SODIUM LACTATE AND CALCIUM CHLORIDE: 600; 310; 30; 20 INJECTION, SOLUTION INTRAVENOUS at 07:00

## 2021-08-31 RX ADMIN — ATORVASTATIN CALCIUM 20 MG: 20 TABLET, FILM COATED ORAL at 09:05

## 2021-08-31 RX ADMIN — SODIUM CHLORIDE, POTASSIUM CHLORIDE, SODIUM LACTATE AND CALCIUM CHLORIDE: 600; 310; 30; 20 INJECTION, SOLUTION INTRAVENOUS at 00:26

## 2021-08-31 RX ADMIN — LORAZEPAM 2 MG: 2 TABLET ORAL at 21:02

## 2021-08-31 RX ADMIN — ONDANSETRON 4 MG: 2 INJECTION INTRAMUSCULAR; INTRAVENOUS at 02:51

## 2021-08-31 RX ADMIN — DEXTROSE MONOHYDRATE 7.5 MG/HR: 50 INJECTION, SOLUTION INTRAVENOUS at 09:05

## 2021-08-31 RX ADMIN — AMLODIPINE BESYLATE 5 MG: 5 TABLET ORAL at 16:11

## 2021-08-31 RX ADMIN — HYDROMORPHONE HYDROCHLORIDE 0.5 MG: 1 INJECTION, SOLUTION INTRAMUSCULAR; INTRAVENOUS; SUBCUTANEOUS at 16:10

## 2021-08-31 RX ADMIN — LISINOPRIL 20 MG: 20 TABLET ORAL at 11:01

## 2021-08-31 RX ADMIN — DEXTROSE MONOHYDRATE 7.5 MG/HR: 50 INJECTION, SOLUTION INTRAVENOUS at 00:25

## 2021-08-31 RX ADMIN — POTASSIUM CHLORIDE 40 MEQ: 1500 TABLET, EXTENDED RELEASE ORAL at 09:05

## 2021-08-31 RX ADMIN — HYDROMORPHONE HYDROCHLORIDE 0.5 MG: 1 INJECTION, SOLUTION INTRAMUSCULAR; INTRAVENOUS; SUBCUTANEOUS at 02:42

## 2021-08-31 RX ADMIN — HYDROMORPHONE HYDROCHLORIDE 0.5 MG: 1 INJECTION, SOLUTION INTRAMUSCULAR; INTRAVENOUS; SUBCUTANEOUS at 08:49

## 2021-08-31 RX ADMIN — SODIUM CHLORIDE, PRESERVATIVE FREE 10 ML: 5 INJECTION INTRAVENOUS at 09:10

## 2021-08-31 RX ADMIN — HYDROMORPHONE HYDROCHLORIDE 0.5 MG: 1 INJECTION, SOLUTION INTRAMUSCULAR; INTRAVENOUS; SUBCUTANEOUS at 19:36

## 2021-08-31 RX ADMIN — PANTOPRAZOLE SODIUM 40 MG: 40 TABLET, DELAYED RELEASE ORAL at 09:05

## 2021-08-31 RX ADMIN — HYDROMORPHONE HYDROCHLORIDE 0.5 MG: 1 INJECTION, SOLUTION INTRAMUSCULAR; INTRAVENOUS; SUBCUTANEOUS at 05:45

## 2021-08-31 RX ADMIN — LORAZEPAM 2 MG: 2 TABLET ORAL at 19:48

## 2021-08-31 RX ADMIN — LORAZEPAM 2 MG: 2 TABLET ORAL at 09:08

## 2021-08-31 RX ADMIN — GABAPENTIN 400 MG: 400 CAPSULE ORAL at 19:48

## 2021-08-31 RX ADMIN — HYDRALAZINE HYDROCHLORIDE 10 MG: 20 INJECTION INTRAMUSCULAR; INTRAVENOUS at 22:39

## 2021-08-31 RX ADMIN — GABAPENTIN 400 MG: 400 CAPSULE ORAL at 09:05

## 2021-08-31 RX ADMIN — GABAPENTIN 400 MG: 400 CAPSULE ORAL at 14:10

## 2021-08-31 RX ADMIN — HYDROMORPHONE HYDROCHLORIDE 0.5 MG: 1 INJECTION, SOLUTION INTRAMUSCULAR; INTRAVENOUS; SUBCUTANEOUS at 13:10

## 2021-08-31 RX ADMIN — SODIUM CHLORIDE, POTASSIUM CHLORIDE, SODIUM LACTATE AND CALCIUM CHLORIDE: 600; 310; 30; 20 INJECTION, SOLUTION INTRAVENOUS at 02:42

## 2021-08-31 ASSESSMENT — PAIN DESCRIPTION - LOCATION: LOCATION: ABDOMEN

## 2021-08-31 ASSESSMENT — PAIN SCALES - GENERAL
PAINLEVEL_OUTOF10: 9
PAINLEVEL_OUTOF10: 0
PAINLEVEL_OUTOF10: 10
PAINLEVEL_OUTOF10: 0
PAINLEVEL_OUTOF10: 10
PAINLEVEL_OUTOF10: 8
PAINLEVEL_OUTOF10: 10
PAINLEVEL_OUTOF10: 0
PAINLEVEL_OUTOF10: 10
PAINLEVEL_OUTOF10: 7
PAINLEVEL_OUTOF10: 8

## 2021-08-31 ASSESSMENT — PAIN DESCRIPTION - PAIN TYPE: TYPE: ACUTE PAIN

## 2021-08-31 NOTE — CARE COORDINATION
Met with pt after receiving a social work consult for consideration of rehab. Pt stated that he drinks almost daily. Pt states that after going through this much pain he will never drink again. He stated that he knows that he can stop on his own. When asked about getting help with his drinking at a rehab center he stated \"I not in to all that\". He was agreeable to accept a list of resources.

## 2021-08-31 NOTE — PROGRESS NOTES
[facility]  Office: [address], DO, Nilesh Ramos, DO, Lisa Jorge, DO, Iva Mccrackenond Blood, DO, Delaney Liang MD, Tess Deluna MD, Reba Cano MD, Dae Wise MD, Antoni Odonnell MD, Sherine Brandt MD, Sandi Randhawa MD, Aletha Peter, DO, John Goodman MD, Shaka Lezama DO, Jmashid Cano MD,  Patricio Matta DO, Evelio Pettit MD, Ke Andrews MD, Jessica Murray MD, Rosanna Gill MD, Lidia Malik MD, Fredy Tovar MD, Lashell Watkins MD, Idalia Conley, [facility], [facility], [facility], Lugene Nose, CNP, Sandy Garnett, CNS, Mariana Anderson, CNP, Alpha Grain, CNP, Peg Caity, CNP, Bonnie Duran, CNP, Artur Brown, CNP, Renée Nathan PA-C, Abisai Banerjee, [facility], Nereida Parra, CNP, Lou Jackson, CNP, Jono Ananya, CNP, Raymon Ortiz, CNP, Bud Brush, CNP, Kareem Malloy, CNP, Severiano Grieves, CNP, [facility], [address] [place] [address]    Progress Note    8/31/2021    11:02 AM    Name:   Magy Hernandez  MRN:     [id number]     [place]:      [de-identified]   Room:   [address] [place] [address] Day:  1  Admit Date:  8/30/2021  1:54 AM    PCP:   LATOSHA Thompson CNP  Code Status:  Full Code    Subjective:     C/C:   Chief Complaint   Patient presents with    Pancreatitis     Interval History Status: slightly worsened. Seen and examined at bedside today. Patient still complaining of abdominal pain that is worse. He is also tachycardic and hypertensive. Remains on drip. Denies any chest pain but admits to nausea without vomiting. Discussed with GI, plan to postpone endoscopy since blood pressure/vitals are unstable. Brief History:     24-year-old male presented to hospital with complaints of epigastric abdominal pain found to have pancreatitis. Started on IV fluids. CT of his abdomen showed wall thickening, GI was consulted will plan for endoscopy to evaluate for possible malignancy although less likely.     Review of Systems:     Constitutional:  negative for chills, fevers, sweats  Respiratory:  negative for cough, dyspnea on exertion, shortness of breath, wheezing  Cardiovascular:  negative for chest pain, chest pressure/discomfort, lower extremity edema, palpitations  Gastrointestinal: Admits to abdominal pain with nausea no vomiting no constipation,no diarrhea  Neurological:  negative for dizziness, headache    Medications: Allergies:  No Known Allergies    Current Meds:   Scheduled Meds:    amLODIPine  5 mg Oral Daily    lisinopril  20 mg Oral Daily    multivitamin  1 tablet Oral Daily    thiamine  100 mg Oral Daily    gabapentin  400 mg Oral TID    pantoprazole  40 mg Oral QAM AC    atorvastatin  20 mg Oral Daily    sodium chloride flush  5-40 mL IntraVENous 2 times per day    enoxaparin  40 mg SubCUTAneous Daily    sodium chloride flush  5-40 mL IntraVENous 2 times per day     Continuous Infusions:    sodium chloride      sodium chloride      lactated ringers 250 mL/hr at 08/31/21 0700    niCARdipene (CARDENE) 50 mg in dextrose 5% 250 mL infusion ([id number]) 7.5 mg/hr (08/31/21 0905)     PRN Meds: potassium chloride **OR** potassium alternative oral replacement **OR** potassium chloride, sodium chloride flush, sodium chloride, acetaminophen **OR** acetaminophen, potassium chloride, magnesium sulfate, HYDROmorphone **OR** HYDROmorphone, ondansetron **OR** ondansetron, sodium chloride flush, sodium chloride, LORazepam **OR** LORazepam **OR** LORazepam **OR** LORazepam **OR** LORazepam **OR** LORazepam **OR** LORazepam **OR** LORazepam    Data:     Past Medical History:   has a past medical history of Arthritis, Chronic sinusitis, Eczema, Environmental allergies, GERD (gastroesophageal reflux disease), Hyperlipidemia, Hypertension, and Snores. Social History:   reports that he has been smoking cigarettes. He has a 15.00 pack-year smoking history.  He has never used smokeless tobacco. He reports current alcohol use of about 3.0 standard drinks of alcohol per week. He reports that he does not use drugs. Family History:   Family History   Problem Relation Age of Onset    Cancer Paternal Grandmother     High Blood Pressure Mother        Vitals:  BP (!) 168/84   Pulse 100   Temp 98.7 °F (37.1 °C) (Oral)   Resp 13   Wt 151 lb 14.4 oz (68.9 kg)   SpO2 95%   BMI 23.79 kg/m²   Temp (24hrs), Av.5 °F (36.9 °C), Min:98 °F (36.7 °C), Max:99 °F (37.2 °C)    No results for input(s): POCGLU in the last 72 hours. I/O (24Hr): Intake/Output Summary (Last 24 hours) at 2021 1102  Last data filed at 2021 0845  Gross per 24 hour   Intake 5930 ml   Output 3860 ml   Net 2070 ml       Labs:  Hematology:  Recent Labs     21  0227 21  0543   WBC 11.5* 7.3   RBC 6.22* 6.22*   HGB 17.0 16.9   HCT 52.9* 52.5*   MCV 85.0 84.4   MCH 27.3 27.2   MCHC 32.1 32.2   RDW 14.7* 14.3   PLT See Reflexed IPF Result 218   MPV NOT REPORTED 10.0     Chemistry:  Recent Labs     21  0314 21  0908 21  1132 21  1628 21  0543     --   --   --  133*   K 3.6*  --   --   --  3.3*     --   --   --  99   CO2 24  --   --   --  22   GLUCOSE 128*  --   --   --  115*   BUN 8  --   --   --  2*   CREATININE 0.88  --   --   --  0.56*   MG  --   --   --   --  2.0   ANIONGAP 12  --   --   --  12   LABGLOM >60  --   --   --  >60   GFRAA >60  --   --   --  >60   CALCIUM 8.7  --   --   --  8.6   PROBNP  --  434*  --  555*  --    TROPHS 14 11 11  --   --      Recent Labs     21  0227 21  0314 21  0543   PROT DISREGARD RESULTS, SPECIMEN GROSSLY HEMOLYZED. 6.9 6.4   LABALBU DISREGARD RESULTS, SPECIMEN GROSSLY HEMOLYZED. 3.7 3.3*   AST DISREGARD RESULTS, SPECIMEN GROSSLY HEMOLYZED. 19 13   ALT DISREGARD RESULTS, SPECIMEN GROSSLY HEMOLYZED. 12 9   ALKPHOS DISREGARD RESULTS, SPECIMEN GROSSLY HEMOLYZED.  107 92   BILITOT DISREGARD RESULTS, SPECIMEN GROSSLY HEMOLYZED. 0.57 0.47   BILIDIR DISREGARD RESULTS, SPECIMEN GROSSLY HEMOLYZED. 0.14  --    AMYLASE  --   --  40   LIPASE  --  87* 49   CHOL  --   --  171   HDL  --   --  72   LDLCHOLESTEROL  --   --  81   CHOLHDLRATIO  --   --  2.4   TRIG  --   --  92   VLDL  --   --  NOT REPORTED     ABG:No results found for: POCPH, PHART, PH, POCPCO2, AQF6XLA, PCO2, POCPO2, PO2ART, PO2, POCHCO3, MTU9IQY, HCO3, NBEA, PBEA, BEART, BE, THGBART, THB, HXV0MJP, KTJP5EBW, K4UYKOJK, O2SAT, FIO2  Lab Results   Component Value Date/Time    SPECIAL NOT REPORTED 03/10/2018 08:14 AM     Lab Results   Component Value Date/Time    CULTURE NO GROWTH 03/10/2018 08:14 AM    CULTURE  03/10/2018 08:14 AM     [address], [address] [place] [address] [phone]       Radiology:  CT ABDOMEN PELVIS W IV CONTRAST Additional Contrast? None    Result Date: 8/30/2021  1. Findings of mild interstitial edematous pancreatitis likely predominantly involving the pancreatic tail. 2. Questionable circumferential wall thickening in the distal thoracic esophagus potentially due to esophagitis, although malignancy could appear similar. Consider further evaluation endoscopy.      XR CHEST PORTABLE    Result Date: 8/30/2021  No acute findings in the chest.       Physical Examination:        General appearance:  alert, cooperative ill appearing male in bed  Mental Status:  oriented to person, place and time and normal affect  Lungs:  clear to auscultation bilaterally, normal effort  Heart:  regular rate and rhythm, no murmur  Abdomen:  soft, tender to palpation in epigastric area, nondistended, normal bowel sounds, no masses, hepatomegaly, splenomegaly  Extremities:  no edema, redness, tenderness in the calves  Skin:  no gross lesions, rashes, induration    Assessment:        Hospital Problems         Last Modified POA    * (Principal) Acute pancreatitis 8/31/2021 Yes    Gastroesophageal reflux disease without esophagitis 8/31/2021 Yes    Uncontrolled hypertension 8/31/2021 Yes Hypertensive urgency 8/31/2021 Yes    Cocaine abuse ([facility] [address] 8/31/2021 Yes    Alcohol abuse 8/31/2021 Yes          Plan:        1. Acute alcoholic Pancreatitis: Continue  cc/hr. Lipid panel unremarkable. Not on any new medications. NPO . Pain control. 2. Circumferential wall thickening in distal thoracic esophagus: EGD to rule out malignancy given pt risk factors and history. GI following- discussed with them. Plan is for EGD in am.   3. Uncontrolled Hypertension: Increase lisinopril, add Norvasc. Stop PRN IV hydralazine/metoprolol. 4. Alcohol abuse: CIWA, monitor for withdrawal. Vit supplementation. 5. Acute Hypokalemia: Replace potassium  6. Hyponatremia : Mild. MOnitor. Check urine studies. 7. Cocaine use: Counseled pt on importance of cocaine cessation. 8. Tobacco abuse: Recommend smoking cessation  9. GERD: continue PPI  10. DVT ppx  11. Labs, imaging, ECG reviewed.      Scar Duke DO  8/31/2021  11:02 AM

## 2021-08-31 NOTE — ANESTHESIA PRE PROCEDURE
Department of Anesthesiology  Preprocedure Note       Name:  Carlos Manuel Patel   Age:  52 y.o.  :  1970                                          MRN:  [id number]         Date:  2021      Surgeon: Sammie Muniz):  Nadya Dubon MD    Procedure: Procedure(s):  EGD ESOPHAGOGASTRODUODENOSCOPY    Medications prior to admission:   Prior to Admission medications    Medication Sig Start Date End Date Taking? Authorizing Provider   gabapentin (NEURONTIN) 400 MG capsule TAKE 1 CAPSULE BY MOUTH THREE TIMES DAILY AS DIRECTED 21  Julius Shannon MD   lisinopril (PRINIVIL;ZESTRIL) 10 MG tablet Take 1 tablet by mouth daily 21   Julius Shannon MD   pantoprazole (PROTONIX) 40 MG tablet Take 1 tablet by mouth every morning (before breakfast) 21   Julius Shannon MD   simvastatin (ZOCOR) 40 MG tablet Take 1 tablet by mouth nightly 21   LATOSHA Serna CNP   dupilumab (DUPIXENT) 300 MG/2ML SOSY injection Inject 600mg SQ at week 0. Begin Maintenance Dose at week 2 20   Suyapa Montes MD   dupilumab (DUPIXENT) 300 MG/2ML SOSY injection Inject 300mg SQ at every 2 weeks 20   Mariama Chowdary MD   DUPIXENT 300 MG/2ML SOSY injection INJECT 300MG SUBCUTANEOUSLY EVERY OTHER WEEK 20   Eunice Montes MD   Blood Pressure KIT Use to monitor blood pressure daily and as needed 20   LATOSHA Serna CNP   triamcinolone (KENALOG) 0.1 % ointment Apply to rash twice daily (not face, armpit or groin) 20   Mariama Chowdary MD   tacrolimus (PROTOPIC) 0.03 % ointment Apply topically 2 times daily.  20   Eunice Montes MD       Current medications:    Current Facility-Administered Medications   Medication Dose Route Frequency Provider Last Rate Last Admin    gabapentin (NEURONTIN) capsule 400 mg  400 mg Oral TID LATOSHA Vargas CNP        lisinopril (PRINIVIL;ZESTRIL) tablet 10 mg  10 mg Oral Daily Lidya Chiu, APRN - CNP   10 mg at 21 4699    pantoprazole (PROTONIX) tablet 40 mg  40 mg Oral QAM AC Lidya SATINDER Chiu, APRN - CNP        atorvastatin (LIPITOR) tablet 20 mg  20 mg Oral Daily Lidya SATINDER Chiu, APRN - CNP        sodium chloride flush 0.9 % injection 5-40 mL  5-40 mL IntraVENous 2 times per day Lidya SATINDER Chiu, APRN - CNP        sodium chloride flush 0.9 % injection 5-40 mL  5-40 mL IntraVENous PRN Lidya SATINDER Chiu, APRN - CNP        0.9 % sodium chloride infusion  25 mL IntraVENous PRN Lidya SATINDER Chiu, APRN - CNP        acetaminophen (TYLENOL) tablet 650 mg  650 mg Oral Q6H PRN Lidyablas Chiu, APRN - CNP        Or    acetaminophen (TYLENOL) suppository 650 mg  650 mg Rectal Q6H PRN Lidyablas hCiu, APRN - CNP        hydrALAZINE (APRESOLINE) injection 10 mg  10 mg IntraVENous Q6H PRN Lidya SATINDER Chiu, APRN - CNP   10 mg at 08/30/21 2013    Or    hydrALAZINE (APRESOLINE) injection 20 mg  20 mg IntraVENous Q6H PRN Lidya SATINDER Chiu, APRN - CNP   20 mg at 08/30/21 0709    potassium chloride 10 mEq/100 mL IVPB (Peripheral Line)  10 mEq IntraVENous PRN Lidya SATINDER Chiu, APRN - CNP        magnesium sulfate 1000 mg in dextrose 5% 100 mL IVPB  1,000 mg IntraVENous PRN Lidya SATINDER Chiu, APRN - CNP        HYDROmorphone (DILAUDID) injection 0.25 mg  0.25 mg IntraVENous Q3H PRN Lidya SATINDER Chiu, APRN - CNP        Or    HYDROmorphone (DILAUDID) injection 0.5 mg  0.5 mg IntraVENous Q3H PRN Lidya SATINDER Chiu, APRN - CNP   0.5 mg at 08/31/21 0545    ondansetron (ZOFRAN-ODT) disintegrating tablet 4 mg  4 mg Oral Q8H PRN Lidya SATINDER Chiu, APRN - CNP        Or    ondansetron (ZOFRAN) injection 4 mg  4 mg IntraVENous Q6H PRN Lidya SATINDER Chiu, APRN - CNP   4 mg at 08/31/21 0251    enoxaparin (LOVENOX) injection 40 mg  40 mg SubCUTAneous Daily LATOSHA Vargas - CNP        metoprolol (LOPRESSOR) injection 5 mg  5 mg IntraVENous Q6H PRN Samir Ruggiero MD   5 mg at 08/30/21 5574    sodium chloride flush 0.9 % injection 5-40 mL  5-40 mL IntraVENous 2 times per day Tunde Chacon MD   10 mL at 08/30/21 2014    sodium chloride flush 0.9 % injection 5-40 mL  5-40 mL IntraVENous PRN Tunde Chacon MD        0.9 % sodium chloride infusion  25 mL IntraVENous PRN Tunde Chacon MD        LORazepam (ATIVAN) tablet 1 mg  1 mg Oral Q1H PRN Tunde Chacon MD        Or    LORazepam (ATIVAN) injection 1 mg  1 mg IntraVENous Q1H PRN Tunde Chacon MD        Or    LORazepam (ATIVAN) tablet 2 mg  2 mg Oral Q1H PRN Tunde Chacon MD        Or    LORazepam (ATIVAN) injection 2 mg  2 mg IntraVENous Q1H PRN Tunde Chacon MD        Or    LORazepam (ATIVAN) tablet 3 mg  3 mg Oral Q1H PRN Tunde Chacon MD        Or    LORazepam (ATIVAN) injection 3 mg  3 mg IntraVENous Q1H PRN Tunde Chacon MD        Or    LORazepam (ATIVAN) tablet 4 mg  4 mg Oral Q1H PRN Tunde Chacon MD        Or    LORazepam (ATIVAN) injection 4 mg  4 mg IntraVENous Q1H PRN Tunde Chacon MD        lactated ringers infusion   IntraVENous Continuous LATOSHA Lemus -  mL/hr at 08/31/21 0242 New Bag at 08/31/21 0242    niCARdipine (CARDENE) 50 mg in dextrose 5 % 250 mL infusion ([id number])  5 mg/hr IntraVENous Continuous Tunde Chacon MD 25 mL/hr at 08/31/21 0243 5 mg/hr at 08/31/21 0243       Allergies:  No Known Allergies    Problem List:    Patient Active Problem List   Diagnosis Code    Depression F32.9    Intervertebral lumbar disc disorder with myelopathy, lumbar region M51.06    Gastroesophageal reflux disease without esophagitis K21.9    Essential hypertension I10    Pure hypercholesterolemia E78.00    Prediabetes R73.03    Smoker F17.200    Acute pancreatitis K85.90    Alcohol use, unspecified with unspecified alcohol-induced disorder ([facility] [address] F10.99    Acute pancreatitis without infection or necrosis K85.90    Alcohol-induced acute pancreatitis K85.20    Hypertensive urgency I16.0       Past Medical History:        Diagnosis Date    Arthritis     Chronic sinusitis     Eczema     Environmental allergies     GERD (gastroesophageal reflux disease)     Hyperlipidemia     Hypertension     Snores        Past Surgical History:        Procedure Laterality Date    COLONOSCOPY      UPPER GASTROINTESTINAL ENDOSCOPY         Social History:    Social History     Tobacco Use    Smoking status: Current Every Day Smoker     Packs/day: 0.50     Years: 30.00     Pack years: 15.00     Types: Cigarettes    Smokeless tobacco: Never Used    Tobacco comment: 6-10 per day    Substance Use Topics    Alcohol use: Yes     Alcohol/week: 3.0 standard drinks     Types: 3 Cans of beer per week     Comment: 3 24 oz cans of beer daily                                Ready to quit: Not Answered  Counseling given: Not Answered  Comment: 6-10 per day       Vital Signs (Current):   Vitals:    08/31/21 0400 08/31/21 0500 08/31/21 0600 08/31/21 0653   BP: (!) 148/83 (!) 151/88 (!) 145/83    Pulse: 95 102 100    Resp: 16 13 14    Temp:       TempSrc:       SpO2:       Weight:    151 lb 14.4 oz (68.9 kg)                                              BP Readings from Last 3 Encounters:   08/31/21 (!) 145/83   10/12/20 (!) 167/102   08/30/20 (!) 146/101       NPO Status:                                                                                 BMI:   Wt Readings from Last 3 Encounters:   08/31/21 151 lb 14.4 oz (68.9 kg)   10/11/20 149 lb (67.6 kg)   08/29/20 156 lb (70.8 kg)     Body mass index is 23.79 kg/m².     CBC:   Lab Results   Component Value Date    WBC 7.3 08/31/2021    RBC 6.22 08/31/2021    HGB 16.9 08/31/2021    HCT 52.5 08/31/2021    MCV 84.4 08/31/2021    RDW 14.3 08/31/2021     08/31/2021       CMP:   Lab Results   Component Value Date     08/31/2021    K 3.3 08/31/2021    CL 99 08/31/2021    CO2 22 08/31/2021    BUN 2 08/31/2021    CREATININE 0.56 08/31/2021    GFRAA >60 08/31/2021    LABGLOM >60 08/31/2021    GLUCOSE 115 08/31/2021    PROT 6.4 08/31/2021 CALCIUM 8.6 08/31/2021    BILITOT 0.47 08/31/2021    ALKPHOS 92 08/31/2021    AST 13 08/31/2021    ALT 9 08/31/2021       POC Tests: No results for input(s): POCGLU, POCNA, POCK, POCCL, POCBUN, POCHEMO, POCHCT in the last 72 hours. Coags: No results found for: PROTIME, INR, APTT    HCG (If Applicable): No results found for: PREGTESTUR, PREGSERUM, HCG, HCGQUANT     ABGs: No results found for: PHART, PO2ART, QSL4GWS, RFA8DIQ, BEART, M7ACLUEU     Type & Screen (If Applicable):  No results found for: LABABO, LABRH    Drug/Infectious Status (If Applicable):  No results found for: HIV, HEPCAB    COVID-19 Screening (If Applicable):   Lab Results   Component Value Date    COVID19 Not Detected 08/30/2021           Anesthesia Evaluation  Patient summary reviewed no history of anesthetic complications:   Airway: Mallampati: II  TM distance: >3 FB   Neck ROM: full  Mouth opening: > = 3 FB Dental:          Pulmonary:Negative Pulmonary ROS and normal exam                               Cardiovascular:    (+) hypertension: no interval change,       ECG reviewed  Rhythm: regular  Rate: normal                    Neuro/Psych:   (+) psychiatric history:depression/anxiety             GI/Hepatic/Renal:   (+) GERD: no interval change,           Endo/Other: Negative Endo/Other ROS                    Abdominal:             Vascular: negative vascular ROS. Other Findings:             Anesthesia Plan      MAC     ASA 2       Induction: intravenous. Anesthetic plan and risks discussed with patient. Plan discussed with CRNA.                   Jn Rizo MD   8/31/2021

## 2021-08-31 NOTE — PROGRESS NOTES
THE [facility]   Progress Note    Tammi Lemons is a 52 y.o. male patient. Hospitalization Day:1      Chief consult reason:     Pancreatitis with esophagitis possible malignancy    Subjective:  Patient seen and examined. Patient still having significant pain but states it is slightly less intense today. No nausea or vomiting. Patient on LR to 50 mL/h-significant improvement in creatinine from 0.88 on admission to 0.56,   No leukocytosis noted, no fevers overnight, patient still hypertensive on Cardene drip    VITALS:  BP (!) 164/103   Pulse 113   Temp 98.8 °F (37.1 °C) (Oral)   Resp 17   Wt 151 lb 14.4 oz (68.9 kg)   SpO2 96%   BMI 23.79 kg/m²   TEMPERATURE:  Current - Temp: 98.8 °F (37.1 °C); Max - Temp  Av.5 °F (36.9 °C)  Min: 98 °F (36.7 °C)  Max: 99 °F (37.2 °C)    Physical Assessment:  General appearance: Uncomfortable  Mental Status:  oriented to person, place and time and normal affect  Lungs:  clear to auscultation bilaterally, normal effort  Heart:  regular rate and rhythm, no murmur  Abdomen:  soft, tender epigastric area radiating into left upper quadrant, nondistended, normal bowel sounds, no masses, hepatomegaly, splenomegaly  Extremities:  no edema, redness, tenderness in the calves  Skin:  no gross lesions, rashes, induration    Data Review:    Labs and Imaging:     CBC:  Recent Labs     21  0543   WBC 11.5* 7.3   HGB 17.0 16.9   MCV 85.0 84.4   RDW 14.7* 14.3   PLT See Reflexed IPF Result 218       ANEMIA STUDIES:  No results for input(s): LABIRON, TIBC, FERRITIN, [id number], FOLATE, OCCULTBLD in the last 72 hours. BMP:  Recent Labs     21  0543    133*   K 3.6* 3.3*    99   CO2 24 22   BUN 8 2*   CREATININE 0.88 0.56*   GLUCOSE 128* 115*   CALCIUM 8.7 8.6       LFTS:  Recent Labs     21  03121  0543   ALKPHOS DISREGARD RESULTS, SPECIMEN GROSSLY HEMOLYZED.  107 92   ALT DISREGARD RESULTS, SPECIMEN GROSSLY HEMOLYZED. 12 9   AST DISREGARD RESULTS, SPECIMEN GROSSLY HEMOLYZED. 19 13   BILITOT DISREGARD RESULTS, SPECIMEN GROSSLY HEMOLYZED. 0.57 0.47   BILIDIR DISREGARD RESULTS, SPECIMEN GROSSLY HEMOLYZED. 0.14  --    LABALBU DISREGARD RESULTS, SPECIMEN GROSSLY HEMOLYZED. 3.7 3.3*       Amylase/Lipase and Ammonia:  Recent Labs     08/30/21  0314 08/31/21  0543   AMYLASE  --  40   LIPASE 87* 49       Acute Hepatitis Panel:  No results found for: HEPBSAG, HEPCAB, HEPBIGM, HEPAIGM    HCV Genotype:  No results found for: HEPATITISCGENOTYPE    HCV Quantitative:  No results found for: HCVQNT    LIVER WORK UP:    AFP  No results found for: AFP    Alpha 1 antitrypsin   No results found for: A1A    VALERIA  No results found for: VALERIA    AMA  No results found for: MITOAB    ASMA  No results found for: SMOOTHMUSCAB    PT/INR  No results for input(s): PROTIME, INR in the last 72 hours. Cancer Markers:  CEA:  No results for input(s): CEA in the last 72 hours. Ca 125:  No results for input(s):  in the last 72 hours. Ca 19-9:   Invalid input(s):   AFP: No results for input(s): AFP in the last 72 hours. Lactic acid:Invalid input(s): LACTIC ACID    Radiology Review:    No results found. Principal Problem:    Acute pancreatitis  Active Problems:    Gastroesophageal reflux disease without esophagitis    Uncontrolled hypertension    Tobacco abuse    Hypertensive urgency    Cocaine abuse ([address]    Alcohol abuse  Resolved Problems:    * No resolved hospital problems. *       GI Impression:    1. Recurrent acute alcoholic pancreatitis-pain is slightly improved today with conservative management. Triglycerides 92-WNL  2. Hypertensive crisis-currently on Cardene drip  3. Alcohol misuse disorder-no signs of DTs  4. Recent admission of cocaine use-denies any chest pain  5. CT imaging suggestive of possible esophagitis-patient will need EGD once medically optimized      Plan and Recommendations:    1.  We will plan for EGD, once patient is medically optimized  2. Continue current conservative management with IV fluids, n.p.o., antiemetics, pain meds, etc. per primary  3. Recommend continuation of LR to 50 mL/h  4. Continue Protonix 40 mg IV once daily  5. Strict I's and O's  6. We will follow closely      This plan was formulated in collaboration with Dr. Quinton Thompson MD    Thank you for allowing me to participate in the care of your patient. Please feel free to contact me with any questions or concerns. [facility]    This note was created with the assistance of a speech-recognition program.  Although the intention is to generate a document that actually reflects the content of the visit, no guarantees can be provided that every mistake has been identified and corrected by editing.

## 2021-09-01 ENCOUNTER — ANESTHESIA (OUTPATIENT)
Dept: ENDOSCOPY | Age: 51
DRG: 282 | End: 2021-09-01
Payer: MEDICAID

## 2021-09-01 ENCOUNTER — ANESTHESIA EVENT (OUTPATIENT)
Dept: ENDOSCOPY | Age: 51
DRG: 282 | End: 2021-09-01
Payer: MEDICAID

## 2021-09-01 LAB
ABSOLUTE EOS #: 0.09 K/UL (ref 0–0.44)
ABSOLUTE IMMATURE GRANULOCYTE: <0.03 K/UL (ref 0–0.3)
ABSOLUTE LYMPH #: 1.17 K/UL (ref 1.1–3.7)
ABSOLUTE MONO #: 0.99 K/UL (ref 0.1–1.2)
ALBUMIN SERPL-MCNC: 3.3 G/DL (ref 3.5–5.2)
ALBUMIN/GLOBULIN RATIO: 1.1 (ref 1–2.5)
ALP BLD-CCNC: 80 U/L (ref 40–129)
ALT SERPL-CCNC: 7 U/L (ref 5–41)
ANION GAP SERPL CALCULATED.3IONS-SCNC: 11 MMOL/L (ref 9–17)
AST SERPL-CCNC: 16 U/L
BASOPHILS # BLD: 1 % (ref 0–2)
BASOPHILS ABSOLUTE: 0.05 K/UL (ref 0–0.2)
BILIRUB SERPL-MCNC: 0.51 MG/DL (ref 0.3–1.2)
BUN BLDV-MCNC: 4 MG/DL (ref 6–20)
BUN/CREAT BLD: ABNORMAL (ref 9–20)
CALCIUM SERPL-MCNC: 9.1 MG/DL (ref 8.6–10.4)
CHLORIDE BLD-SCNC: 102 MMOL/L (ref 98–107)
CO2: 24 MMOL/L (ref 20–31)
CREAT SERPL-MCNC: 0.6 MG/DL (ref 0.7–1.2)
DIFFERENTIAL TYPE: ABNORMAL
EOSINOPHILS RELATIVE PERCENT: 2 % (ref 1–4)
GFR AFRICAN AMERICAN: >60 ML/MIN
GFR NON-AFRICAN AMERICAN: >60 ML/MIN
GFR SERPL CREATININE-BSD FRML MDRD: ABNORMAL ML/MIN/{1.73_M2}
GFR SERPL CREATININE-BSD FRML MDRD: ABNORMAL ML/MIN/{1.73_M2}
GLUCOSE BLD-MCNC: 76 MG/DL (ref 70–99)
HCT VFR BLD CALC: 50.1 % (ref 40.7–50.3)
HEMOGLOBIN: 16.3 G/DL (ref 13–17)
IMMATURE GRANULOCYTES: 0 %
LV EF: 53 %
LVEF MODALITY: NORMAL
LYMPHOCYTES # BLD: 20 % (ref 24–43)
MCH RBC QN AUTO: 27.4 PG (ref 25.2–33.5)
MCHC RBC AUTO-ENTMCNC: 32.5 G/DL (ref 28.4–34.8)
MCV RBC AUTO: 84.2 FL (ref 82.6–102.9)
MONOCYTES # BLD: 17 % (ref 3–12)
NRBC AUTOMATED: 0 PER 100 WBC
PDW BLD-RTO: 14.6 % (ref 11.8–14.4)
PLATELET # BLD: 201 K/UL (ref 138–453)
PLATELET ESTIMATE: ABNORMAL
PMV BLD AUTO: 10.5 FL (ref 8.1–13.5)
POTASSIUM SERPL-SCNC: 3.8 MMOL/L (ref 3.7–5.3)
RBC # BLD: 5.95 M/UL (ref 4.21–5.77)
RBC # BLD: ABNORMAL 10*6/UL
SEG NEUTROPHILS: 62 % (ref 36–65)
SEGMENTED NEUTROPHILS ABSOLUTE COUNT: 3.7 K/UL (ref 1.5–8.1)
SODIUM BLD-SCNC: 137 MMOL/L (ref 135–144)
TOTAL PROTEIN: 6.3 G/DL (ref 6.4–8.3)
WBC # BLD: 6 K/UL (ref 3.5–11.3)
WBC # BLD: ABNORMAL 10*3/UL

## 2021-09-01 PROCEDURE — 97166 OT EVAL MOD COMPLEX 45 MIN: CPT

## 2021-09-01 PROCEDURE — 97162 PT EVAL MOD COMPLEX 30 MIN: CPT

## 2021-09-01 PROCEDURE — 2580000003 HC RX 258: Performed by: FAMILY MEDICINE

## 2021-09-01 PROCEDURE — 6370000000 HC RX 637 (ALT 250 FOR IP): Performed by: FAMILY MEDICINE

## 2021-09-01 PROCEDURE — 97530 THERAPEUTIC ACTIVITIES: CPT

## 2021-09-01 PROCEDURE — 93306 TTE W/DOPPLER COMPLETE: CPT

## 2021-09-01 PROCEDURE — 99239 HOSP IP/OBS DSCHRG MGMT >30: CPT | Performed by: INTERNAL MEDICINE

## 2021-09-01 PROCEDURE — 93356 MYOCRD STRAIN IMG SPCKL TRCK: CPT

## 2021-09-01 PROCEDURE — 36415 COLL VENOUS BLD VENIPUNCTURE: CPT

## 2021-09-01 PROCEDURE — 6360000002 HC RX W HCPCS: Performed by: NURSE PRACTITIONER

## 2021-09-01 PROCEDURE — 6360000002 HC RX W HCPCS: Performed by: FAMILY MEDICINE

## 2021-09-01 PROCEDURE — 6370000000 HC RX 637 (ALT 250 FOR IP): Performed by: NURSE PRACTITIONER

## 2021-09-01 PROCEDURE — 2060000000 HC ICU INTERMEDIATE R&B

## 2021-09-01 PROCEDURE — 99232 SBSQ HOSP IP/OBS MODERATE 35: CPT | Performed by: INTERNAL MEDICINE

## 2021-09-01 PROCEDURE — 6370000000 HC RX 637 (ALT 250 FOR IP): Performed by: INTERNAL MEDICINE

## 2021-09-01 PROCEDURE — 80053 COMPREHEN METABOLIC PANEL: CPT

## 2021-09-01 PROCEDURE — 85025 COMPLETE CBC W/AUTO DIFF WBC: CPT

## 2021-09-01 PROCEDURE — 94761 N-INVAS EAR/PLS OXIMETRY MLT: CPT

## 2021-09-01 PROCEDURE — 2580000003 HC RX 258: Performed by: NURSE PRACTITIONER

## 2021-09-01 RX ORDER — LISINOPRIL 20 MG/1
40 TABLET ORAL DAILY
Status: DISCONTINUED | OUTPATIENT
Start: 2021-09-01 | End: 2021-09-02 | Stop reason: HOSPADM

## 2021-09-01 RX ORDER — SPIRONOLACTONE 25 MG/1
12.5 TABLET ORAL DAILY
Status: DISCONTINUED | OUTPATIENT
Start: 2021-09-02 | End: 2021-09-02 | Stop reason: HOSPADM

## 2021-09-01 RX ORDER — METOPROLOL TARTRATE 5 MG/5ML
5 INJECTION INTRAVENOUS ONCE
Status: DISCONTINUED | OUTPATIENT
Start: 2021-09-01 | End: 2021-09-02 | Stop reason: HOSPADM

## 2021-09-01 RX ORDER — AMLODIPINE BESYLATE 10 MG/1
10 TABLET ORAL DAILY
Status: DISCONTINUED | OUTPATIENT
Start: 2021-09-01 | End: 2021-09-02 | Stop reason: HOSPADM

## 2021-09-01 RX ORDER — NICOTINE 21 MG/24HR
1 PATCH, TRANSDERMAL 24 HOURS TRANSDERMAL DAILY
Status: DISCONTINUED | OUTPATIENT
Start: 2021-09-02 | End: 2021-09-02 | Stop reason: HOSPADM

## 2021-09-01 RX ORDER — SPIRONOLACTONE 25 MG/1
25 TABLET ORAL DAILY
Status: DISCONTINUED | OUTPATIENT
Start: 2021-09-01 | End: 2021-09-01

## 2021-09-01 RX ADMIN — LORAZEPAM 2 MG: 2 TABLET ORAL at 03:27

## 2021-09-01 RX ADMIN — ENOXAPARIN SODIUM 40 MG: 40 INJECTION SUBCUTANEOUS at 09:36

## 2021-09-01 RX ADMIN — PANTOPRAZOLE SODIUM 40 MG: 40 TABLET, DELAYED RELEASE ORAL at 09:36

## 2021-09-01 RX ADMIN — SODIUM CHLORIDE, PRESERVATIVE FREE 10 ML: 5 INJECTION INTRAVENOUS at 20:40

## 2021-09-01 RX ADMIN — LISINOPRIL 40 MG: 20 TABLET ORAL at 09:35

## 2021-09-01 RX ADMIN — SODIUM CHLORIDE, PRESERVATIVE FREE 10 ML: 5 INJECTION INTRAVENOUS at 12:33

## 2021-09-01 RX ADMIN — Medication 100 MG: at 12:33

## 2021-09-01 RX ADMIN — GABAPENTIN 400 MG: 400 CAPSULE ORAL at 09:35

## 2021-09-01 RX ADMIN — ALCOHOL 1 TABLET: 70.47 GEL TOPICAL at 09:36

## 2021-09-01 RX ADMIN — HYDROMORPHONE HYDROCHLORIDE 0.5 MG: 1 INJECTION, SOLUTION INTRAMUSCULAR; INTRAVENOUS; SUBCUTANEOUS at 01:57

## 2021-09-01 RX ADMIN — LORAZEPAM 2 MG: 2 INJECTION INTRAMUSCULAR; INTRAVENOUS at 05:00

## 2021-09-01 RX ADMIN — AMLODIPINE BESYLATE 10 MG: 10 TABLET ORAL at 09:35

## 2021-09-01 RX ADMIN — LORAZEPAM 2 MG: 2 INJECTION INTRAMUSCULAR; INTRAVENOUS at 14:26

## 2021-09-01 RX ADMIN — SPIRONOLACTONE 25 MG: 25 TABLET ORAL at 13:11

## 2021-09-01 RX ADMIN — ATORVASTATIN CALCIUM 20 MG: 20 TABLET, FILM COATED ORAL at 09:36

## 2021-09-01 RX ADMIN — HYDROMORPHONE HYDROCHLORIDE 0.5 MG: 1 INJECTION, SOLUTION INTRAMUSCULAR; INTRAVENOUS; SUBCUTANEOUS at 05:00

## 2021-09-01 RX ADMIN — ACETAMINOPHEN 650 MG: 325 TABLET ORAL at 20:48

## 2021-09-01 RX ADMIN — LORAZEPAM 2 MG: 2 INJECTION INTRAMUSCULAR; INTRAVENOUS at 21:32

## 2021-09-01 RX ADMIN — LORAZEPAM 2 MG: 2 TABLET ORAL at 01:14

## 2021-09-01 ASSESSMENT — PAIN DESCRIPTION - LOCATION
LOCATION: ABDOMEN

## 2021-09-01 ASSESSMENT — PAIN DESCRIPTION - PAIN TYPE
TYPE: ACUTE PAIN
TYPE: ACUTE PAIN

## 2021-09-01 ASSESSMENT — PAIN SCALES - GENERAL
PAINLEVEL_OUTOF10: 10
PAINLEVEL_OUTOF10: 0
PAINLEVEL_OUTOF10: 8
PAINLEVEL_OUTOF10: 10
PAINLEVEL_OUTOF10: 5
PAINLEVEL_OUTOF10: 8

## 2021-09-01 ASSESSMENT — PAIN DESCRIPTION - FREQUENCY: FREQUENCY: INTERMITTENT

## 2021-09-01 ASSESSMENT — PAIN DESCRIPTION - ORIENTATION: ORIENTATION: MID

## 2021-09-01 ASSESSMENT — PAIN DESCRIPTION - DESCRIPTORS: DESCRIPTORS: ACHING

## 2021-09-01 ASSESSMENT — LIFESTYLE VARIABLES: SMOKING_STATUS: 1

## 2021-09-01 ASSESSMENT — PAIN DESCRIPTION - PROGRESSION: CLINICAL_PROGRESSION: NOT CHANGED

## 2021-09-01 NOTE — PROGRESS NOTES
[facility]  Office: [address], DO, Levelbon Ebbs, DO, Juan Antonio Zohra, DO, Stephon Ortiz Blood, DO, Lexx Moore MD, Noah Schulte MD, Omari Rosario MD, Rosie Gomez MD, Carlos Mahajan MD, Steffany Ortiz MD, Gagan Sheldon MD, Charli Tillman, DO, Jonelle De La Cruz MD, Traci Markham, DO, Naomi Grant MD,  Guillermina Hernandez, DO, Donna Sifuentes MD, Francisco Alonso MD, Raenette Felty, MD, Flaquito Driver MD, Alpesh North MD, Mika Clements MD, Elis Eason MD, Malini Hallman CNP, [facility] Dennis, CNP, Janna Lewis, CNP, Kelley Collins, CNS, Scott Lang, Joshua Wilkinson, CNP, Avani Kat, CNP, Jeronimo Whatley, CNP, Adriana Hoang, CNP, Anton Chase PA-C, Abran Gilliland, [facility], Vinnie Yen, CNP, Gordon Langston, CNP, John Vela, CNP, Tennille West, CNP, Wiley Beatty, CNP, Ofelia Mullins, CNP, Maranda Buenrostro, CNP, Reinier Zendejas, [address] [place] [address]    Progress Note    9/1/2021    8:03 AM    Name:   Kaaren Favre  MRN:     [id number]     [place]:      [de-identified]   Room:   [address] [place] [address] Day:  2  Admit Date:  8/30/2021  1:54 AM    PCP:   LATOSHA Biggs CNP  Code Status:  Full Code    Subjective:     C/C:   Chief Complaint   Patient presents with    Pancreatitis     Interval History Status: stable    Seen and examined at bedside today. Blood pressure remains elevated today however better controlled and patient is off of nicardipine drip. Patient does have chronic hypertension and given pain, continuous IV fluids, EtOH withdrawal it is expected that patient's blood pressure will run a little high. He does not have any evidence of hypertensive emergency. His mentation is intact no chest pain or shortness of breath. No focal neurologic deficits seen on exam. States his last alcoholic beverage was on 9/25. Denies ever being in alcohol withdrawal.  He does appear sleepy today.   Abdominal pain has resolved    Brief History:     51-year-old male presented to hospital with complaints of epigastric abdominal pain found to have pancreatitis. Started on IV fluids. CT of his abdomen showed wall thickening, GI was consulted will plan for endoscopy to evaluate for possible malignancy although less likely. Review of Systems:     Constitutional:  negative for chills, fevers, sweats  Respiratory:  negative for cough, dyspnea on exertion, shortness of breath, wheezing  Cardiovascular:  negative for chest pain, chest pressure/discomfort, lower extremity edema, palpitations  Gastrointestinal:abdominal pain has resolved no nausea no vomiting no constipation,no diarrhea  Neurological:  negative for dizziness, headache    Medications:      Allergies:  No Known Allergies    Current Meds:   Scheduled Meds:    lisinopril  40 mg Oral Daily    amLODIPine  10 mg Oral Daily    multivitamin  1 tablet Oral Daily    thiamine mononitrate  100 mg Oral Daily    gabapentin  400 mg Oral TID    pantoprazole  40 mg Oral QAM AC    atorvastatin  20 mg Oral Daily    sodium chloride flush  5-40 mL IntraVENous 2 times per day    enoxaparin  40 mg SubCUTAneous Daily    sodium chloride flush  5-40 mL IntraVENous 2 times per day     Continuous Infusions:    sodium chloride      sodium chloride      lactated ringers 250 mL/hr at 08/31/21 2243     PRN Meds: potassium chloride **OR** potassium alternative oral replacement **OR** potassium chloride, sodium chloride flush, sodium chloride, acetaminophen **OR** acetaminophen, potassium chloride, magnesium sulfate, HYDROmorphone **OR** HYDROmorphone, ondansetron **OR** ondansetron, sodium chloride flush, sodium chloride, LORazepam **OR** LORazepam **OR** LORazepam **OR** LORazepam **OR** LORazepam **OR** LORazepam **OR** LORazepam **OR** LORazepam    Data:     Past Medical History:   has a past medical history of Arthritis, Chronic sinusitis, Eczema, Environmental allergies, GERD (gastroesophageal reflux disease), Hyperlipidemia, Hypertension, and Snores. Social History:   reports that he has been smoking cigarettes. He has a 15.00 pack-year smoking history. He has never used smokeless tobacco. He reports current alcohol use of about 3.0 standard drinks of alcohol per week. He reports that he does not use drugs. Family History:   Family History   Problem Relation Age of Onset    Cancer Paternal Grandmother     High Blood Pressure Mother        Vitals:  BP (!) 181/105   Pulse 117   Temp 98.1 °F (36.7 °C) (Oral)   Resp 18   Wt 151 lb 14.4 oz (68.9 kg)   SpO2 96%   BMI 23.79 kg/m²   Temp (24hrs), Av.4 °F (36.9 °C), Min:97.9 °F (36.6 °C), Max:98.8 °F (37.1 °C)    No results for input(s): POCGLU in the last 72 hours. I/O (24Hr): Intake/Output Summary (Last 24 hours) at 2021 0803  Last data filed at 2021 0441  Gross per 24 hour   Intake 8290 ml   Output 3250 ml   Net 5040 ml       Labs:  Hematology:  Recent Labs     21  0227 21  0543   WBC 11.5* 7.3   RBC 6.22* 6.22*   HGB 17.0 16.9   HCT 52.9* 52.5*   MCV 85.0 84.4   MCH 27.3 27.2   MCHC 32.1 32.2   RDW 14.7* 14.3   PLT See Reflexed IPF Result 218   MPV NOT REPORTED 10.0     Chemistry:  Recent Labs     21  0314 21  0908 21  1132 21  1628 21  0543     --   --   --  133*   K 3.6*  --   --   --  3.3*     --   --   --  99   CO2 24  --   --   --  22   GLUCOSE 128*  --   --   --  115*   BUN 8  --   --   --  2*   CREATININE 0.88  --   --   --  0.56*   MG  --   --   --   --  2.0   ANIONGAP 12  --   --   --  12   LABGLOM >60  --   --   --  >60   GFRAA >60  --   --   --  >60   CALCIUM 8.7  --   --   --  8.6   PROBNP  --  434*  --  555*  --    TROPHS   --   --      Recent Labs     21  0543   PROT DISREGARD RESULTS, SPECIMEN GROSSLY HEMOLYZED. 6.9 6.4   LABALBU DISREGARD RESULTS, SPECIMEN GROSSLY HEMOLYZED.  3.7 3.3*   AST DISREGARD RESULTS, SPECIMEN GROSSLY HEMOLYZED. 19 13   ALT DISREGARD RESULTS, SPECIMEN GROSSLY HEMOLYZED. 12 9   ALKPHOS DISREGARD RESULTS, SPECIMEN GROSSLY HEMOLYZED. 107 92   BILITOT DISREGARD RESULTS, SPECIMEN GROSSLY HEMOLYZED. 0.57 0.47   BILIDIR DISREGARD RESULTS, SPECIMEN GROSSLY HEMOLYZED. 0.14  --    AMYLASE  --   --  40   LIPASE  --  87* 49   CHOL  --   --  171   HDL  --   --  72   LDLCHOLESTEROL  --   --  81   CHOLHDLRATIO  --   --  2.4   TRIG  --   --  92   VLDL  --   --  NOT REPORTED     ABG:No results found for: POCPH, PHART, PH, POCPCO2, CNH8URV, PCO2, POCPO2, PO2ART, PO2, POCHCO3, VLH4TQT, HCO3, NBEA, PBEA, BEART, BE, THGBART, THB, PLB2RWW, TBEC4XHJ, Z4OJTYRE, O2SAT, FIO2  Lab Results   Component Value Date/Time    SPECIAL NOT REPORTED 03/10/2018 08:14 AM     Lab Results   Component Value Date/Time    CULTURE NO GROWTH 03/10/2018 08:14 AM    CULTURE  03/10/2018 08:14 AM     [employer] [address] [address] [phone]       Radiology:  CT ABDOMEN PELVIS W IV CONTRAST Additional Contrast? None    Result Date: 8/30/2021  1. Findings of mild interstitial edematous pancreatitis likely predominantly involving the pancreatic tail. 2. Questionable circumferential wall thickening in the distal thoracic esophagus potentially due to esophagitis, although malignancy could appear similar. Consider further evaluation endoscopy.      XR CHEST PORTABLE    Result Date: 8/30/2021  No acute findings in the chest.       Physical Examination:        General appearance:  alert, cooperative, slightly sleepy  Mental Status:  oriented to person, place and time and normal affect  Lungs:  clear to auscultation bilaterally, normal effort  Heart:  regular rate and rhythm, no murmur  Abdomen:  soft, nontender to palpation, nondistended, normal bowel sounds, no masses, hepatomegaly, splenomegaly  Extremities:  no edema, redness, tenderness in the calves  Skin:  no gross lesions, rashes, induration    Assessment:        Hospital Problems Last Modified POA    * (Principal) Acute pancreatitis 8/31/2021 Yes    Gastroesophageal reflux disease without esophagitis 8/31/2021 Yes    Uncontrolled hypertension 8/31/2021 Yes    Tobacco abuse 8/31/2021 Yes    Hypertensive urgency 8/31/2021 Yes    Cocaine abuse ([address] 8/31/2021 Yes    Alcohol abuse 8/31/2021 Yes    Abdominal pain, epigastric 8/31/2021 Yes    Esophagitis 8/31/2021 Yes          Plan:        1. Acute alcoholic Pancreatitis: decrease LR 50 cc/hr- urine output has been good and pt is +9 liters-we will plan to stop fluids if patient tolerating clear liquid diet. Lipid panel unremarkable. Not on any new medications. Discussed with GI, advance to clear liquids. Stop Dilaudid since this can cause worsening mentation and pain appears to resolved. EGD canceled due to hypertension  2. Circumferential wall thickening in distal thoracic esophagus: EGD to rule out malignancy given pt risk factors and history. GI following- discussed with them. EGD today was canceled due to hypertension BP is better controlled. 3. Uncontrolled Hypertension: Increase lisinopril to 40 mg daily , and increase Norvasc to 10 mg add Aldactone 25 mg daily. Stop PRN IV hydralazine/metoprolol. There is no evidence of HTN emergency at this time, Cardene stopped. 4. Alcohol abuse: CIWA, monitor for withdrawal. Vit supplementation. Patient is showing some signs of alcohol withdrawal however  5. Acute Hypokalemia: Replace potassium  6. Hyponatremia : Mild. MOnitor. Check urine studies. 7. Cocaine use: Counseled pt on importance of cocaine cessation. 8. Tobacco abuse: Recommend smoking cessation  9. GERD: continue PPI  10. DVT ppx  11. Labs, imaging, ECG reviewed.      Pamela Roper DO  9/1/2021  8:03 AM

## 2021-09-01 NOTE — ANESTHESIA PRE PROCEDURE
Department of Anesthesiology  Preprocedure Note       Name:  Randy Zazueta   Age:  52 y.o.  :  1970                                          MRN:  [id number]         Date:  2021      Surgeon: Jaxon Rocha):  Jesús Garvey MD    Procedure: Procedure(s):  EGD ESOPHAGOGASTRODUODENOSCOPY    Medications prior to admission:   Prior to Admission medications    Medication Sig Start Date End Date Taking? Authorizing Provider   gabapentin (NEURONTIN) 400 MG capsule TAKE 1 CAPSULE BY MOUTH THREE TIMES DAILY AS DIRECTED 21  Omari Allen MD   lisinopril (PRINIVIL;ZESTRIL) 10 MG tablet Take 1 tablet by mouth daily 21   Omari Allen MD   pantoprazole (PROTONIX) 40 MG tablet Take 1 tablet by mouth every morning (before breakfast) 21   Omari Allen MD   simvastatin (ZOCOR) 40 MG tablet Take 1 tablet by mouth nightly 21   LATOSHA Fernandez CNP   dupilumab (DUPIXENT) 300 MG/2ML SOSY injection Inject 600mg SQ at week 0. Begin Maintenance Dose at week 2 20   Suyapa Montes MD   dupilumab (DUPIXENT) 300 MG/2ML SOSY injection Inject 300mg SQ at every 2 weeks 20   Maite Purcell MD   DUPIXENT 300 MG/2ML SOSY injection INJECT 300MG SUBCUTANEOUSLY EVERY OTHER WEEK 20   Mark Montes MD   Blood Pressure KIT Use to monitor blood pressure daily and as needed 20   LATOSHA Fernandez CNP   triamcinolone (KENALOG) 0.1 % ointment Apply to rash twice daily (not face, armpit or groin) 20   Maite Purcell MD   tacrolimus (PROTOPIC) 0.03 % ointment Apply topically 2 times daily.  20   Mark Montes MD       Current medications:    Current Facility-Administered Medications   Medication Dose Route Frequency Provider Last Rate Last Admin    lisinopril (PRINIVIL;ZESTRIL) tablet 40 mg  40 mg Oral Daily Fela Vásquez DO   40 mg at 21 0935    amLODIPine (NORVASC) tablet 10 mg  10 mg Oral Daily Fela Vásquez DO   10 mg at 21 0935    potassium chloride (KLOR-CON M) extended release tablet 40 mEq  40 mEq Oral PRN Janeice Courts, DO   40 mEq at 08/31/21 4132    Or    potassium bicarb-citric acid (EFFER-K) effervescent tablet 40 mEq  40 mEq Oral PRN Janeice Courts, DO        Or    potassium chloride 10 mEq/100 mL IVPB (Peripheral Line)  10 mEq IntraVENous PRN Janeice Courts, DO        multivitamin 1 tablet  1 tablet Oral Daily Mohammad I Mashaleh, DO   1 tablet at 09/01/21 0936    thiamine mononitrate tablet 100 mg  100 mg Oral Daily Mohammad I Mashaleh, DO   100 mg at 08/31/21 1310    gabapentin (NEURONTIN) capsule 400 mg  400 mg Oral TID Kailee Chiu APRN - CNP   400 mg at 09/01/21 0935    pantoprazole (PROTONIX) tablet 40 mg  40 mg Oral QAM AC Lidya Chiu APRN - CNP   40 mg at 09/01/21 0936    atorvastatin (LIPITOR) tablet 20 mg  20 mg Oral Daily Lidya SATINDER Chiu, APRN - CNP   20 mg at 09/01/21 0936    sodium chloride flush 0.9 % injection 5-40 mL  5-40 mL IntraVENous 2 times per day Lidya Chiu APRN - CNP   10 mL at 08/31/21 0910    sodium chloride flush 0.9 % injection 5-40 mL  5-40 mL IntraVENous PRN Lidya Chiu APRN - CNP        0.9 % sodium chloride infusion  25 mL IntraVENous PRN Lidya Chiu APRN - CNP        acetaminophen (TYLENOL) tablet 650 mg  650 mg Oral Q6H PRN Lidya SATINDER Menagrosso, APRN - CNP        Or    acetaminophen (TYLENOL) suppository 650 mg  650 mg Rectal Q6H PRN Lidyablas Menagrosso, APRN - CNP        potassium chloride 10 mEq/100 mL IVPB (Peripheral Line)  10 mEq IntraVENous PRN Lidya Chiu, APRN - CNP        magnesium sulfate 1000 mg in dextrose 5% 100 mL IVPB  1,000 mg IntraVENous PRN Lidya Chiu, APRN - CNP        HYDROmorphone (DILAUDID) injection 0.25 mg  0.25 mg IntraVENous Q3H PRN LidyaLATOSHA Borja - CNP        Or    HYDROmorphone (DILAUDID) injection 0.5 mg  0.5 mg IntraVENous Q3H PRN LATOSHA Vargas - CNP 0.5 mg at 09/01/21 0500    ondansetron (ZOFRAN-ODT) disintegrating tablet 4 mg  4 mg Oral Q8H PRN Lidya SATINDER Chiu, APRN - CNP        Or    ondansetron (ZOFRAN) injection 4 mg  4 mg IntraVENous Q6H PRN Lidya SATINDER Rajeshdani, APRN - CNP   4 mg at 08/31/21 0251    enoxaparin (LOVENOX) injection 40 mg  40 mg SubCUTAneous Daily Lidya SATINDER Chiu, APRN - CNP   40 mg at 09/01/21 0936    sodium chloride flush 0.9 % injection 5-40 mL  5-40 mL IntraVENous 2 times per day Nina Allison MD   10 mL at 08/30/21 2014    sodium chloride flush 0.9 % injection 5-40 mL  5-40 mL IntraVENous PRN Nina Allison MD        0.9 % sodium chloride infusion  25 mL IntraVENous PRN Nina Allison MD        LORazepam (ATIVAN) tablet 1 mg  1 mg Oral Q1H PRN Nina Allison MD        Or    LORazepam (ATIVAN) injection 1 mg  1 mg IntraVENous Q1H PRN Nina Allison MD        Or    LORazepam (ATIVAN) tablet 2 mg  2 mg Oral Q1H PRN Nina Allison MD   2 mg at 09/01/21 0327    Or    LORazepam (ATIVAN) injection 2 mg  2 mg IntraVENous Q1H PRN Nina Allison MD   2 mg at 09/01/21 0500    Or    LORazepam (ATIVAN) tablet 3 mg  3 mg Oral Q1H PRN Nina Allison MD        Or    LORazepam (ATIVAN) injection 3 mg  3 mg IntraVENous Q1H PRN Nina Allison MD        Or    LORazepam (ATIVAN) tablet 4 mg  4 mg Oral Q1H PRN Nina Allison MD        Or    LORazepam (ATIVAN) injection 4 mg  4 mg IntraVENous Q1H PRN Nina Allison MD        lactated ringers infusion   IntraVENous Continuous Fela Vásquez  mL/hr at 09/01/21 0903 Rate Change at 09/01/21 4397       Allergies:  No Known Allergies    Problem List:    Patient Active Problem List   Diagnosis Code    Depression F32.9    Intervertebral lumbar disc disorder with myelopathy, lumbar region M51.06    Gastroesophageal reflux disease without esophagitis K21.9    Uncontrolled hypertension I10    Pure hypercholesterolemia E78.00    Prediabetes R73.03    Tobacco abuse Z72.0    Acute pancreatitis K85.90    Alcohol use, unspecified with unspecified alcohol-induced disorder ([address] F10.99    Acute pancreatitis without infection or necrosis K85.90    Alcohol-induced acute pancreatitis K85.20    Hypertensive urgency I16.0    Cocaine abuse ([address] F14.10    Alcohol abuse F10.10    Abdominal pain, epigastric R10.13    Esophagitis K20.90       Past Medical History:        Diagnosis Date    Arthritis     Chronic sinusitis     Eczema     Environmental allergies     GERD (gastroesophageal reflux disease)     Hyperlipidemia     Hypertension     Snores        Past Surgical History:        Procedure Laterality Date    COLONOSCOPY      UPPER GASTROINTESTINAL ENDOSCOPY         Social History:    Social History     Tobacco Use    Smoking status: Current Every Day Smoker     Packs/day: 0.50     Years: 30.00     Pack years: 15.00     Types: Cigarettes    Smokeless tobacco: Never Used    Tobacco comment: 6-10 per day    Substance Use Topics    Alcohol use: Yes     Alcohol/week: 3.0 standard drinks     Types: 3 Cans of beer per week     Comment: 3 24 oz cans of beer daily                                Ready to quit: Not Answered  Counseling given: Not Answered  Comment: 6-10 per day       Vital Signs (Current):   Vitals:    09/01/21 0754 09/01/21 0814 09/01/21 0928 09/01/21 1018   BP: (!) 181/105  (!) 185/102 (!) 181/115   Pulse: 117  102 97   Resp: 18 17 15 18   Temp: 98.1 °F (36.7 °C)      TempSrc: Oral      SpO2: 96% 97% 98% 98%   Weight:                                                  BP Readings from Last 3 Encounters:   09/01/21 (!) 181/115   10/12/20 (!) 167/102   08/30/20 (!) 146/101       NPO Status:                                                                                 BMI:   Wt Readings from Last 3 Encounters:   08/31/21 151 lb 14.4 oz (68.9 kg)   10/11/20 149 lb (67.6 kg)   08/29/20 156 lb (70.8 kg)     Body mass index is 23.79 kg/m².     CBC:   Lab Results   Component Value Date    WBC 6.0 09/01/2021 RBC 5.95 09/01/2021    HGB 16.3 09/01/2021    HCT 50.1 09/01/2021    MCV 84.2 09/01/2021    RDW 14.6 09/01/2021     09/01/2021       CMP:   Lab Results   Component Value Date     09/01/2021    K 3.8 09/01/2021     09/01/2021    CO2 24 09/01/2021    BUN 4 09/01/2021    CREATININE 0.60 09/01/2021    GFRAA >60 09/01/2021    LABGLOM >60 09/01/2021    GLUCOSE 76 09/01/2021    PROT 6.3 09/01/2021    CALCIUM 9.1 09/01/2021    BILITOT 0.51 09/01/2021    ALKPHOS 80 09/01/2021    AST 16 09/01/2021    ALT 7 09/01/2021       POC Tests: No results for input(s): POCGLU, POCNA, POCK, POCCL, POCBUN, POCHEMO, POCHCT in the last 72 hours. Coags: No results found for: PROTIME, INR, APTT    HCG (If Applicable): No results found for: PREGTESTUR, PREGSERUM, HCG, HCGQUANT     ABGs: No results found for: PHART, PO2ART, BAE5JWW, UIV8EUH, BEART, X7DJXCZB     Type & Screen (If Applicable):  No results found for: LABABO, LABRH    Drug/Infectious Status (If Applicable):  No results found for: HIV, HEPCAB    COVID-19 Screening (If Applicable):   Lab Results   Component Value Date    COVID19 Not Detected 08/30/2021           Anesthesia Evaluation  Patient summary reviewed no history of anesthetic complications:   Airway: Mallampati: III        Dental:          Pulmonary:normal exam  breath sounds clear to auscultation  (+) current smoker                           Cardiovascular:    (+) hypertension:,         Rhythm: regular  Rate: normal                    Neuro/Psych:   (+) psychiatric history:             ROS comment: Alcohol use, unspecified with unspecified alcohol-induced disorder (HCC)  Acute pancreatitis without infection or necrosis  Alcohol-induced acute pancreatitis    Cocaine abuse ([facility] [address] GI/Hepatic/Renal:   (+) GERD:,           Endo/Other: Negative Endo/Other ROS                    Abdominal:             Vascular: negative vascular ROS.          Other Findings:             Anesthesia Plan      MAC     ASA 3 Induction: intravenous. Anesthetic plan and risks discussed with patient. Plan discussed with CRNA.               /120  Echo pending      Al Dominguez MD   9/1/2021

## 2021-09-01 NOTE — PROGRESS NOTES
to PT eval  Pain Screening  Patient Currently in Pain: Yes  Pain Assessment  Pain Assessment: 0-10  Pain Level: 8  Pain Location: Abdomen  Vital Signs  Patient Currently in Pain: Yes     Orientation  Orientation  Overall Orientation Status: Impaired  Orientation Level: Oriented to person;Disoriented to time;Disoriented to place;Oriented to situation  Social/Functional History  Social/Functional History  Lives With: Family (wife and 3 children (15, 15, 9))  Type of Home: House  Home Layout: Two level, Bed/Bath upstairs  Home Access: Stairs to enter with rails  Entrance Stairs - Number of Steps: 4  Bathroom Shower/Tub: Tub/Shower unit  Bathroom Toilet: Standard  Home Equipment:  (pt reported no use of DME at baseline)  Receives Help From: Family  ADL Assistance: Independent  Homemaking Assistance: Independent  Homemaking Responsibilities: Yes (pt reported splitting with wife)  Meal Prep Responsibility: Secondary  Laundry Responsibility: Secondary  Ambulation Assistance: Independent  Transfer Assistance: Independent  Active : No  Mode of Transportation: Bus, Friends  Occupation: On disability  Additional Comments: pt reported wife able to assist PRN. limited home information due to patient lethargic throughout evaluation  Cognition      Objective       AROM RLE (degrees)  RLE AROM: WFL  AROM LLE (degrees)  LLE AROM : WFL  AROM RUE (degrees)  RUE AROM : WFL  AROM LUE (degrees)  LUE AROM : WFL  Strength RLE  Strength RLE: WFL  Strength LLE  Strength LLE: WFL  Strength RUE  Strength RUE: WFL  Strength LUE  Strength LUE: WFL     Sensation  Overall Sensation Status: WFL  Bed mobility  Supine to Sit: Moderate assistance  Sit to Supine: Moderate assistance  Scooting:  Moderate assistance  Transfers  Sit to Stand: Minimal Assistance  Stand to sit: Minimal Assistance  Ambulation  Ambulation?: No  Ambulation 1  Surface: level tile  Device: Rolling Walker  Assistance: Minimal assistance  Distance: sit to stand with min assist   unable to progress with mobility today due to increased BP and pt extremely lethargic  Balance  Posture: Good  Sitting - Static: Fair  Sitting - Dynamic: Poor  Standing - Static: Poor        Plan   Plan  Times per week: 5-6x wk  Current Treatment Recommendations: Strengthening, Functional Mobility Training, Gait Training, Safety Education & Training, Endurance Training, Stair training, Pain Management, Balance Training  Safety Devices  Type of devices: Nurse notified, Left in bed, Call light within reach, Bed alarm in place, Patient at risk for falls    G-Code     OutComes Score     AM-PAC Score  AM-PAC Inpatient Mobility Raw Score : 15 (09/01/21 1530)  AM-PAC Inpatient T-Scale Score : 39.45 (09/01/21 1530)  Mobility Inpatient CMS 0-100% Score: 57.7 (09/01/21 1530)  Mobility Inpatient CMS G-Code Modifier : CK (09/01/21 1530)        Goals  Short term goals  Time Frame for Short term goals: 10 visits  Short term goal 1: transfers with SBA  Short term goal 2: amb 150 ft with a RW x SBA  Short term goal 3: ascend/descend 4 steps with SBA  Short term goal 4: 20 min exercise program x SBA     Therapy Time   Individual Concurrent Group Co-treatment   Time In 1315         Time Out 1335         Minutes 20             [address], [address]

## 2021-09-01 NOTE — PROGRESS NOTES
Occupational Therapy   Occupational Therapy Initial Assessment  Date: 2021   Patient Name: Kaaren Favre  MRN: [id number]     : 1970    Date of Service: 2021  Chief Complaint   Patient presents with    Pancreatitis       Discharge Recommendations:   continue to assess (pt expected to require no further therapy at discharge once able to participate in more therapy and when less lethargic)     Assessment   Performance deficits / Impairments: Decreased functional mobility ; Decreased ADL status; Decreased endurance;Decreased high-level IADLs;Decreased balance  Treatment Diagnosis: pancreatitis  Prognosis: Good  Decision Making: Medium Complexity  Patient Education: pt ed on POC, purpose of eval, importance of movement, safety during functional transfers/functional mobility, benefits of therapy. fair return  REQUIRES OT FOLLOW UP: Yes  Activity Tolerance  Activity Tolerance: Patient Tolerated treatment well  Safety Devices  Safety Devices in place: Yes  Type of devices: Call light within reach; Left in bed;Nurse notified; Bed alarm in place; Patient at risk for falls  Restraints  Initially in place: No           Patient Diagnosis(es): The primary encounter diagnosis was Abdominal pain, epigastric. Diagnoses of Essential hypertension, Idiopathic acute pancreatitis, unspecified complication status, and Esophagitis were also pertinent to this visit. has a past medical history of Arthritis, Chronic sinusitis, Eczema, Environmental allergies, GERD (gastroesophageal reflux disease), Hyperlipidemia, Hypertension, and Snores. has a past surgical history that includes Upper gastrointestinal endoscopy and Colonoscopy.     Treatment Diagnosis: pancreatitis      Restrictions  Restrictions/Precautions  Restrictions/Precautions: Fall Risk  Required Braces or Orthoses?: No  Position Activity Restriction  Other position/activity restrictions: up as tolerated    Subjective   General  Patient assessed for rehabilitation services?: Yes  Family / Caregiver Present: No  Diagnosis: pancreatitis  General Comment  Comments: RN ok'd for therapy this afternoon. pt agreeable to participate in session, but lethargic throughout  Patient Currently in Pain: Yes  Pain Assessment  Pain Assessment: 0-10  Pain Level: 8  Pain Type: Acute pain  Pain Location: Abdomen  Non-Pharmaceutical Pain Intervention(s): Ambulation/Increased Activity; Distraction; Therapeutic presence  Response to Pain Intervention: Patient Satisfied    Social/Functional History  Social/Functional History  Lives With: Family (wife and 3 children (15, 15, 9))  Type of Home: House  Home Layout: Two level, Bed/Bath upstairs  Home Access: Stairs to enter with rails  Entrance Stairs - Number of Steps: 4  Bathroom Shower/Tub: Tub/Shower unit  Bathroom Toilet: Standard  Home Equipment:  (pt reported no use of DME at baseline)  Receives Help From: Family  ADL Assistance: Independent  Homemaking Assistance: Independent  Homemaking Responsibilities: Yes (pt reported splitting with wife)  Meal Prep Responsibility: Secondary  Laundry Responsibility: Secondary  Ambulation Assistance: Independent  Transfer Assistance: Independent  Active : No  Mode of Transportation: Bus  Occupation: On disability  Additional Comments: pt reported wife able to assist PRN. limited home information due to patient lethargic throughout evaluation       Objective   Vision: Within Functional Limits  Hearing: Within functional limits    Orientation  Overall Orientation Status: Within Functional Limits     Balance  Sitting Balance: Supervision (~10 minutes on eOB)  Standing Balance: Contact guard assistance (with RW)  Standing Balance  Time: ~2 minutes  Activity: pt completed static standing at bedside for sheet management and took [facility]  Comment: pt /106 upon returning sitting.  pt slightly unsteady on feet, but no LOB  ADL  Feeding: Modified independent   Grooming: Supervision  UE Bathing: Supervision  LE Bathing: Minimal assistance  UE Dressing: Supervision  LE Dressing: Minimal assistance  Toileting: Contact guard assistance  Tone RUE  RUE Tone: Normotonic  Tone LUE  LUE Tone: Normotonic  Coordination  Movements Are Fluid And Coordinated: Yes     Bed mobility  Supine to Sit: Minimal assistance (hand held assist)  Sit to Supine: Stand by assistance  Scooting: Stand by assistance  Transfers  Sit to stand: Contact guard assistance  Stand to sit: Contact guard assistance  Transfer Comments: with RW     Cognition  Overall Cognitive Status: Exceptions  Following Commands: Follows multistep commands with repitition  Attention Span: Difficulty attending to directions; Attends with cues to redirect  Initiation: Requires cues for some  Cognition Comment: pt lethargic throughout session requiring mod-max verbal/tactile cues to maintain attention to task        Sensation  Overall Sensation Status: WFL        LUE AROM (degrees)  LUE AROM : WFL  Left Hand AROM (degrees)  Left Hand AROM: WFL  RUE AROM (degrees)  RUE AROM : WFL  Right Hand AROM (degrees)  Right Hand AROM: WFL  LUE Strength  Gross LUE Strength: Exceptions to [facility] [place]  L Hand General: 4/5  RUE Strength  Gross RUE Strength: Exceptions to [facility] [place]  R Hand General: 4/5                   Plan   Plan  Times per week: 2-3x/wk    AM-PAC Score        AM-[facility] Inpatient Daily Activity Raw Score: 19 (09/01/21 1417)  AM-PAC Inpatient ADL T-Scale Score : 40.22 (09/01/21 1417)  ADL Inpatient CMS 0-100% Score: 42.8 (09/01/21 1417)  ADL Inpatient CMS G-Code Modifier : CK (09/01/21 1417)    Goals  Short term goals  Time Frame for Short term goals: pt will, by discharge  Short term goal 1: complete LB ADLs andtoileting tasks with SBA And set up  Short term goal 2: complete UB ADLs and grooming tasks with mod I  Short term goal 3: increase activitytolerance to 20+ minutes in order toparticipate in daily tasks  Short term goal 4: maintain attention to task for ~8 minutes with 2-3 verbal/tactile cues for redirection  Short term goal 5: dem SBA during functional transfers/functional mobility       Therapy Time   Individual Concurrent Group Co-treatment   Time In 1323         Time Out 1335         Minutes Judi 354, OTR/L

## 2021-09-01 NOTE — PROGRESS NOTES
THE [facility] AT [facility]   Progress Note    Syed Reddy is a 52 y.o. male patient. Hospitalization Day:2      Chief consult reason:     Pancreatitis with esophagitis possible malignancy    Subjective:  Patient seen and examined. Pt is sleeping, has no abdominal pain with deep touch, no facial grimmanacing. Pt states he is feeling much better. Pt is not longer on Cardene drip-transitioned to po meds  Bedside echo completed-results pending  Labs unremarkable. Pt having good urine output    VITALS:  BP (!) 181/115 Comment: Dr. Farzad Herndon, still okay to go off unit no addtl order  Pulse 97   Temp 98.1 °F (36.7 °C) (Oral)   Resp 18   Wt 151 lb 14.4 oz (68.9 kg)   SpO2 98%   BMI 23.79 kg/m²   TEMPERATURE:  Current - Temp: 98.1 °F (36.7 °C); Max - Temp  Av.4 °F (36.9 °C)  Min: 97.9 °F (36.6 °C)  Max: 98.8 °F (37.1 °C)    Physical Assessment:  General appearance: Uncomfortable  Mental Status:  oriented to person, place and time and normal affect  Lungs:  clear to auscultation bilaterally, normal effort  Heart:  regular rate and rhythm, no murmur  Abdomen:  soft, no tenderness nondistended, normal bowel sounds, no masses, hepatomegaly, splenomegaly  Extremities:  no edema, redness, tenderness in the calves  Skin:  no gross lesions, rashes, induration    Data Review:    Labs and Imaging:     CBC:  Recent Labs     21  0227 21  0543 21  0849   WBC 11.5* 7.3 6.0   HGB 17.0 16.9 16.3   MCV 85.0 84.4 84.2   RDW 14.7* 14.3 14.6*   PLT See Reflexed IPF Result 218 201       ANEMIA STUDIES:  No results for input(s): LABIRON, TIBC, FERRITIN, [id number], FOLATE, OCCULTBLD in the last 72 hours.     BMP:  Recent Labs     21  0314 21  0543 21  0849    133* 137   K 3.6* 3.3* 3.8    99 102   CO2 24 22 24   BUN 8 2* 4*   CREATININE 0.88 0.56* 0.60*   GLUCOSE 128* 115* 76   CALCIUM 8.7 8.6 9.1       LFTS:  Recent Labs     21  0227 21  0314 21  0543 09/01/21  0849   ALKPHOS DISREGARD RESULTS, SPECIMEN GROSSLY HEMOLYZED. 107 92 80   ALT DISREGARD RESULTS, SPECIMEN GROSSLY HEMOLYZED. 12 9 7   AST DISREGARD RESULTS, SPECIMEN GROSSLY HEMOLYZED. 19 13 16   BILITOT DISREGARD RESULTS, SPECIMEN GROSSLY HEMOLYZED. 0.57 0.47 0.51   BILIDIR DISREGARD RESULTS, SPECIMEN GROSSLY HEMOLYZED. 0.14  --   --    LABALBU DISREGARD RESULTS, SPECIMEN GROSSLY HEMOLYZED. 3.7 3.3* 3.3*       Amylase/Lipase and Ammonia:  Recent Labs     08/30/21  0314 08/31/21  0543   AMYLASE  --  40   LIPASE 87* 49       Acute Hepatitis Panel:  No results found for: HEPBSAG, HEPCAB, HEPBIGM, HEPAIGM    HCV Genotype:  No results found for: HEPATITISCGENOTYPE    HCV Quantitative:  No results found for: HCVQNT    LIVER WORK UP:    AFP  No results found for: AFP    Alpha 1 antitrypsin   No results found for: A1A    VALERIA  No results found for: VALERIA    AMA  No results found for: MITOAB    ASMA  No results found for: SMOOTHMUSCAB    PT/INR  No results for input(s): PROTIME, INR in the last 72 hours. Cancer Markers:  CEA:  No results for input(s): CEA in the last 72 hours. Ca 125:  No results for input(s):  in the last 72 hours. Ca 19-9:   Invalid input(s):   AFP: No results for input(s): AFP in the last 72 hours. Lactic acid:Invalid input(s): LACTIC ACID    Radiology Review:    No results found. Principal Problem:    Acute pancreatitis  Active Problems:    Gastroesophageal reflux disease without esophagitis    Uncontrolled hypertension    Tobacco abuse    Hypertensive urgency    Cocaine abuse (HCC)    Alcohol abuse    Abdominal pain, epigastric    Esophagitis  Resolved Problems:    * No resolved hospital problems. *       GI Impression:    1. Recurrent acute alcoholic pancreatitis-pain is slightly improved today with conservative management. Triglycerides 92-WNL  2. Hypertensive crisis-currently on Cardene drip  3. Alcohol misuse disorder-no signs of DTs  4.  Recent admission of cocaine

## 2021-09-01 NOTE — PROGRESS NOTES
2200 - patient bp continues to be systolic 299-404'G . PRN pain medication and ativan per CIWA has been given, but bp continue to be high. Notified Brian Woodall an order was placed for Hydralazine 10 mg once.

## 2021-09-02 VITALS
TEMPERATURE: 98.5 F | SYSTOLIC BLOOD PRESSURE: 149 MMHG | RESPIRATION RATE: 22 BRPM | OXYGEN SATURATION: 97 % | HEART RATE: 101 BPM | DIASTOLIC BLOOD PRESSURE: 97 MMHG | BODY MASS INDEX: 23.79 KG/M2 | WEIGHT: 151.9 LBS

## 2021-09-02 NOTE — PLAN OF CARE
Problem: Pain:  Goal: Pain level will decrease  Description: Pain level will decrease  9/1/2021 2342 by Yamilex Iraheta RN  Outcome: Completed  9/1/2021 2009 by Abhijeet Bui RN  Outcome: Ongoing  Goal: Control of acute pain  Description: Control of acute pain  Outcome: Completed  Goal: Control of chronic pain  Description: Control of chronic pain  Outcome: Completed

## 2021-09-02 NOTE — Clinical Note
Patient CIWA score is 12-but he is actively making phone calls and threatening to go AMA. -current dislodged both IVs and is severely agitated. NP notified-will continue to monitor.

## 2021-09-02 NOTE — DISCHARGE SUMMARY
treated per protocol. Patient was found to be hypertensive and UDS was positive for cocaine. Imaging did show findings concerning for thickening of the distal thoracic esophagus likely due to esophagitis although malignancy could not be ruled out. GI was consulted and plan was for patient to get an EGD to further evaluate this. However, patient left AGAINST MEDICAL ADVICE prior to completion of treatment. Risks of leaving AMA were discussed with patient including risk for death/serious injury however patient left anyway. Significant therapeutic interventions:     Significant Diagnostic Studies:   Labs / Micro:  CBC:   Lab Results   Component Value Date    WBC 6.0 09/01/2021    RBC 5.95 09/01/2021    HGB 16.3 09/01/2021    HCT 50.1 09/01/2021    MCV 84.2 09/01/2021    MCH 27.4 09/01/2021    MCHC 32.5 09/01/2021    RDW 14.6 09/01/2021     09/01/2021     BMP:    Lab Results   Component Value Date    GLUCOSE 76 09/01/2021     09/01/2021    K 3.8 09/01/2021     09/01/2021    CO2 24 09/01/2021    ANIONGAP 11 09/01/2021    BUN 4 09/01/2021    CREATININE 0.60 09/01/2021    BUNCRER NOT REPORTED 09/01/2021    CALCIUM 9.1 09/01/2021    LABGLOM >60 09/01/2021    GFRAA >60 09/01/2021    GFR      09/01/2021    GFR NOT REPORTED 09/01/2021        Radiology:  CT ABDOMEN PELVIS W IV CONTRAST Additional Contrast? None    Result Date: 8/30/2021  1. Findings of mild interstitial edematous pancreatitis likely predominantly involving the pancreatic tail. 2. Questionable circumferential wall thickening in the distal thoracic esophagus potentially due to esophagitis, although malignancy could appear similar. Consider further evaluation endoscopy.      XR CHEST PORTABLE    Result Date: 8/30/2021  No acute findings in the chest.       Consultations:    Consults:     Final Specialist Recommendations/Findings:   IP CONSULT TO HOSPITALIST  IP CONSULT TO GI  IP CONSULT TO SOCIAL WORK      The patient was seen and examined on day of discharge and this discharge summary is in conjunction with any daily progress note from day of discharge. Discharge plan:     Disposition: AMA    Physician Follow Up:     No follow-up provider specified. Requiring Further Evaluation/Follow Up POST HOSPITALIZATION/Incidental Findings:       Discharge Medications:      Medication List      ASK your doctor about these medications    Blood Pressure Kit  Use to monitor blood pressure daily and as needed     * Dupixent 300 MG/2ML Sosy injection  Generic drug: dupilumab  INJECT 300MG SUBCUTANEOUSLY EVERY OTHER WEEK     * Dupixent 300 MG/2ML Sosy injection  Generic drug: dupilumab  Inject 600mg SQ at week 0. Begin Maintenance Dose at week 2     * Dupixent 300 MG/2ML Sosy injection  Generic drug: dupilumab  Inject 300mg SQ at every 2 weeks     gabapentin 400 MG capsule  Commonly known as: NEURONTIN  TAKE 1 CAPSULE BY MOUTH THREE TIMES DAILY AS DIRECTED     lisinopril 10 MG tablet  Commonly known as: PRINIVIL;ZESTRIL  Take 1 tablet by mouth daily     pantoprazole 40 MG tablet  Commonly known as: PROTONIX  Take 1 tablet by mouth every morning (before breakfast)     simvastatin 40 MG tablet  Commonly known as: ZOCOR  Take 1 tablet by mouth nightly     tacrolimus 0.03 % ointment  Commonly known as: PROTOPIC  Apply topically 2 times daily. triamcinolone 0.1 % ointment  Commonly known as: KENALOG  Apply to rash twice daily (not face, armpit or groin)         * This list has 3 medication(s) that are the same as other medications prescribed for you. Read the directions carefully, and ask your doctor or other care provider to review them with you. No discharge procedures on file. Time Spent on discharge is  36 mins in patient examination, evaluation, counseling as well as medication reconciliation, prescriptions for required medications, discharge plan and follow up.     Electronically signed by   Katie Castillo DO  9/2/2021  8:26 AM      Thank you LATOSHA Terry - ELIU for the opportunity to be involved in this patient's care.

## 2021-09-02 NOTE — PROGRESS NOTES
Patient re-evaluated for the CIWA protocol. Patient was due for Ativan at this time. Patient states I'm going home. I called my wife and she is coming to get me. Explained to the patient all the risks leaving against medical advice. Patient states he doesn't care. Patients mother called by this writer to have her talk to the patient to get him to stay. 22:45 Elvie Conner perfect served and informed that the patient wanted to go AMA. Elvie Conner called the floor and spoke with this writer about the mentation of the patient. The patient is alert and oriented x3. Elvie Conner states to explain the risks of leaving. to the patient and if he still wants to go have him sign the AMA form. Upon entering the patients room the patient had pulled out both IVs and removed his telemetry. Again this writer encouraged the patient to stay for treatment. Patient refused. 23:00 The patients wife is at the bedside and attempts to get the patient to stay. Patient refused and got dressed. Wife asked patient one more time to stay and patient refused. AMA form signed by the patient at this time. Patient began dancing in the room and said lets go. Wife calls a cab at this time. Patient taken by wheelchair to the lobby to wait for their cab.  23:40 Patients brother calls and asks if he can bring the patient back. This writer informed him at this point he would have to go through the ER.

## 2021-10-14 DIAGNOSIS — K21.9 GASTROESOPHAGEAL REFLUX DISEASE WITHOUT ESOPHAGITIS: ICD-10-CM

## 2021-10-14 DIAGNOSIS — M51.06 INTERVERTEBRAL LUMBAR DISC DISORDER WITH MYELOPATHY, LUMBAR REGION: Chronic | ICD-10-CM

## 2021-10-15 RX ORDER — PANTOPRAZOLE SODIUM 40 MG/1
40 TABLET, DELAYED RELEASE ORAL
Qty: 30 TABLET | Refills: 0 | Status: SHIPPED | OUTPATIENT
Start: 2021-10-15

## 2021-10-15 RX ORDER — GABAPENTIN 400 MG/1
CAPSULE ORAL
Qty: 90 CAPSULE | Refills: 0 | OUTPATIENT
Start: 2021-10-15

## 2021-10-15 NOTE — TELEPHONE ENCOUNTER
Refill request for Gabapentin and Pantoprazole. If appropriate please send medication(s) to patients pharmacy. Next appt: 10/21/21 - [facility], [place]   Last appt: 10/30/2020 -- Has missed multiple appointments since this visit. Health Maintenance   Topic Date Due    Hepatitis C screen  Never done    COVID-19 Vaccine (1) Never done    Pneumococcal 0-64 years Vaccine (2 of 4 - PCV13) 10/12/2017    Flu vaccine (1) 09/01/2021    A1C test (Diabetic or Prediabetic)  12/09/2021    Lipid screen  08/31/2022    Potassium monitoring  09/01/2022    Creatinine monitoring  09/01/2022    DTaP/Tdap/Td vaccine (2 - Td or Tdap) 10/08/2026    Colon cancer screen colonoscopy  09/26/2028    HIV screen  Completed    Hepatitis A vaccine  Aged Out    Hepatitis B vaccine  Aged Out    Hib vaccine  Aged Out    Meningococcal (ACWY) vaccine  Aged Out       Hemoglobin A1C (%)   Date Value   12/09/2020 5.7   05/08/2019 5.1   05/21/2018 5.8             ( goal A1C is < 7)   Microalb/Crt.  Ratio (mcg/mg creat)   Date Value   05/08/2018 4     LDL Cholesterol (mg/dL)   Date Value   08/31/2021 81       (goal LDL is <100)   AST (U/L)   Date Value   09/01/2021 16     ALT (U/L)   Date Value   09/01/2021 7     BUN (mg/dL)   Date Value   09/01/2021 4 (L)     BP Readings from Last 3 Encounters:   09/01/21 (!) 149/97   10/12/20 (!) 167/102   08/30/20 (!) 146/101          (goal 120/80)          Patient Active Problem List:     Depression     Intervertebral lumbar disc disorder with myelopathy, lumbar region     Gastroesophageal reflux disease without esophagitis     Uncontrolled hypertension     Pure hypercholesterolemia     Prediabetes     Tobacco abuse     Acute pancreatitis     Alcohol use, unspecified with unspecified alcohol-induced disorder ([address]     Acute pancreatitis without infection or necrosis     Alcohol-induced acute pancreatitis     Hypertensive urgency     Cocaine abuse ([address]     Alcohol abuse     Abdominal pain, epigastric Esophagitis

## 2022-01-21 ENCOUNTER — HOSPITAL ENCOUNTER (OUTPATIENT)
Age: 52
Setting detail: SPECIMEN
Discharge: HOME | End: 2022-01-21

## 2022-01-22 LAB
SARS-COV-2: ABNORMAL
SARS-COV-2: DETECTED
SOURCE: ABNORMAL

## 2022-06-08 ENCOUNTER — HOSPITAL ENCOUNTER (INPATIENT)
Age: 52
LOS: 1 days | Discharge: HOME | DRG: 720 | End: 2022-06-09
Attending: EMERGENCY MEDICINE | Admitting: INTERNAL MEDICINE
Payer: MEDICAID

## 2022-06-08 ENCOUNTER — APPOINTMENT (OUTPATIENT)
Dept: CT IMAGING | Age: 52
DRG: 720 | End: 2022-06-08
Payer: MEDICAID

## 2022-06-08 DIAGNOSIS — N17.9 AKI (ACUTE KIDNEY INJURY) (HCC): Primary | ICD-10-CM

## 2022-06-08 DIAGNOSIS — K85.20 ALCOHOL-INDUCED ACUTE PANCREATITIS, UNSPECIFIED COMPLICATION STATUS: ICD-10-CM

## 2022-06-08 DIAGNOSIS — E87.6 HYPOKALEMIA: ICD-10-CM

## 2022-06-08 DIAGNOSIS — I10 ESSENTIAL HYPERTENSION: ICD-10-CM

## 2022-06-08 PROBLEM — L20.9 ATOPIC DERMATITIS: Status: ACTIVE | Noted: 2022-06-08

## 2022-06-08 PROBLEM — A41.9 SEPSIS (HCC): Status: ACTIVE | Noted: 2022-06-08

## 2022-06-08 LAB
-: ABNORMAL
ABSOLUTE EOS #: 0.1 K/UL (ref 0–0.44)
ABSOLUTE IMMATURE GRANULOCYTE: 0.07 K/UL (ref 0–0.3)
ABSOLUTE LYMPH #: 2.54 K/UL (ref 1.1–3.7)
ABSOLUTE MONO #: 1.4 K/UL (ref 0.1–1.2)
ALBUMIN SERPL-MCNC: 4.5 G/DL (ref 3.5–5.2)
ALBUMIN/GLOBULIN RATIO: 1.3 (ref 1–2.5)
ALP BLD-CCNC: 121 U/L (ref 40–129)
ALT SERPL-CCNC: 28 U/L (ref 5–41)
AMPHETAMINE SCREEN URINE: NEGATIVE
ANION GAP SERPL CALCULATED.3IONS-SCNC: 15 MMOL/L (ref 9–17)
AST SERPL-CCNC: 40 U/L
BACTERIA: ABNORMAL
BARBITURATE SCREEN URINE: NEGATIVE
BASOPHILS # BLD: 1 % (ref 0–2)
BASOPHILS ABSOLUTE: 0.09 K/UL (ref 0–0.2)
BENZODIAZEPINE SCREEN, URINE: NEGATIVE
BILIRUB SERPL-MCNC: 0.81 MG/DL (ref 0.3–1.2)
BILIRUBIN URINE: ABNORMAL
BUN BLDV-MCNC: 10 MG/DL (ref 6–20)
CALCIUM SERPL-MCNC: 9.6 MG/DL (ref 8.6–10.4)
CANNABINOID SCREEN URINE: POSITIVE
CASTS UA: ABNORMAL /LPF (ref 0–2)
CASTS UA: ABNORMAL /LPF (ref 0–2)
CHLORIDE BLD-SCNC: 91 MMOL/L (ref 98–107)
CHOLESTEROL/HDL RATIO: 1.8
CHOLESTEROL: 186 MG/DL
CO2: 28 MMOL/L (ref 20–31)
COCAINE METABOLITE, URINE: POSITIVE
COLOR: ABNORMAL
CREAT SERPL-MCNC: 1.67 MG/DL (ref 0.7–1.2)
EOSINOPHILS RELATIVE PERCENT: 1 % (ref 1–4)
EPITHELIAL CELLS UA: ABNORMAL /HPF (ref 0–5)
ESTIMATED AVERAGE GLUCOSE: 126 MG/DL
ETHANOL PERCENT: <0.01 %
ETHANOL: <10 MG/DL
GFR AFRICAN AMERICAN: 53 ML/MIN
GFR NON-AFRICAN AMERICAN: 44 ML/MIN
GFR SERPL CREATININE-BSD FRML MDRD: ABNORMAL ML/MIN/{1.73_M2}
GLUCOSE BLD-MCNC: 116 MG/DL (ref 70–99)
GLUCOSE URINE: NEGATIVE
HBA1C MFR BLD: 6 % (ref 4–6)
HCT VFR BLD CALC: 51 % (ref 40.7–50.3)
HDLC SERPL-MCNC: 102 MG/DL
HEMOGLOBIN: 16.8 G/DL (ref 13–17)
IMMATURE GRANULOCYTES: 1 %
KETONES, URINE: ABNORMAL
LACTIC ACID, WHOLE BLOOD: 1.2 MMOL/L (ref 0.7–2.1)
LDL CHOLESTEROL: 65 MG/DL (ref 0–130)
LEUKOCYTE ESTERASE, URINE: ABNORMAL
LIPASE: 63 U/L (ref 13–60)
LYMPHOCYTES # BLD: 19 % (ref 24–43)
MCH RBC QN AUTO: 28.6 PG (ref 25.2–33.5)
MCHC RBC AUTO-ENTMCNC: 32.9 G/DL (ref 28.4–34.8)
MCV RBC AUTO: 86.9 FL (ref 82.6–102.9)
METHADONE SCREEN, URINE: NEGATIVE
MONOCYTES # BLD: 11 % (ref 3–12)
NITRITE, URINE: NEGATIVE
NRBC AUTOMATED: 0 PER 100 WBC
OPIATES, URINE: NEGATIVE
OXYCODONE SCREEN URINE: NEGATIVE
PDW BLD-RTO: 13.7 % (ref 11.8–14.4)
PH UA: 5.5 (ref 5–8)
PHENCYCLIDINE, URINE: NEGATIVE
PLATELET # BLD: 259 K/UL (ref 138–453)
PMV BLD AUTO: 10.3 FL (ref 8.1–13.5)
POTASSIUM SERPL-SCNC: 3.2 MMOL/L (ref 3.7–5.3)
PROTEIN UA: ABNORMAL
RBC # BLD: 5.87 M/UL (ref 4.21–5.77)
RBC UA: ABNORMAL /HPF (ref 0–2)
SEG NEUTROPHILS: 67 % (ref 36–65)
SEGMENTED NEUTROPHILS ABSOLUTE COUNT: 9.08 K/UL (ref 1.5–8.1)
SODIUM BLD-SCNC: 134 MMOL/L (ref 135–144)
SPECIFIC GRAVITY UA: 1.02 (ref 1–1.03)
TEST INFORMATION: ABNORMAL
TOTAL PROTEIN: 8 G/DL (ref 6.4–8.3)
TRIGL SERPL-MCNC: 95 MG/DL
TURBIDITY: ABNORMAL
URINE HGB: NEGATIVE
UROBILINOGEN, URINE: NORMAL
WBC # BLD: 13.3 K/UL (ref 3.5–11.3)
WBC UA: ABNORMAL /HPF (ref 0–5)

## 2022-06-08 PROCEDURE — 87086 URINE CULTURE/COLONY COUNT: CPT

## 2022-06-08 PROCEDURE — G0378 HOSPITAL OBSERVATION PER HR: HCPCS

## 2022-06-08 PROCEDURE — 80061 LIPID PANEL: CPT

## 2022-06-08 PROCEDURE — 36415 COLL VENOUS BLD VENIPUNCTURE: CPT

## 2022-06-08 PROCEDURE — 6360000002 HC RX W HCPCS: Performed by: STUDENT IN AN ORGANIZED HEALTH CARE EDUCATION/TRAINING PROGRAM

## 2022-06-08 PROCEDURE — 2580000003 HC RX 258

## 2022-06-08 PROCEDURE — 2580000003 HC RX 258: Performed by: STUDENT IN AN ORGANIZED HEALTH CARE EDUCATION/TRAINING PROGRAM

## 2022-06-08 PROCEDURE — 83605 ASSAY OF LACTIC ACID: CPT

## 2022-06-08 PROCEDURE — 1200000000 HC SEMI PRIVATE

## 2022-06-08 PROCEDURE — 74176 CT ABD & PELVIS W/O CONTRAST: CPT

## 2022-06-08 PROCEDURE — 96372 THER/PROPH/DIAG INJ SC/IM: CPT

## 2022-06-08 PROCEDURE — 87040 BLOOD CULTURE FOR BACTERIA: CPT

## 2022-06-08 PROCEDURE — 99223 1ST HOSP IP/OBS HIGH 75: CPT | Performed by: INTERNAL MEDICINE

## 2022-06-08 PROCEDURE — 2500000003 HC RX 250 WO HCPCS

## 2022-06-08 PROCEDURE — 81001 URINALYSIS AUTO W/SCOPE: CPT

## 2022-06-08 PROCEDURE — 6360000002 HC RX W HCPCS

## 2022-06-08 PROCEDURE — 94760 N-INVAS EAR/PLS OXIMETRY 1: CPT

## 2022-06-08 PROCEDURE — 6370000000 HC RX 637 (ALT 250 FOR IP)

## 2022-06-08 PROCEDURE — 85025 COMPLETE CBC W/AUTO DIFF WBC: CPT

## 2022-06-08 PROCEDURE — 96361 HYDRATE IV INFUSION ADD-ON: CPT

## 2022-06-08 PROCEDURE — 96374 THER/PROPH/DIAG INJ IV PUSH: CPT

## 2022-06-08 PROCEDURE — G0480 DRUG TEST DEF 1-7 CLASSES: HCPCS

## 2022-06-08 PROCEDURE — 83690 ASSAY OF LIPASE: CPT

## 2022-06-08 PROCEDURE — 99285 EMERGENCY DEPT VISIT HI MDM: CPT

## 2022-06-08 PROCEDURE — 96375 TX/PRO/DX INJ NEW DRUG ADDON: CPT

## 2022-06-08 PROCEDURE — 80307 DRUG TEST PRSMV CHEM ANLYZR: CPT

## 2022-06-08 PROCEDURE — 80053 COMPREHEN METABOLIC PANEL: CPT

## 2022-06-08 PROCEDURE — 83036 HEMOGLOBIN GLYCOSYLATED A1C: CPT

## 2022-06-08 PROCEDURE — A4216 STERILE WATER/SALINE, 10 ML: HCPCS

## 2022-06-08 PROCEDURE — C9113 INJ PANTOPRAZOLE SODIUM, VIA: HCPCS

## 2022-06-08 RX ORDER — ACETAMINOPHEN 650 MG/1
650 SUPPOSITORY RECTAL EVERY 6 HOURS PRN
Status: DISCONTINUED | OUTPATIENT
Start: 2022-06-08 | End: 2022-06-09 | Stop reason: HOSPADM

## 2022-06-08 RX ORDER — SODIUM CHLORIDE 0.9 % (FLUSH) 0.9 %
5-40 SYRINGE (ML) INJECTION PRN
Status: DISCONTINUED | OUTPATIENT
Start: 2022-06-08 | End: 2022-06-09 | Stop reason: HOSPADM

## 2022-06-08 RX ORDER — MORPHINE SULFATE 4 MG/ML
4 INJECTION, SOLUTION INTRAMUSCULAR; INTRAVENOUS ONCE
Status: COMPLETED | OUTPATIENT
Start: 2022-06-08 | End: 2022-06-08

## 2022-06-08 RX ORDER — LISINOPRIL 10 MG/1
10 TABLET ORAL DAILY
Status: DISCONTINUED | OUTPATIENT
Start: 2022-06-08 | End: 2022-06-09 | Stop reason: HOSPADM

## 2022-06-08 RX ORDER — SODIUM CHLORIDE 9 MG/ML
INJECTION, SOLUTION INTRAVENOUS CONTINUOUS
Status: DISCONTINUED | OUTPATIENT
Start: 2022-06-08 | End: 2022-06-09

## 2022-06-08 RX ORDER — SODIUM CHLORIDE 0.9 % (FLUSH) 0.9 %
5-40 SYRINGE (ML) INJECTION EVERY 12 HOURS SCHEDULED
Status: DISCONTINUED | OUTPATIENT
Start: 2022-06-08 | End: 2022-06-09 | Stop reason: HOSPADM

## 2022-06-08 RX ORDER — POTASSIUM CHLORIDE 7.45 MG/ML
10 INJECTION INTRAVENOUS PRN
Status: DISCONTINUED | OUTPATIENT
Start: 2022-06-08 | End: 2022-06-09

## 2022-06-08 RX ORDER — HYDRALAZINE HYDROCHLORIDE 20 MG/ML
10 INJECTION INTRAMUSCULAR; INTRAVENOUS EVERY 6 HOURS PRN
Status: DISCONTINUED | OUTPATIENT
Start: 2022-06-08 | End: 2022-06-09 | Stop reason: HOSPADM

## 2022-06-08 RX ORDER — MORPHINE SULFATE 4 MG/ML
4 INJECTION, SOLUTION INTRAMUSCULAR; INTRAVENOUS
Status: DISCONTINUED | OUTPATIENT
Start: 2022-06-08 | End: 2022-06-09 | Stop reason: HOSPADM

## 2022-06-08 RX ORDER — ONDANSETRON 4 MG/1
4 TABLET, ORALLY DISINTEGRATING ORAL EVERY 8 HOURS PRN
Status: DISCONTINUED | OUTPATIENT
Start: 2022-06-08 | End: 2022-06-09 | Stop reason: HOSPADM

## 2022-06-08 RX ORDER — MORPHINE SULFATE 2 MG/ML
2 INJECTION, SOLUTION INTRAMUSCULAR; INTRAVENOUS
Status: DISCONTINUED | OUTPATIENT
Start: 2022-06-08 | End: 2022-06-09 | Stop reason: HOSPADM

## 2022-06-08 RX ORDER — SODIUM CHLORIDE 9 MG/ML
INJECTION, SOLUTION INTRAVENOUS PRN
Status: DISCONTINUED | OUTPATIENT
Start: 2022-06-08 | End: 2022-06-09 | Stop reason: HOSPADM

## 2022-06-08 RX ORDER — POLYETHYLENE GLYCOL 3350 17 G/17G
17 POWDER, FOR SOLUTION ORAL DAILY PRN
Status: DISCONTINUED | OUTPATIENT
Start: 2022-06-08 | End: 2022-06-09 | Stop reason: HOSPADM

## 2022-06-08 RX ORDER — ONDANSETRON 2 MG/ML
4 INJECTION INTRAMUSCULAR; INTRAVENOUS EVERY 6 HOURS PRN
Status: DISCONTINUED | OUTPATIENT
Start: 2022-06-08 | End: 2022-06-09 | Stop reason: HOSPADM

## 2022-06-08 RX ORDER — POTASSIUM CHLORIDE 7.45 MG/ML
10 INJECTION INTRAVENOUS PRN
Status: DISCONTINUED | OUTPATIENT
Start: 2022-06-08 | End: 2022-06-09 | Stop reason: HOSPADM

## 2022-06-08 RX ORDER — HEPARIN SODIUM 5000 [USP'U]/ML
5000 INJECTION, SOLUTION INTRAVENOUS; SUBCUTANEOUS EVERY 8 HOURS SCHEDULED
Status: DISCONTINUED | OUTPATIENT
Start: 2022-06-08 | End: 2022-06-08

## 2022-06-08 RX ORDER — ONDANSETRON 4 MG/1
4 TABLET, ORALLY DISINTEGRATING ORAL EVERY 8 HOURS PRN
Status: DISCONTINUED | OUTPATIENT
Start: 2022-06-08 | End: 2022-06-09

## 2022-06-08 RX ORDER — MAGNESIUM SULFATE IN WATER 40 MG/ML
2000 INJECTION, SOLUTION INTRAVENOUS PRN
Status: DISCONTINUED | OUTPATIENT
Start: 2022-06-08 | End: 2022-06-09 | Stop reason: HOSPADM

## 2022-06-08 RX ORDER — ATORVASTATIN CALCIUM 20 MG/1
20 TABLET, FILM COATED ORAL DAILY
Status: DISCONTINUED | OUTPATIENT
Start: 2022-06-08 | End: 2022-06-09 | Stop reason: HOSPADM

## 2022-06-08 RX ORDER — HEPARIN SODIUM 5000 [USP'U]/ML
5000 INJECTION, SOLUTION INTRAVENOUS; SUBCUTANEOUS EVERY 8 HOURS SCHEDULED
Status: DISCONTINUED | OUTPATIENT
Start: 2022-06-08 | End: 2022-06-09 | Stop reason: HOSPADM

## 2022-06-08 RX ORDER — POTASSIUM CHLORIDE 20 MEQ/1
40 TABLET, EXTENDED RELEASE ORAL PRN
Status: DISCONTINUED | OUTPATIENT
Start: 2022-06-08 | End: 2022-06-09 | Stop reason: HOSPADM

## 2022-06-08 RX ORDER — ONDANSETRON 2 MG/ML
4 INJECTION INTRAMUSCULAR; INTRAVENOUS EVERY 6 HOURS PRN
Status: DISCONTINUED | OUTPATIENT
Start: 2022-06-08 | End: 2022-06-09

## 2022-06-08 RX ORDER — TACROLIMUS 0.3 MG/G
OINTMENT TOPICAL 2 TIMES DAILY
Status: DISCONTINUED | OUTPATIENT
Start: 2022-06-08 | End: 2022-06-09 | Stop reason: HOSPADM

## 2022-06-08 RX ORDER — POTASSIUM CHLORIDE 20 MEQ/1
40 TABLET, EXTENDED RELEASE ORAL PRN
Status: DISCONTINUED | OUTPATIENT
Start: 2022-06-08 | End: 2022-06-09

## 2022-06-08 RX ORDER — POTASSIUM CHLORIDE 7.45 MG/ML
40 INJECTION INTRAVENOUS ONCE
Status: COMPLETED | OUTPATIENT
Start: 2022-06-08 | End: 2022-06-08

## 2022-06-08 RX ORDER — GABAPENTIN 400 MG/1
400 CAPSULE ORAL 3 TIMES DAILY
Status: DISCONTINUED | OUTPATIENT
Start: 2022-06-08 | End: 2022-06-09 | Stop reason: HOSPADM

## 2022-06-08 RX ORDER — AMLODIPINE BESYLATE 5 MG/1
5 TABLET ORAL DAILY
Status: DISCONTINUED | OUTPATIENT
Start: 2022-06-08 | End: 2022-06-09 | Stop reason: HOSPADM

## 2022-06-08 RX ORDER — ACETAMINOPHEN 325 MG/1
650 TABLET ORAL EVERY 6 HOURS PRN
Status: DISCONTINUED | OUTPATIENT
Start: 2022-06-08 | End: 2022-06-09 | Stop reason: HOSPADM

## 2022-06-08 RX ORDER — 0.9 % SODIUM CHLORIDE 0.9 %
1000 INTRAVENOUS SOLUTION INTRAVENOUS ONCE
Status: COMPLETED | OUTPATIENT
Start: 2022-06-08 | End: 2022-06-08

## 2022-06-08 RX ADMIN — FOLIC ACID: 5 INJECTION, SOLUTION INTRAMUSCULAR; INTRAVENOUS; SUBCUTANEOUS at 13:34

## 2022-06-08 RX ADMIN — SODIUM CHLORIDE, PRESERVATIVE FREE 10 ML: 5 INJECTION INTRAVENOUS at 20:57

## 2022-06-08 RX ADMIN — POTASSIUM CHLORIDE 40 MEQ: 7.46 INJECTION, SOLUTION INTRAVENOUS at 07:34

## 2022-06-08 RX ADMIN — SODIUM CHLORIDE, PRESERVATIVE FREE 10 ML: 5 INJECTION INTRAVENOUS at 13:28

## 2022-06-08 RX ADMIN — GABAPENTIN 400 MG: 400 CAPSULE ORAL at 13:28

## 2022-06-08 RX ADMIN — TRIAMCINOLONE ACETONIDE: 1 OINTMENT TOPICAL at 20:58

## 2022-06-08 RX ADMIN — SODIUM CHLORIDE, PRESERVATIVE FREE 40 MG: 5 INJECTION INTRAVENOUS at 13:28

## 2022-06-08 RX ADMIN — HEPARIN SODIUM 5000 UNITS: 5000 INJECTION INTRAVENOUS; SUBCUTANEOUS at 21:51

## 2022-06-08 RX ADMIN — SODIUM CHLORIDE: 9 INJECTION, SOLUTION INTRAVENOUS at 19:18

## 2022-06-08 RX ADMIN — HEPARIN SODIUM 5000 UNITS: 5000 INJECTION INTRAVENOUS; SUBCUTANEOUS at 13:30

## 2022-06-08 RX ADMIN — POTASSIUM CHLORIDE 10 MEQ: 7.46 INJECTION, SOLUTION INTRAVENOUS at 11:27

## 2022-06-08 RX ADMIN — AMLODIPINE BESYLATE 5 MG: 5 TABLET ORAL at 23:39

## 2022-06-08 RX ADMIN — MORPHINE SULFATE 4 MG: 4 INJECTION INTRAVENOUS at 07:03

## 2022-06-08 RX ADMIN — SODIUM CHLORIDE 1000 ML: 9 INJECTION, SOLUTION INTRAVENOUS at 07:03

## 2022-06-08 RX ADMIN — SODIUM CHLORIDE, PRESERVATIVE FREE 10 ML: 5 INJECTION INTRAVENOUS at 13:27

## 2022-06-08 RX ADMIN — MORPHINE SULFATE 2 MG: 2 INJECTION, SOLUTION INTRAMUSCULAR; INTRAVENOUS at 21:02

## 2022-06-08 RX ADMIN — MORPHINE SULFATE 4 MG: 4 INJECTION INTRAVENOUS at 11:24

## 2022-06-08 RX ADMIN — GABAPENTIN 400 MG: 400 CAPSULE ORAL at 20:56

## 2022-06-08 RX ADMIN — SODIUM CHLORIDE: 9 INJECTION, SOLUTION INTRAVENOUS at 11:25

## 2022-06-08 RX ADMIN — CEFTRIAXONE SODIUM 1000 MG: 1 INJECTION, POWDER, FOR SOLUTION INTRAMUSCULAR; INTRAVENOUS at 19:36

## 2022-06-08 ASSESSMENT — PAIN - FUNCTIONAL ASSESSMENT
PAIN_FUNCTIONAL_ASSESSMENT: ACTIVITIES ARE NOT PREVENTED
PAIN_FUNCTIONAL_ASSESSMENT: PREVENTS OR INTERFERES SOME ACTIVE ACTIVITIES AND ADLS

## 2022-06-08 ASSESSMENT — PAIN DESCRIPTION - LOCATION
LOCATION: ABDOMEN

## 2022-06-08 ASSESSMENT — ENCOUNTER SYMPTOMS
NAUSEA: 1
SHORTNESS OF BREATH: 0
RHINORRHEA: 0
EYE REDNESS: 0
TROUBLE SWALLOWING: 0
VOMITING: 1
DIARRHEA: 0
ABDOMINAL PAIN: 1
COUGH: 1
EYE PAIN: 0

## 2022-06-08 ASSESSMENT — PAIN SCALES - GENERAL
PAINLEVEL_OUTOF10: 1
PAINLEVEL_OUTOF10: 1
PAINLEVEL_OUTOF10: 10
PAINLEVEL_OUTOF10: 5

## 2022-06-08 ASSESSMENT — PAIN DESCRIPTION - DESCRIPTORS
DESCRIPTORS: BURNING
DESCRIPTORS: BURNING

## 2022-06-08 ASSESSMENT — PAIN DESCRIPTION - ORIENTATION
ORIENTATION: MID
ORIENTATION: MID

## 2022-06-08 NOTE — ED PROVIDER NOTES
[facility] ED  Emergency Department Encounter  Emergency Medicine Resident     Pt Name: Filipe Riggins  MRN: [id number]  [place] 1970  Date of evaluation: 6/8/22  PCP:  Amber Patel MD    [address] [place] [address]       Chief Complaint   Patient presents with    Abdominal Pain       [facility]  (Location/Symptom, Timing/Onset, Context/Setting, Quality, Duration, Modifying Factors,Severity.)      Filipe Riggins is a 54 y. o.yo male who presents with epigastric amish pain, nausea, vomiting. Patient states has had pancreatitis in the past and this feels very similar to when he had pancreatitis previously. Has required admission for his pancreatitis previously. States symptoms began on Saturday. States he has been unable to eat or drink anything since then. Has not tried thing at home for the pain. States he is a daily drinker, drinks 4 beers a day. Has not had a thing to drink since Saturday. States he has never gone through alcohol withdrawal in the past.  Denies any fevers. Denies any diarrhea. States he has had a mild cough and some congestion. Denies any other sick contacts. PAST MEDICAL / SURGICAL / SOCIAL / FAMILY HISTORY      has a past medical history of Arthritis, Chronic sinusitis, Eczema, Environmental allergies, GERD (gastroesophageal reflux disease), Hyperlipidemia, Hypertension, and Snores. has a past surgical history that includes Upper gastrointestinal endoscopy and Colonoscopy.      Social History     Socioeconomic History    Marital status: Legally      Spouse name: Not on file    Number of children: Not on file    Years of education: Not on file    Highest education level: Not on file   Occupational History    Not on file   Tobacco Use    Smoking status: Current Every Day Smoker     Packs/day: 0.50     Years: 30.00     Pack years: 15.00     Types: Cigarettes    Smokeless tobacco: Never Used    Tobacco comment: 6-10 per day Substance and Sexual Activity    Alcohol use: Yes     Alcohol/week: 3.0 standard drinks     Types: 3 Cans of beer per week     Comment: 3 24 oz cans of beer daily    Drug use: No    Sexual activity: Not on file   Other Topics Concern    Not on file   Social History Narrative    Not on file     Social Determinants of Health     Financial Resource Strain:     Difficulty of Paying Living Expenses: Not on file   Food Insecurity:     Worried About Running Out of Food in the Last Year: Not on file    Abhi of Food in the Last Year: Not on file   Transportation Needs:     Lack of Transportation (Medical): Not on file    Lack of Transportation (Non-Medical): Not on file   Physical Activity:     Days of Exercise per Week: Not on file    Minutes of Exercise per Session: Not on file   Stress:     Feeling of Stress : Not on file   Social Connections:     Frequency of Communication with Friends and Family: Not on file    Frequency of Social Gatherings with Friends and Family: Not on file    Attends Spiritism Services: Not on file    Active Member of [address] [place] [address] or Organizations: Not on file    Attends Club or Organization Meetings: Not on file    Marital Status: Not on file   Intimate Partner Violence:     Fear of Current or Ex-Partner: Not on file    Emotionally Abused: Not on file    Physically Abused: Not on file    Sexually Abused: Not on file   Housing Stability:     Unable to Pay for Housing in the Last Year: Not on file    Number of [place] in the Last Year: Not on file    Unstable Housing in the Last Year: Not on file       Family History   Problem Relation Age of Onset    Cancer Paternal Grandmother     High Blood Pressure Mother         Allergies:  Patient has no known allergies. Home Medications:  Prior to Admission medications    Medication Sig Start Date End Date Taking?  Authorizing Provider   pantoprazole (PROTONIX) 40 MG tablet TAKE 1 TABLET BY MOUTH EVERY MORNING (BEFORE INITIAL VITALS:   Vitals:    06/08/22 0643 06/08/22 0647 06/08/22 0715 06/08/22 0756   BP: (!) 177/109 (!) 172/112 (!) 157/96    Pulse: (!) 103 100 93    Resp: 15 15 10    Temp:       TempSrc:       SpO2: 98% 99% 96%    Weight:    158 lb (71.7 kg)   Height:    5' 7\" (1.702 m)         Physical Exam  Constitutional:       General: He is not in acute distress. Appearance: Normal appearance. Comments: Appears older than stated age. Appears uncomfortable   HENT:      Head: Normocephalic and atraumatic. Right Ear: External ear normal.      Left Ear: External ear normal.      Nose: Nose normal.      Mouth/Throat:      Mouth: Mucous membranes are dry. Eyes:      General:         Right eye: No discharge. Left eye: No discharge. Extraocular Movements: Extraocular movements intact. Pupils: Pupils are equal, round, and reactive to light. Cardiovascular:      Rate and Rhythm: Normal rate and regular rhythm. Pulses: Normal pulses. Heart sounds: No murmur heard. Pulmonary:      Effort: Pulmonary effort is normal. No respiratory distress. Breath sounds: Normal breath sounds. Abdominal:      Palpations: Abdomen is soft. Comments: Abdomen soft, nondistended. Moderately tender to palpation in the epigastric region. No rebound or guarding. Musculoskeletal:      Cervical back: Normal range of motion. No rigidity. Comments: Moving all 4 extremities   Skin:     General: Skin is warm. Capillary Refill: Capillary refill takes less than 2 seconds. Neurological:      General: No focal deficit present. Mental Status: He is alert and oriented to person, place, and time. Cranial Nerves: No cranial nerve deficit.    Psychiatric:         Mood and Affect: Mood normal.         Behavior: Behavior normal.         DIFFERENTIAL  DIAGNOSIS     PLAN (LABS / IMAGING / EKG):  Orders Placed This Encounter   Procedures    Culture, Urine    CBC with Auto Differential  Lipase    Urinalysis with Reflex to Culture    Comprehensive Metabolic Panel    Microscopic Urinalysis    Inpatient consult to Internal Medicine       MEDICATIONS ORDERED:  Orders Placed This Encounter   Medications    0.9 % sodium chloride bolus    morphine injection 4 mg    potassium chloride 10 mEq/100 mL IVPB (Peripheral Line)       DDX: Pancreatitis, SBO, gastritis, duodenitis    Initial MDM/Plan: 54 y.o. male who presents with epigastric abdominal pain, nausea, vomiting. Is an alcoholic and has a history of alcoholic pancreatitis. Patient appears uncomfortable initial evaluation, afebrile, vital signs stable. Abdomen tender in the epigastric region. Will obtain laboratory work-up. Will provide fluids and pain control. Will reassess.     DIAGNOSTIC RESULTS / EMERGENCYDEPARTMENT COURSE / MDM     LABS:  Labs Reviewed   CBC WITH AUTO DIFFERENTIAL - Abnormal; Notable for the following components:       Result Value    WBC 13.3 (*)     RBC 5.87 (*)     Hematocrit 51.0 (*)     Seg Neutrophils 67 (*)     Lymphocytes 19 (*)     Immature Granulocytes 1 (*)     Segs Absolute 9.08 (*)     Absolute Mono # 1.40 (*)     All other components within normal limits   LIPASE - Abnormal; Notable for the following components:    Lipase 63 (*)     All other components within normal limits   URINALYSIS WITH REFLEX TO CULTURE - Abnormal; Notable for the following components:    Color, UA Dark Yellow (*)     Turbidity UA Cloudy (*)     Bilirubin Urine NEGATIVE  Verified by ictotest. (*)     Ketones, Urine SMALL (*)     Protein, UA 2+ (*)     Leukocyte Esterase, Urine TRACE (*)     All other components within normal limits   COMPREHENSIVE METABOLIC PANEL - Abnormal; Notable for the following components:    Glucose 116 (*)     CREATININE 1.67 (*)     Sodium 134 (*)     Potassium 3.2 (*)     Chloride 91 (*)     AST 40 (*)     GFR Non- 44 (*)     GFR  53 (*)     All other components within normal limits   MICROSCOPIC URINALYSIS - Abnormal; Notable for the following components:    Bacteria, UA FEW (*)     All other components within normal limits   CULTURE, URINE         RADIOLOGY:  No results found. EKG  None    All EKG's are interpreted by the Emergency Department Physicianwho either signs or Co-signs this chart in the absence of a cardiologist.    EMERGENCY DEPARTMENT COURSE:  ED Course as of 06/08/22 0823   Wed Jun 08, 2022   0658 WBC(!): 13.3 [AB]   0659 Hemoglobin Quant: 16.8 [AB]   0713 Lipase(!): 63  Mildly elevated [AB]   0714 Creatinine(!): 1.67  HANNA [AB]   0714 Potassium(!): 3.2  Will replace [AB]   0748 We will plan to admit patient. Medicine consulted due to white count, elevated lipase, electrolyte abnormalities, significant abdominal tenderness [AB]   0814 Medicine agreed to admit patient [AB]      ED Course User Index  [AB] Dc Herrera DO        PROCEDURES:  None    CONSULTS:  IP CONSULT TO INTERNAL MEDICINE    CRITICAL CARE:  See attending physician note    FINAL IMPRESSION      1. HANNA (acute kidney injury) ([facility] [address]    2. Alcohol-induced acute pancreatitis, unspecified complication status    3. Hypokalemia          DISPOSITION / PLAN     DISPOSITION Decision To Admit 06/08/2022 07:47:47 AM      PATIENT REFERRED TO:  No follow-up provider specified.     DISCHARGE MEDICATIONS:  New Prescriptions    No medications on file       Navya Heaton DO  Emergency Medicine Resident    (Please note that portions of this note were completed with a voice recognition program.Efforts were made to edit the dictations but occasionally words are mis-transcribed.)        Dc Herrera DO  Resident  06/08/22 7177

## 2022-06-08 NOTE — PROGRESS NOTES
[facility]   Department of Internal Medicine - Staff Internal Medicine Teaching Service        Senior Note    Date: 6/8/2022  Patient Name: Tabitha Andrade  MRN: [id number]  Date of Admission: 6/8/2022  6:27 AM  YOB: 1970  Primary Care Physician: Jaimie Sultana MD  Attending Physician: Tommy Patrick MD   Room: 04/04    Brief Summary:-  Tabitha Andrade is an 54 y.o. male who presented to the ER with chief complaints of epigastric pain, nausea and vomiting. PMH significant for   Alcohol abuse  Pancreatitis  Eczema  Hypertension  Hyperlipidemia  GERD  Prediabetic  COVID positive in January this year. Urine tox positive for cocaine and opiates. Patient was afebrile, blood pressure 152/105 with heart rate 110 on admission. Saturating well on room air. Complaining of pain 10 out of 10. Labs show HANNA with creatinine 1.67(baseline creatinine normal) ,sodium 134, potassium 3.2, chloride 91,   glucose 116. Liver enzymes within normal limits with AST mildly elevated 40. Lipase 63, lipase during previous admissions remained in this range, highest being 94 in 2020. Leukocytosis with white count 13.3, hemoglobin 16.8 with hematocrit 51. Urinalysis showed 2+ protein, small ketones trace leukocyte esterase and few bacteria, 10-20 hyaline cast.    Echo 9/1/2021 show EF 53%, normal stress test in 2014. Previous CT abdomen pelvis with IV contrast 8/30/2021 show findings suggestive of interstitial edematous pancreatitis predominantly involving pancreatic tail and questionable circumferential wall thickening in the distal esophagus likely secondary to esophagitis, although there was concern for malignancy but patient never followed up for the same. Pt received 1 L normal saline fluid bolus, morphine 4 mg and potassium 40 mEq in ED. Assessment and Plan:-    Principal Problem:    Acute pancreatitis without infection or necrosis  Active Problems:     HANNA (acute kidney injury) ([facility] [address]    Hypokalemia    Atopic dermatitis    Prediabetes    Alcohol use, unspecified with unspecified alcohol-induced disorder ([facility] [address]  Resolved Problems:    * No resolved hospital problems. *      Plan:  -Continue IV fluids for acute pancreatitis and HANNA, normal saline.  -Follow-up on lipid profile, BMP.  -Hyperglycemic with history of prediabetes, not on any oral hypoglycemic agent or insulin, follow-up on HbA1c.  -Previous urine tox was positive for cocaine, follow-up on urine tox screen.  -Pain management.  -No antibiotic needed at this time although white count is elevated. Follow-up on CT imaging. DVT ppx: Heparin subcu  Diet: Currently n.p.o. as patient is nauseous, will resume oral diet once he tolerates. GI prophylaxis: Pepcid iv.   Isolation: Not indicated    Bessie Maldonado MD  Internal Medicine Resident, PGY-2  [facility]         6/8/2022, 10:12 AM

## 2022-06-08 NOTE — ED NOTES
Report called Derick on 4C  Pt being transported to room 447 via wheelchair     Juanis Cavanaugh LPN  61/27/35 9961

## 2022-06-08 NOTE — H&P
negative   Allergy and Immunology ROS:  Completed and except as mentioned above were negative  Hematological and Lymphatic ROS:  Completed and except as mentioned above were negative  Respiratory ROS:  Completed and except as mentioned above were negative  Cardiovascular ROS:  Completed and except as mentioned above were negative  Gastrointestinal ROS: Completed and except as mentioned above were negative  Genito-Urinary ROS:  Completed and except as mentioned above were negative  Musculoskeletal ROS:  Completed and except as mentioned above were negative  Neurological ROS:  Completed and except as mentioned above were negative  Skin & Dermatological ROS:  Completed and except as mentioned above were negative  Psychological ROS:  Completed and except as mentioned above were negative    PAST MEDICAL HISTORY     Past Medical History:   Diagnosis Date    Arthritis     Chronic sinusitis     Eczema     Environmental allergies     GERD (gastroesophageal reflux disease)     Hyperlipidemia     Hypertension     Snores        PAST SURGICAL HISTORY     Past Surgical History:   Procedure Laterality Date    COLONOSCOPY      UPPER GASTROINTESTINAL ENDOSCOPY         ALLERGIES     Patient has no known allergies. MEDICATIONS PRIOR TO ADMISSION     Prior to Admission medications    Medication Sig Start Date End Date Taking? Authorizing Provider   pantoprazole (PROTONIX) 40 MG tablet TAKE 1 TABLET BY MOUTH EVERY MORNING (BEFORE BREAKFAST) 10/15/21   Quinn Mustafa MD   gabapentin (NEURONTIN) 400 MG capsule TAKE 1 CAPSULE BY MOUTH THREE TIMES DAILY AS DIRECTED 9/14/21 10/7/21  Rah Velasco MD   lisinopril (PRINIVIL;ZESTRIL) 10 MG tablet Take 1 tablet by mouth daily 8/18/21   Rah Velasco MD   simvastatin (ZOCOR) 40 MG tablet Take 1 tablet by mouth nightly 4/26/21   LATOSHA Bai - CNP   dupilumab (DUPIXENT) 300 MG/2ML SOSY injection Inject 600mg SQ at week 0.  Begin Maintenance Dose at week 2 12/18/20   Suyapa JEONG MD Simon   dupilumab (DUPIXENT) 300 MG/2ML SOSY injection Inject 300mg SQ at every 2 weeks 20   Noelle Nielson MD   DUPIXENT 300 MG/2ML SOSY injection INJECT 300MG SUBCUTANEOUSLY EVERY OTHER WEEK 20   Rachid Montes MD   Blood Pressure KIT Use to monitor blood pressure daily and as needed 20   Jose Manuelothyann Sor, APRN - CNP   triamcinolone (KENALOG) 0.1 % ointment Apply to rash twice daily (not face, armpit or groin) 20   Noelle Nielson MD   tacrolimus (PROTOPIC) 0.03 % ointment Apply topically 2 times daily. 20   Rachid Montes MD       SOCIAL HISTORY     Tobacco: 20 pack years smoking history  Alcohol: 3 cans of beer each 24 ounce per day. Illicits: Denies illicit drug use    FAMILY HISTORY     Family History   Problem Relation Age of Onset    Cancer Paternal Grandmother     High Blood Pressure Mother        PHYSICAL EXAM     Vitals: BP (!) 157/96   Pulse 93   Temp 97.2 °F (36.2 °C) (Oral)   Resp 10   Ht 5' 7\" (1.702 m)   Wt 158 lb (71.7 kg)   SpO2 96%   BMI 24.75 kg/m²   Tmax: Temp (24hrs), Av.2 °F (36.2 °C), Min:97.2 °F (36.2 °C), Max:97.2 °F (36.2 °C)    Last Body weight:   Wt Readings from Last 3 Encounters:   22 158 lb (71.7 kg)   21 151 lb 14.4 oz (68.9 kg)   10/11/20 149 lb (67.6 kg)     Body Mass Index : Body mass index is 24.75 kg/m². PHYSICAL EXAMINATION:  Constitutional: This is a well developed, well nourished, 18.5-24.9 - Normal 54y.o. year old male who is alert, oriented, cooperative and in no apparent distress. Head:normocephalic and atraumatic. EENT:  PERRLA. No conjunctival injections. Septum was midline, mucosa was without erythema, exudates or cobblestoning. No thrush was noted. Neck: Supple without thyromegaly. No elevated JVP. Trachea was midline. Respiratory: Chest was symmetrical without dullness to percussion. Breath sounds bilaterally were clear to auscultation. There were no wheezes, rhonchi or rales.  There is no intercostal retraction or use of accessory muscles. No egophony noted. Cardiovascular: Regular without murmur, clicks, gallops or rubs. Abdomen: Tenderness is noted in the mid epigastrium without rebound tenderness. No Mark sign noted. No organomegaly noted. Lymphatic: No lymphadenopathy. Musculoskeletal: Normal curvature of the spine. No gross muscle weakness. Extremities:  No lower extremity edema, ulcerations, tenderness, varicosities or erythema. Muscle size, tone and strength are normal.  No involuntary movements are noted. Skin:  Warm and dry. Good color, turgor and pigmentation. No lesions or scars.   No cyanosis or clubbing  Neurological/Psychiatric: The patient's general behavior, level of consciousness, thought content and emotional status is normal.          INVESTIGATIONS     Laboratory Testing:     Recent Results (from the past 24 hour(s))   CBC with Auto Differential    Collection Time: 06/08/22  6:34 AM   Result Value Ref Range    WBC 13.3 (H) 3.5 - 11.3 k/uL    RBC 5.87 (H) 4.21 - 5.77 m/uL    Hemoglobin 16.8 13.0 - 17.0 g/dL    Hematocrit 51.0 (H) 40.7 - 50.3 %    MCV 86.9 82.6 - 102.9 fL    MCH 28.6 25.2 - 33.5 pg    MCHC 32.9 28.4 - 34.8 g/dL    RDW 13.7 11.8 - 14.4 %    Platelets 830 533 - 497 k/uL    MPV 10.3 8.1 - 13.5 fL    NRBC Automated 0.0 0.0 per 100 WBC    Seg Neutrophils 67 (H) 36 - 65 %    Lymphocytes 19 (L) 24 - 43 %    Monocytes 11 3 - 12 %    Eosinophils % 1 1 - 4 %    Basophils 1 0 - 2 %    Immature Granulocytes 1 (H) 0 %    Segs Absolute 9.08 (H) 1.50 - 8.10 k/uL    Absolute Lymph # 2.54 1.10 - 3.70 k/uL    Absolute Mono # 1.40 (H) 0.10 - 1.20 k/uL    Absolute Eos # 0.10 0.00 - 0.44 k/uL    Basophils Absolute 0.09 0.00 - 0.20 k/uL    Absolute Immature Granulocyte 0.07 0.00 - 0.30 k/uL   Lipase    Collection Time: 06/08/22  6:34 AM   Result Value Ref Range    Lipase 63 (H) 13 - 60 U/L   Comprehensive Metabolic Panel    Collection Time: 06/08/22  6:34 AM   Result Value Ref Range    Glucose 116 (H) 70 - 99 mg/dL    BUN 10 6 - 20 mg/dL    CREATININE 1.67 (H) 0.70 - 1.20 mg/dL    Calcium 9.6 8.6 - 10.4 mg/dL    Sodium 134 (L) 135 - 144 mmol/L    Potassium 3.2 (L) 3.7 - 5.3 mmol/L    Chloride 91 (L) 98 - 107 mmol/L    CO2 28 20 - 31 mmol/L    Anion Gap 15 9 - 17 mmol/L    Alkaline Phosphatase 121 40 - 129 U/L    ALT 28 5 - 41 U/L    AST 40 (H) <40 U/L    Total Bilirubin 0.81 0.3 - 1.2 mg/dL    Total Protein 8.0 6.4 - 8.3 g/dL    Albumin 4.5 3.5 - 5.2 g/dL    Albumin/Globulin Ratio 1.3 1.0 - 2.5    GFR Non- 44 (L) >60 mL/min    GFR  53 (L) >60 mL/min    GFR Comment         Urinalysis with Reflex to Culture    Collection Time: 06/08/22  6:57 AM    Specimen: Urine   Result Value Ref Range    Color, UA Dark Yellow (A) Yellow    Turbidity UA Cloudy (A) Clear    Glucose, Ur NEGATIVE NEGATIVE    Bilirubin Urine NEGATIVE  Verified by ictotest. (A) NEGATIVE    Ketones, Urine SMALL (A) NEGATIVE    Specific Gravity, UA 1.025 1.005 - 1.030    Urine Hgb NEGATIVE NEGATIVE    pH, UA 5.5 5.0 - 8.0    Protein, UA 2+ (A) NEGATIVE    Urobilinogen, Urine Normal Normal    Nitrite, Urine NEGATIVE NEGATIVE    Leukocyte Esterase, Urine TRACE (A) NEGATIVE   Microscopic Urinalysis    Collection Time: 06/08/22  6:57 AM   Result Value Ref Range    -          WBC, UA 2 TO 5 0 - 5 /HPF    RBC, UA 2 TO 5 0 - 2 /HPF    Casts UA HYALINE 0 - 2 /LPF    Casts UA 10 TO 20 0 - 2 /LPF    Epithelial Cells UA 2 TO 5 0 - 5 /HPF    Bacteria, UA FEW (A) None       Imaging:   No results found. ASSESSMENT & PLAN     ASSESSMENT / PLAN:     IMPRESSION  This is a 54 y.o. male with a past medical history significant for essential hypertension, hyperlipidemia, chronic alcoholism with a history of pancreatitis in the past who presented with epigastric pain, nausea and vomiting and found to have leukocytosis, HANNA.   Patient admitted to inpatient status for the management of HANNA and abdominal pain.    Active Problems:  Sepsis of unknown etiology:  SIRS 2 out of 2 positive. We will send blood cultures and urine cultures. UA his mild leukocyte Esterase. Negative nitrites. Will start on IV ceftriaxone 1 g once daily. Follow-up on the culture results and titrate antibiotics. HANNA (acute kidney injury) ([facility] [address]  Plan:   BUN is 10, creatinine 1.67. Most likely secondary to dehydration due to decreased p.o. intake and nausea and vomiting. Received 1 L fluid bolus. Started on IV fluid RL @150 cc/h. Strict I's and O's. Avoid nephrotoxic medication. BMP tomorrow a.m. Hypokalemia  Plan:   Replaced with 40 mill equivalent intravenous potassium. Alcohol use, unspecified with unspecified alcohol-induced disorder ([facility] [address]  Plan:   He did have a history of extensive alcohol abuse. Started on thiamine folic acid infusion. Lipase is mildly elevated 63. Will start on CIWA protocol although patient did not have history of alcohol withdrawal seizures in the past.    Essential hypertension:  Home medication include lisinopril. Will resume home medicine. Atopic dermatitis  Plan:   Home medication include tacrolimus and triamcinolone. Will resume. DVT ppx: Heparin 5000 units subcut 3 times daily. GI ppx: IV Protonix 40 mg once daily    PT/OT/SW: Consulted  Discharge Planning: Ongoing    Wenda Alpers, MD  Internal Medicine Resident, PGY-1  [facility];  [facility]  6/8/2022, 9:52 AM

## 2022-06-08 NOTE — ED PROVIDER NOTES
[facility]     Emergency Department     Faculty Attestation    I performed a history and physical examination of the patient and discussed management with the resident. I reviewed the residents note and agree with the documented findings and plan of care. Any areas of disagreement are noted on the chart. I was personally present for the key portions of any procedures. I have documented in the chart those procedures where I was not present during the key portions. I have reviewed the emergency nurses triage note. I agree with the chief complaint, past medical history, past surgical history, allergies, medications, social and family history as documented unless otherwise noted below. Documentation of the HPI, Physical Exam and Medical Decision Making performed by medical students or scribes is based on my personal performance of the HPI, PE and MDM. For Physician Assistant/ Nurse Practitioner cases/documentation I have personally evaluated this patient and have completed at least one if not all key elements of the E/M (history, physical exam, and MDM). Additional findings are as noted. Vital signs:   Vitals:    06/08/22 0647   BP: (!) 172/112   Pulse: 100   Resp: 15   Temp:    SpO2: 80      61-year-old male with history of alcoholic pancreatitis here with epigastric pain, nausea, and vomiting. Reports his last drink was on Saturday. Abdomen is soft, very tender over the epigastrium. No rebound. +guarding. Plan for labs, IV fluids, pain meds, Zofran.             Saji Angeles M.D,  Attending Emergency  Physician           Raffy Payan MD  06/08/22 1448

## 2022-06-08 NOTE — ED NOTES
Pt ambulatory to ED 04  Pt here for abdominal pain   Pt states he's had pancreatitis 4 or 5 times and it feels the same   Pt rates pain 10/10 epigastric, sharp and stabbing   C/o nausea and vomiting   Pt states anytime he has tried to eat or drink he vomits   His last meal was Saturday   Pt states he drinks 3 cans of beer     Pt alert and oriented   Pt on full cardiac monitor  IV initiated and labs drawn         Elías Rain RN  06/08/22 1511       Elías Rain RN  06/08/22 [address], RN  06/08/22 8089

## 2022-06-08 NOTE — CARE COORDINATION
Met with patient to discuss transitional planning. Patient reports that his ODJFS  Ms Cm needs verification of patient in the hospital to ensure he receives his benefits. Ms Trentnonevin Mortno phone number [phone]. Fax number [phone]. Call placed to Ms Greenona Maxine and left vm message requesting a return call to verify what information she requires for this patient.

## 2022-06-08 NOTE — ED NOTES
Pt complains of abdomina; pain  Pt states last drink was Saturday 6/4/22  Pt denies nausea  Pt denies vomiting       Yoko Tee LPN  12/25/63 0122

## 2022-06-08 NOTE — CARE COORDINATION
Case Management Initial Discharge Plan  Krystal Zafar,             Met with:patient to discuss discharge plans. Information verified: address, contacts, phone number, , insurance Yes  Insurance Provider: [address] [place] [address] Avenue: No    Emergency Contact/Next of Kin name & number: Tianna Nuñez (mother) [phone]  Who are involved in patient's support system? Patient's landlord, mom, friends    PCP: Yuni Cordova  Date of last visit: 2 weeks ago      Discharge Planning    Living Arrangements:    lives with 5 children    Home has 2 stories  4 stairs to climb to get into front door, 1 flight stairs to climb to reach second floor  Location of bedroom/bathroom in home: on 2nd floor    Patient able to perform ADL's:Independent    Current Services (outpatient & in home) none  DME equipment: none  DME provider: na    Is patient receiving oral anticoagulation therapy? No      Does patient have any issues/concerns obtaining medications? No  If yes, what are patient's concerns? Is there a preferred Pharmacy after hours or on weekends? No    If yes, which pharmacy? Potential Assistance Needed:       Patient agreeable to home care: No  [place] of choice provided:  n/a    Prior SNF/Rehab Placement and Facility: no  Agreeable to SNF/Rehab: No  [place] of choice provided: n/a     Evaluation: no    Expected Discharge date:       Patient expects to be discharged to:   home with children    If home: is the family and/or caregiver wiling & able to provide support at home? no  Who will be providing this support? na    Follow Up Appointment: Best Day/ Time:      Transportation provider: uses medical cab  Transportation arrangements needed for discharge: Yes    Readmission Risk              Risk of Unplanned Readmission:  18             Does patient have a readmission risk score greater than 14?: Yes  If yes, follow-up appointment must be made within 7 days of discharge.      Goals of Care: comfort      Educated patient on transitional options, provided freedom of choice and are agreeable with plan      Discharge Plan: plan is home independently with children. Patient will need medical cab arranged at time of discharge.            Electronically signed by Chana Anthony RN on 6/8/22 at 5:23 PM EDT

## 2022-06-09 VITALS
BODY MASS INDEX: 24.64 KG/M2 | TEMPERATURE: 98.1 F | SYSTOLIC BLOOD PRESSURE: 173 MMHG | RESPIRATION RATE: 16 BRPM | HEIGHT: 67 IN | OXYGEN SATURATION: 98 % | DIASTOLIC BLOOD PRESSURE: 94 MMHG | HEART RATE: 104 BPM | WEIGHT: 157 LBS

## 2022-06-09 LAB
ABSOLUTE EOS #: 0.12 K/UL (ref 0–0.44)
ABSOLUTE IMMATURE GRANULOCYTE: <0.03 K/UL (ref 0–0.3)
ABSOLUTE LYMPH #: 1.95 K/UL (ref 1.1–3.7)
ABSOLUTE MONO #: 0.76 K/UL (ref 0.1–1.2)
ANION GAP SERPL CALCULATED.3IONS-SCNC: 9 MMOL/L (ref 9–17)
BASOPHILS # BLD: 1 % (ref 0–2)
BASOPHILS ABSOLUTE: 0.05 K/UL (ref 0–0.2)
BUN BLDV-MCNC: 4 MG/DL (ref 6–20)
CALCIUM SERPL-MCNC: 8.5 MG/DL (ref 8.6–10.4)
CHLORIDE BLD-SCNC: 105 MMOL/L (ref 98–107)
CO2: 21 MMOL/L (ref 20–31)
CREAT SERPL-MCNC: 0.95 MG/DL (ref 0.7–1.2)
CULTURE: NO GROWTH
EOSINOPHILS RELATIVE PERCENT: 2 % (ref 1–4)
GFR AFRICAN AMERICAN: >60 ML/MIN
GFR NON-AFRICAN AMERICAN: >60 ML/MIN
GFR SERPL CREATININE-BSD FRML MDRD: ABNORMAL ML/MIN/{1.73_M2}
GLUCOSE BLD-MCNC: 99 MG/DL (ref 70–99)
HCT VFR BLD CALC: 41.5 % (ref 40.7–50.3)
HEMOGLOBIN: 13.8 G/DL (ref 13–17)
IMMATURE GRANULOCYTES: 0 %
LYMPHOCYTES # BLD: 34 % (ref 24–43)
MAGNESIUM: 1.9 MG/DL (ref 1.6–2.6)
MCH RBC QN AUTO: 28.6 PG (ref 25.2–33.5)
MCHC RBC AUTO-ENTMCNC: 33.3 G/DL (ref 28.4–34.8)
MCV RBC AUTO: 86.1 FL (ref 82.6–102.9)
MONOCYTES # BLD: 13 % (ref 3–12)
NRBC AUTOMATED: 0 PER 100 WBC
PDW BLD-RTO: 13.9 % (ref 11.8–14.4)
PLATELET # BLD: 190 K/UL (ref 138–453)
PMV BLD AUTO: 10.5 FL (ref 8.1–13.5)
POTASSIUM SERPL-SCNC: 3.5 MMOL/L (ref 3.7–5.3)
RBC # BLD: 4.82 M/UL (ref 4.21–5.77)
SEG NEUTROPHILS: 50 % (ref 36–65)
SEGMENTED NEUTROPHILS ABSOLUTE COUNT: 2.87 K/UL (ref 1.5–8.1)
SODIUM BLD-SCNC: 135 MMOL/L (ref 135–144)
SPECIMEN DESCRIPTION: NORMAL
TRIGL SERPL-MCNC: 71 MG/DL
WBC # BLD: 5.8 K/UL (ref 3.5–11.3)

## 2022-06-09 PROCEDURE — 6360000002 HC RX W HCPCS

## 2022-06-09 PROCEDURE — 97116 GAIT TRAINING THERAPY: CPT

## 2022-06-09 PROCEDURE — 6360000002 HC RX W HCPCS: Performed by: STUDENT IN AN ORGANIZED HEALTH CARE EDUCATION/TRAINING PROGRAM

## 2022-06-09 PROCEDURE — G0378 HOSPITAL OBSERVATION PER HR: HCPCS

## 2022-06-09 PROCEDURE — 84478 ASSAY OF TRIGLYCERIDES: CPT

## 2022-06-09 PROCEDURE — 2580000003 HC RX 258

## 2022-06-09 PROCEDURE — 97162 PT EVAL MOD COMPLEX 30 MIN: CPT

## 2022-06-09 PROCEDURE — 80048 BASIC METABOLIC PNL TOTAL CA: CPT

## 2022-06-09 PROCEDURE — 6370000000 HC RX 637 (ALT 250 FOR IP)

## 2022-06-09 PROCEDURE — 96375 TX/PRO/DX INJ NEW DRUG ADDON: CPT

## 2022-06-09 PROCEDURE — 36415 COLL VENOUS BLD VENIPUNCTURE: CPT

## 2022-06-09 PROCEDURE — 83735 ASSAY OF MAGNESIUM: CPT

## 2022-06-09 PROCEDURE — C9113 INJ PANTOPRAZOLE SODIUM, VIA: HCPCS

## 2022-06-09 PROCEDURE — 97530 THERAPEUTIC ACTIVITIES: CPT

## 2022-06-09 PROCEDURE — 96376 TX/PRO/DX INJ SAME DRUG ADON: CPT

## 2022-06-09 PROCEDURE — 2580000003 HC RX 258: Performed by: STUDENT IN AN ORGANIZED HEALTH CARE EDUCATION/TRAINING PROGRAM

## 2022-06-09 PROCEDURE — 99239 HOSP IP/OBS DSCHRG MGMT >30: CPT | Performed by: INTERNAL MEDICINE

## 2022-06-09 PROCEDURE — 96372 THER/PROPH/DIAG INJ SC/IM: CPT

## 2022-06-09 PROCEDURE — 85025 COMPLETE CBC W/AUTO DIFF WBC: CPT

## 2022-06-09 PROCEDURE — 6370000000 HC RX 637 (ALT 250 FOR IP): Performed by: STUDENT IN AN ORGANIZED HEALTH CARE EDUCATION/TRAINING PROGRAM

## 2022-06-09 RX ORDER — THIAMINE MONONITRATE (VIT B1) 100 MG
100 TABLET ORAL DAILY
Qty: 30 TABLET | Refills: 3 | Status: SHIPPED | OUTPATIENT
Start: 2022-06-09

## 2022-06-09 RX ORDER — LISINOPRIL 10 MG/1
20 TABLET ORAL DAILY
Qty: 30 TABLET | Refills: 3 | Status: SHIPPED | OUTPATIENT
Start: 2022-06-09

## 2022-06-09 RX ORDER — FOLIC ACID 1 MG/1
1 TABLET ORAL DAILY
Qty: 90 TABLET | Refills: 1 | Status: SHIPPED | OUTPATIENT
Start: 2022-06-09

## 2022-06-09 RX ADMIN — POLYETHYLENE GLYCOL 3350 17 G: 17 POWDER, FOR SOLUTION ORAL at 11:18

## 2022-06-09 RX ADMIN — SODIUM CHLORIDE: 9 INJECTION, SOLUTION INTRAVENOUS at 08:39

## 2022-06-09 RX ADMIN — ACETAMINOPHEN 650 MG: 325 TABLET ORAL at 00:47

## 2022-06-09 RX ADMIN — HEPARIN SODIUM 5000 UNITS: 5000 INJECTION INTRAVENOUS; SUBCUTANEOUS at 05:46

## 2022-06-09 RX ADMIN — SODIUM CHLORIDE, PRESERVATIVE FREE 40 MG: 5 INJECTION INTRAVENOUS at 08:29

## 2022-06-09 RX ADMIN — AMLODIPINE BESYLATE 5 MG: 5 TABLET ORAL at 08:29

## 2022-06-09 RX ADMIN — SODIUM CHLORIDE: 9 INJECTION, SOLUTION INTRAVENOUS at 02:16

## 2022-06-09 RX ADMIN — SODIUM CHLORIDE, PRESERVATIVE FREE 10 ML: 5 INJECTION INTRAVENOUS at 08:33

## 2022-06-09 RX ADMIN — TRIAMCINOLONE ACETONIDE: 1 OINTMENT TOPICAL at 08:29

## 2022-06-09 RX ADMIN — MORPHINE SULFATE 2 MG: 2 INJECTION, SOLUTION INTRAMUSCULAR; INTRAVENOUS at 08:44

## 2022-06-09 RX ADMIN — MORPHINE SULFATE 2 MG: 2 INJECTION, SOLUTION INTRAMUSCULAR; INTRAVENOUS at 12:48

## 2022-06-09 RX ADMIN — MORPHINE SULFATE 2 MG: 2 INJECTION, SOLUTION INTRAMUSCULAR; INTRAVENOUS at 00:47

## 2022-06-09 RX ADMIN — HYDRALAZINE HYDROCHLORIDE 10 MG: 20 INJECTION INTRAMUSCULAR; INTRAVENOUS at 06:28

## 2022-06-09 RX ADMIN — POTASSIUM CHLORIDE 40 MEQ: 1500 TABLET, EXTENDED RELEASE ORAL at 14:16

## 2022-06-09 RX ADMIN — HYDRALAZINE HYDROCHLORIDE 10 MG: 20 INJECTION INTRAMUSCULAR; INTRAVENOUS at 00:39

## 2022-06-09 RX ADMIN — GABAPENTIN 400 MG: 400 CAPSULE ORAL at 08:29

## 2022-06-09 RX ADMIN — MORPHINE SULFATE 2 MG: 2 INJECTION, SOLUTION INTRAMUSCULAR; INTRAVENOUS at 05:46

## 2022-06-09 ASSESSMENT — PAIN SCALES - GENERAL
PAINLEVEL_OUTOF10: 2
PAINLEVEL_OUTOF10: 1
PAINLEVEL_OUTOF10: 9
PAINLEVEL_OUTOF10: 8
PAINLEVEL_OUTOF10: 9
PAINLEVEL_OUTOF10: 2
PAINLEVEL_OUTOF10: 5
PAINLEVEL_OUTOF10: 6
PAINLEVEL_OUTOF10: 5

## 2022-06-09 ASSESSMENT — PAIN - FUNCTIONAL ASSESSMENT
PAIN_FUNCTIONAL_ASSESSMENT: ACTIVITIES ARE NOT PREVENTED

## 2022-06-09 ASSESSMENT — PAIN DESCRIPTION - LOCATION
LOCATION: ABDOMEN

## 2022-06-09 ASSESSMENT — PAIN DESCRIPTION - ORIENTATION
ORIENTATION: MID

## 2022-06-09 ASSESSMENT — PAIN DESCRIPTION - DESCRIPTORS
DESCRIPTORS: BURNING

## 2022-06-09 NOTE — PROGRESS NOTES
[facility]  Internal Medicine Teaching Residency Program  Inpatient Daily Progress Note  ______________________________________________________________________________    Patient: Julee Murillo  YOB: 1970   EVB:[id number]    Acct: [de-identified]     Room: [facility]8947-  Admit date: 6/8/2022  Today's date: 06/09/22  Number of days in the hospital: 1    SUBJECTIVE   Admitting Diagnosis: Acute pancreatitis without infection or necrosis  CC: Abdominal pain. Pt examined at bedside. Chart & results reviewed. No significant event overnight. Patient abdominal pain is significantly improved. Did not have any episode of nausea or vomiting. VSS. Labs reviewed and evaluated. Hypokalemia 3.5replaced. Lactic acid 1.2. Normal BMP and CBC. ROS:  Constitutional:  negative for chills, fevers, sweats  Respiratory:  negative for cough, dyspnea on exertion, hemoptysis, shortness of breath, wheezing  Cardiovascular:  negative for chest pain, chest pressure/discomfort, lower extremity edema, palpitations  Gastrointestinal:  negative for abdominal pain, constipation, diarrhea, nausea, vomiting  Neurological:  negative for dizziness, headache  BRIEF HISTORY     The patient is a pleasant 54 y.o. male with a past medical history significant for essential hypertension, hyperlipidemia, chronic alcoholism with alcoholic pancreatitis presents with a chief complaint of mid epigastric pain, sharp and 10 out of 10 in intensity with no radiation. No relieving and aggravating factors noted. Patient admits to decreased p.o. intake for the last 5 days and has noticed nauseous feeling and vomited twice. He did have a history of extensive alcohol abuse, 3 cans of beer each 24 ounce per day, last drink was on Saturday. Patient denies chest pain shortness of breath orthopnea PND dizziness syncope fever chills diarrhea or sick contact.   On evaluation in the ER, he was found to have a blood pressure of 1 6100. Heart rate 89. Saturating well on room air. Abdominal examination showed tenderness in the epigastric region without rebound tenderness. No organomegaly was noted. No Mark sign was elicited. Rest of the physical exam was unremarkable. Labs were significant for HANNA BUN 10, creatinine 1.67. Lipase mildly elevated 63. Normal liver functions. WBC 13.3 with hemoglobin 16.8.  UA is positive leukocyte Estrace. CT scan of the abdomen unremarkable. He received a dose of morphine and 1 L fluid bolus. Started on IV fluid and thiamine folic acid IV. OBJECTIVE     Vital Signs:  BP (!) 168/96   Pulse 90   Temp 98.1 °F (36.7 °C) (Oral)   Resp 16   Ht 5' 7\" (1.702 m)   Wt 157 lb (71.2 kg)   SpO2 98%   BMI 24.59 kg/m²     Temp (24hrs), Av.3 °F (36.8 °C), Min:98 °F (36.7 °C), Max:99.1 °F (37.3 °C)    In: 3281.7   Out: 1550 [Urine:1550]    Physical Exam:  Constitutional: This is a well developed, well nourished, 18.5-24.9 - Normal 54y.o. year old male who is alert, oriented, cooperative and in no apparent distress. Head:normocephalic and atraumatic. EENT:  PERRLA. No conjunctival injections. Septum was midline, mucosa was without erythema, exudates or cobblestoning. No thrush was noted. Neck: Supple without thyromegaly. No elevated JVP. Trachea was midline. Respiratory: Chest was symmetrical without dullness to percussion. Breath sounds bilaterally were clear to auscultation. There were no wheezes, rhonchi or rales. There is no intercostal retraction or use of accessory muscles. No egophony noted. Cardiovascular: Regular without murmur, clicks, gallops or rubs. Abdomen: Slightly rounded and soft without organomegaly. No rebound, rigidity or guarding was appreciated. Lymphatic: No lymphadenopathy. Musculoskeletal: Normal curvature of the spine. No gross muscle weakness.     Extremities:  No lower extremity edema, ulcerations, tenderness, varicosities or erythema. Muscle size, tone and strength are normal.  No involuntary movements are noted. Skin:  Warm and dry. Good color, turgor and pigmentation. No lesions or scars.   No cyanosis or clubbing  Neurological/Psychiatric: The patient's general behavior, level of consciousness, thought content and emotional status is normal.        Medications:  Scheduled Medications:    gabapentin  400 mg Oral TID    [Held by provider] atorvastatin  20 mg Oral Daily    triamcinolone   Topical BID    [Held by provider] lisinopril  10 mg Oral Daily    sodium chloride flush  5-40 mL IntraVENous 2 times per day    heparin (porcine)  5,000 Units SubCUTAneous 3 times per day    pantoprazole (PROTONIX) 40 mg injection  40 mg IntraVENous Daily    tacrolimus   Topical BID    sodium chloride flush  5-40 mL IntraVENous 2 times per day    cefTRIAXone (ROCEPHIN) IV  1,000 mg IntraVENous Q24H    amLODIPine  5 mg Oral Daily     Continuous Infusions:    sodium chloride      sodium chloride      sodium chloride 150 mL/hr at 06/09/22 0839     PRN Medicationssodium chloride flush, 5-40 mL, PRN  sodium chloride, , PRN  ondansetron, 4 mg, Q8H PRN   Or  ondansetron, 4 mg, Q6H PRN  polyethylene glycol, 17 g, Daily PRN  acetaminophen, 650 mg, Q6H PRN   Or  acetaminophen, 650 mg, Q6H PRN  potassium chloride, 10 mEq, PRN  magnesium sulfate, 2,000 mg, PRN  sodium chloride flush, 5-40 mL, PRN  sodium chloride, , PRN  potassium chloride, 40 mEq, PRN   Or  potassium alternative oral replacement, 40 mEq, PRN   Or  potassium chloride, 10 mEq, PRN  magnesium sulfate, 2,000 mg, PRN  morphine, 2 mg, Q2H PRN   Or  morphine, 4 mg, Q2H PRN  hydrALAZINE, 10 mg, Q6H PRN        Diagnostic Labs:  CBC:   Recent Labs     06/08/22  0634 06/09/22  0623   WBC 13.3* 5.8   RBC 5.87* 4.82   HGB 16.8 13.8   HCT 51.0* 41.5   MCV 86.9 86.1   RDW 13.7 13.9    190     BMP:   Recent Labs     06/08/22  0634 06/09/22  0623   * 135   K 3.2* 3.5*   CL 91* 105 CO2 28 21   BUN 10 4*   CREATININE 1.67* 0.95     BNP: No results for input(s): BNP in the last 72 hours. PT/INR: No results for input(s): PROTIME, INR in the last 72 hours. APTT: No results for input(s): APTT in the last 72 hours. CARDIAC ENZYMES: No results for input(s): CKMB, CKMBINDEX, TROPONINI in the last 72 hours. Invalid input(s): CKTOTAL;3  FASTING LIPID PANEL:  Lab Results   Component Value Date    CHOL 186 06/08/2022     06/08/2022    TRIG 71 06/09/2022     LIVER PROFILE:   Recent Labs     06/08/22  0634   AST 40*   ALT 28   BILITOT 0.81   ALKPHOS 121      MICROBIOLOGY:   Lab Results   Component Value Date/Time    CULTURE NO GROWTH 12 HOURS 06/08/2022 08:15 PM    CULTURE NO GROWTH 12 HOURS 06/08/2022 08:15 PM       Imaging:    CT ABDOMEN PELVIS WO CONTRAST Additional Contrast? None    Result Date: 6/8/2022  1. Normal CT appearance of the pancreas. This does not exclude clinical pancreatitis. 2. No direct evidence for acute infective or inflammatory process. RECOMMENDATIONS: Unavailable       ASSESSMENT & PLAN     ASSESSMENT / PLAN:     IMPRESSION  This is a 54 y.o. male with a past medical history significant for essential hypertension, hyperlipidemia, chronic alcoholism with a history of pancreatitis in the past who presented with epigastric pain, nausea and vomiting and found to have leukocytosis, HANNA. Patient admitted to inpatient status for the management of HANNA and abdominal pain.     Active Problems:  Sepsis of unknown etiology:  SIRS 2 out of 2 positive. Urine and blood cultures are negative so far. UA his mild leukocyte Esterase. Negative nitrites. Will start on IV ceftriaxone 1 g once daily. Follow-up on the culture results and titrate antibiotics.     HANNA (acute kidney injury) [facility]): Resolved  Plan:   BUN is 10, creatinine 0.951. 67. Most likely secondary to dehydration due to decreased p.o. intake and nausea and vomiting. Received 1 L fluid bolus.   Started on IV fluid RL @150 cc/h. Will discontinue IV fluid. Recommend p.o. intake. Strict I's and O's. Avoid nephrotoxic medication.     Hypokalemia  Plan:   Replaced with 40 mill equivalent intravenous potassium.     Alcohol use, unspecified with unspecified alcohol-induced disorder ([facility] [address]  Plan:   He did have a history of extensive alcohol abuse. Started on thiamine folic acid infusion. Lipase is mildly elevated 63. Essential hypertension:  Home medication include lisinopril. Will resume home medicine.     Atopic dermatitis  Plan:   Home medication include tacrolimus and triamcinolone. Will resume. DVT prophylaxis: Heparin 5000. GI prophylaxis: Not needed. Discharge planning: Patient will be discharged today. And his pain is under control and tolerating p.o. diet. Wenda Alpers, MD  Internal Medicine Resident, PGY-1  Radha Cohen;  [place], [place]  6/9/2022, 11:08 AM

## 2022-06-09 NOTE — PLAN OF CARE
Problem: Safety - Adult  Goal: Free from fall injury  6/9/2022 1522 by Conrad Damon RN  Outcome: Completed  6/9/2022 0423 by Kareem Vásquez RN  Outcome: Progressing  Flowsheets (Taken 6/8/2022 1957)  Free From Fall Injury: Instruct family/caregiver on patient safety     Problem: Pain  Goal: Verbalizes/displays adequate comfort level or baseline comfort level  6/9/2022 1522 by Conrad Damon RN  Outcome: Completed  6/9/2022 0423 by Kareem Vásquez RN  Outcome: Progressing     Problem: ABCDS Injury Assessment  Goal: Absence of physical injury  6/9/2022 1522 by Conrad Damon RN  Outcome: Completed  6/9/2022 0423 by Kareem Vásquez RN  Outcome: Progressing

## 2022-06-09 NOTE — PROGRESS NOTES
CLINICAL PHARMACY NOTE: MEDS TO BEDS    Total # of Prescriptions Filled: 2   The following medications were delivered to the patient:  · Thiamine  · Folic acid    Additional Documentation:

## 2022-06-09 NOTE — PROGRESS NOTES
212Physical Therapy  Facility/Department: [facility]  Physical Therapy Initial Assessment    Name: Magy Hernandez  : 1970  MRN: [id number]  Date of Service: 2022  Chief Complaint   Patient presents with    Abdominal Pain     Discharge Recommendations:  No therapy recommended at discharge   PT Equipment Recommendations  Equipment Needed: No  Other: Pt demontrating functional independence without AD, encouraged out of bed activity and progressive gait with staff to facilitate prior level of mobitly      Patient Diagnosis(es): The primary encounter diagnosis was HANNA (acute kidney injury) ([facility] [address] Diagnoses of Alcohol-induced acute pancreatitis, unspecified complication status, Hypokalemia, and Essential hypertension were also pertinent to this visit. Past Medical History:  has a past medical history of Arthritis, Chronic sinusitis, Eczema, Environmental allergies, GERD (gastroesophageal reflux disease), Hyperlipidemia, Hypertension, Sepsis ([facility] [address] and Snores. Past Surgical History:  has a past surgical history that includes Upper gastrointestinal endoscopy and Colonoscopy.     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  Therapy Prognosis: Excellent  Decision Making: Medium Complexity  Clinical Presentation: evolving  Requires PT Follow-Up: Yes  Activity Tolerance  Activity Tolerance: Patient tolerated evaluation without incident     Plan   Plan  Plan: 3-5 times per week  Specific Instructions for Next Treatment: Progress activity and promote independence needed to 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 male who presented to the ER with chief complaints of epigastric pain, nausea and vomiting. Response To Previous Treatment: Not applicable  Family / Caregiver Present: No  Follows Commands: Within Functional Limits  Other (Comment): okay per RN to see and mobalize pt  General Comment  Comments: Pt asleep upon arrival, easily aroused and in agreement with PT intervention.   Subjective  Subjective: Pt report mild abdominal pain, light headed feeling upon standing from sitting         Social/Functional History  Social/Functional History  Lives With: Ayla Desai (comment) (Pt is a single parent of 5 children, brother is currently caring for children)  Home Layout: Multi-level,Laundry in basement,Bed/Bath upstairs,1/2 bath on main level,Able to Live on Main level with bedroom/bathroom  Home Access: Stairs to enter with rails  Entrance Stairs - Number of Steps: 2 rails without railing  Bathroom Shower/Tub: Tub/Shower unit  Bathroom Toilet: Standard  Bathroom Accessibility: Not accessible  Has the patient had two or more falls in the past year or any fall with injury in the past year?: No  Receives Help From: Family  ADL Assistance: [address] [facility] [address]: [address] [place] [address] Responsibilities: Yes  Meal Prep Responsibility: Primary  Laundry Responsibility: Primary  Cleaning Responsibility: Primary  Bill Paying/Finance Responsibility: Primary  Shopping Responsibility: Primary  Dependent Care Responsibility: Primary  Health Care Management: Primary  Ambulation Assistance: Independent  Transfer Assistance: Independent  Active : No  Patient's  Info: Pt use cab for transportation  Mode of Transportation: Car  Occupation: Unemployed,Other(comment) (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.)  Leisure & Hobbies: spend time with family  Additional Comments: Pt pleasent, cooperative and able to make needs known  Vision/Hearing  Vision  Vision: Impaired  Vision Exceptions: Wears glasses for distance (in need of glasses)  Tracking: Able to track stimulus in all quads w/o difficulty  Hearing  Hearing: Within functional limits    Cognition   Orientation  Overall Orientation Status: Within Normal Limits  Orientation Level: Oriented X4  Cognition  Overall Cognitive Status: Exceptions  Arousal/Alertness: Appropriate responses to stimuli  Following Commands: Follows all commands without difficulty  Attention Span: Appears intact  Memory: Appears intact  Safety Judgement: Good awareness of safety precautions  Problem Solving: Able to problem solve independently  Insights: Fully aware of deficits  Initiation: Does not require cues  Sequencing: Does not require cues  Cognition Comment: Pt very pleasent and cooperative, able to make needs known. Objective   Heart Rate: (!) 104  Heart Rate Source: Monitor  BP: (!) 173/94  BP Location: Left upper arm  BP Method: Automatic  Patient Position: Semi fowlers  MAP (Calculated): 120.33  Resp: 16  SpO2: 98 %  O2 Device: None (Room air)     Observation/Palpation  Posture: Good  Observation: pt demonstrates normal posture and motor planning ability  Gross Assessment  AROM: Within functional limits  PROM: Within functional limits  Strength:  Within functional limits  Coordination: Within functional limits  Tone: Normal  Sensation: Impaired (bilateral LE neuropathy)     AROM RLE (degrees)  RLE AROM: WFL  AROM LLE (degrees)  LLE AROM : WFL  AROM RUE (degrees)  RUE AROM : WFL  AROM LUE (degrees)  LUE AROM : WFL  Strength RLE  Strength RLE: WNL  Strength LLE  Strength LLE: WNL  Strength RUE  Strength RUE: WNL  Strength Other  Other: Pt demonstrates general weakness and deconditioning           Bed mobility  Bridging: Independent  Rolling to Left: Independent  Rolling to Right: Independent  Supine to Sit: Independent  Sit to Supine: Independent  Scooting: Independent  Bed Mobility Comments: No decline or functional limitation able to complete all bed mobility without use of railing  Transfers  Sit to Stand: Independent  Stand to sit: Independent  Bed to Chair: Independent  Stand Pivot Transfers: Independent  Comment: Pt up ad darleen in room without AD and into bathroom  Ambulation  WB Status: FWB  Ambulation  Surface: level tile  Device: No Device  Assistance: Stand by assistance  Quality of Gait: normal step length, width and ephraim, slow ephraim when initiating gait due to report of light headed feeling  Gait Deviations: None  Distance: 250 ft  Comments: normal step length, width and ephraim, slow ephraim when initiating gait due to report of light headed feeling  More Ambulation?: No  Stairs/Curb  Stairs?: Yes  Stairs  # Steps : 3  Stairs Height: 4\"  Rails: Right ascending  Device: No Device  Assistance: Stand by assistance  Comment: normal ephraim and negotiation with use of railing     Balance  Posture: Good  Sitting - Static: Good  Sitting - Dynamic: Good  Standing - Static: Good  Standing - Dynamic: Good  Functional Reach Test  Fall Risk (Score of <10 inches is fall risk):  No                                                      AM-PAC Score     AM-PAC Inpatient Mobility without Stair Climbing Raw Score : 19 (06/09/22 1458)  AM-PAC Inpatient without Stair Climbing T-Scale Score : 56.04 (06/09/22 1458)  Mobility Inpatient CMS 0-100% Score: 12.25 (06/09/22 1458)  Mobility Inpatient without Stair CMS G-Code Modifier : CI (06/09/22 1458) Goals  Short Term Goals  Time Frame for Short term goals: 5 visits  Short term goal 1: Pt to ambulate 300 ft Independently  Short term goal 2: Pt to ascend 6 steps MOD I without AD  Long Term Goals  Time Frame for Long term goals : 8 visists  Long term goal 1: Pt to demonstrat independence within room and halls at baseline level of mobilty  Patient Goals   Patient goals : to return home       Education  Patient Education  Education Given To: Patient  Education Provided: Role of Therapy;Plan of Care;Transfer Training  Education Provided Comments: Pt educated on pacing and tolerance with activity  Education Method: Verbal;Teach Back  Barriers to Learning: None  Education Outcome: Verbalized understanding;Demonstrated understanding      Therapy Time   Individual Concurrent Group Co-treatment   Time In 0933         Time Out 1008         Minutes 35         Timed Code Treatment Minutes: 23 Minutes       Blondell Ground PT, DPT

## 2022-06-09 NOTE — PROGRESS NOTES
[facility]up[facility]  Occupational Therapy Not Seen Note    DATE: 2022    NAME: Ailyn Sam  MRN: [id number]   : 1970      Patient not seen this date for Occupational Therapy due to:    Patient independent with ADLs and functional tasks with no acute OT needs. Pt finished toileting prior to writer's entrance and was completing LE dressing independently upon writer's arrival. Pt was observed walking from bathroom and stood independently while speaking to writer. Pt educated to notify RN/MD if any functional changes occur this stay. Will defer OT evaluation at this time. Please reorder OT if future needs arise.        Electronically signed by ARIELLE Cameron on 2022 at 3:34 PM

## 2022-06-13 LAB
CULTURE: NORMAL
CULTURE: NORMAL
Lab: NORMAL
Lab: NORMAL
SPECIMEN DESCRIPTION: NORMAL
SPECIMEN DESCRIPTION: NORMAL

## 2022-06-16 NOTE — DISCHARGE SUMMARY
[address]     Department of Internal Medicine - Staff Internal Medicine Teaching Service    INPATIENT DISCHARGE SUMMARY      Patient Identification:  Kaaren Favre is a 54 y.o. male. :  1970  MRN: [id number]     Acct: [de-identified]   PCP: Eliud Weiss MD  Admit Date:  2022  Discharge date and time: 2022  3:33 PM   Attending Provider: No att. providers found                                     [address] Problem Lists:  Principal Problem:    Acute pancreatitis without infection or necrosis  Active Problems:    Sepsis ([address]    HANNA (acute kidney injury) ([address]    Hypokalemia    Atopic dermatitis    Prediabetes    Alcohol use, unspecified with unspecified alcohol-induced disorder ([facility]  Resolved Problems:    * No resolved hospital problems. *      HOSPITAL STAY     Brief Inpatient course:   Kaaren Favre is a 54 y.o. male who was admitted for the management of Acute pancreatitis without infection or necrosis, presented to the emergency department with severe epigastric pain associated with nausea vomiting. He was found to have elevated lipase, HANNA and hypokalemia. CT abdomen showed normal appearance of the pancreas with no evidence for acute infective or inflammatory processes. Patient was started on IV fluid and pain was controlled with morphine. Hypokalemia was replaced with IV potassium. Urine and blood cultures were negative. His HANNA resolved and creatinine improved to 0.95 from 1.67. He was also started on thiamine folic acid infusion due to significant chronic alcohol intake. Over the course of admission, his abdominal pain and vomiting resolved. His labs improved and he is discharged in a stable condition.     Procedures/ Significant Interventions:    None    Consults:     Consults:     Final Specialist Recommendations/Findings:   IP CONSULT TO INTERNAL MEDICINE  IP CONSULT TO CASE MANAGEMENT  IP CONSULT TO CASE MANAGEMENT      Any Hospital Acquired Infections: none    Discharge Functional Status:  stable    DISCHARGE PLAN     Disposition: home    Patient Instructions:   Discharge Medication List as of 6/9/2022  1:50 PM      START taking these medications    Details   vitamin B-1 (THIAMINE) 100 MG tablet Take 1 tablet by mouth daily, Disp-30 tablet, [id number]      folic acid (FOLVITE) 1 MG tablet Take 1 tablet by mouth daily, Disp-90 tablet, R-1Normal         CONTINUE these medications which have CHANGED    Details   lisinopril (PRINIVIL;ZESTRIL) 10 MG tablet Take 2 tablets by mouth daily, Disp-30 tablet, R-3Normal         CONTINUE these medications which have NOT CHANGED    Details   pantoprazole (PROTONIX) 40 MG tablet TAKE 1 TABLET BY MOUTH EVERY MORNING (BEFORE BREAKFAST), Disp-30 tablet, R-0Normal      gabapentin (NEURONTIN) 400 MG capsule TAKE 1 CAPSULE BY MOUTH THREE TIMES DAILY AS DIRECTED, Disp-90 capsule, R-0Normal      simvastatin (ZOCOR) 40 MG tablet Take 1 tablet by mouth nightly, Disp-30 tablet, R-3Normal      dupilumab (DUPIXENT) 300 MG/2ML SOSY injection Inject 300mg SQ at every 2 weeks, Disp-4 mL, R-2Normal      Blood Pressure KIT Disp-1 kit,R-0, PrintUse to monitor blood pressure daily and as needed      triamcinolone (KENALOG) 0.1 % ointment Apply to rash twice daily (not face, armpit or groin), Disp-454 g, R-1, Normal      tacrolimus (PROTOPIC) 0.03 % ointment Apply topically 2 times daily. , Disp-30 g, R-2, Normal             Activity: activity as tolerated    Diet: regular diet    Follow-up:    Ayanna Leiva MD  [address]  [address]    In 1 week  post hospital discharge follow up    Susana Camejo MD  [address]  [address] [place] [address]  [phone]    Schedule an appointment as soon as possible for a visit in 3 days  for symptoms       Patient Instructions:    Follow up with your PCP within 1 week of discharge  Your BP medication lisinopril has been increased to 20 mg daily. Please take as prescribed. Take thiamine and folic acid daily as prescribed. Return to the ER for any worsening abdominal pain, diarrhea, nausea or vomiting. Please avoid excess alcohol intake. Follow up labs: None  Follow up imaging: None    Note that over 30 minutes was spent in preparing discharge papers, discussing discharge with patient, medication review, etc.      Lynette Lee MD,   Internal Medicine Resident, PGY-1  [facility];  [place], [place]  6/16/2022, 7:35 AM

## 2023-08-09 ENCOUNTER — HOSPITAL ENCOUNTER (INPATIENT)
Age: 53
LOS: 2 days | Discharge: HOME | End: 2023-08-11
Attending: EMERGENCY MEDICINE | Admitting: STUDENT IN AN ORGANIZED HEALTH CARE EDUCATION/TRAINING PROGRAM
Payer: MEDICAID

## 2023-08-09 ENCOUNTER — APPOINTMENT (OUTPATIENT)
Dept: CT IMAGING | Age: 53
End: 2023-08-09
Payer: MEDICAID

## 2023-08-09 DIAGNOSIS — K21.9 GASTROESOPHAGEAL REFLUX DISEASE WITHOUT ESOPHAGITIS: ICD-10-CM

## 2023-08-09 DIAGNOSIS — K85.20 ALCOHOL-INDUCED ACUTE PANCREATITIS WITHOUT INFECTION OR NECROSIS: Primary | ICD-10-CM

## 2023-08-09 PROBLEM — K85.90 PANCREATITIS, UNSPECIFIED PANCREATITIS TYPE: Status: ACTIVE | Noted: 2023-08-09

## 2023-08-09 LAB
ALBUMIN SERPL-MCNC: 4.3 G/DL (ref 3.5–5.2)
ALBUMIN/GLOB SERPL: 1.2 {RATIO} (ref 1–2.5)
ALP SERPL-CCNC: 103 U/L (ref 40–129)
ALT SERPL-CCNC: 13 U/L (ref 5–41)
ANION GAP SERPL CALCULATED.3IONS-SCNC: 15 MMOL/L (ref 9–17)
AST SERPL-CCNC: 23 U/L
BASOPHILS # BLD: 0.04 K/UL (ref 0–0.2)
BASOPHILS NFR BLD: 1 % (ref 0–2)
BILIRUB SERPL-MCNC: 0.6 MG/DL (ref 0.3–1.2)
BUN SERPL-MCNC: 10 MG/DL (ref 6–20)
CALCIUM SERPL-MCNC: 9.3 MG/DL (ref 8.6–10.4)
CHLORIDE SERPL-SCNC: 91 MMOL/L (ref 98–107)
CO2 SERPL-SCNC: 24 MMOL/L (ref 20–31)
CREAT SERPL-MCNC: 1.3 MG/DL (ref 0.7–1.2)
EOSINOPHIL # BLD: 0.04 K/UL (ref 0–0.44)
EOSINOPHILS RELATIVE PERCENT: 1 % (ref 1–4)
ERYTHROCYTE [DISTWIDTH] IN BLOOD BY AUTOMATED COUNT: 11.9 % (ref 11.8–14.4)
GFR SERPL CREATININE-BSD FRML MDRD: >60 ML/MIN/1.73M2
GLUCOSE SERPL-MCNC: 121 MG/DL (ref 70–99)
HCT VFR BLD AUTO: 49.5 % (ref 40.7–50.3)
HGB BLD-MCNC: 16.6 G/DL (ref 13–17)
IMM GRANULOCYTES # BLD AUTO: 0.03 K/UL (ref 0–0.3)
IMM GRANULOCYTES NFR BLD: 0 %
LACTIC ACID, WHOLE BLOOD: 1.3 MMOL/L (ref 0.7–2.1)
LIPASE SERPL-CCNC: 71 U/L (ref 13–60)
LYMPHOCYTES NFR BLD: 1.65 K/UL (ref 1.1–3.7)
LYMPHOCYTES RELATIVE PERCENT: 19 % (ref 24–43)
MCH RBC QN AUTO: 30 PG (ref 25.2–33.5)
MCHC RBC AUTO-ENTMCNC: 33.5 G/DL (ref 28.4–34.8)
MCV RBC AUTO: 89.4 FL (ref 82.6–102.9)
MONOCYTES NFR BLD: 0.92 K/UL (ref 0.1–1.2)
MONOCYTES NFR BLD: 11 % (ref 3–12)
NEUTROPHILS NFR BLD: 68 % (ref 36–65)
NEUTS SEG NFR BLD: 5.9 K/UL (ref 1.5–8.1)
NRBC BLD-RTO: 0 PER 100 WBC
PLATELET # BLD AUTO: 206 K/UL (ref 138–453)
PMV BLD AUTO: 10.8 FL (ref 8.1–13.5)
POTASSIUM SERPL-SCNC: 3.4 MMOL/L (ref 3.7–5.3)
PROT SERPL-MCNC: 7.8 G/DL (ref 6.4–8.3)
RBC # BLD AUTO: 5.54 M/UL (ref 4.21–5.77)
SODIUM SERPL-SCNC: 130 MMOL/L (ref 135–144)
TRIGL SERPL-MCNC: 107 MG/DL
WBC OTHER # BLD: 8.6 K/UL (ref 3.5–11.3)

## 2023-08-09 PROCEDURE — 74177 CT ABD & PELVIS W/CONTRAST: CPT

## 2023-08-09 PROCEDURE — 6360000004 HC RX CONTRAST MEDICATION: Performed by: EMERGENCY MEDICINE

## 2023-08-09 PROCEDURE — 6360000002 HC RX W HCPCS: Performed by: EMERGENCY MEDICINE

## 2023-08-09 PROCEDURE — 85025 COMPLETE CBC W/AUTO DIFF WBC: CPT

## 2023-08-09 PROCEDURE — 96375 TX/PRO/DX INJ NEW DRUG ADDON: CPT

## 2023-08-09 PROCEDURE — 6360000002 HC RX W HCPCS: Performed by: NURSE PRACTITIONER

## 2023-08-09 PROCEDURE — 6370000000 HC RX 637 (ALT 250 FOR IP): Performed by: STUDENT IN AN ORGANIZED HEALTH CARE EDUCATION/TRAINING PROGRAM

## 2023-08-09 PROCEDURE — 2580000003 HC RX 258: Performed by: STUDENT IN AN ORGANIZED HEALTH CARE EDUCATION/TRAINING PROGRAM

## 2023-08-09 PROCEDURE — 80053 COMPREHEN METABOLIC PANEL: CPT

## 2023-08-09 PROCEDURE — 83605 ASSAY OF LACTIC ACID: CPT

## 2023-08-09 PROCEDURE — 6360000002 HC RX W HCPCS

## 2023-08-09 PROCEDURE — 6360000002 HC RX W HCPCS: Performed by: STUDENT IN AN ORGANIZED HEALTH CARE EDUCATION/TRAINING PROGRAM

## 2023-08-09 PROCEDURE — 96361 HYDRATE IV INFUSION ADD-ON: CPT

## 2023-08-09 PROCEDURE — 84478 ASSAY OF TRIGLYCERIDES: CPT

## 2023-08-09 PROCEDURE — 99223 1ST HOSP IP/OBS HIGH 75: CPT | Performed by: STUDENT IN AN ORGANIZED HEALTH CARE EDUCATION/TRAINING PROGRAM

## 2023-08-09 PROCEDURE — 83690 ASSAY OF LIPASE: CPT

## 2023-08-09 PROCEDURE — 96374 THER/PROPH/DIAG INJ IV PUSH: CPT

## 2023-08-09 PROCEDURE — 1200000000 HC SEMI PRIVATE

## 2023-08-09 PROCEDURE — 96376 TX/PRO/DX INJ SAME DRUG ADON: CPT

## 2023-08-09 PROCEDURE — 2580000003 HC RX 258

## 2023-08-09 PROCEDURE — 99285 EMERGENCY DEPT VISIT HI MDM: CPT

## 2023-08-09 RX ORDER — MAGNESIUM SULFATE 1 G/100ML
1000 INJECTION INTRAVENOUS PRN
Status: DISCONTINUED | OUTPATIENT
Start: 2023-08-09 | End: 2023-08-11 | Stop reason: HOSPADM

## 2023-08-09 RX ORDER — LANOLIN ALCOHOL/MO/W.PET/CERES
100 CREAM (GRAM) TOPICAL DAILY
Status: DISCONTINUED | OUTPATIENT
Start: 2023-08-09 | End: 2023-08-11 | Stop reason: HOSPADM

## 2023-08-09 RX ORDER — 0.9 % SODIUM CHLORIDE 0.9 %
1000 INTRAVENOUS SOLUTION INTRAVENOUS ONCE
Status: COMPLETED | OUTPATIENT
Start: 2023-08-09 | End: 2023-08-09

## 2023-08-09 RX ORDER — SODIUM CHLORIDE 9 MG/ML
INJECTION, SOLUTION INTRAVENOUS CONTINUOUS
Status: DISCONTINUED | OUTPATIENT
Start: 2023-08-09 | End: 2023-08-09

## 2023-08-09 RX ORDER — SODIUM CHLORIDE 9 MG/ML
INJECTION, SOLUTION INTRAVENOUS PRN
Status: DISCONTINUED | OUTPATIENT
Start: 2023-08-09 | End: 2023-08-11 | Stop reason: HOSPADM

## 2023-08-09 RX ORDER — HEPARIN SODIUM 5000 [USP'U]/ML
5000 INJECTION, SOLUTION INTRAVENOUS; SUBCUTANEOUS EVERY 8 HOURS SCHEDULED
Status: DISCONTINUED | OUTPATIENT
Start: 2023-08-09 | End: 2023-08-11 | Stop reason: HOSPADM

## 2023-08-09 RX ORDER — LABETALOL HYDROCHLORIDE 5 MG/ML
20 INJECTION, SOLUTION INTRAVENOUS EVERY 4 HOURS PRN
Status: DISCONTINUED | OUTPATIENT
Start: 2023-08-09 | End: 2023-08-11 | Stop reason: HOSPADM

## 2023-08-09 RX ORDER — POTASSIUM CHLORIDE 7.45 MG/ML
10 INJECTION INTRAVENOUS PRN
Status: DISCONTINUED | OUTPATIENT
Start: 2023-08-09 | End: 2023-08-11 | Stop reason: HOSPADM

## 2023-08-09 RX ORDER — SODIUM CHLORIDE 0.9 % (FLUSH) 0.9 %
5-40 SYRINGE (ML) INJECTION PRN
Status: DISCONTINUED | OUTPATIENT
Start: 2023-08-09 | End: 2023-08-11 | Stop reason: HOSPADM

## 2023-08-09 RX ORDER — LABETALOL HYDROCHLORIDE 5 MG/ML
10 INJECTION, SOLUTION INTRAVENOUS EVERY 4 HOURS PRN
Status: DISCONTINUED | OUTPATIENT
Start: 2023-08-09 | End: 2023-08-09

## 2023-08-09 RX ORDER — MORPHINE SULFATE 4 MG/ML
4 INJECTION, SOLUTION INTRAMUSCULAR; INTRAVENOUS ONCE
Status: COMPLETED | OUTPATIENT
Start: 2023-08-09 | End: 2023-08-09

## 2023-08-09 RX ORDER — PANTOPRAZOLE SODIUM 40 MG/1
40 TABLET, DELAYED RELEASE ORAL
Status: DISCONTINUED | OUTPATIENT
Start: 2023-08-10 | End: 2023-08-11 | Stop reason: HOSPADM

## 2023-08-09 RX ORDER — THIAMINE MONONITRATE (VIT B1) 100 MG
100 TABLET ORAL DAILY
Status: DISCONTINUED | OUTPATIENT
Start: 2023-08-09 | End: 2023-08-09

## 2023-08-09 RX ORDER — SODIUM CHLORIDE, SODIUM LACTATE, POTASSIUM CHLORIDE, CALCIUM CHLORIDE 600; 310; 30; 20 MG/100ML; MG/100ML; MG/100ML; MG/100ML
INJECTION, SOLUTION INTRAVENOUS CONTINUOUS
Status: DISCONTINUED | OUTPATIENT
Start: 2023-08-09 | End: 2023-08-10

## 2023-08-09 RX ORDER — ONDANSETRON 4 MG/1
4 TABLET, ORALLY DISINTEGRATING ORAL EVERY 8 HOURS PRN
Status: DISCONTINUED | OUTPATIENT
Start: 2023-08-09 | End: 2023-08-11 | Stop reason: HOSPADM

## 2023-08-09 RX ORDER — POTASSIUM CHLORIDE 20 MEQ/1
40 TABLET, EXTENDED RELEASE ORAL ONCE
Status: COMPLETED | OUTPATIENT
Start: 2023-08-09 | End: 2023-08-09

## 2023-08-09 RX ORDER — FOLIC ACID 1 MG/1
1 TABLET ORAL DAILY
Status: DISCONTINUED | OUTPATIENT
Start: 2023-08-09 | End: 2023-08-11 | Stop reason: HOSPADM

## 2023-08-09 RX ORDER — ATORVASTATIN CALCIUM 20 MG/1
20 TABLET, FILM COATED ORAL NIGHTLY
Status: DISCONTINUED | OUTPATIENT
Start: 2023-08-09 | End: 2023-08-11 | Stop reason: HOSPADM

## 2023-08-09 RX ORDER — SODIUM CHLORIDE 0.9 % (FLUSH) 0.9 %
5-40 SYRINGE (ML) INJECTION EVERY 12 HOURS SCHEDULED
Status: DISCONTINUED | OUTPATIENT
Start: 2023-08-09 | End: 2023-08-11 | Stop reason: HOSPADM

## 2023-08-09 RX ORDER — SODIUM CHLORIDE, SODIUM LACTATE, POTASSIUM CHLORIDE, CALCIUM CHLORIDE 600; 310; 30; 20 MG/100ML; MG/100ML; MG/100ML; MG/100ML
INJECTION, SOLUTION INTRAVENOUS CONTINUOUS
Status: DISCONTINUED | OUTPATIENT
Start: 2023-08-10 | End: 2023-08-10

## 2023-08-09 RX ORDER — AMLODIPINE BESYLATE 10 MG/1
10 TABLET ORAL DAILY
Status: DISCONTINUED | OUTPATIENT
Start: 2023-08-09 | End: 2023-08-11 | Stop reason: HOSPADM

## 2023-08-09 RX ORDER — ONDANSETRON 2 MG/ML
4 INJECTION INTRAMUSCULAR; INTRAVENOUS EVERY 6 HOURS PRN
Status: DISCONTINUED | OUTPATIENT
Start: 2023-08-09 | End: 2023-08-11 | Stop reason: HOSPADM

## 2023-08-09 RX ADMIN — MORPHINE SULFATE 4 MG: 4 INJECTION INTRAVENOUS at 05:58

## 2023-08-09 RX ADMIN — SODIUM CHLORIDE, POTASSIUM CHLORIDE, SODIUM LACTATE AND CALCIUM CHLORIDE: 600; 310; 30; 20 INJECTION, SOLUTION INTRAVENOUS at 14:09

## 2023-08-09 RX ADMIN — HEPARIN SODIUM 5000 UNITS: 5000 INJECTION INTRAVENOUS; SUBCUTANEOUS at 15:07

## 2023-08-09 RX ADMIN — SODIUM CHLORIDE 1000 ML: 9 INJECTION, SOLUTION INTRAVENOUS at 05:56

## 2023-08-09 RX ADMIN — Medication 10 MG: at 10:32

## 2023-08-09 RX ADMIN — HYDROMORPHONE HYDROCHLORIDE 0.5 MG: 1 INJECTION, SOLUTION INTRAMUSCULAR; INTRAVENOUS; SUBCUTANEOUS at 18:50

## 2023-08-09 RX ADMIN — HYDROMORPHONE HYDROCHLORIDE 1 MG: 1 INJECTION, SOLUTION INTRAMUSCULAR; INTRAVENOUS; SUBCUTANEOUS at 20:11

## 2023-08-09 RX ADMIN — POTASSIUM CHLORIDE 40 MEQ: 1500 TABLET, EXTENDED RELEASE ORAL at 14:10

## 2023-08-09 RX ADMIN — Medication 10 MG: at 17:27

## 2023-08-09 RX ADMIN — SODIUM CHLORIDE, POTASSIUM CHLORIDE, SODIUM LACTATE AND CALCIUM CHLORIDE: 600; 310; 30; 20 INJECTION, SOLUTION INTRAVENOUS at 18:25

## 2023-08-09 RX ADMIN — FOLIC ACID 1 MG: 1 TABLET ORAL at 15:07

## 2023-08-09 RX ADMIN — AMLODIPINE BESYLATE 10 MG: 10 TABLET ORAL at 18:24

## 2023-08-09 RX ADMIN — Medication 20 MG: at 23:09

## 2023-08-09 RX ADMIN — HYDROMORPHONE HYDROCHLORIDE 1 MG: 1 INJECTION, SOLUTION INTRAMUSCULAR; INTRAVENOUS; SUBCUTANEOUS at 08:48

## 2023-08-09 RX ADMIN — ATORVASTATIN CALCIUM 20 MG: 20 TABLET, FILM COATED ORAL at 20:58

## 2023-08-09 RX ADMIN — HYDROMORPHONE HYDROCHLORIDE 0.5 MG: 1 INJECTION, SOLUTION INTRAMUSCULAR; INTRAVENOUS; SUBCUTANEOUS at 15:05

## 2023-08-09 RX ADMIN — Medication 100 MG: at 14:10

## 2023-08-09 RX ADMIN — HYDROMORPHONE HYDROCHLORIDE 0.5 MG: 1 INJECTION, SOLUTION INTRAMUSCULAR; INTRAVENOUS; SUBCUTANEOUS at 23:06

## 2023-08-09 RX ADMIN — MORPHINE SULFATE 4 MG: 4 INJECTION INTRAVENOUS at 06:47

## 2023-08-09 RX ADMIN — IOPAMIDOL 75 ML: 755 INJECTION, SOLUTION INTRAVENOUS at 07:00

## 2023-08-09 RX ADMIN — HEPARIN SODIUM 5000 UNITS: 5000 INJECTION INTRAVENOUS; SUBCUTANEOUS at 21:47

## 2023-08-09 ASSESSMENT — PAIN - FUNCTIONAL ASSESSMENT
PAIN_FUNCTIONAL_ASSESSMENT: 0-10
PAIN_FUNCTIONAL_ASSESSMENT: 0-10

## 2023-08-09 ASSESSMENT — PAIN SCALES - GENERAL
PAINLEVEL_OUTOF10: 6
PAINLEVEL_OUTOF10: 7
PAINLEVEL_OUTOF10: 9
PAINLEVEL_OUTOF10: 10
PAINLEVEL_OUTOF10: 9
PAINLEVEL_OUTOF10: 10
PAINLEVEL_OUTOF10: 10
PAINLEVEL_OUTOF10: 9

## 2023-08-09 ASSESSMENT — ENCOUNTER SYMPTOMS
ABDOMINAL PAIN: 1
SHORTNESS OF BREATH: 0
DIARRHEA: 0
BLOOD IN STOOL: 0
NAUSEA: 1
ABDOMINAL DISTENTION: 0
CONSTIPATION: 0
VOMITING: 1
CHEST TIGHTNESS: 0
BACK PAIN: 0
COLOR CHANGE: 0
COUGH: 0

## 2023-08-09 ASSESSMENT — PAIN DESCRIPTION - LOCATION
LOCATION: ABDOMEN
LOCATION: ABDOMEN

## 2023-08-09 ASSESSMENT — PAIN DESCRIPTION - PAIN TYPE: TYPE: ACUTE PAIN

## 2023-08-09 NOTE — ED NOTES
Message sent to DR. Maria Esther Hung to address patients hypertension. Pt continues to be hypertensive despite labetolol.       Magdy Singh RN  08/09/23 0728

## 2023-08-09 NOTE — ED PROVIDER NOTES
[facility] ED  Emergency Department Encounter  Emergency Medicine Resident     Pt Za Wu  MRN: [id number]  [address] [facility] 1970  Date of evaluation: 8/9/23  PCP:  Fe Vazquez MD  Note Started: 5:42 AM EDT      CHIEF COMPLAINT       Chief Complaint   Patient presents with    Abdominal Pain       HISTORY OF PRESENT ILLNESS  (Location/Symptom, Timing/Onset, Context/Setting, Quality, Duration, Modifying Factors, Severity.)      Pia Gupta is a 50 y.o. male with a history of alcoholic pancreatitis who presents with epigastric pain that started 4 days ago and has progressively been worsening. Patient states he now has 10 out of 10 epigastric pain, nonradiating, no worsening or relieving factors, sharp and stabbing in nature. He had some nausea and vomiting 2 days ago, but has not been eating any food or taking any fluids for the past 2 days and does not complain of any nausea or vomiting upon arrival to the ED tonight. Patient denies any lightheadedness, dizziness, fever, chest pain, shortness of breath, difficulty or pain with urination, diarrhea, constipation. PAST MEDICAL / SURGICAL / SOCIAL / FAMILY HISTORY      has a past medical history of Arthritis, Chronic sinusitis, Eczema, Environmental allergies, GERD (gastroesophageal reflux disease), Hyperlipidemia, Hypertension, Sepsis ([address]), and Snores. has a past surgical history that includes Upper gastrointestinal endoscopy and Colonoscopy.       Social History     Socioeconomic History    Marital status: Legally      Spouse name: Not on file    Number of children: Not on file    Years of education: Not on file    Highest education level: Not on file   Occupational History    Not on file   Tobacco Use    Smoking status: Every Day     Packs/day: 0.50     Years: 30.00     Pack years: 15.00     Types: Cigarettes    Smokeless tobacco: Never    Tobacco comments:     6-10 per day    Substance and Sexual Activity completed with a voice recognition program.  Efforts were made to edit the dictations but occasionally words are mis-transcribed.)        Manny Edge MD  Resident  08/09/23 4815

## 2023-08-09 NOTE — ED NOTES
Report given to [facility]. All questions answered.       Brett Rudolph RN  08/09/23 0064

## 2023-08-09 NOTE — H&P
[facility]  Office: [address], DO, Jeovany Lopez, DO, Víctor Escalera, DO, Magno Alfred Blood, DO, Radha Brown MD, Amber Sevilla MD, Estela Camarena MD, Sallie Nguyen MD,  Vanessa Menon MD, Sandhya Conroy MD, Harpal Kinsey, DO, Carlota Meehan MD,  Mónica Brown MD, Sarah Millan MD, Shilpa Carrizales DO, Dylon Dillon MD,  Lawanda Man, DO, Leona Eisenmenger, MD, Erickson St MD, Onesimo Guo MD, Cheryle Spillers, MD,  Jose Raul Craig MD, Maite Domingo MD, Corina Lin, DO, Delonte Dill MD,  Uziel Cheung MD, Cyndi Palafox, CNP,  Homer Yoon, CNP, Meli Harry, CNP, Millicent Winston, CNP,  Melodie Pretty, [facility], Lera Sandhoff, CNP, Francesca Ball, CNP, Marcello Pradhan, CNP, Flores Miramontes, CNP, Rubia Neville, CNP, Candice Glasgow PA-C, Dago Simon, [facility], Cathleen Berumen, CNP, Dai Hector, [facility]         [facility]    HISTORY AND PHYSICAL EXAMINATION            Date:   8/9/2023  Patient name:  Jazmine Blanco  Date of admission:  8/9/2023  5:35 AM  MRN:   [id number]  Account:  [de-identified]  YOB: 1970  PCP:    Loran Ganser, MD  Room:   21/21  Code Status:    Prior    Chief Complaint:     Chief Complaint   Patient presents with    Abdominal Pain     History Obtained From:     patient, electronic medical record    History of Present Illness: This is a 53-year-old male with a significant past medical history of hypertension, hyperlipidemia, GERD, EtOH abuse and frequent pancreatitis who initially presented to the emergency department with a chief complaint of epigastric abdominal pain with associated nausea and vomiting x 4 days. Patient states about 4 days ago he binge drank alcohol and developed the pain the following day. Reminds him of previous pancreatitis. Admits to some shortness of breath secondary to pain, constipation.  Denies fevers, chills, chest pain, dysuria,

## 2023-08-09 NOTE — ED TRIAGE NOTES
Patient presents to ED room 21 with c/o abdominal pain for 4 days. Patient denies nausea, vomiting, and diarrhea. Patient has a history of pancreatitis and states his abdominal pain feels the same. Denies chest pain and shortness of breath. IV established. Patient A&Ox4, respirations even and unlabored, and speaking in complete sentences.

## 2023-08-10 PROBLEM — I16.0 HYPERTENSIVE URGENCY: Status: RESOLVED | Noted: 2021-08-30 | Resolved: 2023-08-10

## 2023-08-10 PROBLEM — N17.9 AKI (ACUTE KIDNEY INJURY) (HCC): Status: RESOLVED | Noted: 2022-06-08 | Resolved: 2023-08-10

## 2023-08-10 PROBLEM — E87.6 HYPOKALEMIA: Status: RESOLVED | Noted: 2022-06-08 | Resolved: 2023-08-10

## 2023-08-10 LAB
ANION GAP SERPL CALCULATED.3IONS-SCNC: 13 MMOL/L (ref 9–17)
BASOPHILS # BLD: 0.05 K/UL (ref 0–0.2)
BASOPHILS NFR BLD: 1 % (ref 0–2)
BUN SERPL-MCNC: 5 MG/DL (ref 6–20)
CA-I BLD-SCNC: 1.16 MMOL/L (ref 1.13–1.33)
CALCIUM SERPL-MCNC: 9 MG/DL (ref 8.6–10.4)
CHLORIDE SERPL-SCNC: 103 MMOL/L (ref 98–107)
CO2 SERPL-SCNC: 24 MMOL/L (ref 20–31)
CREAT SERPL-MCNC: 0.9 MG/DL (ref 0.7–1.2)
EOSINOPHIL # BLD: 0.11 K/UL (ref 0–0.44)
EOSINOPHILS RELATIVE PERCENT: 2 % (ref 1–4)
ERYTHROCYTE [DISTWIDTH] IN BLOOD BY AUTOMATED COUNT: 12.1 % (ref 11.8–14.4)
GFR SERPL CREATININE-BSD FRML MDRD: >60 ML/MIN/1.73M2
GLUCOSE SERPL-MCNC: 79 MG/DL (ref 70–99)
HCT VFR BLD AUTO: 47.2 % (ref 40.7–50.3)
HGB BLD-MCNC: 15.4 G/DL (ref 13–17)
IMM GRANULOCYTES # BLD AUTO: <0.03 K/UL (ref 0–0.3)
IMM GRANULOCYTES NFR BLD: 0 %
LYMPHOCYTES NFR BLD: 1.71 K/UL (ref 1.1–3.7)
LYMPHOCYTES RELATIVE PERCENT: 34 % (ref 24–43)
MAGNESIUM SERPL-MCNC: 2.1 MG/DL (ref 1.6–2.6)
MCH RBC QN AUTO: 29.8 PG (ref 25.2–33.5)
MCHC RBC AUTO-ENTMCNC: 32.6 G/DL (ref 28.4–34.8)
MCV RBC AUTO: 91.5 FL (ref 82.6–102.9)
MONOCYTES NFR BLD: 0.6 K/UL (ref 0.1–1.2)
MONOCYTES NFR BLD: 12 % (ref 3–12)
NEUTROPHILS NFR BLD: 51 % (ref 36–65)
NEUTS SEG NFR BLD: 2.62 K/UL (ref 1.5–8.1)
NRBC BLD-RTO: 0 PER 100 WBC
PHOSPHATE SERPL-MCNC: 2.9 MG/DL (ref 2.5–4.5)
PLATELET # BLD AUTO: 166 K/UL (ref 138–453)
PMV BLD AUTO: 10.8 FL (ref 8.1–13.5)
POTASSIUM SERPL-SCNC: 3.5 MMOL/L (ref 3.7–5.3)
POTASSIUM SERPL-SCNC: 3.5 MMOL/L (ref 3.7–5.3)
RBC # BLD AUTO: 5.16 M/UL (ref 4.21–5.77)
SODIUM SERPL-SCNC: 140 MMOL/L (ref 135–144)
WBC OTHER # BLD: 5.1 K/UL (ref 3.5–11.3)

## 2023-08-10 PROCEDURE — 36415 COLL VENOUS BLD VENIPUNCTURE: CPT

## 2023-08-10 PROCEDURE — 97161 PT EVAL LOW COMPLEX 20 MIN: CPT

## 2023-08-10 PROCEDURE — 80048 BASIC METABOLIC PNL TOTAL CA: CPT

## 2023-08-10 PROCEDURE — 83735 ASSAY OF MAGNESIUM: CPT

## 2023-08-10 PROCEDURE — 99239 HOSP IP/OBS DSCHRG MGMT >30: CPT | Performed by: NURSE PRACTITIONER

## 2023-08-10 PROCEDURE — 6360000002 HC RX W HCPCS: Performed by: STUDENT IN AN ORGANIZED HEALTH CARE EDUCATION/TRAINING PROGRAM

## 2023-08-10 PROCEDURE — 6370000000 HC RX 637 (ALT 250 FOR IP): Performed by: NURSE PRACTITIONER

## 2023-08-10 PROCEDURE — 97165 OT EVAL LOW COMPLEX 30 MIN: CPT

## 2023-08-10 PROCEDURE — 84132 ASSAY OF SERUM POTASSIUM: CPT

## 2023-08-10 PROCEDURE — 6370000000 HC RX 637 (ALT 250 FOR IP): Performed by: STUDENT IN AN ORGANIZED HEALTH CARE EDUCATION/TRAINING PROGRAM

## 2023-08-10 PROCEDURE — 82330 ASSAY OF CALCIUM: CPT

## 2023-08-10 PROCEDURE — 97535 SELF CARE MNGMENT TRAINING: CPT

## 2023-08-10 PROCEDURE — 2580000003 HC RX 258: Performed by: STUDENT IN AN ORGANIZED HEALTH CARE EDUCATION/TRAINING PROGRAM

## 2023-08-10 PROCEDURE — 6360000002 HC RX W HCPCS: Performed by: NURSE PRACTITIONER

## 2023-08-10 PROCEDURE — 85025 COMPLETE CBC W/AUTO DIFF WBC: CPT

## 2023-08-10 PROCEDURE — 84100 ASSAY OF PHOSPHORUS: CPT

## 2023-08-10 PROCEDURE — 97530 THERAPEUTIC ACTIVITIES: CPT

## 2023-08-10 PROCEDURE — 1200000000 HC SEMI PRIVATE

## 2023-08-10 RX ORDER — AMLODIPINE BESYLATE 10 MG/1
10 TABLET ORAL DAILY
Qty: 30 TABLET | Refills: 0 | Status: SHIPPED | OUTPATIENT
Start: 2023-08-11

## 2023-08-10 RX ORDER — LISINOPRIL 20 MG/1
20 TABLET ORAL DAILY
Status: DISCONTINUED | OUTPATIENT
Start: 2023-08-10 | End: 2023-08-11 | Stop reason: HOSPADM

## 2023-08-10 RX ORDER — FOLIC ACID 1 MG/1
1 TABLET ORAL DAILY
Qty: 90 TABLET | Refills: 0 | Status: SHIPPED | OUTPATIENT
Start: 2023-08-10

## 2023-08-10 RX ORDER — NICOTINE 21 MG/24HR
1 PATCH, TRANSDERMAL 24 HOURS TRANSDERMAL DAILY
Qty: 30 PATCH | Refills: 0 | Status: SHIPPED | OUTPATIENT
Start: 2023-08-10

## 2023-08-10 RX ORDER — HYDRALAZINE HYDROCHLORIDE 20 MG/ML
10 INJECTION INTRAMUSCULAR; INTRAVENOUS ONCE
Status: COMPLETED | OUTPATIENT
Start: 2023-08-10 | End: 2023-08-10

## 2023-08-10 RX ORDER — AMOXICILLIN 250 MG
1 CAPSULE ORAL 2 TIMES DAILY PRN
Status: DISCONTINUED | OUTPATIENT
Start: 2023-08-10 | End: 2023-08-11 | Stop reason: HOSPADM

## 2023-08-10 RX ORDER — NICOTINE 21 MG/24HR
1 PATCH, TRANSDERMAL 24 HOURS TRANSDERMAL DAILY
Status: DISCONTINUED | OUTPATIENT
Start: 2023-08-10 | End: 2023-08-11 | Stop reason: HOSPADM

## 2023-08-10 RX ORDER — HYDROCHLOROTHIAZIDE 25 MG/1
25 TABLET ORAL DAILY
Qty: 30 TABLET | Refills: 0 | Status: SHIPPED | OUTPATIENT
Start: 2023-08-11

## 2023-08-10 RX ORDER — HYDROCODONE BITARTRATE AND ACETAMINOPHEN 5; 325 MG/1; MG/1
1 TABLET ORAL EVERY 6 HOURS PRN
Status: DISCONTINUED | OUTPATIENT
Start: 2023-08-10 | End: 2023-08-11 | Stop reason: HOSPADM

## 2023-08-10 RX ORDER — HYDROCHLOROTHIAZIDE 25 MG/1
25 TABLET ORAL DAILY
Status: DISCONTINUED | OUTPATIENT
Start: 2023-08-10 | End: 2023-08-11 | Stop reason: HOSPADM

## 2023-08-10 RX ORDER — MULTIVIT-MIN/IRON FUM/FOLIC AC 7.5 MG-4
1 TABLET ORAL DAILY
Qty: 30 TABLET | Refills: 0 | Status: SHIPPED | OUTPATIENT
Start: 2023-08-10

## 2023-08-10 RX ORDER — LANOLIN ALCOHOL/MO/W.PET/CERES
100 CREAM (GRAM) TOPICAL DAILY
Qty: 30 TABLET | Refills: 0 | Status: SHIPPED | OUTPATIENT
Start: 2023-08-11

## 2023-08-10 RX ORDER — POLYETHYLENE GLYCOL 3350 17 G/17G
17 POWDER, FOR SOLUTION ORAL DAILY
Status: DISCONTINUED | OUTPATIENT
Start: 2023-08-10 | End: 2023-08-11 | Stop reason: HOSPADM

## 2023-08-10 RX ORDER — PANTOPRAZOLE SODIUM 40 MG/1
40 TABLET, DELAYED RELEASE ORAL
Qty: 30 TABLET | Refills: 0 | Status: SHIPPED | OUTPATIENT
Start: 2023-08-10 | End: 2023-08-11 | Stop reason: SDUPTHER

## 2023-08-10 RX ADMIN — HYDRALAZINE HYDROCHLORIDE 10 MG: 20 INJECTION, SOLUTION INTRAMUSCULAR; INTRAVENOUS at 17:45

## 2023-08-10 RX ADMIN — POTASSIUM CHLORIDE 10 MEQ: 7.46 INJECTION, SOLUTION INTRAVENOUS at 16:49

## 2023-08-10 RX ADMIN — POTASSIUM CHLORIDE 10 MEQ: 7.46 INJECTION, SOLUTION INTRAVENOUS at 20:49

## 2023-08-10 RX ADMIN — HYDROMORPHONE HYDROCHLORIDE 0.5 MG: 1 INJECTION, SOLUTION INTRAMUSCULAR; INTRAVENOUS; SUBCUTANEOUS at 04:59

## 2023-08-10 RX ADMIN — HYDROMORPHONE HYDROCHLORIDE 0.5 MG: 1 INJECTION, SOLUTION INTRAMUSCULAR; INTRAVENOUS; SUBCUTANEOUS at 02:05

## 2023-08-10 RX ADMIN — Medication 20 MG: at 04:56

## 2023-08-10 RX ADMIN — POTASSIUM CHLORIDE 10 MEQ: 7.46 INJECTION, SOLUTION INTRAVENOUS at 08:34

## 2023-08-10 RX ADMIN — HYDROCODONE BITARTRATE AND ACETAMINOPHEN 1 TABLET: 5; 325 TABLET ORAL at 19:48

## 2023-08-10 RX ADMIN — LISINOPRIL 20 MG: 20 TABLET ORAL at 12:40

## 2023-08-10 RX ADMIN — POTASSIUM CHLORIDE 10 MEQ: 7.46 INJECTION, SOLUTION INTRAVENOUS at 22:58

## 2023-08-10 RX ADMIN — Medication 20 MG: at 09:33

## 2023-08-10 RX ADMIN — FOLIC ACID 1 MG: 1 TABLET ORAL at 08:22

## 2023-08-10 RX ADMIN — SODIUM CHLORIDE, PRESERVATIVE FREE 10 ML: 5 INJECTION INTRAVENOUS at 19:48

## 2023-08-10 RX ADMIN — HEPARIN SODIUM 5000 UNITS: 5000 INJECTION INTRAVENOUS; SUBCUTANEOUS at 14:25

## 2023-08-10 RX ADMIN — HYDROCHLOROTHIAZIDE 25 MG: 25 TABLET ORAL at 10:17

## 2023-08-10 RX ADMIN — ATORVASTATIN CALCIUM 20 MG: 20 TABLET, FILM COATED ORAL at 19:48

## 2023-08-10 RX ADMIN — HEPARIN SODIUM 5000 UNITS: 5000 INJECTION INTRAVENOUS; SUBCUTANEOUS at 21:38

## 2023-08-10 RX ADMIN — HYDRALAZINE HYDROCHLORIDE 10 MG: 20 INJECTION INTRAMUSCULAR; INTRAVENOUS at 11:12

## 2023-08-10 RX ADMIN — Medication 100 MG: at 10:17

## 2023-08-10 RX ADMIN — SODIUM CHLORIDE, POTASSIUM CHLORIDE, SODIUM LACTATE AND CALCIUM CHLORIDE: 600; 310; 30; 20 INJECTION, SOLUTION INTRAVENOUS at 01:51

## 2023-08-10 RX ADMIN — HYDROMORPHONE HYDROCHLORIDE 0.5 MG: 1 INJECTION, SOLUTION INTRAMUSCULAR; INTRAVENOUS; SUBCUTANEOUS at 08:31

## 2023-08-10 RX ADMIN — HYDROCODONE BITARTRATE AND ACETAMINOPHEN 1 TABLET: 5; 325 TABLET ORAL at 12:33

## 2023-08-10 RX ADMIN — AMLODIPINE BESYLATE 10 MG: 10 TABLET ORAL at 08:22

## 2023-08-10 RX ADMIN — HEPARIN SODIUM 5000 UNITS: 5000 INJECTION INTRAVENOUS; SUBCUTANEOUS at 05:52

## 2023-08-10 RX ADMIN — POTASSIUM CHLORIDE 10 MEQ: 7.46 INJECTION, SOLUTION INTRAVENOUS at 12:36

## 2023-08-10 RX ADMIN — POTASSIUM CHLORIDE 10 MEQ: 7.46 INJECTION, SOLUTION INTRAVENOUS at 11:12

## 2023-08-10 RX ADMIN — POTASSIUM CHLORIDE 10 MEQ: 7.46 INJECTION, SOLUTION INTRAVENOUS at 18:41

## 2023-08-10 RX ADMIN — PANTOPRAZOLE SODIUM 40 MG: 40 TABLET, DELAYED RELEASE ORAL at 05:52

## 2023-08-10 RX ADMIN — Medication 20 MG: at 16:43

## 2023-08-10 RX ADMIN — SODIUM CHLORIDE, POTASSIUM CHLORIDE, SODIUM LACTATE AND CALCIUM CHLORIDE: 600; 310; 30; 20 INJECTION, SOLUTION INTRAVENOUS at 05:53

## 2023-08-10 RX ADMIN — POTASSIUM CHLORIDE 10 MEQ: 7.46 INJECTION, SOLUTION INTRAVENOUS at 09:47

## 2023-08-10 ASSESSMENT — PAIN SCALES - GENERAL
PAINLEVEL_OUTOF10: 8
PAINLEVEL_OUTOF10: 7
PAINLEVEL_OUTOF10: 7
PAINLEVEL_OUTOF10: 9

## 2023-08-10 ASSESSMENT — PAIN DESCRIPTION - LOCATION: LOCATION: ABDOMEN

## 2023-08-10 NOTE — PLAN OF CARE
Problem: Pain  Goal: Verbalizes/displays adequate comfort level or baseline comfort level  8/10/2023 0304 by Severa Cid, RN  Outcome: Progressing    Problem: Safety - Adult  Goal: Free from fall injury  Outcome: Progressing     Problem: ABCDS Injury Assessment  Goal: Absence of physical injury  Outcome: Progressing

## 2023-08-10 NOTE — PROGRESS NOTES
Physical Therapy  Facility/Department: [address] ORTHO/MED SURG  Physical Therapy Initial Assessment    Name: Brynn Lange  : 1970  MRN: [id number]  Date of Service: 8/10/2023    Discharge Recommendations:  No therapy recommended at discharge   PT Equipment Recommendations  Equipment Needed: No      Patient Diagnosis(es): The encounter diagnosis was Alcohol-induced acute pancreatitis without infection or necrosis. Past Medical History:  has a past medical history of Arthritis, Chronic sinusitis, Eczema, Environmental allergies, GERD (gastroesophageal reflux disease), Hyperlipidemia, Hypertension, Sepsis ([address]), and Snores. Past Surgical History:  has a past surgical history that includes Upper gastrointestinal endoscopy and Colonoscopy. Assessment   Assessment: The ot ambulated 300 ft without a device x SBA . He ambulated safely with no major c/o. No further PT intervention is needed at this time  Therapy Prognosis: Good  Decision Making: Medium Complexity  Requires PT Follow-Up: No  Activity Tolerance  Activity Tolerance: Patient tolerated evaluation without incident     Plan   Physcial Therapy Plan  General Plan: Discharge with evaluation only  Safety Devices  Type of Devices: Call light within reach, Left in bed, Nurse notified  Restraints  Restraints Initially in Place: No     Restrictions  Restrictions/Precautions  Required Braces or Orthoses?: No  Position Activity Restriction  Other position/activity restrictions: Up with assistance     Subjective   General  Patient assessed for rehabilitation services?: Yes  Response To Previous Treatment: Not applicable  Family / Caregiver Present: No  Follows Commands: Within Functional Limits  Subjective  Subjective:  The pt reports an 8/10 abd pain         Social/Functional History  Social/Functional History  Lives With: Family (children ages 13, 15, 6, 10, and 3yrs old)  Type of Home: House  Home Layout: Two level, Able to Live on Main level with

## 2023-08-10 NOTE — CARE COORDINATION
Case Management Assessment  Initial Evaluation    Date/Time of Evaluation: 8/10/2023 5:47 PM  Assessment Completed by: Tunde Silva RN    If patient is discharged prior to next notation, then this note serves as note for discharge by case management. Patient Name: Cass Hoffman                   YOB: 1970  Diagnosis: Pancreatitis, unspecified pancreatitis type [K85.90]  Alcohol-induced acute pancreatitis without infection or necrosis [K85.20]                   Date / Time: 8/9/2023  5:35 AM    Patient Admission Status: Inpatient   Readmission Risk (Low < 19, Mod (19-27), High > 27): Readmission Risk Score: 5.2    Current PCP: Chano Gil MD  PCP verified by CM? (P) Yes Ousmane Rubin MD)    Chart Reviewed: Yes      History Provided by: (P) Patient  Patient Orientation: (P) Alert and Oriented    Patient Cognition: (P) Alert    Hospitalization in the last 30 days (Readmission):  No    If yes, Readmission Assessment in CM Navigator will be completed.     Advance Directives:      Code Status: Full Code   Patient's Primary Decision Maker is: (P) Legal Next of Kin      Discharge Planning:    Patient lives with: (P) Children Type of Home: (P) House  Primary Care Giver: (P) Self  Patient Support Systems include: (P) Family Members   Current Financial resources: (P) Medicaid  Current community resources:    Current services prior to admission: (P) None            Current DME:              Type of Home Care services:  (P) None    ADLS  Prior functional level: (P) Independent in ADLs/IADLs  Current functional level: (P) Independent in ADLs/IADLs    PT AM-PAC: 24 /24  OT AM-PAC: 24 /24    Family can provide assistance at DC: (P) No  Would you like Case Management to discuss the discharge plan with any other family members/significant others, and if so, who? (P) No  Plans to Return to Present Housing: (P) Yes  Other Identified Issues/Barriers to RETURNING to current housing:  none  Potential Assistance needed at discharge: (P) Transportation            Potential DME:    Patient expects to discharge to: (P) [address] [employer] [address] for transportation at discharge:      Financial    Payor: [address] / Plan: [address] / Product Type: *No Product type* /     Does insurance require precert for SNF: Yes    Potential assistance Purchasing Medications: (P) No  Meds-to-Beds request: Yes      [facility] - [address], [address] - [address] [facility] [phone] - F [phone]  [address] [facility] [address]  [address]  Phone: [phone] Fax: [phone]    [facility] ([facility]) - [place] [address] [place] [address] [phone] Louise Kamila [phone]  [address] [place] [id number]  Phone: [phone] Fax: [address] [address] [facility] [phone] Louise Kamila [phone]  [address] [address] [address]  Phone: [phone] Fax: [phone]      Notes:    Factors facilitating achievement of predicted outcomes: Motivated, Cooperative, and Pleasant    Barriers to discharge: Pain    Additional Case Management Notes:  Verified demographics, emergency contacts and insurance with patient. Patient plan is to return home independently. 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 Freedom of choice list with basic dialogue that supports the patient's individualized plan of care/goals and shares the quality data associated with the providers was provided to: (P) Patient   Patient Representative Name:       The Patient and/or Patient Representative Agree with the Discharge Plan?

## 2023-08-10 NOTE — PROGRESS NOTES
Notified DANILO Humphrey Prom regarding patient /110 HR 61, no chest pain or headache, due prn labetalol and dilaudid given, will continue to monitor.

## 2023-08-10 NOTE — PROGRESS NOTES
[facility]  Office: [phone]  Poonam Pavon, DO, Ken Lynch, DO, Antonina Cardoza, DO, Elias Dominguez Blood, DO, Nazia Segal MD, Loki Clark MD, Ariane Doyle MD, Maddie Reaves MD,  Maura Berumen MD, Diana Rao MD, Delcia Epley, DO, Skyler Pablo MD,  Blessing Rivera DO, Nereida Talavera MD, Noemi Cuevas MD, Aj Graham, DO, Valerio Patterson MD,  Ghazala Hope DO, Michael Finn MD, Syl Sevilla MD, Nimisha Larsen MD, Peter Barfield MD,  Gina Mcneil MD, Carrol Gutierrez MD, Ingris Crowe MD, Vanna Muniz DO, Mary Anthony MD,  Semaj Jewell MD, Gisele Johnson, Kayden March, CNP, Aron Chowdary, CNP, Jaqueline Wynn, CNP,  Bennett Jamil, MARCI, Liam Shabazz, CNP, Enrique Shook, CNP, Lexis Esparza, CNP, Dejah Michael, CNP, Gissel Gomes, CNP, Gracia Acevedo PA-C, Brendan Garnica, ALEXIS, Jeffrey Rea, CNP, Chloé Lynch, [address] [place] [address] De Luis Valadez    Progress Note    8/10/2023    9:17 AM    Name:   Micheal Ryan  MRN:     [id number]     [address]:      [de-identified]   Room:   [address] [place] [address] Day:  1  Admit Date:  8/9/2023  5:35 AM    PCP:   Jaswant Elias MD  Code Status:  Full Code    Subjective:     C/C:   Chief Complaint   Patient presents with    Abdominal Pain     Interval History Status: significantly worsened. Patient seen and evaluated in room, tolerating some oral intake without difficulty or pain. Had discussion with smoking cessation and alcohol cessation, patient in agreement. Will order nicotine patch. Otherwise stable for discharge after blood pressure normalizes    Addendum at 1222: Nursing [employer] provider about elevated blood pressure and inability to tolerate oral intake. Will hold discharge, continue antiemetics. Brief History:      This is a 71-year-old male with a significant past medical history of hypertension, hyperlipidemia, GERD, EtOH abuse and frequent pancreatitis

## 2023-08-10 NOTE — PROGRESS NOTES
CLINICAL PHARMACY NOTE: MEDS TO BEDS    Total # of Prescriptions Filled: 7   The following medications were delivered to the patient:  Pantoprazole  Amlodipine  Hydrochlorothiazide  Folic acid  Thiamine  Thera-m  Nicotine patches    Additional Documentation:

## 2023-08-10 NOTE — PROGRESS NOTES
Occupational Therapy  Facility/Department: [address] ORTHO/MED SURG  Occupational Therapy Initial Assessment    Name: Ana Harris  : 1970  MRN: [id number]  Date of Service: 8/10/2023    Chief Complaint   Patient presents with    Abdominal Pain     Discharge Recommendations:   (Pt with no further OT required at discharge)        Patient Diagnosis(es): The encounter diagnosis was Alcohol-induced acute pancreatitis without infection or necrosis. Past Medical History:  has a past medical history of Arthritis, Chronic sinusitis, Eczema, Environmental allergies, GERD (gastroesophageal reflux disease), Hyperlipidemia, Hypertension, Sepsis ([address]), and Snores. Past Surgical History:  has a past surgical history that includes Upper gastrointestinal endoscopy and Colonoscopy. Assessment   Assessment: Pt engaged in OT eval/tx session this date including engagement in bed mobility tasks, seated EOB for LB dressing tasks, toileting tasks, and standing sink side for grooming tasks. Pt educated in safety awareness/fall prevention, non pharmaceutical pain mngt techniques, and techniques to manage dizziness with positional changes while seated EOB and demo good carryover of these techniques throughout session. Pt performing functional tasks throughout session at an independent level with supervision provided throughout only for safety. Pt with no further OT acute care needs, OT to sign off at this time. Prognosis: Good  Decision Making: Low Complexity  No Skilled OT: Independent with functional mobility; No OT goals identified; Independent with ADL's  REQUIRES OT FOLLOW-UP: No  Activity Tolerance  Activity Tolerance: Patient Tolerated treatment well  Activity Tolerance Comments: Pt does have complaints of abdominal pain and dizziness intermittently during session however pt demo ability to incorporate education provided to complete functional tasks throughout session      Safety Devices  Type of Devices: Call light

## 2023-08-11 VITALS
WEIGHT: 147.93 LBS | HEART RATE: 75 BPM | HEIGHT: 67 IN | RESPIRATION RATE: 16 BRPM | OXYGEN SATURATION: 99 % | SYSTOLIC BLOOD PRESSURE: 142 MMHG | TEMPERATURE: 98.4 F | BODY MASS INDEX: 23.22 KG/M2 | DIASTOLIC BLOOD PRESSURE: 89 MMHG

## 2023-08-11 LAB
ANION GAP SERPL CALCULATED.3IONS-SCNC: 14 MMOL/L (ref 9–17)
BASOPHILS # BLD: 0.07 K/UL (ref 0–0.2)
BASOPHILS NFR BLD: 1 % (ref 0–2)
BUN SERPL-MCNC: 7 MG/DL (ref 6–20)
CALCIUM SERPL-MCNC: 9.5 MG/DL (ref 8.6–10.4)
CHLORIDE SERPL-SCNC: 99 MMOL/L (ref 98–107)
CO2 SERPL-SCNC: 23 MMOL/L (ref 20–31)
CREAT SERPL-MCNC: 1 MG/DL (ref 0.7–1.2)
EOSINOPHIL # BLD: 0.07 K/UL (ref 0–0.44)
EOSINOPHILS RELATIVE PERCENT: 1 % (ref 1–4)
ERYTHROCYTE [DISTWIDTH] IN BLOOD BY AUTOMATED COUNT: 12.5 % (ref 11.8–14.4)
GFR SERPL CREATININE-BSD FRML MDRD: >60 ML/MIN/1.73M2
GLUCOSE SERPL-MCNC: 99 MG/DL (ref 70–99)
HCT VFR BLD AUTO: 46.3 % (ref 40.7–50.3)
HGB BLD-MCNC: 16 G/DL (ref 13–17)
IMM GRANULOCYTES # BLD AUTO: <0.03 K/UL (ref 0–0.3)
IMM GRANULOCYTES NFR BLD: 0 %
LYMPHOCYTES NFR BLD: 1.43 K/UL (ref 1.1–3.7)
LYMPHOCYTES RELATIVE PERCENT: 22 % (ref 24–43)
MAGNESIUM SERPL-MCNC: 2.2 MG/DL (ref 1.6–2.6)
MCH RBC QN AUTO: 30.8 PG (ref 25.2–33.5)
MCHC RBC AUTO-ENTMCNC: 34.6 G/DL (ref 28.4–34.8)
MCV RBC AUTO: 89.2 FL (ref 82.6–102.9)
MONOCYTES NFR BLD: 1.04 K/UL (ref 0.1–1.2)
MONOCYTES NFR BLD: 16 % (ref 3–12)
NEUTROPHILS NFR BLD: 60 % (ref 36–65)
NEUTS SEG NFR BLD: 3.88 K/UL (ref 1.5–8.1)
NRBC BLD-RTO: 0.8 PER 100 WBC
PLATELET # BLD AUTO: 447 K/UL (ref 138–453)
PMV BLD AUTO: 11.2 FL (ref 8.1–13.5)
POTASSIUM SERPL-SCNC: 3.5 MMOL/L (ref 3.7–5.3)
POTASSIUM SERPL-SCNC: 4.2 MMOL/L (ref 3.7–5.3)
RBC # BLD AUTO: 5.19 M/UL (ref 4.21–5.77)
SODIUM SERPL-SCNC: 136 MMOL/L (ref 135–144)
WBC OTHER # BLD: 6.5 K/UL (ref 3.5–11.3)

## 2023-08-11 PROCEDURE — 80048 BASIC METABOLIC PNL TOTAL CA: CPT

## 2023-08-11 PROCEDURE — 99232 SBSQ HOSP IP/OBS MODERATE 35: CPT | Performed by: NURSE PRACTITIONER

## 2023-08-11 PROCEDURE — 36415 COLL VENOUS BLD VENIPUNCTURE: CPT

## 2023-08-11 PROCEDURE — 83735 ASSAY OF MAGNESIUM: CPT

## 2023-08-11 PROCEDURE — 2580000003 HC RX 258: Performed by: STUDENT IN AN ORGANIZED HEALTH CARE EDUCATION/TRAINING PROGRAM

## 2023-08-11 PROCEDURE — 6370000000 HC RX 637 (ALT 250 FOR IP): Performed by: STUDENT IN AN ORGANIZED HEALTH CARE EDUCATION/TRAINING PROGRAM

## 2023-08-11 PROCEDURE — 6360000002 HC RX W HCPCS: Performed by: STUDENT IN AN ORGANIZED HEALTH CARE EDUCATION/TRAINING PROGRAM

## 2023-08-11 PROCEDURE — 6370000000 HC RX 637 (ALT 250 FOR IP): Performed by: NURSE PRACTITIONER

## 2023-08-11 PROCEDURE — 84132 ASSAY OF SERUM POTASSIUM: CPT

## 2023-08-11 PROCEDURE — 85025 COMPLETE CBC W/AUTO DIFF WBC: CPT

## 2023-08-11 RX ORDER — LISINOPRIL 20 MG/1
20 TABLET ORAL DAILY
Qty: 30 TABLET | Refills: 3 | Status: SHIPPED | OUTPATIENT
Start: 2023-08-12

## 2023-08-11 RX ORDER — PANTOPRAZOLE SODIUM 40 MG/1
TABLET, DELAYED RELEASE ORAL
Qty: 44 TABLET | Refills: 0 | Status: SHIPPED | OUTPATIENT
Start: 2023-08-11 | End: 2023-09-10

## 2023-08-11 RX ORDER — HYDRALAZINE HYDROCHLORIDE 25 MG/1
25 TABLET, FILM COATED ORAL EVERY 8 HOURS SCHEDULED
Qty: 90 TABLET | Refills: 3 | Status: SHIPPED | OUTPATIENT
Start: 2023-08-11

## 2023-08-11 RX ORDER — HYDRALAZINE HYDROCHLORIDE 25 MG/1
25 TABLET, FILM COATED ORAL EVERY 8 HOURS SCHEDULED
Status: DISCONTINUED | OUTPATIENT
Start: 2023-08-11 | End: 2023-08-11 | Stop reason: HOSPADM

## 2023-08-11 RX ADMIN — Medication 100 MG: at 08:18

## 2023-08-11 RX ADMIN — AMLODIPINE BESYLATE 10 MG: 10 TABLET ORAL at 08:18

## 2023-08-11 RX ADMIN — HYDROCHLOROTHIAZIDE 25 MG: 25 TABLET ORAL at 08:18

## 2023-08-11 RX ADMIN — HYDROCODONE BITARTRATE AND ACETAMINOPHEN 1 TABLET: 5; 325 TABLET ORAL at 08:18

## 2023-08-11 RX ADMIN — POTASSIUM CHLORIDE 10 MEQ: 7.46 INJECTION, SOLUTION INTRAVENOUS at 13:40

## 2023-08-11 RX ADMIN — HYDROCODONE BITARTRATE AND ACETAMINOPHEN 1 TABLET: 5; 325 TABLET ORAL at 13:39

## 2023-08-11 RX ADMIN — SODIUM CHLORIDE, PRESERVATIVE FREE 10 ML: 5 INJECTION INTRAVENOUS at 08:19

## 2023-08-11 RX ADMIN — POTASSIUM CHLORIDE 10 MEQ: 7.46 INJECTION, SOLUTION INTRAVENOUS at 10:22

## 2023-08-11 RX ADMIN — PANTOPRAZOLE SODIUM 40 MG: 40 TABLET, DELAYED RELEASE ORAL at 05:19

## 2023-08-11 RX ADMIN — HYDRALAZINE HYDROCHLORIDE 25 MG: 25 TABLET, FILM COATED ORAL at 13:39

## 2023-08-11 RX ADMIN — FOLIC ACID 1 MG: 1 TABLET ORAL at 08:18

## 2023-08-11 RX ADMIN — POTASSIUM CHLORIDE 10 MEQ: 7.46 INJECTION, SOLUTION INTRAVENOUS at 12:08

## 2023-08-11 RX ADMIN — LISINOPRIL 20 MG: 20 TABLET ORAL at 08:18

## 2023-08-11 RX ADMIN — POTASSIUM CHLORIDE 10 MEQ: 7.46 INJECTION, SOLUTION INTRAVENOUS at 15:22

## 2023-08-11 RX ADMIN — HEPARIN SODIUM 5000 UNITS: 5000 INJECTION INTRAVENOUS; SUBCUTANEOUS at 05:18

## 2023-08-11 RX ADMIN — HYDROCODONE BITARTRATE AND ACETAMINOPHEN 1 TABLET: 5; 325 TABLET ORAL at 02:01

## 2023-08-11 ASSESSMENT — PAIN SCALES - GENERAL
PAINLEVEL_OUTOF10: 10
PAINLEVEL_OUTOF10: 9
PAINLEVEL_OUTOF10: 8
PAINLEVEL_OUTOF10: 8
PAINLEVEL_OUTOF10: 7
PAINLEVEL_OUTOF10: 10
PAINLEVEL_OUTOF10: 5

## 2023-08-11 ASSESSMENT — PAIN DESCRIPTION - LOCATION: LOCATION: ABDOMEN

## 2023-08-11 ASSESSMENT — PAIN DESCRIPTION - ONSET: ONSET: ON-GOING

## 2023-08-11 ASSESSMENT — PAIN DESCRIPTION - DESCRIPTORS: DESCRIPTORS: BURNING

## 2023-08-11 ASSESSMENT — PAIN DESCRIPTION - PAIN TYPE: TYPE: ACUTE PAIN

## 2023-08-11 ASSESSMENT — PAIN DESCRIPTION - FREQUENCY: FREQUENCY: CONTINUOUS

## 2023-08-11 ASSESSMENT — PAIN DESCRIPTION - ORIENTATION: ORIENTATION: MID

## 2023-08-11 NOTE — PROGRESS NOTES
Pt given dc instructions and verbalized understanding pt waiting on meds to beds and IV potassium to finish up

## 2023-08-11 NOTE — PLAN OF CARE
Problem: Discharge Planning  Goal: Discharge to home or other facility with appropriate resources  8/11/2023 1220 by Hannah Prasad RN  Outcome: Adequate for Discharge  8/11/2023 1215 by Hannah Prasad RN  Outcome: Progressing  8/11/2023 0316 by Lori Trevino RN  Outcome: Progressing     Problem: Safety - Adult  Goal: Free from fall injury  8/11/2023 1220 by Hannah Prasad RN  Outcome: Adequate for Discharge  8/11/2023 1215 by Hannah Prasad RN  Outcome: Progressing  8/11/2023 0316 by Lori Trevino RN  Outcome: Progressing     Problem: ABCDS Injury Assessment  Goal: Absence of physical injury  8/11/2023 1220 by Hannah Prasad RN  Outcome: Adequate for Discharge  8/11/2023 1215 by Hannah Prasad RN  Outcome: Progressing

## 2023-08-11 NOTE — PLAN OF CARE
Problem: Discharge Planning  Goal: Discharge to home or other facility with appropriate resources  8/11/2023 1215 by Conchita Vargas RN  Outcome: Progressing  8/11/2023 0316 by Abhilash Fowler RN  Outcome: Progressing     Problem: Pain  Goal: Verbalizes/displays adequate comfort level or baseline comfort level  8/11/2023 1215 by Conchita Vargas RN  Outcome: Progressing  8/11/2023 0316 by Abhilash Fowler RN  Outcome: Progressing     Problem: Safety - Adult  Goal: Free from fall injury  8/11/2023 1215 by Conchita Vargas RN  Outcome: Progressing  8/11/2023 0316 by Abhilash Fowler RN  Outcome: Progressing     Problem: ABCDS Injury Assessment  Goal: Absence of physical injury  Outcome: Progressing

## 2023-08-11 NOTE — PLAN OF CARE
Problem: Discharge Planning  Goal: Discharge to home or other facility with appropriate resources  8/11/2023 1220 by Nevin Nguyen RN  Outcome: Adequate for Discharge  8/11/2023 1215 by Nevin Nguyen RN  Outcome: Progressing  8/11/2023 0316 by Jignesh Rossi RN  Outcome: Progressing     Problem: Safety - Adult  Goal: Free from fall injury  8/11/2023 1220 by Nevin Nguyen RN  Outcome: Adequate for Discharge  8/11/2023 1215 by Nevin Nguyen RN  Outcome: Progressing  8/11/2023 0316 by Jignesh Rossi RN  Outcome: Progressing     Problem: ABCDS Injury Assessment  Goal: Absence of physical injury  8/11/2023 1220 by Nevin Nguyen RN  Outcome: Adequate for Discharge  8/11/2023 1215 by Nevin Nguyen RN  Outcome: Progressing     Problem: Discharge Planning  Goal: Discharge to home or other facility with appropriate resources  8/11/2023 1220 by Nevin Nguyen RN  Outcome: Adequate for Discharge  8/11/2023 1215 by Nevin Nguyen RN  Outcome: Progressing  8/11/2023 0316 by Jignesh Rossi RN  Outcome: Progressing

## 2023-08-11 NOTE — PROGRESS NOTES
CLINICAL PHARMACY NOTE: MEDS TO BEDS    Total # of Prescriptions Filled: 3   The following medications were delivered to the patient:  Pantoprazole 40mg tabs  Lisinopril 20mg tabs  Hydralazine 25mg tabs    Additional Documentation:  Delivered to pt in room 250 on 8/11at 3P. No copay.

## 2023-08-11 NOTE — DISCHARGE SUMMARY
[facility]  Office: [id number]  1826, DO, Dary Dom, DO, Desire Doles, DO, Gerry Cruz Blood, DO, Apollo Arevalo MD, Adriana Schwartz MD, Lydia Meraz MD, Girish Kimbrough MD,  Joanne Amezcua MD, Letitia Burgess MD, Emelina Landry, DO, Nithin Moody MD,  Alberto Andrade DO, Manuel Frost MD, Lilibeth Deluca MD, Ralph Thornton, DO, Jeanine Mtz MD,  Adriane Lopez DO, Bryn Quiroz MD, Meghan Hernandez MD, Carri Dietrich MD, Shanel Bennett MD,  Yuliet Li MD, Ann Barakat MD, Paul Bennett MD, Ovidio Correa, DO, Vivienne Figueroa MD,  Arnav Chase MD, Oleksandr Givens, CNP,  Rhonda Avalos, CNP, Veronica Badillo, CNP, Catherine Rios, CNP,  Ary Serna, [facility], Jory Jordan, CNP, Junior Giles, CNP, Vito Mixon, CNP, Claudell Bowler, CNP, Krishan Meza, CNP, Kiear Ariza, [facility], Howie Jean Baptiste, CNS, Po Schultz, CNP, Heath Paulino, [facility]    Discharge Summary     Patient ID: Wenceslao Pimentel  :  1970   MRN: [id number]     ACCOUNT:  [de-identified]   Patient's PCP: Cherry Arias MD  Admit Date: 2023   Discharge Date: 2023     Length of Stay: 2  Code Status:  Full Code  Admitting Physician: Nithin Moody MD  Discharge Physician: Deanna Kovacs, APRN - NP     Active Discharge Diagnoses:     Hospital Problem Lists:  Principal Problem:    Alcohol-induced acute pancreatitis  Active Problems:    Gastroesophageal reflux disease without esophagitis    Uncontrolled hypertension    Pure hypercholesterolemia    Prediabetes    Tobacco abuse    Alcohol abuse  Resolved Problems:    HANNA (acute kidney injury) ([address])    Hypokalemia    Hypertensive urgency      Admission Condition:  fair     Discharged Condition: stable    Hospital Stay:     Hospital Course:  [place] is a 50 y.o. male who was admitted for the management of   Alcohol-induced acute pancreatitis , presented to ER with

## 2023-08-11 NOTE — PLAN OF CARE
Problem: Discharge Planning  Goal: Discharge to home or other facility with appropriate resources  8/11/2023 0316 by Angela Aguilar RN  Outcome: Progressing  8/10/2023 1852 by Keegan Huitron RN  Outcome: Progressing     Problem: Pain  Goal: Verbalizes/displays adequate comfort level or baseline comfort level  8/11/2023 0316 by Angela Aguilar RN  Outcome: Progressing  8/10/2023 1852 by Keegan Huitron RN  Outcome: Progressing     Problem: ABCDS Injury Assessment  Goal: Absence of physical injury  8/10/2023 1852 by Keegan Huitron RN  Outcome: Progressing

## 2023-08-11 NOTE — DISCHARGE INSTRUCTIONS
Follow up with family Dr as soon as possible   Return to emergency room for severe abdominal pain or nausea or vomiting  Take medication as prescibed

## 2023-09-23 ENCOUNTER — HOSPITAL ENCOUNTER (EMERGENCY)
Age: 53
Discharge: HOME | End: 2023-09-23
Attending: EMERGENCY MEDICINE
Payer: MEDICAID

## 2023-09-23 VITALS
RESPIRATION RATE: 12 BRPM | BODY MASS INDEX: 23.54 KG/M2 | OXYGEN SATURATION: 97 % | WEIGHT: 150 LBS | TEMPERATURE: 98.1 F | HEART RATE: 74 BPM | SYSTOLIC BLOOD PRESSURE: 178 MMHG | HEIGHT: 67 IN | DIASTOLIC BLOOD PRESSURE: 104 MMHG

## 2023-09-23 DIAGNOSIS — S01.81XA FACIAL LACERATION, INITIAL ENCOUNTER: Primary | ICD-10-CM

## 2023-09-23 PROCEDURE — 12011 RPR F/E/E/N/L/M 2.5 CM/<: CPT

## 2023-09-23 PROCEDURE — 99284 EMERGENCY DEPT VISIT MOD MDM: CPT

## 2023-09-23 ASSESSMENT — ENCOUNTER SYMPTOMS
GASTROINTESTINAL NEGATIVE: 1
RESPIRATORY NEGATIVE: 1
ALLERGIC/IMMUNOLOGIC NEGATIVE: 1

## 2023-09-23 ASSESSMENT — PAIN - FUNCTIONAL ASSESSMENT: PAIN_FUNCTIONAL_ASSESSMENT: 0-10

## 2023-09-23 NOTE — ED TRIAGE NOTES
Patient was setting up chairs while at work at [employer]. Tripped and fell forward hitting front of head on chairs, no LOC. EMS was called. BP was elevated on EMS arrival. Pt is known hypertensive, not real compliant with BP meds per report and per pt. Pt denies any complaints.  Pt thinks he took BP meds this am.

## 2023-09-23 NOTE — DISCHARGE INSTRUCTIONS
You were assessed and treated for any centimeter laceration to your right brow. You were treated with skin adhesive in 3 layers. Please keep laceration with adhesive clean and dry. Return to the ER  if feeling lightheaded, weak, or dizzy. Follow-up appointment with your primary care provider is strongly recommended given your blood pressure in the ED.

## 2024-10-15 ENCOUNTER — OFFICE VISIT (OUTPATIENT)
Dept: INTERNAL MEDICINE | Age: 54
End: 2024-10-15
Payer: MEDICAID

## 2024-10-15 VITALS
HEART RATE: 98 BPM | SYSTOLIC BLOOD PRESSURE: 140 MMHG | RESPIRATION RATE: 16 BRPM | BODY MASS INDEX: 24.64 KG/M2 | OXYGEN SATURATION: 98 % | DIASTOLIC BLOOD PRESSURE: 82 MMHG | WEIGHT: 157 LBS | HEIGHT: 67 IN | TEMPERATURE: 97.2 F

## 2024-10-15 DIAGNOSIS — R19.7 DIARRHEA, UNSPECIFIED TYPE: Primary | ICD-10-CM

## 2024-10-15 DIAGNOSIS — R35.1 BENIGN PROSTATIC HYPERPLASIA WITH NOCTURIA: ICD-10-CM

## 2024-10-15 DIAGNOSIS — F10.99 ALCOHOL USE, UNSPECIFIED WITH UNSPECIFIED ALCOHOL-INDUCED DISORDER (HCC): ICD-10-CM

## 2024-10-15 DIAGNOSIS — Z23 HEPATITIS B VACCINATION ADMINISTERED AT CURRENT VISIT: ICD-10-CM

## 2024-10-15 DIAGNOSIS — I10 PRIMARY HYPERTENSION: ICD-10-CM

## 2024-10-15 DIAGNOSIS — N40.1 BENIGN PROSTATIC HYPERPLASIA WITH NOCTURIA: ICD-10-CM

## 2024-10-15 DIAGNOSIS — K86.89 PANCREATIC INSUFFICIENCY: ICD-10-CM

## 2024-10-15 DIAGNOSIS — E78.49 OTHER HYPERLIPIDEMIA: ICD-10-CM

## 2024-10-15 DIAGNOSIS — Z11.59 ENCOUNTER FOR HEPATITIS C SCREENING TEST FOR LOW RISK PATIENT: ICD-10-CM

## 2024-10-15 DIAGNOSIS — Z23 FLU VACCINE NEED: ICD-10-CM

## 2024-10-15 DIAGNOSIS — R73.03 PREDIABETES: ICD-10-CM

## 2024-10-15 DIAGNOSIS — Z12.11 ENCOUNTER FOR COLORECTAL CANCER SCREENING: ICD-10-CM

## 2024-10-15 DIAGNOSIS — Z23 NEED FOR SHINGLES VACCINE: ICD-10-CM

## 2024-10-15 DIAGNOSIS — Z12.12 ENCOUNTER FOR COLORECTAL CANCER SCREENING: ICD-10-CM

## 2024-10-15 PROBLEM — K85.90 PANCREATITIS, UNSPECIFIED PANCREATITIS TYPE: Status: RESOLVED | Noted: 2023-08-09 | Resolved: 2024-10-15

## 2024-10-15 PROBLEM — K21.9 GASTROESOPHAGEAL REFLUX DISEASE WITHOUT ESOPHAGITIS: Status: RESOLVED | Noted: 2017-01-20 | Resolved: 2024-10-15

## 2024-10-15 PROBLEM — K85.90 ACUTE PANCREATITIS WITHOUT INFECTION OR NECROSIS: Status: RESOLVED | Noted: 2020-08-29 | Resolved: 2024-10-15

## 2024-10-15 PROBLEM — K85.20 ALCOHOL-INDUCED ACUTE PANCREATITIS: Status: RESOLVED | Noted: 2020-10-11 | Resolved: 2024-10-15

## 2024-10-15 PROBLEM — K85.90 ACUTE PANCREATITIS: Status: RESOLVED | Noted: 2018-09-23 | Resolved: 2024-10-15

## 2024-10-15 PROBLEM — Z87.19 H/O ACUTE PANCREATITIS: Status: ACTIVE | Noted: 2024-10-15

## 2024-10-15 PROBLEM — A41.9 SEPSIS (HCC): Status: RESOLVED | Noted: 2022-06-08 | Resolved: 2024-10-15

## 2024-10-15 PROCEDURE — 4004F PT TOBACCO SCREEN RCVD TLK: CPT

## 2024-10-15 PROCEDURE — 90656 IIV3 VACC NO PRSV 0.5 ML IM: CPT | Performed by: INTERNAL MEDICINE

## 2024-10-15 PROCEDURE — 3079F DIAST BP 80-89 MM HG: CPT

## 2024-10-15 PROCEDURE — 3077F SYST BP >= 140 MM HG: CPT

## 2024-10-15 PROCEDURE — 99213 OFFICE O/P EST LOW 20 MIN: CPT

## 2024-10-15 PROCEDURE — 99203 OFFICE O/P NEW LOW 30 MIN: CPT | Performed by: INTERNAL MEDICINE

## 2024-10-15 PROCEDURE — G8482 FLU IMMUNIZE ORDER/ADMIN: HCPCS

## 2024-10-15 PROCEDURE — 3017F COLORECTAL CA SCREEN DOC REV: CPT

## 2024-10-15 PROCEDURE — 90746 HEPB VACCINE 3 DOSE ADULT IM: CPT | Performed by: INTERNAL MEDICINE

## 2024-10-15 PROCEDURE — G8427 DOCREV CUR MEDS BY ELIG CLIN: HCPCS

## 2024-10-15 PROCEDURE — G8420 CALC BMI NORM PARAMETERS: HCPCS

## 2024-10-15 RX ORDER — LANOLIN ALCOHOL/MO/W.PET/CERES
100 CREAM (GRAM) TOPICAL DAILY
Qty: 30 TABLET | Refills: 0 | Status: ON HOLD | OUTPATIENT
Start: 2024-10-15

## 2024-10-15 RX ORDER — TAMSULOSIN HYDROCHLORIDE 0.4 MG/1
0.4 CAPSULE ORAL DAILY
Qty: 90 CAPSULE | Refills: 1 | Status: ON HOLD | OUTPATIENT
Start: 2024-10-15

## 2024-10-15 RX ORDER — AMLODIPINE BESYLATE 10 MG/1
10 TABLET ORAL DAILY
Qty: 30 TABLET | Refills: 0 | Status: ON HOLD | OUTPATIENT
Start: 2024-10-15

## 2024-10-15 RX ORDER — BLOOD PRESSURE TEST KIT
KIT MISCELLANEOUS
Qty: 1 KIT | Refills: 0 | Status: ON HOLD | OUTPATIENT
Start: 2024-10-15

## 2024-10-15 RX ORDER — SIMVASTATIN 40 MG
40 TABLET ORAL NIGHTLY
Qty: 30 TABLET | Refills: 3 | Status: ON HOLD | OUTPATIENT
Start: 2024-10-15

## 2024-10-15 RX ORDER — LISINOPRIL 10 MG/1
10 TABLET ORAL DAILY
Qty: 30 TABLET | Refills: 1 | Status: ON HOLD | OUTPATIENT
Start: 2024-10-15 | End: 2024-12-14

## 2024-10-15 RX ORDER — FOLIC ACID 1 MG/1
1 TABLET ORAL DAILY
Qty: 90 TABLET | Refills: 0 | Status: ON HOLD | OUTPATIENT
Start: 2024-10-15

## 2024-10-15 RX ORDER — LOPERAMIDE HYDROCHLORIDE 1 MG/5ML
1 SOLUTION ORAL 3 TIMES DAILY PRN
Qty: 118 ML | Refills: 1 | Status: CANCELLED | OUTPATIENT
Start: 2024-10-15 | End: 2024-10-31

## 2024-10-15 RX ORDER — ZOSTER VACCINE RECOMBINANT, ADJUVANTED 50 MCG/0.5
0.5 KIT INTRAMUSCULAR SEE ADMIN INSTRUCTIONS
Qty: 0.5 ML | Refills: 0 | Status: ON HOLD | OUTPATIENT
Start: 2024-10-15 | End: 2025-04-13

## 2024-10-15 SDOH — ECONOMIC STABILITY: INCOME INSECURITY: HOW HARD IS IT FOR YOU TO PAY FOR THE VERY BASICS LIKE FOOD, HOUSING, MEDICAL CARE, AND HEATING?: HARD

## 2024-10-15 SDOH — ECONOMIC STABILITY: FOOD INSECURITY: WITHIN THE PAST 12 MONTHS, THE FOOD YOU BOUGHT JUST DIDN'T LAST AND YOU DIDN'T HAVE MONEY TO GET MORE.: SOMETIMES TRUE

## 2024-10-15 SDOH — ECONOMIC STABILITY: FOOD INSECURITY: WITHIN THE PAST 12 MONTHS, YOU WORRIED THAT YOUR FOOD WOULD RUN OUT BEFORE YOU GOT MONEY TO BUY MORE.: NEVER TRUE

## 2024-10-15 ASSESSMENT — PATIENT HEALTH QUESTIONNAIRE - PHQ9
9. THOUGHTS THAT YOU WOULD BE BETTER OFF DEAD, OR OF HURTING YOURSELF: NOT AT ALL
SUM OF ALL RESPONSES TO PHQ QUESTIONS 1-9: 6
SUM OF ALL RESPONSES TO PHQ QUESTIONS 1-9: 6
5. POOR APPETITE OR OVEREATING: MORE THAN HALF THE DAYS
4. FEELING TIRED OR HAVING LITTLE ENERGY: SEVERAL DAYS
6. FEELING BAD ABOUT YOURSELF - OR THAT YOU ARE A FAILURE OR HAVE LET YOURSELF OR YOUR FAMILY DOWN: SEVERAL DAYS
1. LITTLE INTEREST OR PLEASURE IN DOING THINGS: NOT AT ALL
SUM OF ALL RESPONSES TO PHQ QUESTIONS 1-9: 6
2. FEELING DOWN, DEPRESSED OR HOPELESS: NOT AT ALL
SUM OF ALL RESPONSES TO PHQ9 QUESTIONS 1 & 2: 0
8. MOVING OR SPEAKING SO SLOWLY THAT OTHER PEOPLE COULD HAVE NOTICED. OR THE OPPOSITE, BEING SO FIGETY OR RESTLESS THAT YOU HAVE BEEN MOVING AROUND A LOT MORE THAN USUAL: NOT AT ALL
SUM OF ALL RESPONSES TO PHQ QUESTIONS 1-9: 6
7. TROUBLE CONCENTRATING ON THINGS, SUCH AS READING THE NEWSPAPER OR WATCHING TELEVISION: MORE THAN HALF THE DAYS
10. IF YOU CHECKED OFF ANY PROBLEMS, HOW DIFFICULT HAVE THESE PROBLEMS MADE IT FOR YOU TO DO YOUR WORK, TAKE CARE OF THINGS AT HOME, OR GET ALONG WITH OTHER PEOPLE: SOMEWHAT DIFFICULT
DEPRESSION UNABLE TO ASSESS: PT REFUSES
3. TROUBLE FALLING OR STAYING ASLEEP: NOT AT ALL

## 2024-10-15 NOTE — PROGRESS NOTES
Attending Physician Statement  I have discussed the care of Matthieu Barba, including pertinent history and exam findings with the resident. I have reviewed the key elements of all parts of the encounter with the resident. I have seen and examined the patient with the resident and the key elements of all parts of the encounter have been performed by me.  Added history includes Alcohol induced pancreatitis, HTN, pre diabetes. I agree with the assessment, and status of the problem list as documented.   Diagnosis Orders   1. Diarrhea, unspecified type  Pancrelipase, Lip-Prot-Amyl, 3000-59126 units CPEP      2. Encounter for colorectal cancer screening  [facility] Colonoscopy      3. Pancreatic insufficiency  Pancrelipase, Lip-Prot-Amyl, 3000-26651 units CPEP      4. Benign prostatic hyperplasia with nocturia  tamsulosin (FLOMAX) 0.4 MG capsule    PSA Screening      5. Primary hypertension  CBC with Auto Differential    Lipid, Fasting    Hemoglobin A1C    Comprehensive Metabolic Panel      6. Other hyperlipidemia  Lipid, Fasting    Hemoglobin A1C      7. Encounter for hepatitis C screening test for low risk patient  Hepatitis C Antibody      8. Diabetes mellitus screening  Hemoglobin A1C      9. Flu vaccine need  Influenza, AFLURIA Trivalent, (age 3 y+), IM, Preservative Free, 0.5mL      10. Hepatitis B vaccination administered at current visit  Hep B, ENGERIX-B, (age 20 yrs+), IM, 1mL, 3-dose      11. Need for shingles vaccine        12. Prediabetes  Hemoglobin A1C         The plan and orders should include   Orders Placed This Encounter   Procedures    Influenza, AFLURIA Trivalent, (age 3 y+), IM, Preservative Free, 0.5mL    Hep B, ENGERIX-B, (age 20 yrs+), IM, 1mL, 3-dose    CBC with Auto Differential    Lipid, Fasting    Hepatitis C Antibody    Hemoglobin A1C    PSA Screening    Comprehensive Metabolic Panel    [facility] Colonoscopy    and this was also documented by the resident.The medication list was

## 2024-10-15 NOTE — PROGRESS NOTES
Internal Medicine Clinic New Patient Note    Date of patient's visit: 10/15/2024  Name:  Matthieu Barba  Primary Care Physician: Glenn Freeman MD    Reason for visit: First Visit, establish care     HISTORY OF PRESENTING ILLNESS:    History was obtained from the patient. Matthieu Barba is a 54 y.o. male here to establish care. Former PCP is LATOSHA Joseph CNP. Last seen 10/30/2020.   Today the patient is coming to establish care with a new provider. He does have a couple of active complaints. The patient stated that his is frequently feeling the urge to urinate and when he goes some times its a lot while other times it is just a dribble. He stated this has been going on for over a year. He has not tried anything for it. He additionally complains of awaking at night to urinate, usually about 4 times a night.   He also has a complaint of frequent defecation. Stated that he feels as though food runs right through him. He does not have any problem with liquids, beer does not give him the runs, just food. Does not eat a lot of dairy. He does have a history of recurrent alcohol induced pancreatitis, has not had an episode in a year, but last year had roughly 4-5 episodes.   He has a PMHx of HTN, diagnosed roughly 10y ago. Previously he was on norvasc 10mg, lisinopril 20mg, hydralazine 25mg TID, and HCTZ 25mg however he has not taken any medications in the last 2 years. He denied any red flag symptoms such as head ache, chest pain, SOB, dizziness, or blurry vision. He does not check his blood pressure at home.   He additionally has a history of HLD, previously was on statin therapy, but has not taken it in 2 years.   Additional history of GERD, previously was on protonix, has not taken in 2 years and does not have any symptoms currently.   He has a charted history of pre-diabetes, but denied any blurry vision, polydipsia, or polyphagia. Is peeing a lot, but is likely not related. He has never been on

## 2024-10-15 NOTE — PATIENT INSTRUCTIONS
[facility] Financial Resources*  (Call [employer]/211 if need more resources).       Area Office on Aging of [facility] ([place] and surrounding [facility]):  What they offer: Assisted Living Waiver Program, Medicare benefits counseling, and referral to community resources.  Phone Number: [phone]   [place] [facility] ([place] and counties to the east):  What they offer: Assistance with utility expenses.  Phone Number: [phone]   [facility] Veterans Service Commission:  What they offer:  Assistance with rent or mortgage payments, utilities, auto payments or repair, food, medical prescriptions, transportation to VA medical facilities for veterans and their widows who qualify.  Phone Number: 847.745.6368   [facility] Community Action Commission ([place] and counties to the west):   What they offer: Assistance with utility expenses.  Phone Number: 538.644.6233  [place] [employer] ([employer]):  What they offer: Medicaid, SNAP (food stamps), TANF (cash assistance), and childcare assistance.  Phone Number: 179.428.6700  [employer] ([facility]):  What they offer: Assistance with utility expenses.  Phone Number: 298.633.8282 ext: x11   Community Action Commission [place]:  What they offer: Electric, gas and water payment service.  Phone: 694.692.5013    [facility] Agency on Aging, District 5:    [place], [place], [place], [place], [place], [place], [place], [place], [facility]:  What they offer: Referral to transportation and other resources for seniors.  Phone Number: [phone]   [place]    First Call for Help     [facility]  What they offer: Information and referral services about food, housing, utility assistance, drug and alcohol treatment, child and family support  Phone number: 782.696.7115    [employer]  What they offer: Programs for children, addiction recovery, family restoration  Phone number:

## 2024-10-17 ENCOUNTER — HOSPITAL ENCOUNTER (OUTPATIENT)
Age: 54
Setting detail: SPECIMEN
Discharge: HOME | End: 2024-10-17

## 2024-10-17 DIAGNOSIS — Z11.59 ENCOUNTER FOR HEPATITIS C SCREENING TEST FOR LOW RISK PATIENT: ICD-10-CM

## 2024-10-17 DIAGNOSIS — E78.49 OTHER HYPERLIPIDEMIA: ICD-10-CM

## 2024-10-17 DIAGNOSIS — I10 PRIMARY HYPERTENSION: ICD-10-CM

## 2024-10-17 DIAGNOSIS — R35.1 BENIGN PROSTATIC HYPERPLASIA WITH NOCTURIA: ICD-10-CM

## 2024-10-17 DIAGNOSIS — R73.03 PREDIABETES: ICD-10-CM

## 2024-10-17 DIAGNOSIS — N40.1 BENIGN PROSTATIC HYPERPLASIA WITH NOCTURIA: ICD-10-CM

## 2024-10-17 LAB
ALBUMIN SERPL-MCNC: 4.1 G/DL (ref 3.5–5.2)
ALBUMIN/GLOB SERPL: 1 {RATIO} (ref 1–2.5)
ALP SERPL-CCNC: 107 U/L (ref 40–129)
ALT SERPL-CCNC: 12 U/L (ref 10–50)
ANION GAP SERPL CALCULATED.3IONS-SCNC: 10 MMOL/L (ref 9–16)
AST SERPL-CCNC: 24 U/L (ref 10–50)
BASOPHILS # BLD: 0.07 K/UL (ref 0–0.2)
BASOPHILS NFR BLD: 1 % (ref 0–2)
BILIRUB SERPL-MCNC: 0.3 MG/DL (ref 0–1.2)
BUN SERPL-MCNC: 12 MG/DL (ref 6–20)
CALCIUM SERPL-MCNC: 9.5 MG/DL (ref 8.6–10.4)
CHLORIDE SERPL-SCNC: 105 MMOL/L (ref 98–107)
CHOLEST SERPL-MCNC: 192 MG/DL (ref 0–199)
CHOLESTEROL/HDL RATIO: 2
CO2 SERPL-SCNC: 27 MMOL/L (ref 20–31)
CREAT SERPL-MCNC: 1.2 MG/DL (ref 0.7–1.2)
EOSINOPHIL # BLD: 0.18 K/UL (ref 0–0.44)
EOSINOPHILS RELATIVE PERCENT: 4 % (ref 1–4)
ERYTHROCYTE [DISTWIDTH] IN BLOOD BY AUTOMATED COUNT: 12.8 % (ref 11.8–14.4)
EST. AVERAGE GLUCOSE BLD GHB EST-MCNC: 114 MG/DL
GFR, ESTIMATED: 77 ML/MIN/1.73M2
GLUCOSE SERPL-MCNC: 76 MG/DL (ref 74–99)
HBA1C MFR BLD: 5.6 % (ref 4–6)
HCT VFR BLD AUTO: 46 % (ref 40.7–50.3)
HCV AB SERPL QL IA: NONREACTIVE
HDLC SERPL-MCNC: 80 MG/DL
HGB BLD-MCNC: 14.5 G/DL (ref 13–17)
IMM GRANULOCYTES # BLD AUTO: <0.03 K/UL (ref 0–0.3)
IMM GRANULOCYTES NFR BLD: 0 %
LDLC SERPL CALC-MCNC: 95 MG/DL (ref 0–100)
LYMPHOCYTES NFR BLD: 1.47 K/UL (ref 1.1–3.7)
LYMPHOCYTES RELATIVE PERCENT: 30 % (ref 24–43)
MCH RBC QN AUTO: 29.1 PG (ref 25.2–33.5)
MCHC RBC AUTO-ENTMCNC: 31.5 G/DL (ref 28.4–34.8)
MCV RBC AUTO: 92.2 FL (ref 82.6–102.9)
MONOCYTES NFR BLD: 0.74 K/UL (ref 0.1–1.2)
MONOCYTES NFR BLD: 15 % (ref 3–12)
NEUTROPHILS NFR BLD: 50 % (ref 36–65)
NEUTS SEG NFR BLD: 2.42 K/UL (ref 1.5–8.1)
NRBC BLD-RTO: 0 PER 100 WBC
PLATELET # BLD AUTO: 177 K/UL (ref 138–453)
PMV BLD AUTO: 11.5 FL (ref 8.1–13.5)
POTASSIUM SERPL-SCNC: 4 MMOL/L (ref 3.7–5.3)
PROT SERPL-MCNC: 7.1 G/DL (ref 6.6–8.7)
PSA SERPL-MCNC: 1.2 NG/ML (ref 0–4)
RBC # BLD AUTO: 4.99 M/UL (ref 4.21–5.77)
SODIUM SERPL-SCNC: 142 MMOL/L (ref 136–145)
TRIGL SERPL-MCNC: 88 MG/DL (ref 0–149)
VLDLC SERPL CALC-MCNC: 18 MG/DL
WBC OTHER # BLD: 4.9 K/UL (ref 3.5–11.3)

## 2024-10-25 ENCOUNTER — APPOINTMENT (OUTPATIENT)
Dept: GENERAL RADIOLOGY | Age: 54
End: 2024-10-25
Payer: MEDICAID

## 2024-10-25 ENCOUNTER — APPOINTMENT (OUTPATIENT)
Dept: MRI IMAGING | Age: 54
End: 2024-10-25
Payer: MEDICAID

## 2024-10-25 ENCOUNTER — HOSPITAL ENCOUNTER (INPATIENT)
Age: 54
LOS: 7 days | Discharge: NURSING, REHAB OR LONG-TERM CARE | End: 2024-11-01
Attending: EMERGENCY MEDICINE | Admitting: STUDENT IN AN ORGANIZED HEALTH CARE EDUCATION/TRAINING PROGRAM
Payer: MEDICAID

## 2024-10-25 ENCOUNTER — APPOINTMENT (OUTPATIENT)
Dept: CT IMAGING | Age: 54
End: 2024-10-25
Payer: MEDICAID

## 2024-10-25 DIAGNOSIS — K92.0 HEMATEMESIS WITH NAUSEA: ICD-10-CM

## 2024-10-25 DIAGNOSIS — I63.9 ISCHEMIC STROKE (HCC): Primary | ICD-10-CM

## 2024-10-25 DIAGNOSIS — I63.9 CEREBROVASCULAR ACCIDENT (CVA), UNSPECIFIED MECHANISM (HCC): ICD-10-CM

## 2024-10-25 LAB
ALBUMIN SERPL-MCNC: 3.9 G/DL (ref 3.5–5.2)
ALBUMIN/GLOB SERPL: 2 {RATIO} (ref 1–2.5)
ALP SERPL-CCNC: 100 U/L (ref 40–129)
ALT SERPL-CCNC: 36 U/L (ref 10–50)
ANION GAP SERPL CALCULATED.3IONS-SCNC: 13 MMOL/L (ref 9–16)
AST SERPL-CCNC: 41 U/L (ref 10–50)
BASOPHILS # BLD: 0.12 K/UL (ref 0–0.2)
BASOPHILS NFR BLD: 2 % (ref 0–2)
BILIRUB SERPL-MCNC: 0.4 MG/DL (ref 0–1.2)
BUN SERPL-MCNC: 11 MG/DL (ref 6–20)
CALCIUM SERPL-MCNC: 8.3 MG/DL (ref 8.6–10.4)
CHLORIDE SERPL-SCNC: 102 MMOL/L (ref 98–107)
CO2 SERPL-SCNC: 22 MMOL/L (ref 20–31)
CREAT SERPL-MCNC: 1.1 MG/DL (ref 0.7–1.2)
EOSINOPHIL # BLD: 0.07 K/UL (ref 0–0.44)
EOSINOPHILS RELATIVE PERCENT: 1 % (ref 1–4)
ERYTHROCYTE [DISTWIDTH] IN BLOOD BY AUTOMATED COUNT: 12.3 % (ref 11.8–14.4)
GFR, ESTIMATED: 82 ML/MIN/1.73M2
GLUCOSE SERPL-MCNC: 187 MG/DL (ref 74–99)
HCT VFR BLD AUTO: 47.9 % (ref 40.7–50.3)
HGB BLD-MCNC: 16.3 G/DL (ref 13–17)
IMM GRANULOCYTES # BLD AUTO: 0.03 K/UL (ref 0–0.3)
IMM GRANULOCYTES NFR BLD: 0 %
LIPASE SERPL-CCNC: 22 U/L (ref 13–60)
LYMPHOCYTES NFR BLD: 1.94 K/UL (ref 1.1–3.7)
LYMPHOCYTES RELATIVE PERCENT: 25 % (ref 24–43)
MCH RBC QN AUTO: 29.9 PG (ref 25.2–33.5)
MCHC RBC AUTO-ENTMCNC: 34 G/DL (ref 28.4–34.8)
MCV RBC AUTO: 87.9 FL (ref 82.6–102.9)
MONOCYTES NFR BLD: 0.75 K/UL (ref 0.1–1.2)
MONOCYTES NFR BLD: 10 % (ref 3–12)
NEUTROPHILS NFR BLD: 62 % (ref 36–65)
NEUTS SEG NFR BLD: 4.9 K/UL (ref 1.5–8.1)
NRBC BLD-RTO: 0 PER 100 WBC
PLATELET # BLD AUTO: 236 K/UL (ref 138–453)
PMV BLD AUTO: 10.4 FL (ref 8.1–13.5)
POTASSIUM SERPL-SCNC: 3.2 MMOL/L (ref 3.7–5.3)
PROT SERPL-MCNC: 6.4 G/DL (ref 6.6–8.7)
RBC # BLD AUTO: 5.45 M/UL (ref 4.21–5.77)
SODIUM SERPL-SCNC: 137 MMOL/L (ref 136–145)
WBC OTHER # BLD: 7.8 K/UL (ref 3.5–11.3)

## 2024-10-25 PROCEDURE — 2000000000 HC ICU R&B

## 2024-10-25 PROCEDURE — 96375 TX/PRO/DX INJ NEW DRUG ADDON: CPT

## 2024-10-25 PROCEDURE — 6360000002 HC RX W HCPCS

## 2024-10-25 PROCEDURE — 99285 EMERGENCY DEPT VISIT HI MDM: CPT

## 2024-10-25 PROCEDURE — 93005 ELECTROCARDIOGRAM TRACING: CPT

## 2024-10-25 PROCEDURE — 2580000003 HC RX 258: Performed by: STUDENT IN AN ORGANIZED HEALTH CARE EDUCATION/TRAINING PROGRAM

## 2024-10-25 PROCEDURE — 6360000004 HC RX CONTRAST MEDICATION: Performed by: STUDENT IN AN ORGANIZED HEALTH CARE EDUCATION/TRAINING PROGRAM

## 2024-10-25 PROCEDURE — 2580000003 HC RX 258

## 2024-10-25 PROCEDURE — 71045 X-RAY EXAM CHEST 1 VIEW: CPT

## 2024-10-25 PROCEDURE — 83690 ASSAY OF LIPASE: CPT

## 2024-10-25 PROCEDURE — 96374 THER/PROPH/DIAG INJ IV PUSH: CPT

## 2024-10-25 PROCEDURE — 80053 COMPREHEN METABOLIC PANEL: CPT

## 2024-10-25 PROCEDURE — 70551 MRI BRAIN STEM W/O DYE: CPT

## 2024-10-25 PROCEDURE — 6370000000 HC RX 637 (ALT 250 FOR IP)

## 2024-10-25 PROCEDURE — 4A03X5D MEASUREMENT OF ARTERIAL FLOW, INTRACRANIAL, EXTERNAL APPROACH: ICD-10-PCS | Performed by: RADIOLOGY

## 2024-10-25 PROCEDURE — 70498 CT ANGIOGRAPHY NECK: CPT

## 2024-10-25 PROCEDURE — 85025 COMPLETE CBC W/AUTO DIFF WBC: CPT

## 2024-10-25 RX ORDER — SODIUM CHLORIDE 0.9 % (FLUSH) 0.9 %
5-40 SYRINGE (ML) INJECTION EVERY 12 HOURS SCHEDULED
Status: DISCONTINUED | OUTPATIENT
Start: 2024-10-25 | End: 2024-11-01 | Stop reason: HOSPADM

## 2024-10-25 RX ORDER — LISINOPRIL 20 MG/1
10 TABLET ORAL DAILY
Status: DISCONTINUED | OUTPATIENT
Start: 2024-10-26 | End: 2024-10-31

## 2024-10-25 RX ORDER — ONDANSETRON 2 MG/ML
4 INJECTION INTRAMUSCULAR; INTRAVENOUS ONCE
Status: COMPLETED | OUTPATIENT
Start: 2024-10-25 | End: 2024-10-25

## 2024-10-25 RX ORDER — MECLIZINE HCL 12.5 MG 12.5 MG/1
25 TABLET ORAL ONCE
Status: COMPLETED | OUTPATIENT
Start: 2024-10-25 | End: 2024-10-25

## 2024-10-25 RX ORDER — AMLODIPINE BESYLATE 10 MG/1
10 TABLET ORAL DAILY
Status: DISCONTINUED | OUTPATIENT
Start: 2024-10-26 | End: 2024-11-01 | Stop reason: HOSPADM

## 2024-10-25 RX ORDER — SODIUM CHLORIDE 0.9 % (FLUSH) 0.9 %
5-40 SYRINGE (ML) INJECTION PRN
Status: DISCONTINUED | OUTPATIENT
Start: 2024-10-25 | End: 2024-11-01 | Stop reason: HOSPADM

## 2024-10-25 RX ORDER — ONDANSETRON 2 MG/ML
INJECTION INTRAMUSCULAR; INTRAVENOUS
Status: COMPLETED
Start: 2024-10-25 | End: 2024-10-25

## 2024-10-25 RX ORDER — DIPHENHYDRAMINE HYDROCHLORIDE 50 MG/ML
25 INJECTION INTRAMUSCULAR; INTRAVENOUS ONCE
Status: COMPLETED | OUTPATIENT
Start: 2024-10-25 | End: 2024-10-25

## 2024-10-25 RX ORDER — ONDANSETRON 2 MG/ML
4 INJECTION INTRAMUSCULAR; INTRAVENOUS EVERY 6 HOURS PRN
Status: DISCONTINUED | OUTPATIENT
Start: 2024-10-25 | End: 2024-11-01 | Stop reason: HOSPADM

## 2024-10-25 RX ORDER — POLYETHYLENE GLYCOL 3350 17 G/17G
17 POWDER, FOR SOLUTION ORAL DAILY PRN
Status: DISCONTINUED | OUTPATIENT
Start: 2024-10-25 | End: 2024-11-01 | Stop reason: HOSPADM

## 2024-10-25 RX ORDER — POTASSIUM CHLORIDE 1500 MG/1
40 TABLET, EXTENDED RELEASE ORAL ONCE
Status: COMPLETED | OUTPATIENT
Start: 2024-10-25 | End: 2024-10-25

## 2024-10-25 RX ORDER — IOPAMIDOL 755 MG/ML
75 INJECTION, SOLUTION INTRAVASCULAR
Status: COMPLETED | OUTPATIENT
Start: 2024-10-25 | End: 2024-10-25

## 2024-10-25 RX ORDER — TAMSULOSIN HYDROCHLORIDE 0.4 MG/1
0.4 CAPSULE ORAL DAILY
Status: DISCONTINUED | OUTPATIENT
Start: 2024-10-26 | End: 2024-11-01 | Stop reason: HOSPADM

## 2024-10-25 RX ORDER — PROCHLORPERAZINE EDISYLATE 5 MG/ML
10 INJECTION INTRAMUSCULAR; INTRAVENOUS ONCE
Status: COMPLETED | OUTPATIENT
Start: 2024-10-25 | End: 2024-10-25

## 2024-10-25 RX ORDER — FOLIC ACID 1 MG/1
1 TABLET ORAL DAILY
Status: DISCONTINUED | OUTPATIENT
Start: 2024-10-26 | End: 2024-11-01 | Stop reason: HOSPADM

## 2024-10-25 RX ORDER — SODIUM CHLORIDE 9 MG/ML
INJECTION, SOLUTION INTRAVENOUS PRN
Status: DISCONTINUED | OUTPATIENT
Start: 2024-10-25 | End: 2024-11-01 | Stop reason: HOSPADM

## 2024-10-25 RX ORDER — MECLIZINE HCL 12.5 MG 12.5 MG/1
12.5 TABLET ORAL 3 TIMES DAILY PRN
Status: DISCONTINUED | OUTPATIENT
Start: 2024-10-25 | End: 2024-10-25

## 2024-10-25 RX ORDER — LANOLIN ALCOHOL/MO/W.PET/CERES
100 CREAM (GRAM) TOPICAL DAILY
Status: DISCONTINUED | OUTPATIENT
Start: 2024-10-26 | End: 2024-11-01 | Stop reason: HOSPADM

## 2024-10-25 RX ORDER — SODIUM CHLORIDE 9 MG/ML
INJECTION, SOLUTION INTRAVENOUS CONTINUOUS
Status: DISCONTINUED | OUTPATIENT
Start: 2024-10-25 | End: 2024-10-26

## 2024-10-25 RX ORDER — 0.9 % SODIUM CHLORIDE 0.9 %
500 INTRAVENOUS SOLUTION INTRAVENOUS ONCE
Status: COMPLETED | OUTPATIENT
Start: 2024-10-25 | End: 2024-10-25

## 2024-10-25 RX ORDER — SCOLOPAMINE TRANSDERMAL SYSTEM 1 MG/1
1 PATCH, EXTENDED RELEASE TRANSDERMAL
Status: DISCONTINUED | OUTPATIENT
Start: 2024-10-25 | End: 2024-11-01 | Stop reason: HOSPADM

## 2024-10-25 RX ORDER — ATORVASTATIN CALCIUM 10 MG/1
20 TABLET, FILM COATED ORAL DAILY
Status: DISCONTINUED | OUTPATIENT
Start: 2024-10-26 | End: 2024-11-01 | Stop reason: HOSPADM

## 2024-10-25 RX ORDER — ONDANSETRON 4 MG/1
4 TABLET, ORALLY DISINTEGRATING ORAL EVERY 8 HOURS PRN
Status: DISCONTINUED | OUTPATIENT
Start: 2024-10-25 | End: 2024-11-01 | Stop reason: HOSPADM

## 2024-10-25 RX ADMIN — ONDANSETRON 4 MG: 2 INJECTION INTRAMUSCULAR; INTRAVENOUS at 16:50

## 2024-10-25 RX ADMIN — POTASSIUM CHLORIDE 40 MEQ: 1500 TABLET, EXTENDED RELEASE ORAL at 19:26

## 2024-10-25 RX ADMIN — MECLIZINE 25 MG: 12.5 TABLET ORAL at 17:01

## 2024-10-25 RX ADMIN — IOPAMIDOL 75 ML: 755 INJECTION, SOLUTION INTRAVENOUS at 20:34

## 2024-10-25 RX ADMIN — SODIUM CHLORIDE 500 ML: 0.9 INJECTION, SOLUTION INTRAVENOUS at 20:07

## 2024-10-25 RX ADMIN — PROCHLORPERAZINE EDISYLATE 10 MG: 5 INJECTION INTRAMUSCULAR; INTRAVENOUS at 17:12

## 2024-10-25 RX ADMIN — PANTOPRAZOLE SODIUM 80 MG: 40 INJECTION, POWDER, FOR SOLUTION INTRAVENOUS at 16:56

## 2024-10-25 RX ADMIN — DIPHENHYDRAMINE HYDROCHLORIDE 25 MG: 50 INJECTION INTRAMUSCULAR; INTRAVENOUS at 17:12

## 2024-10-25 ASSESSMENT — PAIN DESCRIPTION - DESCRIPTORS: DESCRIPTORS: DISCOMFORT

## 2024-10-25 ASSESSMENT — PAIN DESCRIPTION - PAIN TYPE: TYPE: ACUTE PAIN

## 2024-10-25 ASSESSMENT — PAIN SCALES - GENERAL: PAINLEVEL_OUTOF10: 10

## 2024-10-25 ASSESSMENT — PAIN DESCRIPTION - LOCATION: LOCATION: ABDOMEN

## 2024-10-25 ASSESSMENT — PAIN - FUNCTIONAL ASSESSMENT: PAIN_FUNCTIONAL_ASSESSMENT: 0-10

## 2024-10-25 NOTE — ED PROVIDER NOTES
[facility] NEURO ICU  Emergency Department Encounter  Emergency Medicine Resident     Pt Name:Matthieu Barba  MRN: [id number]  Birthdate 1970  Date of evaluation: 10/25/24  PCP:  Glenn Freeman MD  Note Started: 5:28 PM EDT      CHIEF COMPLAINT       Chief Complaint   Patient presents with    Vomiting    Dizziness       HISTORY OF PRESENT ILLNESS  (Location/Symptom, Timing/Onset, Context/Setting, Quality, Duration, Modifying Factors, Severity.)      Matthieu Barba is a 54 y.o. male who presents with dizziness, nausea, vomiting after donating plasma earlier today.  Patient reports that he donated plasma earlier this week to on Monday.  Reports that he also snorted some cocaine today.  He is unable to specify how much cocaine he snorted.  Also reports that he had \"just a sip\" of beer.  He does report that he is a daily drinker and drinks 3 24 ounce beers a day.  He is also complaining of generalized abdominal pain.  He describes his vomitus as dark and that it contained blood..  He denies any fever or chills.  Denies any diarrhea, chest pain, shortness of breath, headache, numbness or tingling in any of his extremities. LKW 2 pm    PAST MEDICAL / SURGICAL / SOCIAL / FAMILY HISTORY      has a past medical history of Arthritis, Chronic sinusitis, Eczema, Environmental allergies, GERD (gastroesophageal reflux disease), Hyperlipidemia, Hypertension, Sepsis (HCC), and Snores.     has a past surgical history that includes Upper gastrointestinal endoscopy and Colonoscopy.    Social History     Socioeconomic History    Marital status: Legally      Spouse name: Not on file    Number of children: Not on file    Years of education: Not on file    Highest education level: Not on file   Occupational History    Not on file   Tobacco Use    Smoking status: Every Day     Current packs/day: 0.50     Average packs/day: 0.5 packs/day for 30.0 years (15.0 ttl pk-yrs)     Types: Cigarettes    Smokeless tobacco:

## 2024-10-25 NOTE — CONSULTS
[facility]   IN-PATIENT SERVICE   [facility]    Neurology Consult Note            Date:   10/25/2024  Patient name:  Matthieu Barba  Date of admission:  10/25/2024  4:34 PM  MRN:   [id number]  Account:  [id number]  YOB: 1970  PCP:    Glenn Freeman MD  Room:   03/03  Code Status:    Prior    Chief Complaint:     Chief Complaint   Patient presents with    Vomiting    Dizziness       History Obtained From:     patient, electronic medical record    History of Present Illness:     The patient is a 54 y.o. male with significant past medical history of depression, HTN, cocaine and alcohol abuse who presents with dizziness, nausea and vomiting with coffee ground emesis.    Patient is a poor historian     Patient reports that today he went to donate plasma, as well as on Monday and was doing relatively well. He then had a meal, drank beer and smoked crack. Later an hour he started feeling dizzy and vertiginous, had multiple episodes of vomitus and coffee ground emesis. Patient had no similar episodes before, and his symptoms were not associated with diplopia, double vision, facial droop or focal weakness. Patient has no sensory deficit as well.      Patient then presented to the ER where he had a positive occult blood, but he was normotensive with normal Hb. Patient was to be evaluated by neurology for concerns of dizziness.      Patient not very cooperative on examination and is lethargic.He is oriented to person and place as well as situation. He has normal CN II-XII exam. No evidence of aphasia. Normal sensation to light touch throughout. Strength over 4/5 in all 4 extremities.        Past Medical History:     Past Medical History:   Diagnosis Date    Arthritis     Chronic sinusitis     Eczema     Environmental allergies     GERD (gastroesophageal reflux disease)     Hyperlipidemia     Hypertension     Sepsis (HCC) 6/8/2022    Snores         Past Surgical History:  Symmetric in upper and lower extremities, no Babinski sign   STATION and GAIT Evaluate with PT OT     Investigations:      Laboratory Testing:  Recent Results (from the past 24 hour(s))   CBC with Auto Differential    Collection Time: 10/25/24  4:54 PM   Result Value Ref Range    WBC 7.8 3.5 - 11.3 k/uL    RBC 5.45 4.21 - 5.77 m/uL    Hemoglobin 16.3 13.0 - 17.0 g/dL    Hematocrit 47.9 40.7 - 50.3 %    MCV 87.9 82.6 - 102.9 fL    MCH 29.9 25.2 - 33.5 pg    MCHC 34.0 28.4 - 34.8 g/dL    RDW 12.3 11.8 - 14.4 %    Platelets 236 138 - 453 k/uL    MPV 10.4 8.1 - 13.5 fL    NRBC Automated 0.0 0.0 per 100 WBC    Neutrophils % 62 36 - 65 %    Lymphocytes % 25 24 - 43 %    Monocytes % 10 3 - 12 %    Eosinophils % 1 1 - 4 %    Basophils % 2 0 - 2 %    Immature Granulocytes % 0 0 %    Neutrophils Absolute 4.90 1.50 - 8.10 k/uL    Lymphocytes Absolute 1.94 1.10 - 3.70 k/uL    Monocytes Absolute 0.75 0.10 - 1.20 k/uL    Eosinophils Absolute 0.07 0.00 - 0.44 k/uL    Basophils Absolute 0.12 0.00 - 0.20 k/uL    Immature Granulocytes Absolute 0.03 0.00 - 0.30 k/uL   Comprehensive Metabolic Panel    Collection Time: 10/25/24  4:54 PM   Result Value Ref Range    Sodium 137 136 - 145 mmol/L    Potassium 3.2 (L) 3.7 - 5.3 mmol/L    Chloride 102 98 - 107 mmol/L    CO2 22 20 - 31 mmol/L    Anion Gap 13 9 - 16 mmol/L    Glucose 187 (H) 74 - 99 mg/dL    BUN 11 6 - 20 mg/dL    Creatinine 1.1 0.70 - 1.20 mg/dL    Est, Glom Filt Rate 82 >60 mL/min/1.73m2    Calcium 8.3 (L) 8.6 - 10.4 mg/dL    Total Protein 6.4 (L) 6.6 - 8.7 g/dL    Albumin 3.9 3.5 - 5.2 g/dL    Albumin/Globulin Ratio 2.0 1.0 - 2.5    Total Bilirubin 0.4 0.00 - 1.20 mg/dL    Alkaline Phosphatase 100 40 - 129 U/L    ALT 36 10 - 50 U/L    AST 41 10 - 50 U/L   Lipase    Collection Time: 10/25/24  4:54 PM   Result Value Ref Range    Lipase 22 13 - 60 U/L         Assessment :      Primary Problem  <principal problem not specified>    There are no active hospital problems to display

## 2024-10-25 NOTE — ED PROVIDER NOTES
[facility]     Emergency Department     Faculty Attestation    I performed a history and physical examination of the patient and discussed management with the resident. I reviewed the resident's note and agree with the documented findings and plan of care. Any areas of disagreement are noted on the chart. I was personally present for the key portions of any procedures. I have documented in the chart those procedures where I was not present during the key portions. I have reviewed the emergency nurses triage note. I agree with the chief complaint, past medical history, past surgical history, allergies, medications, social and family history as documented unless otherwise noted below. For Physician Assistant/ Nurse Practitioner cases/documentation I have personally evaluated this patient and have completed at least one if not all key elements of the E/M (history, physical exam, and MDM). Additional findings are as noted.    Mild epigastric pain.     Alexis Ching MD  10/25/24 7716

## 2024-10-25 NOTE — ED NOTES
Pt to ED via EMS. EMS report pt donated plasma today reporting approx 1 hr post pt began feeling unwell. Pt reports n/v, diffuse abd pain, and dizziness. Pt vomiting coffee ground emesis on arrival. Pt alert and oriented, appears uncomfortable.

## 2024-10-26 ENCOUNTER — APPOINTMENT (OUTPATIENT)
Age: 54
End: 2024-10-26
Payer: MEDICAID

## 2024-10-26 ENCOUNTER — APPOINTMENT (OUTPATIENT)
Dept: GENERAL RADIOLOGY | Age: 54
End: 2024-10-26
Payer: MEDICAID

## 2024-10-26 PROBLEM — K92.0 HEMATEMESIS WITH NAUSEA: Status: ACTIVE | Noted: 2024-10-26

## 2024-10-26 LAB
ANION GAP SERPL CALCULATED.3IONS-SCNC: 12 MMOL/L (ref 9–16)
BASOPHILS # BLD: 0.05 K/UL (ref 0–0.2)
BASOPHILS NFR BLD: 0 % (ref 0–2)
BUN SERPL-MCNC: 10 MG/DL (ref 6–20)
CALCIUM SERPL-MCNC: 8.3 MG/DL (ref 8.6–10.4)
CHLORIDE SERPL-SCNC: 106 MMOL/L (ref 98–107)
CHOLEST SERPL-MCNC: 129 MG/DL (ref 0–199)
CHOLESTEROL/HDL RATIO: 2
CO2 SERPL-SCNC: 22 MMOL/L (ref 20–31)
CREAT SERPL-MCNC: 1 MG/DL (ref 0.7–1.2)
D DIMER PPP FEU-MCNC: <0.27 UG/ML FEU (ref 0–0.57)
ECHO AO ROOT DIAM: 2.5 CM
ECHO AO ROOT INDEX: 1.37 CM/M2
ECHO AV AREA PEAK VELOCITY: 2 CM2
ECHO AV AREA VTI: 1.9 CM2
ECHO AV AREA/BSA PEAK VELOCITY: 1.1 CM2/M2
ECHO AV AREA/BSA VTI: 1 CM2/M2
ECHO AV MEAN GRADIENT: 5 MMHG
ECHO AV MEAN VELOCITY: 1 M/S
ECHO AV PEAK GRADIENT: 11 MMHG
ECHO AV PEAK VELOCITY: 1.7 M/S
ECHO AV VELOCITY RATIO: 0.59
ECHO AV VTI: 27.4 CM
ECHO BSA: 1.83 M2
ECHO LA AREA 2C: 18.3 CM2
ECHO LA AREA 4C: 20.1 CM2
ECHO LA DIAMETER INDEX: 2.14 CM/M2
ECHO LA DIAMETER: 3.9 CM
ECHO LA MAJOR AXIS: 5.6 CM
ECHO LA MINOR AXIS: 5.5 CM
ECHO LA TO AORTIC ROOT RATIO: 1.56
ECHO LA VOL BP: 55 ML (ref 18–58)
ECHO LA VOL MOD A2C: 52 ML (ref 18–58)
ECHO LA VOL MOD A4C: 56 ML (ref 18–58)
ECHO LA VOL/BSA BIPLANE: 30 ML/M2 (ref 16–34)
ECHO LA VOLUME INDEX MOD A2C: 29 ML/M2 (ref 16–34)
ECHO LA VOLUME INDEX MOD A4C: 31 ML/M2 (ref 16–34)
ECHO LV E' LATERAL VELOCITY: 8.9 CM/S
ECHO LV E' SEPTAL VELOCITY: 9.5 CM/S
ECHO LV EDV A2C: 74 ML
ECHO LV EDV A4C: 96 ML
ECHO LV EDV INDEX A4C: 53 ML/M2
ECHO LV EDV NDEX A2C: 41 ML/M2
ECHO LV EF PHYSICIAN: 55 %
ECHO LV EJECTION FRACTION A2C: 50 %
ECHO LV EJECTION FRACTION A4C: 52 %
ECHO LV EJECTION FRACTION BIPLANE: 50 % (ref 55–100)
ECHO LV ESV A2C: 37 ML
ECHO LV ESV A4C: 47 ML
ECHO LV ESV INDEX A2C: 20 ML/M2
ECHO LV ESV INDEX A4C: 26 ML/M2
ECHO LV FRACTIONAL SHORTENING: 31 % (ref 28–44)
ECHO LV INTERNAL DIMENSION DIASTOLE INDEX: 2.47 CM/M2
ECHO LV INTERNAL DIMENSION DIASTOLIC: 4.5 CM (ref 4.2–5.9)
ECHO LV INTERNAL DIMENSION SYSTOLIC INDEX: 1.7 CM/M2
ECHO LV INTERNAL DIMENSION SYSTOLIC: 3.1 CM
ECHO LV IVSD: 1.2 CM (ref 0.6–1)
ECHO LV MASS 2D: 198.1 G (ref 88–224)
ECHO LV MASS INDEX 2D: 108.8 G/M2 (ref 49–115)
ECHO LV POSTERIOR WALL DIASTOLIC: 1.2 CM (ref 0.6–1)
ECHO LV RELATIVE WALL THICKNESS RATIO: 0.53
ECHO LVOT AREA: 3.1 CM2
ECHO LVOT AV VTI INDEX: 0.62
ECHO LVOT DIAM: 2 CM
ECHO LVOT MEAN GRADIENT: 2 MMHG
ECHO LVOT PEAK GRADIENT: 4 MMHG
ECHO LVOT PEAK VELOCITY: 1 M/S
ECHO LVOT STROKE VOLUME INDEX: 29.2 ML/M2
ECHO LVOT SV: 53.1 ML
ECHO LVOT VTI: 16.9 CM
ECHO MV A VELOCITY: 1.21 M/S
ECHO MV AREA VTI: 2.9 CM2
ECHO MV E DECELERATION TIME (DT): 127 MS
ECHO MV E VELOCITY: 0.88 M/S
ECHO MV E/A RATIO: 0.73
ECHO MV E/E' LATERAL: 9.89
ECHO MV E/E' RATIO (AVERAGED): 9.58
ECHO MV E/E' SEPTAL: 9.26
ECHO MV LVOT VTI INDEX: 1.07
ECHO MV MAX VELOCITY: 1.3 M/S
ECHO MV MEAN GRADIENT: 3 MMHG
ECHO MV MEAN VELOCITY: 0.9 M/S
ECHO MV PEAK GRADIENT: 7 MMHG
ECHO MV VTI: 18.1 CM
ECHO PV MAX VELOCITY: 1.2 M/S
ECHO PV PEAK GRADIENT: 6 MMHG
ECHO RV BASAL DIMENSION: 3.7 CM
ECHO RV FREE WALL PEAK S': 16.4 CM/S
ECHO RV TAPSE: 2.4 CM (ref 1.7–?)
ECHO TV REGURGITANT MAX VELOCITY: 2.01 M/S
ECHO TV REGURGITANT PEAK GRADIENT: 16 MMHG
EKG ATRIAL RATE: 67 BPM
EKG ATRIAL RATE: 67 BPM
EKG P AXIS: 37 DEGREES
EKG P AXIS: 64 DEGREES
EKG P-R INTERVAL: 158 MS
EKG P-R INTERVAL: 162 MS
EKG Q-T INTERVAL: 418 MS
EKG Q-T INTERVAL: 458 MS
EKG QRS DURATION: 80 MS
EKG QRS DURATION: 80 MS
EKG QTC CALCULATION (BAZETT): 441 MS
EKG QTC CALCULATION (BAZETT): 483 MS
EKG R AXIS: 11 DEGREES
EKG R AXIS: 26 DEGREES
EKG T AXIS: 69 DEGREES
EKG T AXIS: 92 DEGREES
EKG VENTRICULAR RATE: 67 BPM
EKG VENTRICULAR RATE: 67 BPM
EOSINOPHIL # BLD: <0.03 K/UL (ref 0–0.44)
EOSINOPHILS RELATIVE PERCENT: 0 % (ref 1–4)
ERYTHROCYTE [DISTWIDTH] IN BLOOD BY AUTOMATED COUNT: 12.3 % (ref 11.8–14.4)
EST. AVERAGE GLUCOSE BLD GHB EST-MCNC: 123 MG/DL
GFR, ESTIMATED: >90 ML/MIN/1.73M2
GLUCOSE BLD-MCNC: 128 MG/DL (ref 75–110)
GLUCOSE SERPL-MCNC: 96 MG/DL (ref 74–99)
HBA1C MFR BLD: 5.9 % (ref 4–6)
HCT VFR BLD AUTO: 44.2 % (ref 40.7–50.3)
HCT VFR BLD AUTO: 44.4 % (ref 40.7–50.3)
HCT VFR BLD AUTO: 45.2 % (ref 40.7–50.3)
HCT VFR BLD AUTO: 45.5 % (ref 40.7–50.3)
HDLC SERPL-MCNC: 67 MG/DL
HGB BLD-MCNC: 14.3 G/DL (ref 13–17)
HGB BLD-MCNC: 14.5 G/DL (ref 13–17)
HGB BLD-MCNC: 15 G/DL (ref 13–17)
HGB BLD-MCNC: 15.1 G/DL (ref 13–17)
IMM GRANULOCYTES # BLD AUTO: 0.08 K/UL (ref 0–0.3)
IMM GRANULOCYTES NFR BLD: 0 %
LDLC SERPL CALC-MCNC: 34 MG/DL (ref 0–100)
LYMPHOCYTES NFR BLD: 1.08 K/UL (ref 1.1–3.7)
LYMPHOCYTES RELATIVE PERCENT: 6 % (ref 24–43)
MCH RBC QN AUTO: 29.1 PG (ref 25.2–33.5)
MCHC RBC AUTO-ENTMCNC: 33.2 G/DL (ref 28.4–34.8)
MCV RBC AUTO: 87.8 FL (ref 82.6–102.9)
MONOCYTES NFR BLD: 0.85 K/UL (ref 0.1–1.2)
MONOCYTES NFR BLD: 5 % (ref 3–12)
NEUTROPHILS NFR BLD: 88 % (ref 36–65)
NEUTS SEG NFR BLD: 15.82 K/UL (ref 1.5–8.1)
NRBC BLD-RTO: 0 PER 100 WBC
PLATELET # BLD AUTO: 207 K/UL (ref 138–453)
PMV BLD AUTO: 10.2 FL (ref 8.1–13.5)
POTASSIUM SERPL-SCNC: 4 MMOL/L (ref 3.7–5.3)
PROCALCITONIN SERPL-MCNC: 0.12 NG/ML (ref 0–0.09)
RBC # BLD AUTO: 5.15 M/UL (ref 4.21–5.77)
SODIUM SERPL-SCNC: 140 MMOL/L (ref 136–145)
TRIGL SERPL-MCNC: 139 MG/DL
TROPONIN I SERPL HS-MCNC: 16 NG/L (ref 0–22)
TSH SERPL DL<=0.05 MIU/L-ACNC: 0.9 UIU/ML (ref 0.27–4.2)
VLDLC SERPL CALC-MCNC: 28 MG/DL
WBC OTHER # BLD: 17.9 K/UL (ref 3.5–11.3)

## 2024-10-26 PROCEDURE — 6360000002 HC RX W HCPCS

## 2024-10-26 PROCEDURE — 83036 HEMOGLOBIN GLYCOSYLATED A1C: CPT

## 2024-10-26 PROCEDURE — 36415 COLL VENOUS BLD VENIPUNCTURE: CPT

## 2024-10-26 PROCEDURE — 2580000003 HC RX 258

## 2024-10-26 PROCEDURE — 85018 HEMOGLOBIN: CPT

## 2024-10-26 PROCEDURE — 93010 ELECTROCARDIOGRAM REPORT: CPT | Performed by: INTERNAL MEDICINE

## 2024-10-26 PROCEDURE — 99222 1ST HOSP IP/OBS MODERATE 55: CPT | Performed by: STUDENT IN AN ORGANIZED HEALTH CARE EDUCATION/TRAINING PROGRAM

## 2024-10-26 PROCEDURE — 71045 X-RAY EXAM CHEST 1 VIEW: CPT

## 2024-10-26 PROCEDURE — 84484 ASSAY OF TROPONIN QUANT: CPT

## 2024-10-26 PROCEDURE — 99254 IP/OBS CNSLTJ NEW/EST MOD 60: CPT | Performed by: INTERNAL MEDICINE

## 2024-10-26 PROCEDURE — 6370000000 HC RX 637 (ALT 250 FOR IP)

## 2024-10-26 PROCEDURE — 84443 ASSAY THYROID STIM HORMONE: CPT

## 2024-10-26 PROCEDURE — 80048 BASIC METABOLIC PNL TOTAL CA: CPT

## 2024-10-26 PROCEDURE — 93005 ELECTROCARDIOGRAM TRACING: CPT

## 2024-10-26 PROCEDURE — 85014 HEMATOCRIT: CPT

## 2024-10-26 PROCEDURE — 82947 ASSAY GLUCOSE BLOOD QUANT: CPT

## 2024-10-26 PROCEDURE — 2060000000 HC ICU INTERMEDIATE R&B

## 2024-10-26 PROCEDURE — 85025 COMPLETE CBC W/AUTO DIFF WBC: CPT

## 2024-10-26 PROCEDURE — APPNB60 APP NON BILLABLE TIME 46-60 MINS: Performed by: INTERNAL MEDICINE

## 2024-10-26 PROCEDURE — 93306 TTE W/DOPPLER COMPLETE: CPT | Performed by: INTERNAL MEDICINE

## 2024-10-26 PROCEDURE — 93306 TTE W/DOPPLER COMPLETE: CPT

## 2024-10-26 PROCEDURE — 80061 LIPID PANEL: CPT

## 2024-10-26 PROCEDURE — 92523 SPEECH SOUND LANG COMPREHEN: CPT

## 2024-10-26 PROCEDURE — 99232 SBSQ HOSP IP/OBS MODERATE 35: CPT | Performed by: STUDENT IN AN ORGANIZED HEALTH CARE EDUCATION/TRAINING PROGRAM

## 2024-10-26 PROCEDURE — 84145 PROCALCITONIN (PCT): CPT

## 2024-10-26 PROCEDURE — 85379 FIBRIN DEGRADATION QUANT: CPT

## 2024-10-26 RX ORDER — LORAZEPAM 2 MG/ML
0.5 INJECTION INTRAMUSCULAR ONCE
Status: COMPLETED | OUTPATIENT
Start: 2024-10-26 | End: 2024-10-26

## 2024-10-26 RX ORDER — HYDRALAZINE HYDROCHLORIDE 20 MG/ML
10 INJECTION INTRAMUSCULAR; INTRAVENOUS
Status: DISCONTINUED | OUTPATIENT
Start: 2024-10-26 | End: 2024-10-26

## 2024-10-26 RX ORDER — LABETALOL HYDROCHLORIDE 5 MG/ML
10 INJECTION, SOLUTION INTRAVENOUS
Status: DISCONTINUED | OUTPATIENT
Start: 2024-10-26 | End: 2024-10-26

## 2024-10-26 RX ORDER — LORAZEPAM 2 MG/ML
0.5 INJECTION INTRAMUSCULAR EVERY 4 HOURS PRN
Status: DISCONTINUED | OUTPATIENT
Start: 2024-10-26 | End: 2024-11-01 | Stop reason: HOSPADM

## 2024-10-26 RX ORDER — METOCLOPRAMIDE HYDROCHLORIDE 5 MG/ML
10 INJECTION INTRAMUSCULAR; INTRAVENOUS EVERY 6 HOURS PRN
Status: DISCONTINUED | OUTPATIENT
Start: 2024-10-26 | End: 2024-11-01 | Stop reason: HOSPADM

## 2024-10-26 RX ORDER — HYDRALAZINE HYDROCHLORIDE 20 MG/ML
10 INJECTION INTRAMUSCULAR; INTRAVENOUS
Status: DISCONTINUED | OUTPATIENT
Start: 2024-10-26 | End: 2024-10-28

## 2024-10-26 RX ORDER — LABETALOL HYDROCHLORIDE 5 MG/ML
10 INJECTION, SOLUTION INTRAVENOUS
Status: DISCONTINUED | OUTPATIENT
Start: 2024-10-26 | End: 2024-10-28

## 2024-10-26 RX ADMIN — TAMSULOSIN HYDROCHLORIDE 0.4 MG: 0.4 CAPSULE ORAL at 11:39

## 2024-10-26 RX ADMIN — PANCRELIPASE LIPASE, PANCRELIPASE PROTEASE, PANCRELIPASE AMYLASE 5000 UNITS: 5000; 17000; 24000 CAPSULE, DELAYED RELEASE ORAL at 17:12

## 2024-10-26 RX ADMIN — PANCRELIPASE LIPASE, PANCRELIPASE PROTEASE, PANCRELIPASE AMYLASE 5000 UNITS: 5000; 17000; 24000 CAPSULE, DELAYED RELEASE ORAL at 05:01

## 2024-10-26 RX ADMIN — METOCLOPRAMIDE HYDROCHLORIDE 10 MG: 5 INJECTION INTRAMUSCULAR; INTRAVENOUS at 03:31

## 2024-10-26 RX ADMIN — LORAZEPAM 0.5 MG: 2 INJECTION INTRAMUSCULAR; INTRAVENOUS at 03:31

## 2024-10-26 RX ADMIN — SODIUM CHLORIDE: 9 INJECTION, SOLUTION INTRAVENOUS at 00:25

## 2024-10-26 RX ADMIN — PANTOPRAZOLE SODIUM 8 MG/HR: 40 INJECTION, POWDER, FOR SOLUTION INTRAVENOUS at 00:22

## 2024-10-26 RX ADMIN — PANCRELIPASE LIPASE, PANCRELIPASE PROTEASE, PANCRELIPASE AMYLASE 5000 UNITS: 5000; 17000; 24000 CAPSULE, DELAYED RELEASE ORAL at 11:39

## 2024-10-26 RX ADMIN — ONDANSETRON 4 MG: 2 INJECTION INTRAMUSCULAR; INTRAVENOUS at 02:11

## 2024-10-26 RX ADMIN — LABETALOL HYDROCHLORIDE 10 MG: 5 INJECTION, SOLUTION INTRAVENOUS at 20:25

## 2024-10-26 RX ADMIN — PANTOPRAZOLE SODIUM 8 MG/HR: 40 INJECTION, POWDER, FOR SOLUTION INTRAVENOUS at 11:43

## 2024-10-26 RX ADMIN — SODIUM CHLORIDE, PRESERVATIVE FREE 10 ML: 5 INJECTION INTRAVENOUS at 20:26

## 2024-10-26 ASSESSMENT — PAIN SCALES - GENERAL
PAINLEVEL_OUTOF10: 2
PAINLEVEL_OUTOF10: 2
PAINLEVEL_OUTOF10: 0
PAINLEVEL_OUTOF10: 2

## 2024-10-26 ASSESSMENT — PAIN DESCRIPTION - DESCRIPTORS
DESCRIPTORS: DISCOMFORT
DESCRIPTORS: DISCOMFORT

## 2024-10-26 ASSESSMENT — PAIN DESCRIPTION - LOCATION
LOCATION: CHEST
LOCATION: HEAD

## 2024-10-26 ASSESSMENT — PAIN DESCRIPTION - PAIN TYPE: TYPE: ACUTE PAIN

## 2024-10-26 ASSESSMENT — PAIN DESCRIPTION - ONSET: ONSET: SUDDEN

## 2024-10-26 NOTE — PROGRESS NOTES
Facility/Department: [address] NEURO ICU  Initial Speech/Language/Cognitive Assessment    NAME: Matthieu Barba  : 1970   MRN: [id number]  ADMISSION DATE: 10/25/2024  ADMITTING DIAGNOSIS: has Depression; Intervertebral lumbar disc disorder with myelopathy, lumbar region; Uncontrolled hypertension; Pure hypercholesterolemia; Prediabetes; Tobacco abuse; Alcohol use, unspecified with unspecified alcohol-induced disorder (HCC); Cocaine abuse (HCC); Alcohol abuse; Atopic dermatitis; H/O acute pancreatitis; and Acute ischemic stroke (HCC) on their problem list.  DATE ONSET: 10/25/24    Date of Eval: 10/26/2024   Evaluating Therapist: Viola Fitzpatrick    RECENT RESULTS  CT OF HEAD/MRI:   MRI 10/25/24  IMPRESSION:  1. Acute ischemic infarct in the inferior left cerebellum.  2. No intracranial hemorrhage or mass effect.  3. Chronic lacunar infarcts in the left cerebellum, bilateral middle  cerebellar peduncles, right natanael, and right thalamus.  4. Mild chronic white matter microvascular ischemic changes.    Primary Complaint:   Obtained from H&P:  Matthieu Barba is a 54 y.o. male with past medical history of hypertension, cocaine and alcohol use disorder who presented after acute onset dizziness and vertigo with associated nausea/vomiting.     Patient states that he donated plasma earlier in the day and was in his usual state of health.  Patient states he then ate some [employer], drank beer, and smoked crack.  About 1 hour later, he started feeling dizzy with vertigo and had multiple episodes of vomiting with coffee-ground emesis.  Patient denied any diplopia, facial droop, focal weakness, or numbness.     In the ED, patient was found to have positive occult blood.  Neurology was consulted for dizziness.  MRI brain was obtained which showed acute left cerebellar infarct.  Not a TNK candidate due to active GI bleed.  CTA showed no LVO or flow-limiting stenosis.    Pain:  Pain Assessment  Pain Assessment: 0-10  Pain Level:  Comprehension  Comprehension: Within Functional Limits       Expression  Primary Mode of Expression: Verbal    Verbal Expression  Verbal Expression: Within functional limits  Cognition:      Orientation  Overall Orientation Status: Within Normal Limits  Attention  Attention: Exceptions to WFL  Sustained Attention: Mild (min verbal cues t/o session)  Memory  Memory: Exceptions to WFL  Short-term Memory: Moderate (immediate 3/3; delayed 2/3 cued)  Problem Solving  Problem Solving: Within Functional Limits  Verbal Reasoning Skills: Mild (reasons for late 2/3; opposties 3/3)  Abstract Reasoning  Abstract Reasoning: Within Functional Limits  Safety/Judgment  Safety/Judgment: Exceptions to WFL  Insight: Mild   (2/3 indp late to appt scenario)      Prognosis:  Speech Therapy Prognosis  Prognosis: Good  Prognosis Considerations: Age;Participation Level;Previous Level of Function    Education:  Patient Education Response: Verbalizes understanding          Therapy Time:   Individual Concurrent Group Co-treatment   Time In 0909         Time Out 0923         Minutes 14                 Electronically signed by Viola Fitzpatrick M.A., CCC-SLP   on 10/26/2024 at 9:32 AM

## 2024-10-26 NOTE — CONSULTS
[facility]    GASTROENTEROLOGY CONSULT    Patient:   Matthieu Barba   :    1970   Facility:   [facility]   Date:    10/26/2024  Admission Dx:  Acute ischemic stroke (HCC) [I63.9]  Ischemic stroke (HCC) [I63.9]  Cerebrovascular accident (CVA), unspecified mechanism (HCC) [I63.9]  Requesting physician: Julio César Waters MD  Reason for consult:  Coffee-ground emesis   CC : Nausea, vomiting, dizzy    SUBJECTIVE     HISTORY OF PRESENT ILLNESS  This is a 54 y.o. AA male who was admitted 10/25/2024 with Acute ischemic stroke (HCC) [I63.9]  Ischemic stroke (HCC) [I63.9]  Cerebrovascular accident (CVA), unspecified mechanism (HCC) [I63.9]. We have been asked to see the patient in consultation by Julio César Waters MD for coffee-ground emesis.     54-year-old male with history of GERD, HTN, HLD, arthritis, alcohol abuse, alcohol induced pancreatitis, cocaine abuse who presented to the ED for evaluation of nausea, vomiting of coffee-ground emesis, and dizziness.  Patient was found to have acute left cerebellar infarct on MRI.  GI consulted for coffee-ground emesis.    Patient reports he ate at [employer] yesterday, drank some beer and smoked crack cocaine.  Later he developed dizziness and had multiple episodes of vomiting with retching, dark coffee-ground-colored emesis.  He reports he could taste blood in his mouth.  He had a episode of emesis this morning that was yellow in color.    Patient reports a long history of GERD and previously was on omeprazole.  He stopped it 6 months ago but due to ongoing symptoms he restarted it 5 days ago.  Patient is unsure if he ever had a EGD before.  Records show patient had a EGD in the past though no records available.         Summary of imaging completed at this time:      Summary of labs completed at this time:  Hgb 16.3/HCT 47.9,       Previous GI history:     SUMMARY TABLE    Last EGD  - ?   Last colonoscopy  evidence of an acute intracranial hemorrhage.  The ventricles and sulci are normal in size and configuration.  The sellar/suprasellar regions appear unremarkable.  The normal signal voids within the major intracranial vessels appear maintained. ORBITS: Orbital structures are unremarkable. SINUSES: The visualized paranasal sinuses and mastoid air cells demonstrate no acute abnormality. BONES/SOFT TISSUES: The bone marrow signal intensity appears normal. The soft tissues demonstrate no acute abnormality.     1. Acute ischemic infarct in the inferior left cerebellum. 2. No intracranial hemorrhage or mass effect. 3. Chronic lacunar infarcts in the left cerebellum, bilateral middle cerebellar peduncles, right natanael, and right thalamus. 4. Mild chronic white matter microvascular ischemic changes.     CTA HEAD NECK W CONTRAST    Result Date: 10/25/2024  EXAMINATION: CTA OF THE HEAD AND NECK WITH CONTRAST 10/25/2024 8:31 pm: TECHNIQUE: CTA of the head and neck was performed with the administration of intravenous contrast. Multiplanar reformatted images are provided for review.  MIP images are provided for review. Stenosis of the internal carotid arteries measured using NASCET criteria. Automated exposure control, iterative reconstruction, and/or weight based adjustment of the mA/kV was utilized to reduce the radiation dose to as low as reasonably achievable. This scan was analyzed using [employer] contact LVO. Identification of suspected findings is not for diagnostic use beyond notification. Viz LVO is limited to analysis of imaging data and should not be used in-lieu of full patient evaluation or relied upon to make or confirm diagnosis. COMPARISON: None. HISTORY: ORDERING SYSTEM PROVIDED HISTORY: Evaluate vasculature. Dizziness TECHNOLOGIST PROVIDED HISTORY: Evaluate vasculature. Dizziness Decision Support Exception - unselect if not a suspected or confirmed emergency medical condition->Emergency Medical Condition (MA) Reason  for Exam: dizzy FINDINGS: CTA NECK: AORTIC ARCH/ARCH VESSELS: No dissection or arterial injury.  No significant stenosis of the brachiocephalic or subclavian arteries. CAROTID ARTERIES: No dissection, arterial injury, or hemodynamically significant stenosis by NASCET criteria. VERTEBRAL ARTERIES: No dissection, arterial injury, or significant stenosis. SOFT TISSUES: The lung apices are clear.  Mild centrilobular emphysema is present.  No cervical or superior mediastinal lymphadenopathy.  The larynx and pharynx are unremarkable.  No acute abnormality of the salivary and thyroid glands. BONES: No acute osseous abnormality. CTA HEAD: ANTERIOR CIRCULATION: No significant stenosis of the intracranial internal carotid, anterior cerebral, or middle cerebral arteries. No aneurysm. POSTERIOR CIRCULATION: No significant stenosis of the vertebral, basilar, or posterior cerebral arteries. No aneurysm. OTHER: No dural venous sinus thrombosis on this non-dedicated study. BRAIN: No mass effect or midline shift. No extra-axial fluid collection. The gray-white differentiation is maintained.     No acute arterial abnormality or hemodynamically significant arterial stenosis in the head or neck.     XR CHEST PORTABLE    Result Date: 10/25/2024  EXAMINATION: ONE XRAY VIEW OF THE CHEST 10/25/2024 4:54 pm COMPARISON: 08/30/2021 HISTORY: Free air under diaphragm? FINDINGS: Patient is slightly rotated.  Cardiomediastinal silhouette and pulmonary vasculature are normal.  No consolidation, pleural effusion, or pneumothorax. No pneumoperitoneum seen.     Negative chest.  No pneumoperitoneum seen.       IMPRESSION:   54-year-old male with history of GERD, HTN, HLD, arthritis, alcohol abuse, alcohol induced pancreatitis, cocaine abuse who presented to the ED for evaluation of nausea, vomiting of coffee-ground emesis, and dizziness.  Patient was found to have acute left cerebellar infarct on MRI.  GI consulted for coffee-ground emesis.    1.

## 2024-10-26 NOTE — H&P
[facility]   IN-PATIENT SERVICE   [facility]    HISTORY AND PHYSICAL EXAMINATION            Date:   10/26/2024  Patient name:  Matthieu Barba  Date of admission:  10/25/2024  4:34 PM  MRN:   [id number]  Account:  [id number]  YOB: 1970  PCP:    Glenn Freeman MD  Room:   [address] [place] [address]  Code Status:    Full Code    Chief Complaint:     Chief Complaint   Patient presents with    Vomiting    Dizziness       History Obtained From:     patient    History of Present Illness:       54 y.o. male with past medical history of hypertension, cocaine and alcohol use disorder who presented yesterday after acute onset dizziness and vertigo with associated nausea/vomiting.     Patient stated that he donated plasma yesterday and then went home, ate some [employer], drank beer, and smoked crack.  About 1 hour later, he started feeling dizzy with vertigo and had multiple episodes of vomiting with coffee-ground emesis.  Patient denied any diplopia, facial droop, focal weakness, or numbness.In the ED, patient was found to have positive occult blood.  Neurology was consulted for dizziness.  MRI brain was obtained which showed acute left cerebellar infarct.  Not a TNK candidate due to active GI bleed.  CTA showed no LVO or flow-limiting stenosis.  Patient was admitted overnight in neuro ICU.  GI medicine was consulted and they recommended to continue with Protonix drip for 24 hours and no acute intervention needed given left cerebellar infarct and stable Hb.  Patient is now stepped down to our unit.      Past Medical History:     Past Medical History:   Diagnosis Date    Arthritis     Chronic sinusitis     Eczema     Environmental allergies     GERD (gastroesophageal reflux disease)     Hyperlipidemia     Hypertension     Sepsis (HCC) 6/8/2022    Snores         Past Surgical History:     Past Surgical History:   Procedure Laterality Date    COLONOSCOPY      UPPER GASTROINTESTINAL  unstable angina, planned for cardiac cath 11/10.

## 2024-10-26 NOTE — PROGRESS NOTES
Daily Progress Note  Neuro Critical Care    Patient Name: Matthieu Barba  Patient : 1970  Room/Bed: 0124/0124-01  Code Status: Full Code   Allergies: No Known Allergies    CHIEF COMPLAINT:       Nausea/Vomiting     INTERVAL HISTORY    Initial Presentation (Admitted 10/25):  Matthieu Barba is a 54 y.o. male with past medical history of hypertension, hyperlipidemia, cocaine and alcohol use disorder who presented after acute onset dizziness and vertigo with associated nausea/vomiting.     Patient states that he donated plasma earlier in the day and was in his usual state of health.  Patient states he then ate some [employer], drank beer, and smoked crack.  About 1 hour later, he started feeling dizzy with vertigo and had multiple episodes of vomiting with coffee-ground emesis.  Patient denied any diplopia, facial droop, focal weakness, or numbness.     In the ED, patient was found to have positive occult blood.  Neurology was consulted for dizziness.  MRI brain was obtained which showed acute left cerebellar infarct.  Not a TNK candidate due to active GI bleed.  CTA showed no LVO or flow-limiting stenosis.    Hospital Course:       Interval Events:   Overnight had an episode of left-sided chest pressure.  Troponin within normal limits, EKG with no acute changes, D-dimer within normal notes.  Patient was given IV Ativan for suspected withdrawal symptoms, he did have improvement of his symptoms. On examination he is asymptomatic, denies chest pain or shortness of breath. Plan to start on clear liquids today per GI recommendations. Currently no plan for acute intervention.     Hgb 16.3 > 15.0 > 14.5    CURRENT MEDICATIONS:  SCHEDULED MEDICATIONS:   [START ON 10/27/2024] pantoprazole (PROTONIX) 40 mg in sodium chloride (PF) 0.9 % 10 mL injection  40 mg IntraVENous Daily    tamsulosin  0.4 mg Oral Daily    thiamine  100 mg Oral Daily    atorvastatin  20 mg Oral Daily    lipase-protease-amylase  5,000 Units Oral TID  are unremarkable.     SINUSES: The visualized paranasal sinuses and mastoid air cells demonstrate  no acute abnormality.     BONES/SOFT TISSUES: The bone marrow signal intensity appears normal. The soft  tissues demonstrate no acute abnormality.     IMPRESSION:  1. Acute ischemic infarct in the inferior left cerebellum.  2. No intracranial hemorrhage or mass effect.  3. Chronic lacunar infarcts in the left cerebellum, bilateral middle  cerebellar peduncles, right natanael, and right thalamus.  4. Mild chronic white matter microvascular ischemic changes.    EXAMINATION:  CTA OF THE HEAD AND NECK WITH CONTRAST 10/25/2024 8:31 pm:     TECHNIQUE:  CTA of the head and neck was performed with the administration of intravenous  contrast. Multiplanar reformatted images are provided for review.  MIP images  are provided for review. Stenosis of the internal carotid arteries measured  using NASCET criteria. Automated exposure control, iterative reconstruction,  and/or weight based adjustment of the mA/kV was utilized to reduce the  radiation dose to as low as reasonably achievable.     This scan was analyzed using [email or web address] contact LVO. Identification of suspected  findings is not for diagnostic use beyond notification. Viz LVO is limited to  analysis of imaging data and should not be used in-lieu of full patient  evaluation or relied upon to make or confirm diagnosis.     COMPARISON:  None.     HISTORY:  ORDERING SYSTEM PROVIDED HISTORY: Evaluate vasculature. Dizziness  TECHNOLOGIST PROVIDED HISTORY:  Evaluate vasculature. Dizziness     Decision Support Exception - unselect if not a suspected or confirmed  emergency medical condition->Emergency Medical Condition (MA)  Reason for Exam: dizzy     FINDINGS:     CTA NECK:     AORTIC ARCH/ARCH VESSELS: No dissection or arterial injury.  No significant  stenosis of the brachiocephalic or subclavian arteries.     CAROTID ARTERIES: No dissection, arterial injury, or  Lower Extremity:  normal    Deep Tendon Reflexes:    Right Bicep:  2+  Left Bicep:  2+  Right Knee:  2+  Left Knee:  2+    Plantar Response:  Right:  downgoing  Left:  downgoing    Coordination/Dysmetria:  Heel to Shin:  Right:  normal  Left:  normal  Finger to Nose:   Right:  normal  Left:  normal      NIH Stroke Scale Total (if not done complete detailed one below):    1a.  Level of consciousness:  0 - alert; keenly responsive  1b.  Level of consciousness questions:  0 - answers both questions correctly  1c.  Level of consciousness questions:  0 - performs both tasks correctly  2.    Best Gaze:  0 - normal  3.    Visual:  0 - no visual loss  4.    Facial Palsy:  0 - normal symmetric movement  5a.  Motor left arm:  0 - no drift, limb holds 90 (or 45) degrees for full 10 seconds  5b.  Motor right arm:  0 - no drift, limb holds 90 (or 45) degrees for full 10 seconds  6a.  Motor left le - no drift; leg holds 30 degree position for full 5 seconds  6b.  Motor right le - no drift; leg holds 30 degree position for full 5 seconds  7.    Limb Ataxia:  0 - absent  8.    Sensory:  0 - normal; no sensory loss  9.    Best Language:  0 - no aphasia, normal  10.  Dysarthria:  0 - normal  11.  Extinction and Inattention:  0 - no abnormality  TOTAL: 0    DRAINS:  [x] There are no drains for Neuro Critical Care to monitor at this time.     ASSESSMENT AND PLAN:       This is a 54 y.o. male with history of hypertension, cocaine use disorder, tobacco use, and alcohol use disorder who presented with symptoms of dizziness, nausea/vomiting.  Found to have acute left cerebellar infarct on MRI.  Not TNK candidate due to active GI bleed, coffee-ground emesis in ED.  Stroke workup.  GI consulted, on Protonix drip.  Trending H&H every 8 hours.     NEUROLOGIC:  Acute L Cerebellar Ischemic Stroke   - MRI: Acute ischemic infarct in the inferior left cerebellum.  Chronic lacunar infarcts in the left cerebellum, bilateral middle

## 2024-10-26 NOTE — ED PROVIDER NOTES
[facility]  Emergency Department  Faculty Sign-Out Addendum     Care of Matthieu Barba was assumed from previous attending and is being seen for Vomiting and Dizziness  .  The patient's initial evaluation and plan have been discussed with the prior provider who initially evaluated the patient.      I reviewed the resident’s note and agree with the documented findings and plan of care. Any areas of disagreement are noted on the chart. I was personally present for the key portions of any procedures. I have documented in the chart those procedures where I was not present during the key portions. I have reviewed the emergency nurses triage note. I agree with the chief complaint, past medical history, past surgical history, allergies, medications, social and family history as documented unless otherwise noted below.      EMERGENCY DEPARTMENT COURSE / MEDICAL DECISION MAKING:       MEDICATIONS GIVEN:  Orders Placed This Encounter   Medications    ondansetron (ZOFRAN) injection 4 mg    ondansetron (ZOFRAN) 4 MG/2ML injection     Irma Terry: cabinet override    DISCONTD: pantoprazole (PROTONIX) 40 mg in sodium chloride (PF) 0.9 % 10 mL injection    DISCONTD: pantoprazole (PROTONIX) 80 mg in sodium chloride 0.9 % 50 mL bolus    pantoprazole (PROTONIX) 80 mg in sodium chloride (PF) 0.9 % 20 mL injection    meclizine (ANTIVERT) tablet 25 mg    prochlorperazine (COMPAZINE) injection 10 mg    diphenhydrAMINE (BENADRYL) injection 25 mg    potassium chloride (KLOR-CON M) extended release tablet 40 mEq    sodium chloride 0.9 % bolus 500 mL    iopamidol (ISOVUE-370) 76 % injection 75 mL    meclizine (ANTIVERT) tablet 12.5 mg    pantoprazole (PROTONIX) 80 mg in sodium chloride 0.9 % 100 mL infusion    0.9 % sodium chloride infusion       LABS / RADIOLOGY:     Labs Reviewed   COMPREHENSIVE METABOLIC PANEL - Abnormal; Notable for the following components:       Result Value    Potassium 3.2 (*)     Glucose 187 (*)

## 2024-10-26 NOTE — PLAN OF CARE
Problem: Chronic Conditions and Co-morbidities  Goal: Patient's chronic conditions and co-morbidity symptoms are monitored and maintained or improved  Outcome: Progressing     Problem: Discharge Planning  Goal: Discharge to home or other facility with appropriate resources  Outcome: Progressing     Problem: Discharge Planning  Goal: Discharge to home or other facility with appropriate resources  Outcome: Progressing     Problem: Pain  Goal: Verbalizes/displays adequate comfort level or baseline comfort level  Outcome: Progressing

## 2024-10-26 NOTE — CONSULTS
Stroke Neurology Consult Note  Stroke consult @ 9 pm  Arrival at bedside @ 9    Reason for Consult:  ED Stroke Consult  Requesting Physician:  ED team   Endovascular Neurosurgeon: Deangelo Rust MD  Stroke Team: Brad Jensen MD  History Obtained From:  patient, electronic medical record  Chief Complaint:  Nausea and vomiting.   Allergies:  Patient has no known allergies.    History of Presenting Illness     he patient is a 54 y.o. male with significant past medical history of depression, HTN, cocaine and alcohol abuse who presents with dizziness, nausea and vomiting with coffee ground emesis.     Patient is a poor historian      Patient reports that today he went to donate plasma, as well as on Monday and was doing relatively well. He then had a meal, drank beer and smoked crack. Later an hour he started feeling dizzy and vertiginous, had multiple episodes of vomitus and coffee ground emesis. Patient had no similar episodes before, and his symptoms were not associated with diplopia, double vision, facial droop or focal weakness. Patient has no sensory deficit as well.       Patient then presented to the ER where he had a positive occult blood, but he was normotensive with normal Hb. Patient was to be evaluated by neurology for concerns of dizziness.        Patient not very cooperative on examination and is lethargic.He is oriented to person and place as well as situation. He has normal CN II-XII exam. No evidence of aphasia. Normal sensation to light touch throughout. Strength over 4/5 in all 4 extremities.    Patient underwent MRI brain WO contrast which showed L Cerebellar acute ischemic infarct.         LKW: ~2-3 pm  NIHSS: 1  Modified Mangham Scale: 0  SBP: 130  Glucose: 187  MRI brain WO: Acute left cerebellar stroke  TNK: Not a candidate  Review of Systems    Past Histories          Diagnosis Date    Arthritis     Chronic sinusitis     Eczema     Environmental allergies     GERD (gastroesophageal reflux  vasospasm in the setting of cocaine abuse        Disposition: NICU Status - (1B)      Stroke admission order set: [id number] - GEN Ischemic Stroke Non-Thrombolytic Admission      Plan:   Blood pressure goal: BP < 180/105  Monitor closely in ICU if change in mentation or exam. Obtain CTH  Consider GI consult for coffee ground emesis.  Advise about cocaine avoidance.   Antithrombotic therapy: Aspirin 81 mg once daily  DVT Prophylaxis: Enoxaparin   High intensity statin therapy (long-term goal LDL < 70)  Diagnostics: Transthoracic echocardiogram with bubble study  Frequent neuro-checks per order set protocol  Basic labs: CBC, CMP, coagulation panel, troponin  Stroke labwork: HgbA1C, lipid panel  Intravenous hydration with normal saline at 75cc per hour  Swallow evaluation prior to advancing diet   Tight glucose control (long-term goal HgbA1c < 7%)      Discussed with Brad Jensen MD.    MARTY Del Rio MD   10/25/2024  10:33 PM

## 2024-10-26 NOTE — H&P
Neuro ICU History & Physical    Patient Name: Matthieu Barba  Patient : 1970  Room/Bed: 0124/0124-01  Code Status: Full Code  Allergies: No Known Allergies    CHIEF COMPLAINT     Nausea/vomiting    HPI    History Obtained From: Patient/EMR    Matthieu Barba is a 54 y.o. male with past medical history of hypertension, cocaine and alcohol use disorder who presented after acute onset dizziness and vertigo with associated nausea/vomiting.    Patient states that he donated plasma earlier in the day and was in his usual state of health.  Patient states he then ate some [employer], drank beer, and smoked crack.  About 1 hour later, he started feeling dizzy with vertigo and had multiple episodes of vomiting with coffee-ground emesis.  Patient denied any diplopia, facial droop, focal weakness, or numbness.    In the ED, patient was found to have positive occult blood.  Neurology was consulted for dizziness.  MRI brain was obtained which showed acute left cerebellar infarct.  Not a TNK candidate due to active GI bleed.  CTA showed no LVO or flow-limiting stenosis.    Admitted to ICU From: ED  Reason for ICU Admission: Acute stroke c/w acute GI bleed       PATIENT HISTORY   Past Medical History:        Diagnosis Date    Arthritis     Chronic sinusitis     Eczema     Environmental allergies     GERD (gastroesophageal reflux disease)     Hyperlipidemia     Hypertension     Sepsis (HCC) 2022    Snores        Past Surgical History:        Procedure Laterality Date    COLONOSCOPY      UPPER GASTROINTESTINAL ENDOSCOPY         Social History:   Social History     Socioeconomic History    Marital status: Legally      Spouse name: Not on file    Number of children: Not on file    Years of education: Not on file    Highest education level: Not on file   Occupational History    Not on file   Tobacco Use    Smoking status: Every Day     Current packs/day: 0.50     Average packs/day: 0.5 packs/day for 30.0 years (15.0  tablet by mouth daily  lisinopril (PRINIVIL;ZESTRIL) 10 MG tablet, Take 1 tablet by mouth daily  simvastatin (ZOCOR) 40 MG tablet, Take 1 tablet by mouth nightly  thiamine 100 MG tablet, Take 1 tablet by mouth daily  tamsulosin (FLOMAX) 0.4 MG capsule, Take 1 capsule by mouth daily (Patient not taking: Reported on 10/25/2024)  amLODIPine (NORVASC) 10 MG tablet, Take 1 tablet by mouth daily (Patient not taking: Reported on 10/25/2024)  zoster recombinant adjuvanted vaccine (SHINGRIX) 50 MCG/0.5ML SUSR injection, Inject 0.5 mLs into the muscle See Admin Instructions 1 dose now and repeat in 2-6 months  Blood Pressure KIT, Use to monitor blood pressure daily and as needed    Current Medications:  Current Facility-Administered Medications: tamsulosin (FLOMAX) capsule 0.4 mg, 0.4 mg, Oral, Daily  thiamine tablet 100 mg, 100 mg, Oral, Daily  atorvastatin (LIPITOR) tablet 20 mg, 20 mg, Oral, Daily  lipase-protease-amylase (ZENPEP) delayed release capsule 5,000 Units, 5,000 Units, Oral, TID AC  folic acid (FOLVITE) tablet 1 mg, 1 mg, Oral, Daily  [Held by provider] amLODIPine (NORVASC) tablet 10 mg, 10 mg, Oral, Daily  sodium chloride flush 0.9 % injection 5-40 mL, 5-40 mL, IntraVENous, 2 times per day  sodium chloride flush 0.9 % injection 5-40 mL, 5-40 mL, IntraVENous, PRN  0.9 % sodium chloride infusion, , IntraVENous, PRN  ondansetron (ZOFRAN-ODT) disintegrating tablet 4 mg, 4 mg, Oral, Q8H PRN **OR** ondansetron (ZOFRAN) injection 4 mg, 4 mg, IntraVENous, Q6H PRN  polyethylene glycol (GLYCOLAX) packet 17 g, 17 g, Oral, Daily PRN  [Held by provider] lisinopril (PRINIVIL;ZESTRIL) tablet 10 mg, 10 mg, Oral, Daily  pantoprazole (PROTONIX) 80 mg in sodium chloride 0.9 % 100 mL infusion, 8 mg/hr, IntraVENous, Continuous  0.9 % sodium chloride infusion, , IntraVENous, Continuous  scopolamine (TRANSDERM-SCOP) transdermal patch 1 patch, 1 patch, TransDERmal, Q72H    REVIEW OF SYSTEMS     CONSTITUTIONAL: negative for fatigue  MD  Neuro Critical Care Service   Pager [phone]  10/26/2024     12:33 AM

## 2024-10-27 PROBLEM — F17.200 TOBACCO USE DISORDER: Status: ACTIVE | Noted: 2024-10-27

## 2024-10-27 PROBLEM — F14.10 COCAINE USE DISORDER (HCC): Status: ACTIVE | Noted: 2024-10-27

## 2024-10-27 PROBLEM — I63.9 CEREBROVASCULAR ACCIDENT (CVA) (HCC): Status: ACTIVE | Noted: 2024-10-27

## 2024-10-27 PROBLEM — I10 PRIMARY HYPERTENSION: Status: ACTIVE | Noted: 2024-10-27

## 2024-10-27 PROBLEM — I63.542 ACUTE STROKE DUE TO OCCLUSION OF LEFT CEREBELLAR ARTERY (HCC): Status: ACTIVE | Noted: 2024-10-27

## 2024-10-27 PROBLEM — F19.10 POLYSUBSTANCE ABUSE (HCC): Status: ACTIVE | Noted: 2024-10-27

## 2024-10-27 PROBLEM — K21.9 GASTROESOPHAGEAL REFLUX DISEASE: Status: ACTIVE | Noted: 2024-10-27

## 2024-10-27 PROBLEM — F10.20 ALCOHOL USE DISORDER, MODERATE, DEPENDENCE (HCC): Status: ACTIVE | Noted: 2024-10-27

## 2024-10-27 LAB
ANION GAP SERPL CALCULATED.3IONS-SCNC: 11 MMOL/L (ref 9–16)
BASOPHILS # BLD: 0.05 K/UL (ref 0–0.2)
BASOPHILS NFR BLD: 1 % (ref 0–2)
BUN SERPL-MCNC: 4 MG/DL (ref 6–20)
CALCIUM SERPL-MCNC: 8.9 MG/DL (ref 8.6–10.4)
CHLORIDE SERPL-SCNC: 101 MMOL/L (ref 98–107)
CO2 SERPL-SCNC: 24 MMOL/L (ref 20–31)
CREAT SERPL-MCNC: 0.9 MG/DL (ref 0.7–1.2)
EOSINOPHIL # BLD: 0.04 K/UL (ref 0–0.44)
EOSINOPHILS RELATIVE PERCENT: 1 % (ref 1–4)
ERYTHROCYTE [DISTWIDTH] IN BLOOD BY AUTOMATED COUNT: 12 % (ref 11.8–14.4)
GFR, ESTIMATED: >90 ML/MIN/1.73M2
GLUCOSE SERPL-MCNC: 128 MG/DL (ref 74–99)
HCT VFR BLD AUTO: 46.2 % (ref 40.7–50.3)
HCT VFR BLD AUTO: 46.3 % (ref 40.7–50.3)
HCT VFR BLD AUTO: 47.5 % (ref 40.7–50.3)
HGB BLD-MCNC: 15.2 G/DL (ref 13–17)
HGB BLD-MCNC: 15.3 G/DL (ref 13–17)
HGB BLD-MCNC: 15.8 G/DL (ref 13–17)
IMM GRANULOCYTES # BLD AUTO: 0.03 K/UL (ref 0–0.3)
IMM GRANULOCYTES NFR BLD: 0 %
LYMPHOCYTES NFR BLD: 1.67 K/UL (ref 1.1–3.7)
LYMPHOCYTES RELATIVE PERCENT: 21 % (ref 24–43)
MAGNESIUM SERPL-MCNC: 2.1 MG/DL (ref 1.6–2.6)
MCH RBC QN AUTO: 29.3 PG (ref 25.2–33.5)
MCHC RBC AUTO-ENTMCNC: 32.9 G/DL (ref 28.4–34.8)
MCV RBC AUTO: 89 FL (ref 82.6–102.9)
MONOCYTES NFR BLD: 0.76 K/UL (ref 0.1–1.2)
MONOCYTES NFR BLD: 10 % (ref 3–12)
NEUTROPHILS NFR BLD: 68 % (ref 36–65)
NEUTS SEG NFR BLD: 5.39 K/UL (ref 1.5–8.1)
NRBC BLD-RTO: 0 PER 100 WBC
PLATELET # BLD AUTO: 213 K/UL (ref 138–453)
PMV BLD AUTO: 10.4 FL (ref 8.1–13.5)
POTASSIUM SERPL-SCNC: 3.3 MMOL/L (ref 3.7–5.3)
RBC # BLD AUTO: 5.19 M/UL (ref 4.21–5.77)
SODIUM SERPL-SCNC: 136 MMOL/L (ref 136–145)
WBC OTHER # BLD: 7.9 K/UL (ref 3.5–11.3)

## 2024-10-27 PROCEDURE — 85014 HEMATOCRIT: CPT

## 2024-10-27 PROCEDURE — 6360000002 HC RX W HCPCS

## 2024-10-27 PROCEDURE — 99231 SBSQ HOSP IP/OBS SF/LOW 25: CPT | Performed by: INTERNAL MEDICINE

## 2024-10-27 PROCEDURE — 80048 BASIC METABOLIC PNL TOTAL CA: CPT

## 2024-10-27 PROCEDURE — 85025 COMPLETE CBC W/AUTO DIFF WBC: CPT

## 2024-10-27 PROCEDURE — 97162 PT EVAL MOD COMPLEX 30 MIN: CPT

## 2024-10-27 PROCEDURE — 36415 COLL VENOUS BLD VENIPUNCTURE: CPT

## 2024-10-27 PROCEDURE — 6370000000 HC RX 637 (ALT 250 FOR IP)

## 2024-10-27 PROCEDURE — 99232 SBSQ HOSP IP/OBS MODERATE 35: CPT | Performed by: STUDENT IN AN ORGANIZED HEALTH CARE EDUCATION/TRAINING PROGRAM

## 2024-10-27 PROCEDURE — 83735 ASSAY OF MAGNESIUM: CPT

## 2024-10-27 PROCEDURE — 97535 SELF CARE MNGMENT TRAINING: CPT

## 2024-10-27 PROCEDURE — 2580000003 HC RX 258

## 2024-10-27 PROCEDURE — 2060000000 HC ICU INTERMEDIATE R&B

## 2024-10-27 PROCEDURE — 97530 THERAPEUTIC ACTIVITIES: CPT

## 2024-10-27 PROCEDURE — APPSS45 APP SPLIT SHARED TIME 31-45 MINUTES: Performed by: INTERNAL MEDICINE

## 2024-10-27 PROCEDURE — 97166 OT EVAL MOD COMPLEX 45 MIN: CPT

## 2024-10-27 PROCEDURE — 85018 HEMOGLOBIN: CPT

## 2024-10-27 RX ORDER — PANTOPRAZOLE SODIUM 40 MG/1
40 TABLET, DELAYED RELEASE ORAL
Status: DISCONTINUED | OUTPATIENT
Start: 2024-10-28 | End: 2024-11-01 | Stop reason: HOSPADM

## 2024-10-27 RX ORDER — ASPIRIN 81 MG/1
81 TABLET ORAL DAILY
Status: DISCONTINUED | OUTPATIENT
Start: 2024-10-27 | End: 2024-11-01 | Stop reason: HOSPADM

## 2024-10-27 RX ORDER — ACETAMINOPHEN 325 MG/1
650 TABLET ORAL EVERY 6 HOURS PRN
Status: DISCONTINUED | OUTPATIENT
Start: 2024-10-27 | End: 2024-11-01 | Stop reason: HOSPADM

## 2024-10-27 RX ADMIN — HYDRALAZINE HYDROCHLORIDE 10 MG: 20 INJECTION INTRAMUSCULAR; INTRAVENOUS at 16:06

## 2024-10-27 RX ADMIN — ONDANSETRON 4 MG: 2 INJECTION INTRAMUSCULAR; INTRAVENOUS at 16:48

## 2024-10-27 RX ADMIN — FOLIC ACID 1 MG: 1 TABLET ORAL at 08:00

## 2024-10-27 RX ADMIN — POTASSIUM BICARBONATE 40 MEQ: 782 TABLET, EFFERVESCENT ORAL at 07:59

## 2024-10-27 RX ADMIN — HYDRALAZINE HYDROCHLORIDE 10 MG: 20 INJECTION INTRAMUSCULAR; INTRAVENOUS at 00:25

## 2024-10-27 RX ADMIN — SODIUM CHLORIDE, PRESERVATIVE FREE 10 ML: 5 INJECTION INTRAVENOUS at 20:25

## 2024-10-27 RX ADMIN — Medication 5 MG: at 00:31

## 2024-10-27 RX ADMIN — PANTOPRAZOLE SODIUM 40 MG: 40 INJECTION, POWDER, FOR SOLUTION INTRAVENOUS at 07:59

## 2024-10-27 RX ADMIN — PANCRELIPASE LIPASE, PANCRELIPASE PROTEASE, PANCRELIPASE AMYLASE 5000 UNITS: 5000; 17000; 24000 CAPSULE, DELAYED RELEASE ORAL at 15:37

## 2024-10-27 RX ADMIN — PANCRELIPASE LIPASE, PANCRELIPASE PROTEASE, PANCRELIPASE AMYLASE 5000 UNITS: 5000; 17000; 24000 CAPSULE, DELAYED RELEASE ORAL at 10:32

## 2024-10-27 RX ADMIN — Medication 5 MG: at 20:25

## 2024-10-27 RX ADMIN — SODIUM CHLORIDE, PRESERVATIVE FREE 10 ML: 5 INJECTION INTRAVENOUS at 08:00

## 2024-10-27 RX ADMIN — Medication 100 MG: at 08:00

## 2024-10-27 RX ADMIN — ASPIRIN 81 MG: 81 TABLET, COATED ORAL at 15:37

## 2024-10-27 RX ADMIN — LORAZEPAM 0.5 MG: 2 INJECTION INTRAMUSCULAR; INTRAVENOUS at 16:48

## 2024-10-27 RX ADMIN — PANCRELIPASE LIPASE, PANCRELIPASE PROTEASE, PANCRELIPASE AMYLASE 5000 UNITS: 5000; 17000; 24000 CAPSULE, DELAYED RELEASE ORAL at 06:19

## 2024-10-27 RX ADMIN — ATORVASTATIN CALCIUM 20 MG: 10 TABLET, FILM COATED ORAL at 08:00

## 2024-10-27 RX ADMIN — ACETAMINOPHEN 650 MG: 325 TABLET ORAL at 10:32

## 2024-10-27 RX ADMIN — HYDRALAZINE HYDROCHLORIDE 10 MG: 20 INJECTION INTRAMUSCULAR; INTRAVENOUS at 08:03

## 2024-10-27 RX ADMIN — TAMSULOSIN HYDROCHLORIDE 0.4 MG: 0.4 CAPSULE ORAL at 08:00

## 2024-10-27 NOTE — PROGRESS NOTES
Physical Therapy  Facility/Department: [address] NEURO ICU  Physical Therapy Initial Assessment    Name: Matthieu Barba  : 1970  MRN: [id number]  Date of Service: 10/27/2024    54 y.o. male with history of hypertension, cocaine use disorder, tobacco use, and alcohol use disorder who presented with symptoms of dizziness, nausea/vomiting. Found to have acute left cerebellar infarct on MRI. Not TNK candidate due to active GI bleed, coffee-ground emesis in ED.     Discharge Recommendations:  Further therapy recommended at discharge.        PT Equipment Recommendations  Equipment Needed: No (continue to assess)      Patient Diagnosis(es): The primary encounter diagnosis was Ischemic stroke (HCC). Diagnoses of Cerebrovascular accident (CVA), unspecified mechanism (HCC) and Hematemesis with nausea were also pertinent to this visit.  Past Medical History:  has a past medical history of Arthritis, Chronic sinusitis, Eczema, Environmental allergies, GERD (gastroesophageal reflux disease), Hyperlipidemia, Hypertension, Sepsis (HCC), and Snores.  Past Surgical History:  has a past surgical history that includes Upper gastrointestinal endoscopy and Colonoscopy.    Assessment  Pt cooperative, agreeable to get OOB in spite of dizziness with mobility.  Bed mobility SBA+1, transfers min A+1, gait w/o device min A x 25' with frequent staggering/LOB.   Body Structures, Functions, Activity Limitations Requiring Skilled Therapeutic Intervention: Decreased functional mobility ;Decreased tolerance to work activity;Decreased balance  Therapy Prognosis: Good  Decision Making: Medium Complexity  Requires PT Follow-Up: Yes  Activity Tolerance  Activity Tolerance: Other (comment)  Activity Tolerance Comments: pt very dizzy with mobility    Plan  Physical Therapy Plan  General Plan:  (5-6 visits weekly)  Current Treatment Recommendations: Strengthening, ROM, Balance training, Functional mobility training, Transfer training, Endurance

## 2024-10-27 NOTE — PROGRESS NOTES
[facility] Neurology   IN-PATIENT SERVICE   [facility]    Progress Note             Date:   10/27/2024  Patient name:  Matthieu Barba  Date of admission:  10/25/2024  4:34 PM  MRN:   [id number]  Account:  [id number]  YOB: 1970  PCP:    Glenn Freeman MD  Room:   [address] [place] [address]  Code Status:    Full Code    Chief Complaint:     Chief Complaint   Patient presents with    Vomiting    Dizziness       Interval hx:     The patient was seen and examined at bedside.   Is vitally stable, alert and oriented x 3.   No acute events overnight.  Neuroexam: Nonfocal except for left-sided dysmetria  No further episodes of hematemesis.  Did talk with GI team, they said okay for aspirin.  Started on aspirin 81 Mg daily.  Has additional PT OT needs at discharge.  PMR consulted for further recommendations.      Brief History of Present Illness:     54 y.o. male with past medical history of hypertension, cocaine and alcohol use disorder who presented yesterday after acute onset dizziness and vertigo with associated nausea/vomiting.     Patient stated that he donated plasma yesterday and then went home, ate some [employer], drank beer, and smoked crack.  About 1 hour later, he started feeling dizzy with vertigo and had multiple episodes of vomiting with coffee-ground emesis.  Patient denied any diplopia, facial droop, focal weakness, or numbness.In the ED, patient was found to have positive occult blood.  Neurology was consulted for dizziness.  MRI brain was obtained which showed acute left cerebellar infarct.  Not a TNK candidate due to active GI bleed.  CTA showed no LVO or flow-limiting stenosis.  Patient was admitted overnight in neuro ICU.  GI medicine was consulted and they recommended to continue with Protonix drip for 24 hours and no acute intervention needed given left cerebellar infarct and stable Hb.  Patient is now stepped down to our unit.      Past Medical History:     Past Medical  LDL Cholesterol 34 0 - 100 mg/dL    Chol/HDL Ratio 2.0     Triglycerides 139 <150 mg/dL    VLDL 28 mg/dL   Hemoglobin and Hematocrit    Collection Time: 10/26/24 11:18 PM   Result Value Ref Range    Hemoglobin 15.1 13.0 - 17.0 g/dL    Hematocrit 45.5 40.7 - 50.3 %   CBC with Auto Differential    Collection Time: 10/27/24  5:53 AM   Result Value Ref Range    WBC 7.9 3.5 - 11.3 k/uL    RBC 5.19 4.21 - 5.77 m/uL    Hemoglobin 15.2 13.0 - 17.0 g/dL    Hematocrit 46.2 40.7 - 50.3 %    MCV 89.0 82.6 - 102.9 fL    MCH 29.3 25.2 - 33.5 pg    MCHC 32.9 28.4 - 34.8 g/dL    RDW 12.0 11.8 - 14.4 %    Platelets 213 138 - 453 k/uL    MPV 10.4 8.1 - 13.5 fL    NRBC Automated 0.0 0.0 per 100 WBC    Neutrophils % 68 (H) 36 - 65 %    Lymphocytes % 21 (L) 24 - 43 %    Monocytes % 10 3 - 12 %    Eosinophils % 1 1 - 4 %    Basophils % 1 0 - 2 %    Immature Granulocytes % 0 0 %    Neutrophils Absolute 5.39 1.50 - 8.10 k/uL    Lymphocytes Absolute 1.67 1.10 - 3.70 k/uL    Monocytes Absolute 0.76 0.10 - 1.20 k/uL    Eosinophils Absolute 0.04 0.00 - 0.44 k/uL    Basophils Absolute 0.05 0.00 - 0.20 k/uL    Immature Granulocytes Absolute 0.03 0.00 - 0.30 k/uL   Basic Metabolic Panel w/ Reflex to MG    Collection Time: 10/27/24  5:53 AM   Result Value Ref Range    Sodium 136 136 - 145 mmol/L    Potassium 3.3 (L) 3.7 - 5.3 mmol/L    Chloride 101 98 - 107 mmol/L    CO2 24 20 - 31 mmol/L    Anion Gap 11 9 - 16 mmol/L    Glucose 128 (H) 74 - 99 mg/dL    BUN 4 (L) 6 - 20 mg/dL    Creatinine 0.9 0.70 - 1.20 mg/dL    Est, Glom Filt Rate >90 >60 mL/min/1.73m2    Calcium 8.9 8.6 - 10.4 mg/dL   Magnesium    Collection Time: 10/27/24  5:53 AM   Result Value Ref Range    Magnesium 2.1 1.6 - 2.6 mg/dL     Recent Labs     10/27/24  0553   WBC 7.9   RBC 5.19   HGB 15.2   HCT 46.2   MCV 89.0   MCH 29.3   MCHC 32.9   RDW 12.0      MPV 10.4     Recent Labs     10/25/24  1654 10/26/24  0805 10/27/24  0553      < > 136   K 3.2*   < > 3.3*       < > 101   CO2 22   < > 24   BUN 11   < > 4*   CREATININE 1.1   < > 0.9   GLUCOSE 187*   < > 128*   CALCIUM 8.3*   < > 8.9   BILITOT 0.4  --   --    ALKPHOS 100  --   --    AST 41  --   --    ALT 36  --   --     < > = values in this interval not displayed.     Hemoglobin A1C   Date Value Ref Range Status   10/26/2024 5.9 4.0 - 6.0 % Final       Assessment :      Primary Problem  Ischemic stroke (HCC)    Active Hospital Problems    Diagnosis Date Noted    Hematemesis with nausea [K92.0] 10/26/2024    Ischemic stroke (HCC) [I63.9] 10/25/2024       54-year-old female with past medical history of GERD, hypertension, arthritis, alcohol abuse, cocaine abuse presented to ED with acute onset dizziness and nausea along with coffee-ground emesis.  Further evaluation was found to have acute left cerebellar infarct.     # Acute left cerebellar infarct likely secondary to cocaine use  # Hematemesis:possible esophagitis vs gastritis versus Carol Ann-Beltran tear.       Plan:       Patient status Admit as inpatient in the  Progressive Unit/Step down     # Acute left cerebellar infarct likely secondary to cocaine use  -No further episodes of hematemesis.hemoglobin stable 15.3. GI okay to start aspirin.  Started on aspirin 81 Mg, continue it.  -Continue Lipitor 20 Mg daily  -SBP goal : 140-180 for next 24 hours  -Echo ordered  -Will ordered A1c and lipid profile  -PT OT consulted: Has additional needs on discharge, PMR consulted for further recommendation  -Neurovascular checks     # Hematemesis:possible esophagitis vs gastritis versus Carol Ann-Beltran tear.   -GI medicine consulted: No endoscopic evaluation for now given recent left cerebellar infarct and stable hemoglobin   -Continue Protonix  -Trend H&H: Hemoglobin is stable, latest 15.3.       # Elevated WBC counts likely reactive(resolved now)     # History of acute pancreatitis  -Continue lipase protease and amylase capsule     # History of BPH  -Continue Flomax 0.4 mg daily     #

## 2024-10-27 NOTE — PROGRESS NOTES
Occupational Therapy Initial Evaluation  Facility/Department: [address] NEURO ICU     Patient Name: Matthieu Barba        MRN: [id number]    : 1970    Date of Service: 10/27/2024    Discharge Recommendations   Further therapy recommended at discharge.    OT Equipment Recommendations  Equipment Needed: Yes  Mobility Devices: ADL Assistive Devices  ADL Assistive Devices: Shower Chair with back    Chief Complaint   Patient presents with    Vomiting    Dizziness     Past Medical History:  has a past medical history of Arthritis, Chronic sinusitis, Eczema, Environmental allergies, GERD (gastroesophageal reflux disease), Hyperlipidemia, Hypertension, Sepsis (HCC), and Snores.  Past Surgical History:  has a past surgical history that includes Upper gastrointestinal endoscopy and Colonoscopy.    Assessment  Performance deficits / Impairments: Decreased functional mobility ;Decreased ADL status;Decreased balance;Decreased endurance;Decreased high-level IADLs;Decreased safe awareness  Assessment: pt significantly limited by dizziness during movement. pt required increased assistance for all functional movement d/t decreased balance. pt would benefit from continued acute OT, as well as at discharge in order to increase safety and independence.  Prognosis: Good  Decision Making: Medium Complexity  REQUIRES OT FOLLOW-UP: Yes  Activity Tolerance  Activity Tolerance: Treatment limited secondary to medical complications (free text)  Activity Tolerance Comments: pt limited by dizziness and \"room spinning\" causing nausea. pt BP supine 164/104, sitting 147/98, standing 136/89  Safety Devices  Type of Devices: Call light within reach;Left in bed;Gait belt  Restraints  Restraints Initially in Place: No    Restrictions/Precautions  Restrictions/Precautions  Restrictions/Precautions: Fall Risk  Required Braces or Orthoses?: No  Position Activity Restriction  Other position/activity restrictions: up as tolerated, SBP  Minimal assistance  UE Dressing: Stand by assistance  LE Dressing: Minimal assistance (pt able to don/doff B socks while sitting on EOB with CGA d/t slight unsteadiness. pt expected to require min A for standing L tasks d/t decreased balance)  Toileting: Minimal assistance    Balance  Balance  Sitting: Intact (~10 minutes on eOB with SBA)  Standing: With support (~2 minutes. pt completed static standing at bedside for BP reading. pt required min A for dynamic standing and CGA for static.)    Transfers/Mobility  Bed mobility  Supine to Sit: Stand by assistance  Sit to Supine: Stand by assistance  Scooting: Stand by assistance    Transfers  Sit to stand: Minimal assistance  Stand to sit: Minimal assistance  Transfer Comments: from EOB d/t dizziness, pt reported room spinning causing unsteadiness         Functional Mobility Skilled Clinical Factors: pt unabel to tolerate standing            Patient Education  Patient Education  Education Given To: Patient  Education Provided: Role of Therapy;Plan of Care;Transfer Training  Education Provided Comments: balancing rest with movement, pursed lip breathing tech  Education Method: Verbal  Barriers to Learning: None  Education Outcome: Verbalized understanding    Goals  Short Term Goals  Time Frame for Short Term Goals: pt will, by discharge  Short Term Goal 1: complete LB ADLs and toileting tasks with SBA and AE, as needed  Short Term Goal 2: complete UB ADLs with mod I  Short Term Goal 3: dem SBA during functional trasnfers/functional mobility with LRAD, as needed  Short Term Goal 4: dem ~6 minutes dynamic standing tolerance with SBA in order to complete functional tasks  Short Term Goal 5: increase activity tolerance to 20+ minutes in order to participate in daily tasks    Plan  Occupational Therapy Plan  Times Per Week: 3-5x/wk  Current Treatment Recommendations: Balance training, Functional mobility training, Endurance training, Patient/Caregiver education & training,  Self-Care / ADL, Safety education & training, Equipment evaluation, education, & procurement, Home management training    AM-PAC Daily Activities Inpatient  AM-PAC Daily Activity - Inpatient   How much help is needed for putting on and taking off regular lower body clothing?: A Little  How much help is needed for bathing (which includes washing, rinsing, drying)?: A Little  How much help is needed for toileting (which includes using toilet, bedpan, or urinal)?: A Little  How much help is needed for putting on and taking off regular upper body clothing?: A Little  How much help is needed for taking care of personal grooming?: A Little  How much help for eating meals?: None  AM-PAC Inpatient Daily Activity Raw Score: 19  AM-PAC Inpatient ADL T-Scale Score : 40.22  ADL Inpatient CMS 0-100% Score: 42.8  ADL Inpatient CMS G-Code Modifier : CK    Minutes  OT Individual Minutes  Time In: 0856  Time Out: 0909  Minutes: 13  Time Code Minutes   Timed Code Treatment Minutes: 8 Minutes    Electronically signed by LORENZO Oswald on 10/27/24 at 9:55 AM EDT

## 2024-10-27 NOTE — PROGRESS NOTES
[facility] Gastroenterology Progress Note    Matthieu Barba is a 54 y.o. male patient.  Hospitalization Day:2      Chief consult reason: Coffee-ground emesis      Subjective: Patient seen and examined.  Patient reports overall doing well.  Patient denies any nausea or vomiting.  Patient reporting ongoing dizziness.      Objective:   VITALS:  BP (!) 171/100   Pulse 75   Temp 98.2 °F (36.8 °C) (Oral)   Resp 15   Ht 1.702 m (5' 7\")   Wt 70.8 kg (156 lb)   SpO2 93%   BMI 24.43 kg/m²   TEMPERATURE:  Current - Temp: 98.2 °F (36.8 °C); Max - Temp  Av °F (36.7 °C)  Min: 97.3 °F (36.3 °C)  Max: 98.3 °F (36.8 °C)    Physical Assessment:  General appearance:  alert, cooperative and no distress  Mental Status:  oriented to person, place and time and normal affect  Lungs: Chest expansion symmetrical, normal effort  Heart:  regular rate and rhythm, no murmur  Abdomen:  soft, nontender, nondistended,   Extremities:  no edema, no redness, No clubbing  Skin:  warm, dry, no gross lesions or rashes    CURRENT MEDICATIONS:  Scheduled Meds:   pantoprazole (PROTONIX) 40 mg in sodium chloride (PF) 0.9 % 10 mL injection  40 mg IntraVENous Daily    tamsulosin  0.4 mg Oral Daily    thiamine  100 mg Oral Daily    atorvastatin  20 mg Oral Daily    lipase-protease-amylase  5,000 Units Oral TID AC    folic acid  1 mg Oral Daily    [Held by provider] amLODIPine  10 mg Oral Daily    sodium chloride flush  5-40 mL IntraVENous 2 times per day    [Held by provider] lisinopril  10 mg Oral Daily    scopolamine  1 patch TransDERmal Q72H     Continuous Infusions:   sodium chloride       PRN Meds:melatonin, LORazepam, metoclopramide, hydrALAZINE, labetalol, sodium chloride flush, sodium chloride, ondansetron **OR** ondansetron, polyethylene glycol      Data Review:  LABS and IMAGING:     CBC  Recent Labs     10/25/24  1654 10/26/24  0234 10/26/24  0805 10/26/24  1505 10/26/24  2318   WBC 7.8 17.9*  --   --   --    HGB 16.3 15.0 14.5

## 2024-10-27 NOTE — PLAN OF CARE
Problem: Chronic Conditions and Co-morbidities  Goal: Patient's chronic conditions and co-morbidity symptoms are monitored and maintained or improved  Outcome: Progressing  Flowsheets  Taken 10/27/2024 0400  Care Plan - Patient's Chronic Conditions and Co-Morbidity Symptoms are Monitored and Maintained or Improved: Monitor and assess patient's chronic conditions and comorbid symptoms for stability, deterioration, or improvement  Taken 10/27/2024 0000  Care Plan - Patient's Chronic Conditions and Co-Morbidity Symptoms are Monitored and Maintained or Improved: Monitor and assess patient's chronic conditions and comorbid symptoms for stability, deterioration, or improvement  Taken 10/26/2024 2000  Care Plan - Patient's Chronic Conditions and Co-Morbidity Symptoms are Monitored and Maintained or Improved: Monitor and assess patient's chronic conditions and comorbid symptoms for stability, deterioration, or improvement     Problem: Discharge Planning  Goal: Discharge to home or other facility with appropriate resources  Outcome: Progressing  Flowsheets  Taken 10/27/2024 0400  Discharge to home or other facility with appropriate resources: Identify barriers to discharge with patient and caregiver  Taken 10/27/2024 0000  Discharge to home or other facility with appropriate resources: Identify barriers to discharge with patient and caregiver  Taken 10/26/2024 2000  Discharge to home or other facility with appropriate resources: Identify barriers to discharge with patient and caregiver     Problem: Pain  Goal: Verbalizes/displays adequate comfort level or baseline comfort level  Outcome: Progressing     Problem: Skin/Tissue Integrity  Goal: Absence of new skin breakdown  Description: 1.  Monitor for areas of redness and/or skin breakdown  2.  Assess vascular access sites hourly  3.  Every 4-6 hours minimum:  Change oxygen saturation probe site  4.  Every 4-6 hours:  If on nasal continuous positive airway pressure,

## 2024-10-27 NOTE — PLAN OF CARE
Problem: Chronic Conditions and Co-morbidities  Goal: Patient's chronic conditions and co-morbidity symptoms are monitored and maintained or improved  10/27/2024 1615 by Mackenzie Mcmahon RN  Outcome: Progressing  10/27/2024 0503 by Shaunna Robison RN  Outcome: Progressing  Flowsheets  Taken 10/27/2024 0400  Care Plan - Patient's Chronic Conditions and Co-Morbidity Symptoms are Monitored and Maintained or Improved: Monitor and assess patient's chronic conditions and comorbid symptoms for stability, deterioration, or improvement  Taken 10/27/2024 0000  Care Plan - Patient's Chronic Conditions and Co-Morbidity Symptoms are Monitored and Maintained or Improved: Monitor and assess patient's chronic conditions and comorbid symptoms for stability, deterioration, or improvement  Taken 10/26/2024 2000  Care Plan - Patient's Chronic Conditions and Co-Morbidity Symptoms are Monitored and Maintained or Improved: Monitor and assess patient's chronic conditions and comorbid symptoms for stability, deterioration, or improvement     Problem: Discharge Planning  Goal: Discharge to home or other facility with appropriate resources  10/27/2024 1615 by Mackenzie Mcmahon RN  Outcome: Progressing  10/27/2024 0503 by Shaunna Robison RN  Outcome: Progressing  Flowsheets  Taken 10/27/2024 0400  Discharge to home or other facility with appropriate resources: Identify barriers to discharge with patient and caregiver  Taken 10/27/2024 0000  Discharge to home or other facility with appropriate resources: Identify barriers to discharge with patient and caregiver  Taken 10/26/2024 2000  Discharge to home or other facility with appropriate resources: Identify barriers to discharge with patient and caregiver     Problem: Pain  Goal: Verbalizes/displays adequate comfort level or baseline comfort level  10/27/2024 1615 by Mackenzie Mcmahon, RN  Outcome: Progressing  10/27/2024 0503 by Shaunna Robison RN  Outcome: Progressing     Problem: Skin/Tissue

## 2024-10-27 NOTE — PROGRESS NOTES
Attending Physician Statement:  I have discussed the case of Matthieu Barba, including pertinent history and exam findings with the resident. I have seen and examined the patient and the key elements of the encounter have been performed by me.  I have reviewed medications, clinical laboratory, imaging and other diagnostic tests with the residents. I agree with the assessment, plan and orders as documented by the resident with changes made to the note as needed.     10/27/24-continues to have ambulatory and mild speech ataxia related with left cerrebellar cva. Working with PT/OT/ST. Has additional needs at [place]. Will consult PM&R for ARU vs home with outpatient therapy. Patient is agreeable to short ARU stay if he is a candidate.     Assessment  Acute onset ataxia and vertigo 2PM 10/25/24 found to have left Cerebellar CVA 10/25/24 admitted to NICU for concern of posterior fossa with cytotoxic edema and close neuro checks. Transferred to Neuro stepdown 10/26/24  Upper GI bleeding with coffee ground emesis on arrival stable hgb   Reactive leukocytosis   History of polysubstance abuse including ETOH use disorder, marijuana, crack cocaine and tobacco   Plan  -Agree with plan as discussed and documented by Dr. Escboar  -PM&R consulted today for ARU vs home with outpatient therapy   Saturnino You MD 10/27/2024 3:16 PM

## 2024-10-28 LAB
ANION GAP SERPL CALCULATED.3IONS-SCNC: 11 MMOL/L (ref 9–16)
BASOPHILS # BLD: 0.05 K/UL (ref 0–0.2)
BASOPHILS NFR BLD: 1 % (ref 0–2)
BUN SERPL-MCNC: 7 MG/DL (ref 6–20)
CALCIUM SERPL-MCNC: 9.1 MG/DL (ref 8.6–10.4)
CHLORIDE SERPL-SCNC: 100 MMOL/L (ref 98–107)
CO2 SERPL-SCNC: 25 MMOL/L (ref 20–31)
CREAT SERPL-MCNC: 0.9 MG/DL (ref 0.7–1.2)
EOSINOPHIL # BLD: 0.09 K/UL (ref 0–0.44)
EOSINOPHILS RELATIVE PERCENT: 1 % (ref 1–4)
ERYTHROCYTE [DISTWIDTH] IN BLOOD BY AUTOMATED COUNT: 12.1 % (ref 11.8–14.4)
GFR, ESTIMATED: >90 ML/MIN/1.73M2
GLUCOSE SERPL-MCNC: 113 MG/DL (ref 74–99)
HCT VFR BLD AUTO: 42.7 % (ref 40.7–50.3)
HCT VFR BLD AUTO: 44.5 % (ref 40.7–50.3)
HCT VFR BLD AUTO: 44.6 % (ref 40.7–50.3)
HCT VFR BLD AUTO: 45.2 % (ref 40.7–50.3)
HGB BLD-MCNC: 14.3 G/DL (ref 13–17)
HGB BLD-MCNC: 14.8 G/DL (ref 13–17)
HGB BLD-MCNC: 14.8 G/DL (ref 13–17)
HGB BLD-MCNC: 15.3 G/DL (ref 13–17)
IMM GRANULOCYTES # BLD AUTO: <0.03 K/UL (ref 0–0.3)
IMM GRANULOCYTES NFR BLD: 0 %
LYMPHOCYTES NFR BLD: 1.88 K/UL (ref 1.1–3.7)
LYMPHOCYTES RELATIVE PERCENT: 23 % (ref 24–43)
MAGNESIUM SERPL-MCNC: 2.2 MG/DL (ref 1.6–2.6)
MCH RBC QN AUTO: 29.2 PG (ref 25.2–33.5)
MCHC RBC AUTO-ENTMCNC: 33.2 G/DL (ref 28.4–34.8)
MCV RBC AUTO: 88.1 FL (ref 82.6–102.9)
MONOCYTES NFR BLD: 0.82 K/UL (ref 0.1–1.2)
MONOCYTES NFR BLD: 10 % (ref 3–12)
NEUTROPHILS NFR BLD: 65 % (ref 36–65)
NEUTS SEG NFR BLD: 5.3 K/UL (ref 1.5–8.1)
NRBC BLD-RTO: 0 PER 100 WBC
PLATELET # BLD AUTO: 219 K/UL (ref 138–453)
PMV BLD AUTO: 10.1 FL (ref 8.1–13.5)
POTASSIUM SERPL-SCNC: 3.3 MMOL/L (ref 3.7–5.3)
POTASSIUM SERPL-SCNC: 3.6 MMOL/L (ref 3.7–5.3)
RBC # BLD AUTO: 5.06 M/UL (ref 4.21–5.77)
SODIUM SERPL-SCNC: 136 MMOL/L (ref 136–145)
WBC OTHER # BLD: 8.2 K/UL (ref 3.5–11.3)

## 2024-10-28 PROCEDURE — 6370000000 HC RX 637 (ALT 250 FOR IP)

## 2024-10-28 PROCEDURE — 83735 ASSAY OF MAGNESIUM: CPT

## 2024-10-28 PROCEDURE — 2060000000 HC ICU INTERMEDIATE R&B

## 2024-10-28 PROCEDURE — 80048 BASIC METABOLIC PNL TOTAL CA: CPT

## 2024-10-28 PROCEDURE — 6360000002 HC RX W HCPCS

## 2024-10-28 PROCEDURE — 99254 IP/OBS CNSLTJ NEW/EST MOD 60: CPT | Performed by: STUDENT IN AN ORGANIZED HEALTH CARE EDUCATION/TRAINING PROGRAM

## 2024-10-28 PROCEDURE — 84132 ASSAY OF SERUM POTASSIUM: CPT

## 2024-10-28 PROCEDURE — 85018 HEMOGLOBIN: CPT

## 2024-10-28 PROCEDURE — 6370000000 HC RX 637 (ALT 250 FOR IP): Performed by: INTERNAL MEDICINE

## 2024-10-28 PROCEDURE — 99232 SBSQ HOSP IP/OBS MODERATE 35: CPT | Performed by: PSYCHIATRY & NEUROLOGY

## 2024-10-28 PROCEDURE — 97129 THER IVNTJ 1ST 15 MIN: CPT

## 2024-10-28 PROCEDURE — 2580000003 HC RX 258

## 2024-10-28 PROCEDURE — 36415 COLL VENOUS BLD VENIPUNCTURE: CPT

## 2024-10-28 PROCEDURE — 85014 HEMATOCRIT: CPT

## 2024-10-28 PROCEDURE — 85025 COMPLETE CBC W/AUTO DIFF WBC: CPT

## 2024-10-28 RX ORDER — HYDRALAZINE HYDROCHLORIDE 20 MG/ML
10 INJECTION INTRAMUSCULAR; INTRAVENOUS EVERY 4 HOURS PRN
Status: DISCONTINUED | OUTPATIENT
Start: 2024-10-28 | End: 2024-11-01 | Stop reason: HOSPADM

## 2024-10-28 RX ORDER — CALCIUM CARBONATE 500 MG/1
500 TABLET, CHEWABLE ORAL 3 TIMES DAILY PRN
Status: DISCONTINUED | OUTPATIENT
Start: 2024-10-28 | End: 2024-11-01 | Stop reason: HOSPADM

## 2024-10-28 RX ORDER — LABETALOL HYDROCHLORIDE 5 MG/ML
10 INJECTION, SOLUTION INTRAVENOUS EVERY 4 HOURS PRN
Status: DISCONTINUED | OUTPATIENT
Start: 2024-10-28 | End: 2024-11-01 | Stop reason: HOSPADM

## 2024-10-28 RX ORDER — POTASSIUM CHLORIDE 7.45 MG/ML
10 INJECTION INTRAVENOUS
Status: DISCONTINUED | OUTPATIENT
Start: 2024-10-28 | End: 2024-10-28

## 2024-10-28 RX ORDER — POTASSIUM CHLORIDE 1500 MG/1
40 TABLET, EXTENDED RELEASE ORAL ONCE
Status: COMPLETED | OUTPATIENT
Start: 2024-10-28 | End: 2024-10-28

## 2024-10-28 RX ADMIN — POTASSIUM CHLORIDE 40 MEQ: 1500 TABLET, EXTENDED RELEASE ORAL at 05:06

## 2024-10-28 RX ADMIN — PANCRELIPASE LIPASE, PANCRELIPASE PROTEASE, PANCRELIPASE AMYLASE 5000 UNITS: 5000; 17000; 24000 CAPSULE, DELAYED RELEASE ORAL at 07:56

## 2024-10-28 RX ADMIN — ASPIRIN 81 MG: 81 TABLET, COATED ORAL at 07:55

## 2024-10-28 RX ADMIN — LABETALOL HYDROCHLORIDE 10 MG: 5 INJECTION, SOLUTION INTRAVENOUS at 19:54

## 2024-10-28 RX ADMIN — Medication 5 MG: at 19:54

## 2024-10-28 RX ADMIN — Medication 100 MG: at 07:55

## 2024-10-28 RX ADMIN — SODIUM CHLORIDE, PRESERVATIVE FREE 10 ML: 5 INJECTION INTRAVENOUS at 19:59

## 2024-10-28 RX ADMIN — TAMSULOSIN HYDROCHLORIDE 0.4 MG: 0.4 CAPSULE ORAL at 07:55

## 2024-10-28 RX ADMIN — METOCLOPRAMIDE HYDROCHLORIDE 10 MG: 5 INJECTION INTRAMUSCULAR; INTRAVENOUS at 07:55

## 2024-10-28 RX ADMIN — ATORVASTATIN CALCIUM 20 MG: 10 TABLET, FILM COATED ORAL at 07:55

## 2024-10-28 RX ADMIN — PANCRELIPASE LIPASE, PANCRELIPASE PROTEASE, PANCRELIPASE AMYLASE 5000 UNITS: 5000; 17000; 24000 CAPSULE, DELAYED RELEASE ORAL at 12:06

## 2024-10-28 RX ADMIN — FOLIC ACID 1 MG: 1 TABLET ORAL at 07:55

## 2024-10-28 RX ADMIN — PANCRELIPASE LIPASE, PANCRELIPASE PROTEASE, PANCRELIPASE AMYLASE 5000 UNITS: 5000; 17000; 24000 CAPSULE, DELAYED RELEASE ORAL at 18:06

## 2024-10-28 RX ADMIN — SODIUM CHLORIDE, PRESERVATIVE FREE 10 ML: 5 INJECTION INTRAVENOUS at 07:55

## 2024-10-28 RX ADMIN — LORAZEPAM 0.5 MG: 2 INJECTION INTRAMUSCULAR; INTRAVENOUS at 02:22

## 2024-10-28 RX ADMIN — PANTOPRAZOLE SODIUM 40 MG: 40 TABLET, DELAYED RELEASE ORAL at 07:55

## 2024-10-28 RX ADMIN — HYDRALAZINE HYDROCHLORIDE 10 MG: 20 INJECTION INTRAMUSCULAR; INTRAVENOUS at 23:27

## 2024-10-28 ASSESSMENT — PAIN - FUNCTIONAL ASSESSMENT: PAIN_FUNCTIONAL_ASSESSMENT: ACTIVITIES ARE NOT PREVENTED

## 2024-10-28 ASSESSMENT — ENCOUNTER SYMPTOMS
ABDOMINAL PAIN: 0
NAUSEA: 0
COLOR CHANGE: 0
CHEST TIGHTNESS: 0
VOMITING: 0
TROUBLE SWALLOWING: 0
PHOTOPHOBIA: 0
VOICE CHANGE: 0
WHEEZING: 0
SHORTNESS OF BREATH: 0

## 2024-10-28 ASSESSMENT — PAIN SCALES - GENERAL
PAINLEVEL_OUTOF10: 0
PAINLEVEL_OUTOF10: 7
PAINLEVEL_OUTOF10: 0

## 2024-10-28 ASSESSMENT — PAIN DESCRIPTION - DESCRIPTORS: DESCRIPTORS: BURNING

## 2024-10-28 ASSESSMENT — PAIN DESCRIPTION - PAIN TYPE: TYPE: ACUTE PAIN

## 2024-10-28 ASSESSMENT — PAIN DESCRIPTION - FREQUENCY: FREQUENCY: CONTINUOUS

## 2024-10-28 ASSESSMENT — PAIN DESCRIPTION - ONSET: ONSET: SUDDEN

## 2024-10-28 ASSESSMENT — PAIN DESCRIPTION - ORIENTATION: ORIENTATION: LOWER

## 2024-10-28 ASSESSMENT — PAIN DESCRIPTION - LOCATION: LOCATION: ABDOMEN

## 2024-10-28 NOTE — PLAN OF CARE
Problem: Chronic Conditions and Co-morbidities  Goal: Patient's chronic conditions and co-morbidity symptoms are monitored and maintained or improved  10/28/2024 0542 by Shaunna Robison RN  Outcome: Progressing  Flowsheets  Taken 10/28/2024 0400  Care Plan - Patient's Chronic Conditions and Co-Morbidity Symptoms are Monitored and Maintained or Improved: Monitor and assess patient's chronic conditions and comorbid symptoms for stability, deterioration, or improvement  Taken 10/28/2024 0000  Care Plan - Patient's Chronic Conditions and Co-Morbidity Symptoms are Monitored and Maintained or Improved: Monitor and assess patient's chronic conditions and comorbid symptoms for stability, deterioration, or improvement  Taken 10/27/2024 2000  Care Plan - Patient's Chronic Conditions and Co-Morbidity Symptoms are Monitored and Maintained or Improved: Monitor and assess patient's chronic conditions and comorbid symptoms for stability, deterioration, or improvement     Problem: Discharge Planning  Goal: Discharge to home or other facility with appropriate resources  10/28/2024 0542 by Shaunna Robison RN  Outcome: Progressing  Flowsheets  Taken 10/28/2024 0400  Discharge to home or other facility with appropriate resources: Identify barriers to discharge with patient and caregiver  Taken 10/28/2024 0000  Discharge to home or other facility with appropriate resources: Identify barriers to discharge with patient and caregiver  Taken 10/27/2024 2000  Discharge to home or other facility with appropriate resources: Identify barriers to discharge with patient and caregiver     Problem: Pain  Goal: Verbalizes/displays adequate comfort level or baseline comfort level  10/28/2024 0542 by Shaunna Robison, RN  Outcome: Progressing     Problem: Skin/Tissue Integrity  Goal: Absence of new skin breakdown  Description: 1.  Monitor for areas of redness and/or skin breakdown  2.  Assess vascular access sites hourly  3.  Every 4-6

## 2024-10-28 NOTE — CONSULTS
to Stand: Minimal Assistance  Stand to Sit: Minimal Assistance  Bed to Chair: Minimal assistance  Stand Pivot Transfers: Minimal Assistance    Ambulation  Surface: Level tile  Device: No Device  Assistance: Minimal assistance  Quality of Gait: takes small, choppy steps, unsteady/wandering gait; wide CINDY  Gait Deviations: Slow Kitty, Decreased step length, Decreased step height, Decreased arm swing, Decreased head and trunk rotation, Deviated path, Staggers, Shuffles  Distance: 25'x1      Occupational Therapy    ADL  Feeding: Independent  Grooming: Supervision  UE Bathing: Stand by assistance  LE Bathing: Minimal assistance  UE Dressing: Stand by assistance  LE Dressing: Minimal assistance (pt able to don/doff B socks while sitting on EOB with CGA d/t slight unsteadiness. pt expected to require min A for standing L tasks d/t decreased balance)  Toileting: Minimal assistance  Functional Mobility Skilled Clinical Factors: pt unabel to tolerate standing  Skin Care: Bath wipes                      Transfers  Sit to stand: Minimal assistance  Stand to sit: Minimal assistance  Transfer Comments: from EOB d/t dizziness, pt reported room spinning causing unsteadiness                  Speech Therapy  Subjective: [x] Alert     [x] Cooperative     [] Confused     [] Agitated    [] Lethargic        Objective/Assessment:     Recall:   Delayed with Distractions: 3/3; 3/3; 3/3     Word List Retention: 19/21 increased to 21/21 with min verbal cues.     Problem Solving/Reasoning:   Word Deduction: 10/11 increased to 11/11 with max verbal cues.     Stating Situational Problems: 12/12     Opposites: 12/13 increased to 13/13 with mod verbal cues.      Past Medical History:        Diagnosis Date    Arthritis     Chronic sinusitis     Eczema     Environmental allergies     GERD (gastroesophageal reflux disease)     Hyperlipidemia     Hypertension     Sepsis (HCC) 6/8/2022    Snores        Past Surgical History:        Procedure  HISTORY: Rule out acute ischmic stroke. Dizziness. TECHNOLOGIST PROVIDED HISTORY: Rule out acute ischmic stroke. Dizziness. Decision Support Exception - unselect if not a suspected or confirmed emergency medical condition->Emergency Medical Condition (MA) Reason for Exam: Rule out acute ischmic stroke. Dizziness. FINDINGS: INTRACRANIAL STRUCTURES/VENTRICLES: Wedge-shaped T2/FLAIR hyperintensity, DWI hyperintensity, and ADC hypointensity in the inferior left cerebellum.  No additional diffusion restriction.  Chronic lacunar infarcts in the left cerebellum, bilateral middle cerebellar peduncles, right natanael, and right thalamus.  Mild patchy cerebral and pontine white matter T2/FLAIR hyperintensity, nonspecific but generally ascribed to sequela of chronic microvascular ischemia.  No mass effect or midline shift. No evidence of an acute intracranial hemorrhage.  The ventricles and sulci are normal in size and configuration.  The sellar/suprasellar regions appear unremarkable.  The normal signal voids within the major intracranial vessels appear maintained. ORBITS: Orbital structures are unremarkable. SINUSES: The visualized paranasal sinuses and mastoid air cells demonstrate no acute abnormality. BONES/SOFT TISSUES: The bone marrow signal intensity appears normal. The soft tissues demonstrate no acute abnormality.     1. Acute ischemic infarct in the inferior left cerebellum. 2. No intracranial hemorrhage or mass effect. 3. Chronic lacunar infarcts in the left cerebellum, bilateral middle cerebellar peduncles, right natanael, and right thalamus. 4. Mild chronic white matter microvascular ischemic changes.     CTA HEAD NECK W CONTRAST    Result Date: 10/25/2024  EXAMINATION: CTA OF THE HEAD AND NECK WITH CONTRAST 10/25/2024 8:31 pm: TECHNIQUE: CTA of the head and neck was performed with the administration of intravenous contrast. Multiplanar reformatted images are provided for review.  MIP images are provided for review.

## 2024-10-28 NOTE — CARE COORDINATION
PHQ-9  Met with pt this a.m. was alert and oriented  Pt states he was living with his 5 children ages: 16,14,12,7,and 5.  Pt states he plans to stay with his mother after d/c. Mother is presently caring for the children .  Pt denies having any SI/depression     Patient Health Questionnaire-9 (PHQ-9)    Over the last 2 weeks, how often have you been bothered by any of the following problems?    1. Little Interest or pleasure in doing things?   [x] Not at all  [] Several Days  [] More than half the day  []  Nearly every day    2. Feeling down, depressed or hopeless?    [x] Not at all  [] Several Days  [] More than half the day  []  Nearly every day    3. Trouble falling or staying asleep, or sleeping too much?   [] Not at all  [x] Several Days  [] More than half the day  []  Nearly every day    4. Feeling tired or having little energy?   [] Not at all  [x] Several Days  [] More than half the day  []  Nearly every day    5. Poor apettite or overeating?   [x] Not at all  [] Several Days  [] More than half the day  []  Nearly every day    6. Feeling bad about yourself-or that you are a failure or have let yourself or your family down?   [x] Not at all  [] Several Days  [] More than half the day  []  Nearly every day    7. Trouble concentrating on things, such as reading the newspaper or watching television?   [] Not at all  [x] Several Days  [] More than half the day  []  Nearly every day    8. Moving or speaking so slowly that other people could have noticed? Or the opposite-being so fidgety or restless that you have been moving around a lot more than usual?   [x] Not at all  [] Several Days  [] More than half the day  []  Nearly every day    9. Thoughts that you would be better off dead or of hurting yourself in some way?   [x] Not at all  [] Several Days  [] More than half the day  []  Nearly every day    Total Score: 3    If you checked off any problems, how difficult have these problems made it for you to do your  work, take care of things at home, or get along with other people?   [x] Not difficult at all  [] Somewhat Difficult  [] Very Difficult  []  Extremely Difficult

## 2024-10-28 NOTE — PROGRESS NOTES
Speech Language Pathology  [facility]    Cognitive Treatment Note    Date: 10/28/2024  Patient’s Name: Matthieu Barba  MRN: [id number]  Diagnosis:   Patient Active Problem List   Diagnosis Code    Depression F32.A    Intervertebral lumbar disc disorder with myelopathy, lumbar region M51.06    Uncontrolled hypertension I10    Pure hypercholesterolemia E78.00    Prediabetes R73.03    Tobacco abuse Z72.0    Alcohol use, unspecified with unspecified alcohol-induced disorder (HCC) F10.99    Cocaine abuse ([facility]) F14.10    Alcohol abuse F10.10    Atopic dermatitis L20.9    H/O acute pancreatitis Z87.19    Ischemic stroke ([facility]) I63.9    Hematemesis with nausea K92.0    Gastroesophageal reflux disease K21.9    Polysubstance abuse ([facility]) F19.10    Alcohol use disorder, moderate, dependence ([facility]) F10.20    Primary hypertension I10    Tobacco use disorder F17.200    Cocaine use disorder ([facility]) F14.10    Cerebrovascular accident (CVA) ([facility]) I63.9    Acute stroke due to occlusion of left cerebellar artery ([facility]) I63.542       Pain: Pt reported new pain in stomach. Pt reported pain to be at level of 7/10. RN notified.    Cognitive Treatment    Treatment time: 9:55-10:09      Subjective: [x] Alert [x] Cooperative     [] Confused     [] Agitated    [] Lethargic      Objective/Assessment:    Recall:   Delayed with Distractions: 3/3; 3/3; 3/3    Word List Retention: 19/21 increased to 21/21 with min verbal cues.    Problem Solving/Reasoning:   Word Deduction: 10/11 increased to 11/11 with max verbal cues.    Stating Situational Problems: 12/12    Opposites: 12/13 increased to 13/13 with mod verbal cues.        Plan:  [x] Continue ST services    [] Discharge from ST:      Discharge recommendations: []  Further therapy recommended at discharge.The patient should be able to tolerate at least 3 hours of therapy per day over 5 days or 15 hours over 7 days. [] Further therapy recommended at discharge.   [x] No therapy  recommended at discharge.        Completed by: Klaudia Toth  Clinician    Cosigned By: Charmaine Lemon M.S.CCC/SLP    Assistance with ambulation/Assistance OOB with selected safe patient handling equipment/Communicate Risk of Fall with Harm to all staff/Discuss with provider need for PT consult/Monitor for mental status changes/Monitor gait and stability/Reinforce activity limits and safety measures with patient and family/Reorient to person, place and time as needed/Review medications for side effects contributing to fall risk/Sit up slowly, dangle for a short time, stand at bedside before walking/Tailored Fall Risk Interventions/Use of alarms - bed, chair and/or voice tab/Visual Cue: Yellow wristband and red socks/Bed in lowest position, wheels locked, appropriate side rails in place/Call bell, personal items and telephone in reach/Instruct patient to call for assistance before getting out of bed or chair/Non-slip footwear when patient is out of bed/[place] to call system/Physically safe environment - no spills, clutter or unnecessary equipment/Purposeful Proactive Rounding/Room/bathroom lighting operational, light cord in reach

## 2024-10-28 NOTE — PROGRESS NOTES
[facility]   IN-PATIENT SERVICE   [facility]    Progress Note             Date:   10/28/2024  Patient name:  Matthieu Barba  Date of admission:  10/25/2024  4:34 PM  MRN:   [id number]  Account:  [id number]  YOB: 1970  PCP:    Glenn Freeman MD  Room:   [address] [place] [address]  Code Status:    Full Code    Chief Complaint:     Chief Complaint   Patient presents with    Vomiting    Dizziness       Interval hx:     The patient was seen and examined at bedside.   Is vitally stable, alert and oriented x 3.   No acute events overnight.  Neuroexam: Nonfocal except for left-sided dysmetria  No further episodes of hematemesis.  Did talk with GI team, they said okay for aspirin.  Started on aspirin 81 Mg daily.  Has additional PT OT needs at discharge.  PMR consulted for further recommendations.    MRI brain shows acute infarction in inferior left cerebellum.       Brief History of Present Illness:     54 y.o. male with past medical history of hypertension, cocaine and alcohol use disorder who presented yesterday after acute onset dizziness and vertigo with associated nausea/vomiting.     Patient stated that he donated plasma yesterday and then went home, ate some [employer], drank beer, and smoked crack.  About 1 hour later, he started feeling dizzy with vertigo and had multiple episodes of vomiting with coffee-ground emesis.  Patient denied any diplopia, facial droop, focal weakness, or numbness.In the ED, patient was found to have positive occult blood.  Neurology was consulted for dizziness.  MRI brain was obtained which showed acute left cerebellar infarct.  Not a TNK candidate due to active GI bleed.  CTA showed no LVO or flow-limiting stenosis.  Patient was admitted overnight in neuro ICU.  GI medicine was consulted and they recommended to continue with Protonix drip for 24 hours and no acute intervention needed given left cerebellar infarct and stable Hb.  Patient is now  stepped down to our unit.      Past Medical History:     Past Medical History:   Diagnosis Date    Arthritis     Chronic sinusitis     Eczema     Environmental allergies     GERD (gastroesophageal reflux disease)     Hyperlipidemia     Hypertension     Sepsis (HCC) 6/8/2022    Snores         Past Surgical History:     Past Surgical History:   Procedure Laterality Date    COLONOSCOPY      UPPER GASTROINTESTINAL ENDOSCOPY          Medications Prior to Admission:     Prior to Admission medications    Medication Sig Start Date End Date Taking? Authorizing Provider   Pancrelipase, Lip-Prot-Amyl, [id number] units CPEP Take 1 capsule by mouth 3 times daily (before meals) 10/15/24  Yes Marie Nazario MD   folic acid (FOLVITE) 1 MG tablet Take 1 tablet by mouth daily 10/15/24  Yes Glenn Freeman MD   lisinopril (PRINIVIL;ZESTRIL) 10 MG tablet Take 1 tablet by mouth daily 10/15/24 12/14/24 Yes Glenn Freeman MD   simvastatin (ZOCOR) 40 MG tablet Take 1 tablet by mouth nightly 10/15/24  Yes Glenn Freeman MD   thiamine 100 MG tablet Take 1 tablet by mouth daily 10/15/24  Yes Glenn Freeman MD   tamsulosin (FLOMAX) 0.4 MG capsule Take 1 capsule by mouth daily  Patient not taking: Reported on 10/25/2024 10/15/24   Marie Nazario MD   amLODIPine (NORVASC) 10 MG tablet Take 1 tablet by mouth daily  Patient not taking: Reported on 10/25/2024 10/15/24   Glenn Freeman MD   zoster recombinant adjuvanted vaccine (SHINGRIX) 50 MCG/0.5ML SUSR injection Inject 0.5 mLs into the muscle See Admin Instructions 1 dose now and repeat in 2-6 months 10/15/24 4/13/25  Glenn Freeman MD   Blood Pressure KIT Use to monitor blood pressure daily and as needed 10/15/24   Glenn Freeman MD        Allergies:     Patient has no known allergies.    Social History:     Tobacco:    reports that he has been smoking cigarettes. He has a 15 pack-year smoking history. He has never used smokeless  Neutrophils Absolute 5.30 1.50 - 8.10 k/uL    Lymphocytes Absolute 1.88 1.10 - 3.70 k/uL    Monocytes Absolute 0.82 0.10 - 1.20 k/uL    Eosinophils Absolute 0.09 0.00 - 0.44 k/uL    Basophils Absolute 0.05 0.00 - 0.20 k/uL    Immature Granulocytes Absolute <0.03 0.00 - 0.30 k/uL   Basic Metabolic Panel w/ Reflex to MG    Collection Time: 10/28/24  3:17 AM   Result Value Ref Range    Sodium 136 136 - 145 mmol/L    Potassium 3.3 (L) 3.7 - 5.3 mmol/L    Chloride 100 98 - 107 mmol/L    CO2 25 20 - 31 mmol/L    Anion Gap 11 9 - 16 mmol/L    Glucose 113 (H) 74 - 99 mg/dL    BUN 7 6 - 20 mg/dL    Creatinine 0.9 0.70 - 1.20 mg/dL    Est, Glom Filt Rate >90 >60 mL/min/1.73m2    Calcium 9.1 8.6 - 10.4 mg/dL   Magnesium    Collection Time: 10/28/24  3:17 AM   Result Value Ref Range    Magnesium 2.2 1.6 - 2.6 mg/dL   Potassium    Collection Time: 10/28/24  6:05 AM   Result Value Ref Range    Potassium 3.6 (L) 3.7 - 5.3 mmol/L     Recent Labs     10/28/24  0317   WBC 8.2   RBC 5.06   HGB 14.8   HCT 44.6   MCV 88.1   MCH 29.2   MCHC 33.2   RDW 12.1      MPV 10.1     Recent Labs     10/25/24  1654 10/26/24  0805 10/28/24  0317 10/28/24  0605      < > 136  --    K 3.2*   < > 3.3* 3.6*      < > 100  --    CO2 22   < > 25  --    BUN 11   < > 7  --    CREATININE 1.1   < > 0.9  --    GLUCOSE 187*   < > 113*  --    CALCIUM 8.3*   < > 9.1  --    BILITOT 0.4  --   --   --    ALKPHOS 100  --   --   --    AST 41  --   --   --    ALT 36  --   --   --     < > = values in this interval not displayed.     Hemoglobin A1C   Date Value Ref Range Status   10/26/2024 5.9 4.0 - 6.0 % Final       Assessment :      Primary Problem  Ischemic stroke (HCC)    Active Hospital Problems    Diagnosis Date Noted    Gastroesophageal reflux disease [K21.9] 10/27/2024    Polysubstance abuse (HCC) [F19.10] 10/27/2024    Alcohol use disorder, moderate, dependence (HCC) [F10.20] 10/27/2024    Primary hypertension [I10] 10/27/2024    Tobacco use

## 2024-10-28 NOTE — PLAN OF CARE
Problem: Chronic Conditions and Co-morbidities  Goal: Patient's chronic conditions and co-morbidity symptoms are monitored and maintained or improved  10/28/2024 1829 by Elsie Crowe RN  Outcome: Progressing  Flowsheets (Taken 10/28/2024 0800)  Care Plan - Patient's Chronic Conditions and Co-Morbidity Symptoms are Monitored and Maintained or Improved: Monitor and assess patient's chronic conditions and comorbid symptoms for stability, deterioration, or improvement  10/28/2024 0542 by Shaunna Robison, RN  Outcome: Progressing  Flowsheets  Taken 10/28/2024 0400  Care Plan - Patient's Chronic Conditions and Co-Morbidity Symptoms are Monitored and Maintained or Improved: Monitor and assess patient's chronic conditions and comorbid symptoms for stability, deterioration, or improvement  Taken 10/28/2024 0000  Care Plan - Patient's Chronic Conditions and Co-Morbidity Symptoms are Monitored and Maintained or Improved: Monitor and assess patient's chronic conditions and comorbid symptoms for stability, deterioration, or improvement  Taken 10/27/2024 2000  Care Plan - Patient's Chronic Conditions and Co-Morbidity Symptoms are Monitored and Maintained or Improved: Monitor and assess patient's chronic conditions and comorbid symptoms for stability, deterioration, or improvement     Problem: Discharge Planning  Goal: Discharge to home or other facility with appropriate resources  10/28/2024 1829 by Elsie Crowe RN  Outcome: Progressing  Flowsheets (Taken 10/28/2024 0800)  Discharge to home or other facility with appropriate resources: Identify barriers to discharge with patient and caregiver  10/28/2024 0542 by Shaunna Robison, RN  Outcome: Progressing  Flowsheets  Taken 10/28/2024 0400  Discharge to home or other facility with appropriate resources: Identify barriers to discharge with patient and caregiver  Taken 10/28/2024 0000  Discharge to home or other facility with appropriate resources: Identify barriers to

## 2024-10-29 LAB
BASOPHILS # BLD: 0.06 K/UL (ref 0–0.2)
BASOPHILS NFR BLD: 1 % (ref 0–2)
EOSINOPHIL # BLD: 0.13 K/UL (ref 0–0.44)
EOSINOPHILS RELATIVE PERCENT: 2 % (ref 1–4)
ERYTHROCYTE [DISTWIDTH] IN BLOOD BY AUTOMATED COUNT: 11.9 % (ref 11.8–14.4)
HCT VFR BLD AUTO: 44.2 % (ref 40.7–50.3)
HGB BLD-MCNC: 15 G/DL (ref 13–17)
IMM GRANULOCYTES # BLD AUTO: <0.03 K/UL (ref 0–0.3)
IMM GRANULOCYTES NFR BLD: 0 %
LYMPHOCYTES NFR BLD: 1.91 K/UL (ref 1.1–3.7)
LYMPHOCYTES RELATIVE PERCENT: 24 % (ref 24–43)
MCH RBC QN AUTO: 29.9 PG (ref 25.2–33.5)
MCHC RBC AUTO-ENTMCNC: 33.9 G/DL (ref 28.4–34.8)
MCV RBC AUTO: 88 FL (ref 82.6–102.9)
MONOCYTES NFR BLD: 0.76 K/UL (ref 0.1–1.2)
MONOCYTES NFR BLD: 10 % (ref 3–12)
NEUTROPHILS NFR BLD: 63 % (ref 36–65)
NEUTS SEG NFR BLD: 5.11 K/UL (ref 1.5–8.1)
NRBC BLD-RTO: 0 PER 100 WBC
PLATELET # BLD AUTO: 237 K/UL (ref 138–453)
PMV BLD AUTO: 10.2 FL (ref 8.1–13.5)
RBC # BLD AUTO: 5.02 M/UL (ref 4.21–5.77)
WBC OTHER # BLD: 8 K/UL (ref 3.5–11.3)

## 2024-10-29 PROCEDURE — 6370000000 HC RX 637 (ALT 250 FOR IP)

## 2024-10-29 PROCEDURE — 99232 SBSQ HOSP IP/OBS MODERATE 35: CPT | Performed by: PSYCHIATRY & NEUROLOGY

## 2024-10-29 PROCEDURE — 85025 COMPLETE CBC W/AUTO DIFF WBC: CPT

## 2024-10-29 PROCEDURE — 2580000003 HC RX 258

## 2024-10-29 PROCEDURE — 6360000002 HC RX W HCPCS

## 2024-10-29 PROCEDURE — 97130 THER IVNTJ EA ADDL 15 MIN: CPT

## 2024-10-29 PROCEDURE — 99222 1ST HOSP IP/OBS MODERATE 55: CPT | Performed by: NURSE PRACTITIONER

## 2024-10-29 PROCEDURE — 97530 THERAPEUTIC ACTIVITIES: CPT

## 2024-10-29 PROCEDURE — 36415 COLL VENOUS BLD VENIPUNCTURE: CPT

## 2024-10-29 PROCEDURE — 6370000000 HC RX 637 (ALT 250 FOR IP): Performed by: INTERNAL MEDICINE

## 2024-10-29 PROCEDURE — 6370000000 HC RX 637 (ALT 250 FOR IP): Performed by: STUDENT IN AN ORGANIZED HEALTH CARE EDUCATION/TRAINING PROGRAM

## 2024-10-29 PROCEDURE — 97129 THER IVNTJ 1ST 15 MIN: CPT

## 2024-10-29 PROCEDURE — 97535 SELF CARE MNGMENT TRAINING: CPT

## 2024-10-29 PROCEDURE — 97116 GAIT TRAINING THERAPY: CPT

## 2024-10-29 PROCEDURE — 2060000000 HC ICU INTERMEDIATE R&B

## 2024-10-29 RX ORDER — HEPARIN SODIUM 5000 [USP'U]/ML
5000 INJECTION, SOLUTION INTRAVENOUS; SUBCUTANEOUS EVERY 8 HOURS SCHEDULED
Status: DISCONTINUED | OUTPATIENT
Start: 2024-10-29 | End: 2024-11-01 | Stop reason: HOSPADM

## 2024-10-29 RX ADMIN — PANCRELIPASE LIPASE, PANCRELIPASE PROTEASE, PANCRELIPASE AMYLASE 5000 UNITS: 5000; 17000; 24000 CAPSULE, DELAYED RELEASE ORAL at 09:20

## 2024-10-29 RX ADMIN — ANTACID TABLETS 500 MG: 500 TABLET, CHEWABLE ORAL at 03:39

## 2024-10-29 RX ADMIN — TAMSULOSIN HYDROCHLORIDE 0.4 MG: 0.4 CAPSULE ORAL at 07:45

## 2024-10-29 RX ADMIN — ATORVASTATIN CALCIUM 20 MG: 10 TABLET, FILM COATED ORAL at 07:45

## 2024-10-29 RX ADMIN — PANCRELIPASE LIPASE, PANCRELIPASE PROTEASE, PANCRELIPASE AMYLASE 5000 UNITS: 5000; 17000; 24000 CAPSULE, DELAYED RELEASE ORAL at 11:45

## 2024-10-29 RX ADMIN — SODIUM CHLORIDE, PRESERVATIVE FREE 10 ML: 5 INJECTION INTRAVENOUS at 21:27

## 2024-10-29 RX ADMIN — METOCLOPRAMIDE HYDROCHLORIDE 10 MG: 5 INJECTION INTRAMUSCULAR; INTRAVENOUS at 07:46

## 2024-10-29 RX ADMIN — Medication 100 MG: at 07:46

## 2024-10-29 RX ADMIN — FOLIC ACID 1 MG: 1 TABLET ORAL at 07:46

## 2024-10-29 RX ADMIN — ASPIRIN 81 MG: 81 TABLET, COATED ORAL at 07:45

## 2024-10-29 RX ADMIN — LABETALOL HYDROCHLORIDE 10 MG: 5 INJECTION, SOLUTION INTRAVENOUS at 00:27

## 2024-10-29 RX ADMIN — Medication 5 MG: at 21:27

## 2024-10-29 RX ADMIN — AMLODIPINE BESYLATE 10 MG: 10 TABLET ORAL at 07:46

## 2024-10-29 RX ADMIN — PANTOPRAZOLE SODIUM 40 MG: 40 TABLET, DELAYED RELEASE ORAL at 07:45

## 2024-10-29 RX ADMIN — HEPARIN SODIUM 5000 UNITS: 5000 INJECTION INTRAVENOUS; SUBCUTANEOUS at 21:27

## 2024-10-29 RX ADMIN — PANCRELIPASE LIPASE, PANCRELIPASE PROTEASE, PANCRELIPASE AMYLASE 5000 UNITS: 5000; 17000; 24000 CAPSULE, DELAYED RELEASE ORAL at 16:31

## 2024-10-29 RX ADMIN — LISINOPRIL 10 MG: 20 TABLET ORAL at 09:20

## 2024-10-29 RX ADMIN — HEPARIN SODIUM 5000 UNITS: 5000 INJECTION INTRAVENOUS; SUBCUTANEOUS at 16:31

## 2024-10-29 RX ADMIN — SODIUM CHLORIDE, PRESERVATIVE FREE 10 ML: 5 INJECTION INTRAVENOUS at 07:46

## 2024-10-29 ASSESSMENT — ENCOUNTER SYMPTOMS
SHORTNESS OF BREATH: 0
VOICE CHANGE: 0
CHEST TIGHTNESS: 0
WHEEZING: 0
NAUSEA: 0
COLOR CHANGE: 0
VOMITING: 0
TROUBLE SWALLOWING: 0
PHOTOPHOBIA: 0

## 2024-10-29 ASSESSMENT — PAIN SCALES - GENERAL
PAINLEVEL_OUTOF10: 5
PAINLEVEL_OUTOF10: 0
PAINLEVEL_OUTOF10: 3

## 2024-10-29 ASSESSMENT — PAIN DESCRIPTION - LOCATION
LOCATION: ABDOMEN
LOCATION: ABDOMEN

## 2024-10-29 ASSESSMENT — PAIN DESCRIPTION - ONSET: ONSET: ON-GOING

## 2024-10-29 ASSESSMENT — PAIN DESCRIPTION - PAIN TYPE: TYPE: ACUTE PAIN;CHRONIC PAIN

## 2024-10-29 ASSESSMENT — PAIN - FUNCTIONAL ASSESSMENT: PAIN_FUNCTIONAL_ASSESSMENT: ACTIVITIES ARE NOT PREVENTED

## 2024-10-29 ASSESSMENT — PAIN DESCRIPTION - FREQUENCY: FREQUENCY: CONTINUOUS

## 2024-10-29 ASSESSMENT — PAIN DESCRIPTION - DESCRIPTORS: DESCRIPTORS: BURNING

## 2024-10-29 ASSESSMENT — PAIN DESCRIPTION - ORIENTATION: ORIENTATION: LOWER;MID

## 2024-10-29 NOTE — CONSULTS
[facility]    Consult                        Today's Date: 10/29/2024  Patient Name: Matthieu Barba  Date of admission: 10/25/2024  4:34 PM  Patient's age: 54 y.o., 1970  Admission Dx: Acute ischemic stroke (HCC) [I63.9]  Ischemic stroke (HCC) [I63.9]  Cerebrovascular accident (CVA), unspecified mechanism (HCC) [I63.9]    Reason for Consult:  Cardiac evaluation for ANEL/LOOP recorder     Requesting Physician: Saturnino You MD    CHIEF COMPLAINT:  CVA    History Obtained From:  patient, electronic medical record    HISTORY OF PRESENT ILLNESS:    Per previous documentation   \"The patient is a 54 y.o.  male with past medical history of hypertension, cocaine and alcohol use disorder who presented after acute onset dizziness and vertigo with associated nausea/vomiting.  Patient stated that he donated plasma yesterday and then went home, ate some [employer], drank beer, and smoked crack.  About 1 hour later, he started feeling dizzy with vertigo and had multiple episodes of vomiting with coffee-ground emesis.  Patient denied any diplopia, facial droop, focal weakness, or numbness.In the ED, patient was found to have positive occult blood.  Neurology was consulted for dizziness.  MRI brain was obtained which showed acute left cerebellar infarct. Not a TNK candidate due to active GI bleed.  CTA showed no LVO or flow-limiting stenosis.  Patient was admitted overnight in neuro ICU.  GI medicine was consulted and they recommended to continue with Protonix drip for 24 hours and no acute intervention needed given left cerebellar infarct and stable Hb.\"     Past Medical History:   has a past medical history of Arthritis, Chronic sinusitis, Eczema, Environmental allergies, GERD (gastroesophageal reflux disease), Hyperlipidemia, Hypertension, Sepsis (HCC), and Snores.    Past Surgical History:   has a past surgical history that includes Upper gastrointestinal endoscopy and Colonoscopy.     Home  Simpsons Biplane is 50%. Left ventricle size is normal. Increased wall thickness. Findings consistent with mild concentric hypertrophy. Normal wall motion. Normal diastolic function.    Aortic Valve: Trileaflet valve.    Mitral Valve: Mild regurgitation.    Tricuspid Valve: Mild regurgitation.    Interatrial Septum: No interatrial shunt visualized with color Doppler. Agitated saline study was negative without provocation.    Image quality is adequate.  Stress Test: 11/15/14 :IMPRESSION: Electrocardiographically negative.   Cardiac Angiography: not obtained.    Labs:     CBC:   Recent Labs     10/28/24  0317 10/28/24  1119 10/28/24  2326 10/29/24  0434   WBC 8.2  --   --  8.0   HGB 14.8   < > 14.3 15.0   HCT 44.6   < > 42.7 44.2     --   --  237    < > = values in this interval not displayed.     BMP:   Recent Labs     10/27/24  0553 10/28/24  0317 10/28/24  0605    136  --    K 3.3* 3.3* 3.6*   CO2 24 25  --    BUN 4* 7  --    CREATININE 0.9 0.9  --    LABGLOM >90 >90  --    GLUCOSE 128* 113*  --      BNP: No results for input(s): \"BNP\" in the last 72 hours.  PT/INR: No results for input(s): \"PROTIME\", \"INR\" in the last 72 hours.  APTT:No results for input(s): \"APTT\" in the last 72 hours.  CARDIAC ENZYMES:No results for input(s): \"CKTOTAL\", \"CKMB\", \"CKMBINDEX\", \"TROPONINI\" in the last 72 hours.  FASTING LIPID PANEL:  Lab Results   Component Value Date/Time    HDL 67 10/26/2024 03:05 PM    TRIG 139 10/26/2024 03:05 PM     LIVER PROFILE:No results for input(s): \"AST\", \"ALT\", \"LABALBU\" in the last 72 hours.    IMPRESSION:    Patient Active Problem List   Diagnosis    Depression    Intervertebral lumbar disc disorder with myelopathy, lumbar region    Uncontrolled hypertension    Pure hypercholesterolemia    Prediabetes    Tobacco abuse    Alcohol use, unspecified with unspecified alcohol-induced disorder (HCC)    Cocaine abuse (HCC)    Alcohol abuse    Atopic dermatitis    H/O acute pancreatitis    Ischemic

## 2024-10-29 NOTE — PROGRESS NOTES
Speech Language Pathology  [facility]    Cognitive Treatment Note    Date: 10/29/2024  Patient’s Name: Matthieu Barba  MRN: [id number]  Diagnosis:   Patient Active Problem List   Diagnosis Code    Depression F32.A    Intervertebral lumbar disc disorder with myelopathy, lumbar region M51.06    Uncontrolled hypertension I10    Pure hypercholesterolemia E78.00    Prediabetes R73.03    Tobacco abuse Z72.0    Alcohol use, unspecified with unspecified alcohol-induced disorder ([facility]) F10.99    Cocaine abuse ([facility]) F14.10    Alcohol abuse F10.10    Atopic dermatitis L20.9    H/O acute pancreatitis Z87.19    Ischemic stroke ([facility]) I63.9    Hematemesis with nausea K92.0    Gastroesophageal reflux disease K21.9    Polysubstance abuse ([facility]) F19.10    Alcohol use disorder, moderate, dependence ([facility]) F10.20    Primary hypertension I10    Tobacco use disorder F17.200    Cocaine use disorder ([facility]) F14.10    Cerebrovascular accident (CVA) ([facility]) I63.9    Acute stroke due to occlusion of left cerebellar artery ([facility]) I63.542       Pain: 0/10    Cognitive Treatment    Treatment time: 11:07-11:33      Subjective: [x] Alert [x] Cooperative     [] Confused     [] Agitated    [] Lethargic      Objective/Assessment:    Recall:   Delayed with Distractions: 2/3 increased to 3/3 with mod verbal cues; 3/3; 3/3    Word List Retention-Exclusion: 16/21 increased to 21/21 with min to max verbal cues.    Functional-Paragraph with 3 Units-Inclusion: 11/15 increased to 15/15 with min to mod verbal cues.    Problem Solving/Reasoning:   Multiple Definitions: 18/18    Analogies: 14/16 increased to 16/16 with max verbal cues.    Similarities/Differences: 22/22      Plan:  [x] Continue ST services    [] Discharge from ST:      Discharge recommendations: []  Further therapy recommended at discharge.The patient should be able to tolerate at least 3 hours of therapy per day over 5 days or 15 hours over 7 days. [] Further therapy recommended at

## 2024-10-29 NOTE — PLAN OF CARE
Problem: Chronic Conditions and Co-morbidities  Goal: Patient's chronic conditions and co-morbidity symptoms are monitored and maintained or improved  Outcome: Progressing  Flowsheets (Taken 10/29/2024 0107)  Care Plan - Patient's Chronic Conditions and Co-Morbidity Symptoms are Monitored and Maintained or Improved: Monitor and assess patient's chronic conditions and comorbid symptoms for stability, deterioration, or improvement     Problem: Discharge Planning  Goal: Discharge to home or other facility with appropriate resources  Outcome: Progressing  Flowsheets (Taken 10/29/2024 8341)  Discharge to home or other facility with appropriate resources: Identify barriers to discharge with patient and caregiver     Problem: Pain  Goal: Verbalizes/displays adequate comfort level or baseline comfort level  Outcome: Progressing     Problem: Skin/Tissue Integrity  Goal: Absence of new skin breakdown  Description: 1.  Monitor for areas of redness and/or skin breakdown  2.  Assess vascular access sites hourly  3.  Every 4-6 hours minimum:  Change oxygen saturation probe site  4.  Every 4-6 hours:  If on nasal continuous positive airway pressure, respiratory therapy assess nares and determine need for appliance change or resting period.  Outcome: Progressing     Problem: ABCDS Injury Assessment  Goal: Absence of physical injury  Outcome: Progressing  Flowsheets (Taken 10/29/2024 3921)  Absence of Physical Injury: Implement safety measures based on patient assessment     Problem: Safety - Adult  Goal: Free from fall injury  Outcome: Progressing

## 2024-10-29 NOTE — PROGRESS NOTES
[facility]   IN-PATIENT SERVICE   [facility]    Progress Note             Date:   10/29/2024  Patient name:  Matthieu Barba  Date of admission:  10/25/2024  4:34 PM  MRN:   [id number]  Account:  [id number]  YOB: 1970  PCP:    Glenn Freeman MD  Room:   [address] [place] [address]  Code Status:    Full Code    Chief Complaint:     Chief Complaint   Patient presents with    Vomiting    Dizziness       Interval hx:     Patient seen and examined at bedside  No acute events overnight  Neuroexam: Left-sided upper extremity dysmetria improved today  BP currently on higher side, -170s, resumed home lisinopril 10mg.  PMR recommended acute inpatient rehab  Cardiology consulted for ANEL and loop.  Awaiting further recommendation.    Brief History of Present Illness:     54 y.o. male with past medical history of hypertension, cocaine and alcohol use disorder who presented yesterday after acute onset dizziness and vertigo with associated nausea/vomiting.     Patient stated that he donated plasma yesterday and then went home, ate some [employer], drank beer, and smoked crack.  About 1 hour later, he started feeling dizzy with vertigo and had multiple episodes of vomiting with coffee-ground emesis.  Patient denied any diplopia, facial droop, focal weakness, or numbness.In the ED, patient was found to have positive occult blood.  Neurology was consulted for dizziness.  MRI brain was obtained which showed acute left cerebellar infarct.  Not a TNK candidate due to active GI bleed.  CTA showed no LVO or flow-limiting stenosis.  Patient was admitted overnight in neuro ICU.  GI medicine was consulted and they recommended to continue with Protonix drip for 24 hours and no acute intervention needed given left cerebellar infarct and stable Hb.  Patient is now stepped down to our unit.      Past Medical History:     Past Medical History:   Diagnosis Date    Arthritis     Chronic sinusitis      -     Midline tongue, no atrophy    MOTOR FUNCTION: RUE: Significant for good strength of grade 5/5 in proximal and distal muscle groups   LUE: Significant for good strength of grade 5/5 in proximal and distal muscle groups   RLE: Significant for good strength of grade 5/5 in proximal and distal muscle groups   LLE: Significant for good strength of grade 5/5 in proximal and distal muscle groups      Normal bulk, normal tone and no involuntary movements, no tremor   SENSORY FUNCTION:  Normal touch, normal pinprick, normal vibration, normal proprioception   CEREBELLAR FUNCTION: Left upper extremity dysmetria improved today on exam     REFLEX FUNCTION:  Symmetric in upper and lower extremities, no Babinski sign   STATION and GAIT  Deferred       Investigations:      Laboratory Testing:  Recent Results (from the past 24 hour(s))   Hemoglobin and Hematocrit    Collection Time: 10/28/24 11:19 AM   Result Value Ref Range    Hemoglobin 15.3 13.0 - 17.0 g/dL    Hematocrit 45.2 40.7 - 50.3 %   Hemoglobin and Hematocrit    Collection Time: 10/28/24  6:58 PM   Result Value Ref Range    Hemoglobin 14.8 13.0 - 17.0 g/dL    Hematocrit 44.5 40.7 - 50.3 %   Hemoglobin and Hematocrit    Collection Time: 10/28/24 11:26 PM   Result Value Ref Range    Hemoglobin 14.3 13.0 - 17.0 g/dL    Hematocrit 42.7 40.7 - 50.3 %   CBC with Auto Differential    Collection Time: 10/29/24  4:34 AM   Result Value Ref Range    WBC 8.0 3.5 - 11.3 k/uL    RBC 5.02 4.21 - 5.77 m/uL    Hemoglobin 15.0 13.0 - 17.0 g/dL    Hematocrit 44.2 40.7 - 50.3 %    MCV 88.0 82.6 - 102.9 fL    MCH 29.9 25.2 - 33.5 pg    MCHC 33.9 28.4 - 34.8 g/dL    RDW 11.9 11.8 - 14.4 %    Platelets 237 138 - 453 k/uL    MPV 10.2 8.1 - 13.5 fL    NRBC Automated 0.0 0.0 per 100 WBC    Neutrophils % 63 36 - 65 %    Lymphocytes % 24 24 - 43 %    Monocytes % 10 3 - 12 %    Eosinophils % 2 1 - 4 %    Basophils % 1 0 - 2 %    Immature Granulocytes % 0 0 %    Neutrophils Absolute 5.11 1.50 -

## 2024-10-29 NOTE — CARE COORDINATION
Met with the patient. Discussed  the transition plan and PMR recommendation for IPR. States he would like a referral sent to [facility].    5374 Referral sent to [facility].    1113 Spoke with Elsie with [facility]. They are able to accept the patient and will initiate the precert.

## 2024-10-29 NOTE — PLAN OF CARE
Problem: Chronic Conditions and Co-morbidities  Goal: Patient's chronic conditions and co-morbidity symptoms are monitored and maintained or improved  10/28/2024 2136 by Koki Garza RN  Outcome: Progressing     Problem: Discharge Planning  Goal: Discharge to home or other facility with appropriate resources  10/28/2024 2136 by Koki Garza RN  Outcome: Progressing     Problem: Pain  Goal: Verbalizes/displays adequate comfort level or baseline comfort level  10/28/2024 2136 by Koki Garza RN  Outcome: Progressing    Problem: Skin/Tissue Integrity  Goal: Absence of new skin breakdown  Description: 1.  Monitor for areas of redness and/or skin breakdown  2.  Assess vascular access sites hourly  3.  Every 4-6 hours minimum:  Change oxygen saturation probe site  4.  Every 4-6 hours:  If on nasal continuous positive airway pressure, respiratory therapy assess nares and determine need for appliance change or resting period.  10/28/2024 2136 by Koki Garza RN  Outcome: Progressing     Problem: ABCDS Injury Assessment  Goal: Absence of physical injury  10/28/2024 2136 by Koki Garza RN  Outcome: Progressing     Problem: Safety - Adult  Goal: Free from fall injury  10/28/2024 2136 by Koki Garza RN  Outcome: Progressing

## 2024-10-29 NOTE — PROGRESS NOTES
Physical Therapy  Facility/Department: [address] NEURO ICU  Physical Therapy Daily Treatment Note    Name: Matthieu Barba  : 1970  MRN: [id number]  Date of Service: 10/29/2024    Discharge Recommendations:  Patient would benefit from continued therapy after discharge Further therapy recommended at discharge.The patient should be able to tolerate at least 3 hours of therapy per day over 5 days or 15 hours over 7 days.   This patient may benefit from a Physical Medicine and Rehab consult.    PT Equipment Recommendations  Equipment Needed: No  Other: continue to assess      Patient Diagnosis(es): The primary encounter diagnosis was Ischemic stroke (HCC). Diagnoses of Cerebrovascular accident (CVA), unspecified mechanism (HCC) and Hematemesis with nausea were also pertinent to this visit.  Past Medical History:  has a past medical history of Arthritis, Chronic sinusitis, Eczema, Environmental allergies, GERD (gastroesophageal reflux disease), Hyperlipidemia, Hypertension, Sepsis (HCC), and Snores.  Past Surgical History:  has a past surgical history that includes Upper gastrointestinal endoscopy and Colonoscopy.    Assessment  Body Structures, Functions, Activity Limitations Requiring Skilled Therapeutic Intervention: Decreased functional mobility ;Decreased tolerance to work activity;Decreased balance  Assessment: Pt ambulated 30 ft RW and transfers CGA. Pt limited with distance d/t dizziness increasing with mobility. BP as follows during treatment today: Initially supine 170/99, sitting at /89 c/o dizziness, standing at RW w/ dizziness 137/89, returning to sitting in recliner 151/89, and returning to recliner after ambulation 154/90 w/ dizziness; RN notified. Dizziness subsides with seated rest.   Pt required an emergent sitting rest period d/t increased dizziness.  Pt would benefit from continued PT.  Therapy Prognosis: Good  Activity Tolerance  Activity Tolerance: Treatment limited secondary to medical

## 2024-10-29 NOTE — PROGRESS NOTES
Facility/Department: [address] NEURO ICU   Daily Treatment Note  Patient Name: Matthieu Barba        MRN: [id number]    : 1970    Date of Service: 10/29/2024    Discharge Recommendations  Discharge Recommendations: Patient would benefit from continued therapy after discharge    OT Equipment Recommendations  Equipment Needed: Yes  ADL Assistive Devices: Shower Chair with back    Assessment  Performance deficits / Impairments: Decreased functional mobility ;Decreased ADL status;Decreased balance;Decreased endurance;Decreased high-level IADLs;Decreased safe awareness  Assessment: Patient demonstrates progression towards goals and completing ADLs with increased time. Pt progressed from use of RW to no device this session during simulated ADL and IADL tasks. Patient would benefit from continued acute OT services to address functional deficits through skilled intervention to promote independence and safety with ADL/IADLs and functional transfers/mobility for safe return to prior living environment and level of function.  Prognosis: Good  Decision Making: Medium Complexity  REQUIRES OT FOLLOW-UP: Yes  Activity Tolerance  Activity Tolerance: Patient Tolerated treatment well  Safety Devices  Type of Devices: Call light within reach;Chair alarm in place;Gait belt;Patient at risk for falls;Left in chair;Nurse notified  Restraints  Restraints Initially in Place: No    Restrictions/Precautions  Restrictions/Precautions  Restrictions/Precautions: Up as Tolerated  Required Braces or Orthoses?: No  Position Activity Restriction  Other position/activity restrictions: up as tolerated, -180    Subjective  General  Patient assessed for rehabilitation services?: Yes  Family / Caregiver Present: Yes (Son)  General Comment  Comments: RN ok'd patient for OT session. Pt pleasant, cooperative and agreeable. Pt reports 7/10 pain in the abdomen and declines intervention from therapist. Pt reports improvement with movement and  Given To: Patient  Education Provided: Role of Therapy;Plan of Care;Transfer Training;ADL Adaptive Strategies;Mobility Training;Equipment;Energy Conservation  Education Method: Verbal  Barriers to Learning: None  Education Outcome: Verbalized understanding    Goals  Short Term Goals  Time Frame for Short Term Goals: pt will, by discharge  Short Term Goal 1: complete LB ADLs and toileting tasks with SBA and AE, as needed  Short Term Goal 2: complete UB ADLs with mod I  Short Term Goal 3: dem SBA during functional trasnfers/functional mobility with LRAD, as needed  Short Term Goal 4: dem ~6 minutes dynamic standing tolerance with SBA in order to complete functional tasks  Short Term Goal 5: increase activity tolerance to 20+ minutes in order to participate in daily tasks    Plan  Occupational Therapy Plan  Times Per Week: 3-5x/wk  Current Treatment Recommendations: Balance training, Functional mobility training, Endurance training, Patient/Caregiver education & training, Self-Care / ADL, Safety education & training, Equipment evaluation, education, & procurement, Home management training, Neuromuscular re-education    AM-[facility] Daily Activities Inpatient  AM-[facility] Daily Activity - Inpatient   How much help is needed for putting on and taking off regular lower body clothing?: A Little  How much help is needed for bathing (which includes washing, rinsing, drying)?: A Little  How much help is needed for toileting (which includes using toilet, bedpan, or urinal)?: A Little  How much help is needed for putting on and taking off regular upper body clothing?: A Little  How much help is needed for taking care of personal grooming?: None  How much help for eating meals?: None  AM-[facility] Inpatient Daily Activity Raw Score: 20  AM-[facility] Inpatient ADL T-Scale Score : 42.03  ADL Inpatient CMS 0-100% Score: 38.32  ADL Inpatient CMS G-Code Modifier : CJ    Minutes  OT Individual Minutes  Time In: 1430  Time Out: 1453  Minutes: 23  Time Code

## 2024-10-30 ENCOUNTER — OFFICE VISIT (OUTPATIENT)
Dept: OPERATING ROOM | Age: 54
End: 2024-10-30
Attending: INTERNAL MEDICINE

## 2024-10-30 ENCOUNTER — HOSPITAL ENCOUNTER (INPATIENT)
Age: 54
Discharge: HOME | End: 2024-11-01
Payer: MEDICAID

## 2024-10-30 VITALS
RESPIRATION RATE: 14 BRPM | HEART RATE: 84 BPM | OXYGEN SATURATION: 98 % | SYSTOLIC BLOOD PRESSURE: 130 MMHG | DIASTOLIC BLOOD PRESSURE: 81 MMHG

## 2024-10-30 DIAGNOSIS — F10.20 ALCOHOL USE DISORDER, MODERATE, DEPENDENCE (HCC): Primary | ICD-10-CM

## 2024-10-30 LAB
BASOPHILS # BLD: 0.1 K/UL (ref 0–0.2)
BASOPHILS NFR BLD: 1 % (ref 0–2)
ECHO BSA: 1.83 M2
ECHO BSA: 1.83 M2
EOSINOPHIL # BLD: 0.21 K/UL (ref 0–0.44)
EOSINOPHILS RELATIVE PERCENT: 3 % (ref 1–4)
ERYTHROCYTE [DISTWIDTH] IN BLOOD BY AUTOMATED COUNT: 12 % (ref 11.8–14.4)
HCT VFR BLD AUTO: 42.9 % (ref 40.7–50.3)
HCYS SERPL-SCNC: 10.6 UMOL/L (ref 0–15)
HGB BLD-MCNC: 14.3 G/DL (ref 13–17)
IMM GRANULOCYTES # BLD AUTO: <0.03 K/UL (ref 0–0.3)
IMM GRANULOCYTES NFR BLD: 0 %
LYMPHOCYTES NFR BLD: 1.99 K/UL (ref 1.1–3.7)
LYMPHOCYTES RELATIVE PERCENT: 27 % (ref 24–43)
MCH RBC QN AUTO: 29.5 PG (ref 25.2–33.5)
MCHC RBC AUTO-ENTMCNC: 33.3 G/DL (ref 28.4–34.8)
MCV RBC AUTO: 88.6 FL (ref 82.6–102.9)
MONOCYTES NFR BLD: 0.81 K/UL (ref 0.1–1.2)
MONOCYTES NFR BLD: 11 % (ref 3–12)
NEUTROPHILS NFR BLD: 58 % (ref 36–65)
NEUTS SEG NFR BLD: 4.39 K/UL (ref 1.5–8.1)
NRBC BLD-RTO: 0 PER 100 WBC
PLATELET # BLD AUTO: 229 K/UL (ref 138–453)
PMV BLD AUTO: 10.2 FL (ref 8.1–13.5)
RBC # BLD AUTO: 4.84 M/UL (ref 4.21–5.77)
WBC OTHER # BLD: 7.5 K/UL (ref 3.5–11.3)

## 2024-10-30 PROCEDURE — 6360000002 HC RX W HCPCS

## 2024-10-30 PROCEDURE — 85025 COMPLETE CBC W/AUTO DIFF WBC: CPT

## 2024-10-30 PROCEDURE — 6360000002 HC RX W HCPCS: Performed by: INTERNAL MEDICINE

## 2024-10-30 PROCEDURE — 6370000000 HC RX 637 (ALT 250 FOR IP)

## 2024-10-30 PROCEDURE — 81291 MTHFR GENE: CPT

## 2024-10-30 PROCEDURE — 93285 PRGRMG DEV EVAL SCRMS IP: CPT | Performed by: INTERNAL MEDICINE

## 2024-10-30 PROCEDURE — 93325 DOPPLER ECHO COLOR FLOW MAPG: CPT | Performed by: INTERNAL MEDICINE

## 2024-10-30 PROCEDURE — 2580000003 HC RX 258

## 2024-10-30 PROCEDURE — C1764 EVENT RECORDER, CARDIAC: HCPCS

## 2024-10-30 PROCEDURE — 85306 CLOT INHIBIT PROT S FREE: CPT

## 2024-10-30 PROCEDURE — 0JH632Z INSERTION OF MONITORING DEVICE INTO CHEST SUBCUTANEOUS TISSUE AND FASCIA, PERCUTANEOUS APPROACH: ICD-10-PCS | Performed by: INTERNAL MEDICINE

## 2024-10-30 PROCEDURE — 2500000003 HC RX 250 WO HCPCS

## 2024-10-30 PROCEDURE — 85300 ANTITHROMBIN III ACTIVITY: CPT

## 2024-10-30 PROCEDURE — 93312 ECHO TRANSESOPHAGEAL: CPT | Performed by: INTERNAL MEDICINE

## 2024-10-30 PROCEDURE — 2060000000 HC ICU INTERMEDIATE R&B

## 2024-10-30 PROCEDURE — 6370000000 HC RX 637 (ALT 250 FOR IP): Performed by: INTERNAL MEDICINE

## 2024-10-30 PROCEDURE — 99231 SBSQ HOSP IP/OBS SF/LOW 25: CPT | Performed by: PSYCHIATRY & NEUROLOGY

## 2024-10-30 PROCEDURE — 93325 DOPPLER ECHO COLOR FLOW MAPG: CPT

## 2024-10-30 PROCEDURE — 81241 F5 GENE: CPT

## 2024-10-30 PROCEDURE — B246ZZ4 ULTRASONOGRAPHY OF RIGHT AND LEFT HEART, TRANSESOPHAGEAL: ICD-10-PCS | Performed by: INTERNAL MEDICINE

## 2024-10-30 PROCEDURE — 83090 ASSAY OF HOMOCYSTEINE: CPT

## 2024-10-30 PROCEDURE — 85240 CLOT FACTOR VIII AHG 1 STAGE: CPT

## 2024-10-30 PROCEDURE — 99152 MOD SED SAME PHYS/QHP 5/>YRS: CPT

## 2024-10-30 PROCEDURE — 97129 THER IVNTJ 1ST 15 MIN: CPT

## 2024-10-30 PROCEDURE — 36415 COLL VENOUS BLD VENIPUNCTURE: CPT

## 2024-10-30 PROCEDURE — 97130 THER IVNTJ EA ADDL 15 MIN: CPT

## 2024-10-30 PROCEDURE — 85303 CLOT INHIBIT PROT C ACTIVITY: CPT

## 2024-10-30 RX ORDER — FENTANYL CITRATE 50 UG/ML
INJECTION, SOLUTION INTRAMUSCULAR; INTRAVENOUS PRN
Status: COMPLETED | OUTPATIENT
Start: 2024-10-30 | End: 2024-10-30

## 2024-10-30 RX ORDER — MIDAZOLAM HYDROCHLORIDE 1 MG/ML
INJECTION, SOLUTION INTRAMUSCULAR; INTRAVENOUS PRN
Status: COMPLETED | OUTPATIENT
Start: 2024-10-30 | End: 2024-10-30

## 2024-10-30 RX ORDER — LIDOCAINE HYDROCHLORIDE 20 MG/ML
SOLUTION OROPHARYNGEAL PRN
Status: COMPLETED | OUTPATIENT
Start: 2024-10-30 | End: 2024-10-30

## 2024-10-30 RX ADMIN — LIDOCAINE HYDROCHLORIDE 5 ML: 20 SOLUTION ORAL; TOPICAL at 12:07

## 2024-10-30 RX ADMIN — MIDAZOLAM 2 MG: 1 INJECTION INTRAMUSCULAR; INTRAVENOUS at 12:10

## 2024-10-30 RX ADMIN — AMLODIPINE BESYLATE 10 MG: 10 TABLET ORAL at 08:14

## 2024-10-30 RX ADMIN — HEPARIN SODIUM 5000 UNITS: 5000 INJECTION INTRAVENOUS; SUBCUTANEOUS at 15:57

## 2024-10-30 RX ADMIN — PANCRELIPASE LIPASE, PANCRELIPASE PROTEASE, PANCRELIPASE AMYLASE 5000 UNITS: 5000; 17000; 24000 CAPSULE, DELAYED RELEASE ORAL at 15:57

## 2024-10-30 RX ADMIN — BENZOCAINE, BUTAMBEN, AND TETRACAINE HYDROCHLORIDE 3 SPRAY: .028; .004; .004 AEROSOL, SPRAY TOPICAL at 12:07

## 2024-10-30 RX ADMIN — Medication 5 MG: at 20:36

## 2024-10-30 RX ADMIN — TAMSULOSIN HYDROCHLORIDE 0.4 MG: 0.4 CAPSULE ORAL at 08:14

## 2024-10-30 RX ADMIN — SODIUM CHLORIDE, PRESERVATIVE FREE 10 ML: 5 INJECTION INTRAVENOUS at 20:36

## 2024-10-30 RX ADMIN — LIDOCAINE HYDROCHLORIDE 10 ML: 10 INJECTION, SOLUTION EPIDURAL; INFILTRATION; INTRACAUDAL; PERINEURAL at 12:31

## 2024-10-30 RX ADMIN — MIDAZOLAM 1 MG: 1 INJECTION INTRAMUSCULAR; INTRAVENOUS at 12:15

## 2024-10-30 RX ADMIN — LISINOPRIL 10 MG: 20 TABLET ORAL at 08:14

## 2024-10-30 RX ADMIN — PANTOPRAZOLE SODIUM 40 MG: 40 TABLET, DELAYED RELEASE ORAL at 05:54

## 2024-10-30 RX ADMIN — HEPARIN SODIUM 5000 UNITS: 5000 INJECTION INTRAVENOUS; SUBCUTANEOUS at 05:54

## 2024-10-30 RX ADMIN — HEPARIN SODIUM 5000 UNITS: 5000 INJECTION INTRAVENOUS; SUBCUTANEOUS at 20:36

## 2024-10-30 RX ADMIN — SODIUM CHLORIDE, PRESERVATIVE FREE 10 ML: 5 INJECTION INTRAVENOUS at 08:14

## 2024-10-30 RX ADMIN — FENTANYL CITRATE 50 MCG: 50 INJECTION, SOLUTION INTRAMUSCULAR; INTRAVENOUS at 12:10

## 2024-10-30 ASSESSMENT — ENCOUNTER SYMPTOMS
PHOTOPHOBIA: 0
VOMITING: 0
SHORTNESS OF BREATH: 0
NAUSEA: 0
CHEST TIGHTNESS: 0
COLOR CHANGE: 0
VOICE CHANGE: 0
WHEEZING: 0
TROUBLE SWALLOWING: 0

## 2024-10-30 ASSESSMENT — PAIN SCALES - GENERAL: PAINLEVEL_OUTOF10: 0

## 2024-10-30 NOTE — PROGRESS NOTES
[facility]   IN-PATIENT SERVICE   [facility]    Progress Note             Date:   10/30/2024  Patient name:  Matthieu Barba  Date of admission:  10/25/2024  4:34 PM  MRN:   [id number]  Account:  [id number]  YOB: 1970  PCP:    Glenn Freeman MD  Room:   [address] [place] [address]  Code Status:    Full Code    Chief Complaint:     Chief Complaint   Patient presents with    Vomiting    Dizziness       Interval hx:     Patient seen and examined at bedside  No acute events overnight, vitals stable  Neuroexam: Stable.  Left-sided dysmetria improved.  PMR recommended acute inpatient rehab.  Precert started to [facility].  Currently n.p.o. for ANEL and loop recorder placement today    Brief History of Present Illness:     54 y.o. male with past medical history of hypertension, cocaine and alcohol use disorder who presented yesterday after acute onset dizziness and vertigo with associated nausea/vomiting.     Patient stated that he donated plasma yesterday and then went home, ate some [employer], drank beer, and smoked crack.  About 1 hour later, he started feeling dizzy with vertigo and had multiple episodes of vomiting with coffee-ground emesis.  Patient denied any diplopia, facial droop, focal weakness, or numbness.In the ED, patient was found to have positive occult blood.  Neurology was consulted for dizziness.  MRI brain was obtained which showed acute left cerebellar infarct.  Not a TNK candidate due to active GI bleed.  CTA showed no LVO or flow-limiting stenosis.  Patient was admitted overnight in neuro ICU.  GI medicine was consulted and they recommended to continue with Protonix drip for 24 hours and no acute intervention needed given left cerebellar infarct and stable Hb.  Patient is now stepped down to our unit.      Past Medical History:     Past Medical History:   Diagnosis Date    Arthritis     Chronic sinusitis     Eczema     Environmental allergies     GERD    CALCIUM 9.1  --      Hemoglobin A1C   Date Value Ref Range Status   10/26/2024 5.9 4.0 - 6.0 % Final       Assessment :      Primary Problem  Ischemic stroke (HCC)    Active Hospital Problems    Diagnosis Date Noted    Gastroesophageal reflux disease [K21.9] 10/27/2024    Polysubstance abuse (HCC) [F19.10] 10/27/2024    Alcohol use disorder, moderate, dependence (HCC) [F10.20] 10/27/2024    Primary hypertension [I10] 10/27/2024    Tobacco use disorder [F17.200] 10/27/2024    Cocaine use disorder (HCC) [F14.10] 10/27/2024    Cerebrovascular accident (CVA) (HCC) [I63.9] 10/27/2024    Acute stroke due to occlusion of left cerebellar artery (HCC) [I63.542] 10/27/2024    Hematemesis with nausea [K92.0] 10/26/2024    Ischemic stroke (HCC) [I63.9] 10/25/2024     54-year-old female with past medical history of GERD, hypertension, arthritis, alcohol abuse, cocaine abuse presented to ED with acute onset dizziness and nausea along with coffee-ground emesis.  Further evaluation was found to have acute left cerebellar infarct.     # Acute left cerebellar infarct likely secondary to cocaine use  # Hematemesis:possible esophagitis vs gastritis versus Carol Ann-Beltran tear.       Plan:     # Acute left cerebellar infarct likely secondary to cocaine use  -No further episodes of hematemesis.hemoglobin stable 14.3. Continue aspirin 81 mg.  -Continue Lipitor 20 Mg daily  -SBP goal : 100-140  -Continue amlodipine 10 Mg and resumed lisinopril 10 Mg  -Echo EF 55 to 60%, NPO for ANEL and loop recorder placement today  -PT OT  -PMR : Acute inpatient rehab once stable, [facility] pre cert started  -Neurovascular checks     # Hematemesis:possible esophagitis vs gastritis versus Carol Ann-Beltran tear.   -GI medicine consulted: No endoscopic evaluation for now given recent left cerebellar infarct and stable hemoglobin   -Continue Protonix  -Trend H&H: Hemoglobin is stable, latest 14.3.      # Elevated WBC counts likely reactive(resolved now)     #  History of acute pancreatitis  -Continue lipase protease and amylase capsule     # History of BPH  -Continue Flomax 0.4 mg daily     # Chronic alcoholic  abuse  -Continue thiamine and folate     DVT prophylaxis: heparin aq started today  PT OT: all consulted  Diet:regular diet, NPO from midnight   Disposition: Acute inpatient rehab as recommended by PMR,  working on it.Precert started to [facility].        Follow-up further recommendations after discussing case with the attending.  The plan was discussed with the patient, patient's family and the medical staff.   Consultations:   IP CONSULT TO NEUROLOGY  IP CONSULT TO NEUROCRITICAL CARE  IP CONSULT TO STROKE TEAM  IP CONSULT TO GI  IP CONSULT TO SOCIAL WORK  IP CONSULT TO PHYSICAL MEDICINE REHAB  IP CONSULT TO CARDIOLOGY    Patient is admitted as inpatient status because of co-morbidities listed above, severity of signs and symptoms as outlined, requirement for current medical therapies and most importantly because of direct risk to patient if care not provided in a hospital setting.    Delfino Escobar MD  Neurology Resident PGY-2  10/30/2024  8:47 AM    Copy sent to Glenn Maher MD

## 2024-10-30 NOTE — PROCEDURES
[facility]  Transesophageal Echocardiogram       Today's Date:  10/30/2024  Ordering Physician:  Dr Saleh  Indication:   Assessment of and intracardiac shunts.  and Finding a cardiac source of embolus    Operators:  Primary:   Elvin Araujo MD (Attending Physician)  Assistant:   Indira Banuelos MD (Cardiovascular Fellow)    Pre Procedure Conscious Sedation Data:  ASA Class:    [] I [x] II [] III [] IV    Mallampati Class:  [] I [x] II [] III [] IV    Procedure:  Patient seen and examined. History and Physical reviewed. Labs reviewed.    After informed consent was obtained with explanation of the risks and benefits, the patient was brought to cardiac cath lab. All sedation was administered by the cardiologist. The oropharynx was pre-anesthesized with viscous lidocaine and cetacaine spray. The ultrasound probe was passed without any difficulty.    ANEL findings:  LA:  Normal  HARLEEN:  No thrombus  RA:  Normal  RV:   Normal  LV:  Normal  Estimated LVEF:  55%  Aorta:   Mild atheromatous disease arch  Pericardium: No pericardial effusion  Septum:  No intracardiac shunt via color Doppler.  No intracardiac shunt via injection of agitated saline.    Valves:    Mitral Valve: Structurally normal. mild regurgitation is identified.  Aortic Valve: The aortic valve is trileaflet and opens adequately. no regurgitiation is identified.  Tricuspid valve: Structurally normal. mild regurgitation is identified.    No valvular vegetations or thrombus identified.    Summary:     1. A ANEL was performed without complications.  2. LVEF 55%  3. No thrombus or valvular vegetation identified      Indira Banuelos MD.  Fellow, cardiovascular disease   [facility], [place], [place]      Attending Physician Statement  I have discussed the case of Matthieu Barba including pertinent history and exam findings with the student/resident/fellow. I have seen and examined the patient and the key elements of the encounter have been  performed by me. I agree with the assessment, plan and orders as documented by the resident With changes made to the note.  I was present during entire procedure and performed all critical elements of the procedure    Electronically signed by Elvin Araujo MD on 10/30/2024 at 3:31 PM.    [facility]      [phone]

## 2024-10-30 NOTE — PROCEDURES
[facility]      Procedure Note  LOOP RECORDER IMPLANT      Matthieu Barba (54 y.o., male)  1970  10/30/2024    Procedure: Loop Recorder Implant    Operators:  Primary: Elvin Araujo MD (Attending Physician)  Assistant:Indira Banuelos MD       Model # Serial #   Recorder medtronic [id number]     Sedation monitoring  Loop recorder Implant    Indication: CVA    Procedure:  After the usual preparation of the left neck and chest, the patient was draped in the usual sterile manner.  Local anesthesia was infused below the left clavicle from the midline laterally. An incision was made below the left breast, lateral to midline. The incision was dilated. The Loop recorder System was advanced using the standard techniques, and positioned successfully with no bleeding or complication. Hemostasis was secured. 3-0 Vicryl suture was used.     Impression / Device:  Successful Implantation of  Loop Recorder. R wave of 1.1 mV     Plan:  Telemetry monitoring  Interrogate recorder before discharge  Wound check at [facility] in 7 days  Discharge if patient remains stable      Indira Banuelos MD.  Fellow, cardiovascular disease   [facility], [place], [place]      Attending Physician Statement  I have discussed the case of Matthieu Barba including pertinent history and exam findings with the student/resident/fellow. I have seen and examined the patient and the key elements of the encounter have been performed by me. I agree with the assessment, plan and orders as documented by the resident With changes made to the note.  I was present during entire procedure and performed all critical elements of the procedure    Electronically signed by Elvin Araujo MD on 10/30/2024 at 3:31 PM.    [facility]      [phone]

## 2024-10-30 NOTE — PROGRESS NOTES
Speech Language Pathology  [facility]    Cognitive Treatment Note    Date: 10/30/2024  Patient’s Name: Matthieu Barba  MRN: [id number]  Diagnosis:   Patient Active Problem List   Diagnosis Code    Depression F32.A    Intervertebral lumbar disc disorder with myelopathy, lumbar region M51.06    Uncontrolled hypertension I10    Pure hypercholesterolemia E78.00    Prediabetes R73.03    Tobacco abuse Z72.0    Alcohol use, unspecified with unspecified alcohol-induced disorder (HCC) F10.99    Cocaine abuse ([facility]) F14.10    Alcohol abuse F10.10    Atopic dermatitis L20.9    H/O acute pancreatitis Z87.19    Ischemic stroke ([facility]) I63.9    Hematemesis with nausea K92.0    Gastroesophageal reflux disease K21.9    Polysubstance abuse ([facility]) F19.10    Alcohol use disorder, moderate, dependence ([facility]) F10.20    Primary hypertension I10    Tobacco use disorder F17.200    Cocaine use disorder ([facility]) F14.10    Cerebrovascular accident (CVA) ([facility]) I63.9    Acute stroke due to occlusion of left cerebellar artery ([facility]) I63.542       Pain: 0/10    Cognitive Treatment    Treatment time: 9:34-10:00      Subjective: [x] Alert [x] Cooperative     [] Confused     [] Agitated    [] Lethargic      Objective/Assessment:    Recall:   Word List Retention-Placement: 7/11 increased to 11/11 with min to mod verbal cues.    Functional-Appointments: 5/7 increased to 6/7 with mod to max verbal cues.    Problem Solving/Reasoning:   Answering Questions: 11/11    Describing with Attributes: 13/13    Word Deduction: 14/15 increased to 15/15 with mod verbal cues.      Plan:  [x] Continue ST services    [] Discharge from ST:      Discharge recommendations: []  Further therapy recommended at discharge.The patient should be able to tolerate at least 3 hours of therapy per day over 5 days or 15 hours over 7 days. [] Further therapy recommended at discharge.   [x] No therapy recommended at discharge.          Completed by: Klaudia Gomes

## 2024-10-30 NOTE — PLAN OF CARE
Problem: Chronic Conditions and Co-morbidities  Goal: Patient's chronic conditions and co-morbidity symptoms are monitored and maintained or improved  10/30/2024 1817 by Elsie Crowe RN  Outcome: Not Progressing  10/30/2024 0927 by Elsie Crowe RN  Outcome: Progressing  Flowsheets (Taken 10/30/2024 0800)  Care Plan - Patient's Chronic Conditions and Co-Morbidity Symptoms are Monitored and Maintained or Improved: Monitor and assess patient's chronic conditions and comorbid symptoms for stability, deterioration, or improvement     Problem: Discharge Planning  Goal: Discharge to home or other facility with appropriate resources  10/30/2024 1817 by Elsie Crowe RN  Outcome: Not Progressing  10/30/2024 0927 by Elsie Crowe RN  Outcome: Progressing  Flowsheets (Taken 10/30/2024 0800)  Discharge to home or other facility with appropriate resources: Identify barriers to discharge with patient and caregiver     Problem: Pain  Goal: Verbalizes/displays adequate comfort level or baseline comfort level  10/30/2024 1817 by Elsie Crowe RN  Outcome: Not Progressing  10/30/2024 0927 by Elsie Crowe RN  Outcome: Progressing     Problem: Skin/Tissue Integrity  Goal: Absence of new skin breakdown  Description: 1.  Monitor for areas of redness and/or skin breakdown  2.  Assess vascular access sites hourly  3.  Every 4-6 hours minimum:  Change oxygen saturation probe site  4.  Every 4-6 hours:  If on nasal continuous positive airway pressure, respiratory therapy assess nares and determine need for appliance change or resting period.  10/30/2024 1817 by Elsie Crowe RN  Outcome: Not Progressing  10/30/2024 0927 by Elsie Crowe RN  Outcome: Progressing     Problem: ABCDS Injury Assessment  Goal: Absence of physical injury  10/30/2024 1817 by Elsie Crowe RN  Outcome: Not Progressing  10/30/2024 0927 by Elsie Crowe RN  Outcome: Progressing  Flowsheets (Taken 10/30/2024 0911)  Absence of Physical Injury: Implement safety  physical injury  10/30/2024 1817 by Elsie Crowe, RN  Outcome: Not Progressing  10/30/2024 0927 by Elsie Crowe RN  Outcome: Progressing  Flowsheets (Taken 10/30/2024 0911)  Absence of Physical Injury: Implement safety measures based on patient assessment     Problem: Safety - Adult  Goal: Free from fall injury  10/30/2024 1817 by Elsie Crowe, RN  Outcome: Not Progressing  10/30/2024 0927 by Elsie Crowe, RN  Outcome: Progressing

## 2024-10-30 NOTE — PROGRESS NOTES
TRANSFER - OUT REPORT:    Verbal report given to GAUTAM Zimmerman on Matthieu Barba being transferred to  for routine progression of patient care       Report consisted of patient's Situation, Background, Assessment and   Recommendations(SBAR).     Information from the following report(s) Nurse Handoff Report was reviewed with the receiving nurse.    Opportunity for questions and clarification was provided.      Patient transported with:   Monitor

## 2024-10-30 NOTE — PLAN OF CARE
Problem: Chronic Conditions and Co-morbidities  Goal: Patient's chronic conditions and co-morbidity symptoms are monitored and maintained or improved  10/29/2024 2216 by Koki Garza RN  Outcome: Progressing    Problem: Discharge Planning  Goal: Discharge to home or other facility with appropriate resources  10/29/2024 2216 by Koki aGrza RN  Outcome: Progressing    Problem: Pain  Goal: Verbalizes/displays adequate comfort level or baseline comfort level  10/29/2024 2216 by Koki Garza RN  Outcome: Progressing    Problem: Skin/Tissue Integrity  Goal: Absence of new skin breakdown  Description: 1.  Monitor for areas of redness and/or skin breakdown  2.  Assess vascular access sites hourly  3.  Every 4-6 hours minimum:  Change oxygen saturation probe site  4.  Every 4-6 hours:  If on nasal continuous positive airway pressure, respiratory therapy assess nares and determine need for appliance change or resting period.  10/29/2024 2216 by Koki Garza RN  Outcome: Progressing    Problem: ABCDS Injury Assessment  Goal: Absence of physical injury  10/29/2024 2216 by Koki Garza RN  Outcome: Progressing    Problem: Safety - Adult  Goal: Free from fall injury  10/29/2024 2216 by Koki Garza RN  Outcome: Progressing

## 2024-10-31 LAB
BASOPHILS # BLD: 0.11 K/UL (ref 0–0.2)
BASOPHILS NFR BLD: 1 % (ref 0–2)
EOSINOPHIL # BLD: 0.18 K/UL (ref 0–0.44)
EOSINOPHILS RELATIVE PERCENT: 2 % (ref 1–4)
ERYTHROCYTE [DISTWIDTH] IN BLOOD BY AUTOMATED COUNT: 12.1 % (ref 11.8–14.4)
HCT VFR BLD AUTO: 41.8 % (ref 40.7–50.3)
HGB BLD-MCNC: 13.7 G/DL (ref 13–17)
IMM GRANULOCYTES # BLD AUTO: 0.03 K/UL (ref 0–0.3)
IMM GRANULOCYTES NFR BLD: 0 %
LYMPHOCYTES NFR BLD: 2.12 K/UL (ref 1.1–3.7)
LYMPHOCYTES RELATIVE PERCENT: 25 % (ref 24–43)
MCH RBC QN AUTO: 29.5 PG (ref 25.2–33.5)
MCHC RBC AUTO-ENTMCNC: 32.8 G/DL (ref 28.4–34.8)
MCV RBC AUTO: 90.1 FL (ref 82.6–102.9)
MONOCYTES NFR BLD: 0.95 K/UL (ref 0.1–1.2)
MONOCYTES NFR BLD: 11 % (ref 3–12)
NEUTROPHILS NFR BLD: 61 % (ref 36–65)
NEUTS SEG NFR BLD: 4.97 K/UL (ref 1.5–8.1)
NRBC BLD-RTO: 0 PER 100 WBC
PLATELET # BLD AUTO: 247 K/UL (ref 138–453)
PMV BLD AUTO: 10.1 FL (ref 8.1–13.5)
RBC # BLD AUTO: 4.64 M/UL (ref 4.21–5.77)
WBC OTHER # BLD: 8.4 K/UL (ref 3.5–11.3)

## 2024-10-31 PROCEDURE — 2060000000 HC ICU INTERMEDIATE R&B

## 2024-10-31 PROCEDURE — 6370000000 HC RX 637 (ALT 250 FOR IP)

## 2024-10-31 PROCEDURE — 6370000000 HC RX 637 (ALT 250 FOR IP): Performed by: PSYCHIATRY & NEUROLOGY

## 2024-10-31 PROCEDURE — 2580000003 HC RX 258

## 2024-10-31 PROCEDURE — 97112 NEUROMUSCULAR REEDUCATION: CPT

## 2024-10-31 PROCEDURE — 97116 GAIT TRAINING THERAPY: CPT

## 2024-10-31 PROCEDURE — 97129 THER IVNTJ 1ST 15 MIN: CPT

## 2024-10-31 PROCEDURE — 6360000002 HC RX W HCPCS

## 2024-10-31 PROCEDURE — 85025 COMPLETE CBC W/AUTO DIFF WBC: CPT

## 2024-10-31 PROCEDURE — 99232 SBSQ HOSP IP/OBS MODERATE 35: CPT | Performed by: PSYCHIATRY & NEUROLOGY

## 2024-10-31 PROCEDURE — 6370000000 HC RX 637 (ALT 250 FOR IP): Performed by: INTERNAL MEDICINE

## 2024-10-31 PROCEDURE — 97535 SELF CARE MNGMENT TRAINING: CPT

## 2024-10-31 PROCEDURE — 97130 THER IVNTJ EA ADDL 15 MIN: CPT

## 2024-10-31 PROCEDURE — 36415 COLL VENOUS BLD VENIPUNCTURE: CPT

## 2024-10-31 RX ORDER — LISINOPRIL 20 MG/1
10 TABLET ORAL ONCE
Status: COMPLETED | OUTPATIENT
Start: 2024-10-31 | End: 2024-10-31

## 2024-10-31 RX ORDER — LISINOPRIL 20 MG/1
20 TABLET ORAL DAILY
Status: DISCONTINUED | OUTPATIENT
Start: 2024-11-01 | End: 2024-11-01 | Stop reason: HOSPADM

## 2024-10-31 RX ADMIN — ATORVASTATIN CALCIUM 20 MG: 10 TABLET, FILM COATED ORAL at 08:08

## 2024-10-31 RX ADMIN — SODIUM CHLORIDE, PRESERVATIVE FREE 10 ML: 5 INJECTION INTRAVENOUS at 20:36

## 2024-10-31 RX ADMIN — SODIUM CHLORIDE, PRESERVATIVE FREE 10 ML: 5 INJECTION INTRAVENOUS at 08:08

## 2024-10-31 RX ADMIN — LISINOPRIL 10 MG: 20 TABLET ORAL at 12:04

## 2024-10-31 RX ADMIN — AMLODIPINE BESYLATE 10 MG: 10 TABLET ORAL at 08:08

## 2024-10-31 RX ADMIN — ASPIRIN 81 MG: 81 TABLET, COATED ORAL at 08:08

## 2024-10-31 RX ADMIN — PANCRELIPASE LIPASE, PANCRELIPASE PROTEASE, PANCRELIPASE AMYLASE 5000 UNITS: 5000; 17000; 24000 CAPSULE, DELAYED RELEASE ORAL at 12:00

## 2024-10-31 RX ADMIN — HEPARIN SODIUM 5000 UNITS: 5000 INJECTION INTRAVENOUS; SUBCUTANEOUS at 20:36

## 2024-10-31 RX ADMIN — PANCRELIPASE LIPASE, PANCRELIPASE PROTEASE, PANCRELIPASE AMYLASE 5000 UNITS: 5000; 17000; 24000 CAPSULE, DELAYED RELEASE ORAL at 08:09

## 2024-10-31 RX ADMIN — FOLIC ACID 1 MG: 1 TABLET ORAL at 08:07

## 2024-10-31 RX ADMIN — Medication 100 MG: at 08:08

## 2024-10-31 RX ADMIN — PANTOPRAZOLE SODIUM 40 MG: 40 TABLET, DELAYED RELEASE ORAL at 05:57

## 2024-10-31 RX ADMIN — PANCRELIPASE LIPASE, PANCRELIPASE PROTEASE, PANCRELIPASE AMYLASE 5000 UNITS: 5000; 17000; 24000 CAPSULE, DELAYED RELEASE ORAL at 16:33

## 2024-10-31 RX ADMIN — LISINOPRIL 10 MG: 20 TABLET ORAL at 08:08

## 2024-10-31 RX ADMIN — Medication 5 MG: at 20:36

## 2024-10-31 RX ADMIN — HEPARIN SODIUM 5000 UNITS: 5000 INJECTION INTRAVENOUS; SUBCUTANEOUS at 05:57

## 2024-10-31 RX ADMIN — TAMSULOSIN HYDROCHLORIDE 0.4 MG: 0.4 CAPSULE ORAL at 08:08

## 2024-10-31 ASSESSMENT — ENCOUNTER SYMPTOMS
SHORTNESS OF BREATH: 0
VOMITING: 0
VOICE CHANGE: 0
NAUSEA: 0
TROUBLE SWALLOWING: 0
WHEEZING: 0
COLOR CHANGE: 0
CHEST TIGHTNESS: 0
PHOTOPHOBIA: 0

## 2024-10-31 NOTE — PROGRESS NOTES
Speech Language Pathology  [facility]    Cognitive Treatment Note    Date: 10/31/2024  Patient’s Name: Matthieu Barba  MRN: [id number]  Diagnosis:   Patient Active Problem List   Diagnosis Code    Depression F32.A    Intervertebral lumbar disc disorder with myelopathy, lumbar region M51.06    Uncontrolled hypertension I10    Pure hypercholesterolemia E78.00    Prediabetes R73.03    Tobacco abuse Z72.0    Alcohol use, unspecified with unspecified alcohol-induced disorder ([facility]) F10.99    Cocaine abuse ([facility]) F14.10    Alcohol abuse F10.10    Atopic dermatitis L20.9    H/O acute pancreatitis Z87.19    Ischemic stroke ([facility]) I63.9    Hematemesis with nausea K92.0    Gastroesophageal reflux disease K21.9    Polysubstance abuse ([facility]) F19.10    Alcohol use disorder, moderate, dependence ([facility]) F10.20    Primary hypertension I10    Tobacco use disorder F17.200    Cocaine use disorder ([facility]) F14.10    Cerebrovascular accident (CVA) ([facility]) I63.9    Acute stroke due to occlusion of left cerebellar artery ([facility]) I63.542       Pain: 0/10    Cognitive Treatment    Treatment time: 9:48-10:28      Subjective: [x] Alert [x] Cooperative     [] Confused     [] Agitated    [] Lethargic      Objective/Assessment:    Recall:   Picture Retention: 17/20 increased to 19/20 with min to max verbal cues.    Problem Solving/Reasoning:   State/Add to Category-Abstract: 11/12 increased to 12/12 with min verbal cues.    Stating Logical Conclusions:4/4    Proverbs and Expressions: 10/11 increased to 11/11 with max verbal cues.    Deduction Puzzle: 4/12 increased to 12/12 with max verbal cues.      Plan:  [x] Continue ST services    [] Discharge from ST:      Discharge recommendations: []  Further therapy recommended at discharge.The patient should be able to tolerate at least 3 hours of therapy per day over 5 days or 15 hours over 7 days. [] Further therapy recommended at discharge.   [x] No therapy recommended at  discharge.          Completed by: Klaudia Toth  Clinician    Cosigned By: Charmaine Lemon M.S.CCC/SLP

## 2024-10-31 NOTE — PROGRESS NOTES
[facility]   IN-PATIENT SERVICE   [facility]    Progress Note             Date:   10/31/2024  Patient name:  Matthieu Barba  Date of admission:  10/25/2024  4:34 PM  MRN:   [id number]  Account:  [id number]  YOB: 1970  PCP:    Glenn Freeman MD  Room:   [address] [place] [address]  Code Status:    Full Code    Chief Complaint:     Chief Complaint   Patient presents with    Vomiting    Dizziness       Interval hx:     Patient seen and examined at bedside  No acute events overnight, vitals stable  Neuroexam: Stable.   Precert started to [facility]. Waiting for placement      Brief History of Present Illness:     54 y.o. male with past medical history of hypertension, cocaine and alcohol use disorder who presented yesterday after acute onset dizziness and vertigo with associated nausea/vomiting.     Patient stated that he donated plasma yesterday and then went home, ate some [employer], drank beer, and smoked crack.  About 1 hour later, he started feeling dizzy with vertigo and had multiple episodes of vomiting with coffee-ground emesis.  Patient denied any diplopia, facial droop, focal weakness, or numbness.In the ED, patient was found to have positive occult blood.  Neurology was consulted for dizziness.  MRI brain was obtained which showed acute left cerebellar infarct.  Not a TNK candidate due to active GI bleed.  CTA showed no LVO or flow-limiting stenosis.  Patient was admitted overnight in neuro ICU.  GI medicine was consulted and they recommended to continue with Protonix drip for 24 hours and no acute intervention needed given left cerebellar infarct and stable Hb.  Patient is now stepped down to our unit.      Past Medical History:     Past Medical History:   Diagnosis Date    Arthritis     Chronic sinusitis     Eczema     Environmental allergies     GERD (gastroesophageal reflux disease)     Hyperlipidemia     Hypertension     Sepsis (HCC) 6/8/2022    Snores         Past  hours.    Invalid input(s): \"PROT\"    Hemoglobin A1C   Date Value Ref Range Status   10/26/2024 5.9 4.0 - 6.0 % Final       Assessment :      Primary Problem  Ischemic stroke (HCC)    Active Hospital Problems    Diagnosis Date Noted    Gastroesophageal reflux disease [K21.9] 10/27/2024    Polysubstance abuse (HCC) [F19.10] 10/27/2024    Alcohol use disorder, moderate, dependence (HCC) [F10.20] 10/27/2024    Primary hypertension [I10] 10/27/2024    Tobacco use disorder [F17.200] 10/27/2024    Cocaine use disorder (HCC) [F14.10] 10/27/2024    Cerebrovascular accident (CVA) ([facility]) [I63.9] 10/27/2024    Acute stroke due to occlusion of left cerebellar artery ([facility]) [I63.542] 10/27/2024    Hematemesis with nausea [K92.0] 10/26/2024    Ischemic stroke (HCC) [I63.9] 10/25/2024     54-year-old female with past medical history of GERD, hypertension, arthritis, alcohol abuse, cocaine abuse presented to ED with acute onset dizziness and nausea along with coffee-ground emesis.  Further evaluation was found to have acute left cerebellar infarct.     # Acute left cerebellar infarct likely secondary to cocaine use  # Hematemesis:possible esophagitis vs gastritis versus Carol Ann-Beltran tear.       Plan:     # Acute left cerebellar infarct likely secondary to cocaine use  -No further episodes of hematemesis.hemoglobin stable 14.3. Continue aspirin 81 mg.  -Continue Lipitor 20 Mg daily  -SBP goal : 100-140  -Continue amlodipine 10 Mg and resumed lisinopril 10 Mg  -Echo EF 55 to 60%  -ANEL: No vegetations and thrombus, loop recorder placed  -PT OT  -PMR : Acute inpatient rehab once stable, [facility] pre cert started.  Awaiting on placement  -Neurovascular checks     # Hematemesis:possible esophagitis vs gastritis versus Carol Ann-Beltran tear.   -GI medicine consulted: No endoscopic evaluation for now given recent left cerebellar infarct and stable hemoglobin   -Continue Protonix  -Trend H&H: Hemoglobin is stable, latest 13.7.      # Elevated

## 2024-10-31 NOTE — PROGRESS NOTES
Occupational Therapy  Facility/Department: [address] NEURO ICU   Daily Treatment Note  Patient Name: Matthieu Barba        MRN: [id number]    : 1970    Date of Service: 10/31/2024    Discharge Recommendations  Discharge Recommendations: Patient would benefit from continued therapy after discharge    OT Equipment Recommendations  ADL Assistive Devices: Shower Chair with back    Assessment  Performance deficits / Impairments: Decreased functional mobility ;Decreased ADL status;Decreased balance;Decreased endurance;Decreased high-level IADLs;Decreased safe awareness  Assessment: Pt presents to OT this date with above noted deficits s/p ischemic stroke. Pt is making steady progress towards goals and requires SBA-SUP for ADL routine including showering, dressing, grooming, transfers, and func mob without device. It is recommended that Pt recieve OT services during hospitalization and after discharge to increase safety/ADLs for safe return to previous environment.  Prognosis: Good  REQUIRES OT FOLLOW-UP: Yes  Activity Tolerance  Activity Tolerance: Patient Tolerated treatment well  Safety Devices  Type of Devices: Call light within reach;Chair alarm in place;Gait belt;Patient at risk for falls;Left in chair;Nurse notified  Restraints  Restraints Initially in Place: No    Restrictions/Precautions  Restrictions/Precautions  Restrictions/Precautions: Up as Tolerated  Required Braces or Orthoses?: No  Position Activity Restriction  Other position/activity restrictions: up as tolerated, -180    Subjective  General  Patient assessed for rehabilitation services?: Yes  Family / Caregiver Present: No  Subjective  Subjective: RN ok'd for Tx this date with Pt cooperate/agreeable/pleasant throughout. Pt with no c/o pain during session.    Objective  Orientation  Overall Orientation Status: Within Functional Limits  Orientation Level: Oriented X4  Cognition  Overall Cognitive Status: WFL  Cognition Comment: Pt demo good  Equipment evaluation, education, & procurement, Home management training, Neuromuscular re-education    AM-PAC Daily Activities Inpatient  AM-PAC Daily Activity - Inpatient   How much help is needed for putting on and taking off regular lower body clothing?: A Little  How much help is needed for bathing (which includes washing, rinsing, drying)?: A Little  How much help is needed for toileting (which includes using toilet, bedpan, or urinal)?: A Little  How much help is needed for putting on and taking off regular upper body clothing?: A Little  How much help is needed for taking care of personal grooming?: A Little  How much help for eating meals?: None  AM-PAC Inpatient Daily Activity Raw Score: 19  AM-PAC Inpatient ADL T-Scale Score : 40.22  ADL Inpatient CMS 0-100% Score: 42.8  ADL Inpatient CMS G-Code Modifier : CK    Minutes  OT Individual Minutes  Time In: 0836  Time Out: 0920  Minutes: 44  Time Code Minutes   Timed Code Treatment Minutes: 40 Minutes    Electronically signed by LORENZO PEREZ on 10/31/24 at 11:02 AM EDT

## 2024-10-31 NOTE — CARE COORDINATION
Transitional planning: Left HIPAA compliant message for Elsie with [facility] requesting return call regarding status of precert. Call back number provided. [phone] Phone call from Elsie with [facility]. The precert remains as a pending status. Notified patient. Also notified patient that due to how well he is doing with therapy there is a good chance the insurance will deny coverage for [facility]. Explained to patient that option then would be home with out patient therapy. Patient states understanding.

## 2024-10-31 NOTE — PROGRESS NOTES
Spiritual Health History and Assessment/Progress Note  [facility]    (P) Initial Encounter,  ,  ,      Name: Matthieu Barba MRN: [id number]    Age: 54 y.o.     Sex: male   Language: English   Church: [employer]   Ischemic stroke (HCC)     Date: 10/31/2024            Total Time Calculated: (P) 30 min              Spiritual Assessment began in [facility]UR[facility] ICU        Referral/Consult From: (P) Rounding   Encounter Overview/Reason: (P) Initial Encounter  Service Provided For: (P) Patient    Amanda, Belief, Meaning:   Patient identifies as spiritual  Family/Friends Other: unable to assess      Importance and Influence:  Patient Other: acknowledges relationship with a Higher Power that supports him in his sobriety and in his commitment to healing and recovery  Family/Friends Other: unable to assess    Community:  Patient feels well-supported. Support system includes: Parent/s and Children  Family/Friends Other: unable to assess    Assessment and Plan of Care:     Patient Interventions include: Facilitated expression of thoughts and feelings, Explored spiritual coping/struggle/distress, Affirmed coping skills/support systems, and Other: ministry of presence through active listening, affirmation, and bedside prayer.  Family/Friends Interventions include: Other: unable to assess    Patient Plan of Care: Spiritual Care available upon further referral  Family/Friends Plan of Care: Other: n/a    Electronically signed by Marcela PIERCEin Resident on 10/31/2024 at 3:07 PM    10/31/24 1505   Encounter Summary   Encounter Overview/Reason Initial Encounter   Service Provided For Patient   [facility]   Support System Parent;Children;Family members   Last Encounter  10/31/24   Complexity of Encounter Low   Begin Time 1000   End Time  1030   Total Time Calculated 30 min   Spiritual/Emotional needs   Type Spiritual Support   Assessment/Intervention/Outcome   Assessment

## 2024-10-31 NOTE — PLAN OF CARE
Problem: Chronic Conditions and Co-morbidities  Goal: Patient's chronic conditions and co-morbidity symptoms are monitored and maintained or improved  10/30/2024 2153 by Stephani Ma, RN  Outcome: Progressing  Flowsheets (Taken 10/30/2024 2010)  Care Plan - Patient's Chronic Conditions and Co-Morbidity Symptoms are Monitored and Maintained or Improved:   Monitor and assess patient's chronic conditions and comorbid symptoms for stability, deterioration, or improvement   Collaborate with multidisciplinary team to address chronic and comorbid conditions and prevent exacerbation or deterioration   Update acute care plan with appropriate goals if chronic or comorbid symptoms are exacerbated and prevent overall improvement and discharge  10/30/2024 1817 by Elsie Crowe, RN  Outcome: Not Progressing  10/30/2024 0927 by Elsie Crowe RN  Outcome: Progressing  Flowsheets (Taken 10/30/2024 0800)  Care Plan - Patient's Chronic Conditions and Co-Morbidity Symptoms are Monitored and Maintained or Improved: Monitor and assess patient's chronic conditions and comorbid symptoms for stability, deterioration, or improvement     Problem: Discharge Planning  Goal: Discharge to home or other facility with appropriate resources  10/30/2024 2153 by Stephani Ma, RN  Outcome: Progressing  Flowsheets (Taken 10/30/2024 2010)  Discharge to home or other facility with appropriate resources:   Identify barriers to discharge with patient and caregiver   Arrange for needed discharge resources and transportation as appropriate   Identify discharge learning needs (meds, wound care, etc)   Refer to discharge planning if patient needs post-hospital services based on physician order or complex needs related to functional status, cognitive ability or social support system  10/30/2024 1817 by Elsie Crowe, RN  Outcome: Not Progressing  10/30/2024 0927 by Elsie Crowe, RN  Outcome: Progressing  Flowsheets (Taken 10/30/2024 0800)  Discharge to home  caregiver   Arrange for needed discharge resources and transportation as appropriate   Identify discharge learning needs (meds, wound care, etc)   Refer to discharge planning if patient needs post-hospital services based on physician order or complex needs related to functional status, cognitive ability or social support system  10/30/2024 1817 by Elsie Crowe RN  Outcome: Not Progressing  10/30/2024 0927 by Elsie Crowe RN  Outcome: Progressing  Flowsheets (Taken 10/30/2024 0800)  Discharge to home or other facility with appropriate resources: Identify barriers to discharge with patient and caregiver     Problem: Pain  Goal: Verbalizes/displays adequate comfort level or baseline comfort level  10/30/2024 2153 by Stephani Ma RN  Outcome: Progressing  Flowsheets (Taken 10/30/2024 2030)  Verbalizes/displays adequate comfort level or baseline comfort level:   Encourage patient to monitor pain and request assistance   Assess pain using appropriate pain scale   Administer analgesics based on type and severity of pain and evaluate response   Implement non-pharmacological measures as appropriate and evaluate response   Notify Licensed Independent Practitioner if interventions unsuccessful or patient reports new pain  10/30/2024 1817 by Elsie Crowe RN  Outcome: Not Progressing  10/30/2024 0927 by Elsie Crowe RN  Outcome: Progressing     Problem: Skin/Tissue Integrity  Goal: Absence of new skin breakdown  Description: 1.  Monitor for areas of redness and/or skin breakdown  2.  Assess vascular access sites hourly  3.  Every 4-6 hours minimum:  Change oxygen saturation probe site  4.  Every 4-6 hours:  If on nasal continuous positive airway pressure, respiratory therapy assess nares and determine need for appliance change or resting period.  10/30/2024 2153 by Stephani Ma, RN  Outcome: Progressing  10/30/2024 1817 by Elsie Crowe RN  Outcome: Not Progressing  10/30/2024 0927 by Elsie Crowe RN  Outcome:  Progressing     Problem: ABCDS Injury Assessment  Goal: Absence of physical injury  10/30/2024 2153 by Stephani Ma, RN  Outcome: Progressing  Flowsheets (Taken 10/30/2024 2124)  Absence of Physical Injury: Implement safety measures based on patient assessment  10/30/2024 1817 by lEsie Crowe RN  Outcome: Not Progressing  10/30/2024 0927 by Elsie Crowe RN  Outcome: Progressing  Flowsheets (Taken 10/30/2024 0911)  Absence of Physical Injury: Implement safety measures based on patient assessment     Problem: Safety - Adult  Goal: Free from fall injury  10/30/2024 2153 by Stephani Ma RN  Outcome: Progressing  Flowsheets (Taken 10/30/2024 2124)  Free From Fall Injury:   Instruct family/caregiver on patient safety   Based on caregiver fall risk screen, instruct family/caregiver to ask for assistance with transferring infant if caregiver noted to have fall risk factors  10/30/2024 1817 by Elsie Crowe RN  Outcome: Not Progressing  10/30/2024 0927 by Elsie Crowe RN  Outcome: Progressing

## 2024-10-31 NOTE — PROGRESS NOTES
Physical Therapy  Facility/Department: [address] NEURO ICU  Physical Therapy Treatment note    Name: Matthieu Barba  : 1970  MRN: [id number]  Date of Service: 10/31/2024    Discharge Recommendations:  Patient would benefit from continued therapy after discharge   PT Equipment Recommendations  Equipment Needed: No      Patient Diagnosis(es): The primary encounter diagnosis was Ischemic stroke (HCC). Diagnoses of Cerebrovascular accident (CVA), unspecified mechanism (HCC) and Hematemesis with nausea were also pertinent to this visit.  Past Medical History:  has a past medical history of Arthritis, Chronic sinusitis, Eczema, Environmental allergies, GERD (gastroesophageal reflux disease), Hyperlipidemia, Hypertension, Sepsis (HCC), and Snores.  Past Surgical History:  has a past surgical history that includes Upper gastrointestinal endoscopy and Colonoscopy.    Assessment  Body Structures, Functions, Activity Limitations Requiring Skilled Therapeutic Intervention: Decreased functional mobility ;Decreased tolerance to work activity;Decreased balance  Assessment: Pt able to ambulate 200 ft with CGA, one loss of balance with CGA.  Pt SBA for transfers without device.  Completed 10 stairs with CGA and 1 rail.  Pt will benefit from continued therapy to improve functional mobility and balance, endurance.  Therapy Prognosis: Good  Activity Tolerance  Activity Tolerance: Patient tolerated treatment well    Plan  Physical Therapy Plan  General Plan:  (5-6 visits weekly)  Current Treatment Recommendations: Strengthening, ROM, Balance training, Functional mobility training, Transfer training, Endurance training, Gait training, Stair training, Neuromuscular re-education, Home exercise program, Safety education & training, Patient/Caregiver education & training, Equipment evaluation, education, & procurement, Positioning, Therapeutic activities  Safety Devices  Type of Devices: Call light within reach, Chair alarm in place, Gait  arm  BP Method: Automatic  Patient Position: Semi fowlers        Bed mobility  Rolling to Right: Independent  Supine to Sit: Independent  Sit to Supine: Independent  Scooting: Independent  Bed Mobility Comments: bed flat and rail down  Transfers  Sit to Stand: Supervision  Stand to Sit: Supervision  Bed to Chair: Supervision  Comment: up without device  Ambulation  Surface: Level tile  Device: No Device  Assistance: Contact guard assistance  Quality of Gait: 1 LOB looking down, CGA to steady  Gait Deviations: Slow Kitty;Decreased step length  Distance: 200 ft  Comments: mild dizziness with looking down, able to demonstrate scanning side to side  More Ambulation?: No  Stairs/Curb  Stairs?: Yes  Stairs  # Steps : 10  Stairs Height: 6\"  Rails: Left ascending  Device: No Device  Assistance: Contact guard assistance  Comment: mild dizziness at top of stairs with visual looking down     Balance  Posture: Good  Sitting - Static: Good  Sitting - Dynamic: Good  Standing - Static: Fair;+  Standing - Dynamic: Fair  Comments: standing balance without UE support  Static Standing Balance Exercises: Standing heel raises, marches, hip abd, hip extension and hamstring curls x 10 reps with no UE support, CGA to improve balance and standing tolerance for mobility  Dynamic Standing Balance Exercises: Side stepping to each side x 40 ft without UE support, CGA  Motor Control/Coordination: Forward and lateral weight shifts targeting 4 places onto each LE with focus on placement, taps ups on 10 inch taget for control of  each LE and increased step height. Approx 10 reps of each       OutComes Score    AM-PAC - Mobility    AM-PAC Basic Mobility - Inpatient   How much help is needed turning from your back to your side while in a flat bed without using bedrails?: None  How much help is needed moving from lying on your back to sitting on the side of a flat bed without using bedrails?: None  How much help is needed moving to and from a bed to

## 2024-11-01 VITALS
BODY MASS INDEX: 24.48 KG/M2 | OXYGEN SATURATION: 98 % | DIASTOLIC BLOOD PRESSURE: 66 MMHG | HEIGHT: 67 IN | SYSTOLIC BLOOD PRESSURE: 128 MMHG | RESPIRATION RATE: 21 BRPM | HEART RATE: 80 BPM | WEIGHT: 156 LBS | TEMPERATURE: 98.1 F

## 2024-11-01 LAB
BASOPHILS # BLD: 0.09 K/UL (ref 0–0.2)
BASOPHILS NFR BLD: 1 % (ref 0–2)
EOSINOPHIL # BLD: 0.25 K/UL (ref 0–0.44)
EOSINOPHILS RELATIVE PERCENT: 3 % (ref 1–4)
ERYTHROCYTE [DISTWIDTH] IN BLOOD BY AUTOMATED COUNT: 11.9 % (ref 11.8–14.4)
HCT VFR BLD AUTO: 42.5 % (ref 40.7–50.3)
HGB BLD-MCNC: 13.5 G/DL (ref 13–17)
IMM GRANULOCYTES # BLD AUTO: 0.04 K/UL (ref 0–0.3)
IMM GRANULOCYTES NFR BLD: 1 %
LYMPHOCYTES NFR BLD: 2.36 K/UL (ref 1.1–3.7)
LYMPHOCYTES RELATIVE PERCENT: 32 % (ref 24–43)
MCH RBC QN AUTO: 29.2 PG (ref 25.2–33.5)
MCHC RBC AUTO-ENTMCNC: 31.8 G/DL (ref 28.4–34.8)
MCV RBC AUTO: 92 FL (ref 82.6–102.9)
MONOCYTES NFR BLD: 0.96 K/UL (ref 0.1–1.2)
MONOCYTES NFR BLD: 13 % (ref 3–12)
NEUTROPHILS NFR BLD: 50 % (ref 36–65)
NEUTS SEG NFR BLD: 3.73 K/UL (ref 1.5–8.1)
NRBC BLD-RTO: 0 PER 100 WBC
PLATELET # BLD AUTO: 252 K/UL (ref 138–453)
PMV BLD AUTO: 10.5 FL (ref 8.1–13.5)
RBC # BLD AUTO: 4.62 M/UL (ref 4.21–5.77)
WBC OTHER # BLD: 7.4 K/UL (ref 3.5–11.3)

## 2024-11-01 PROCEDURE — 36415 COLL VENOUS BLD VENIPUNCTURE: CPT

## 2024-11-01 PROCEDURE — 6370000000 HC RX 637 (ALT 250 FOR IP)

## 2024-11-01 PROCEDURE — 6370000000 HC RX 637 (ALT 250 FOR IP): Performed by: PSYCHIATRY & NEUROLOGY

## 2024-11-01 PROCEDURE — 99239 HOSP IP/OBS DSCHRG MGMT >30: CPT | Performed by: PSYCHIATRY & NEUROLOGY

## 2024-11-01 PROCEDURE — 6370000000 HC RX 637 (ALT 250 FOR IP): Performed by: INTERNAL MEDICINE

## 2024-11-01 PROCEDURE — 2580000003 HC RX 258

## 2024-11-01 PROCEDURE — 85025 COMPLETE CBC W/AUTO DIFF WBC: CPT

## 2024-11-01 PROCEDURE — 6360000002 HC RX W HCPCS

## 2024-11-01 RX ORDER — LISINOPRIL 20 MG/1
20 TABLET ORAL DAILY
Qty: 30 TABLET | Refills: 3 | Status: SHIPPED | OUTPATIENT
Start: 2024-11-02

## 2024-11-01 RX ORDER — ASPIRIN 81 MG/1
81 TABLET ORAL DAILY
Qty: 30 TABLET | Refills: 3 | Status: SHIPPED | OUTPATIENT
Start: 2024-11-02

## 2024-11-01 RX ORDER — PANTOPRAZOLE SODIUM 40 MG/1
40 TABLET, DELAYED RELEASE ORAL
Qty: 30 TABLET | Refills: 3 | Status: SHIPPED | OUTPATIENT
Start: 2024-11-02

## 2024-11-01 RX ORDER — ATORVASTATIN CALCIUM 20 MG/1
20 TABLET, FILM COATED ORAL DAILY
Qty: 30 TABLET | Refills: 3 | Status: SHIPPED | OUTPATIENT
Start: 2024-11-02

## 2024-11-01 RX ADMIN — PANTOPRAZOLE SODIUM 40 MG: 40 TABLET, DELAYED RELEASE ORAL at 06:01

## 2024-11-01 RX ADMIN — PANCRELIPASE LIPASE, PANCRELIPASE PROTEASE, PANCRELIPASE AMYLASE 5000 UNITS: 5000; 17000; 24000 CAPSULE, DELAYED RELEASE ORAL at 08:39

## 2024-11-01 RX ADMIN — PANCRELIPASE LIPASE, PANCRELIPASE PROTEASE, PANCRELIPASE AMYLASE 5000 UNITS: 5000; 17000; 24000 CAPSULE, DELAYED RELEASE ORAL at 10:47

## 2024-11-01 RX ADMIN — TAMSULOSIN HYDROCHLORIDE 0.4 MG: 0.4 CAPSULE ORAL at 08:37

## 2024-11-01 RX ADMIN — HEPARIN SODIUM 5000 UNITS: 5000 INJECTION INTRAVENOUS; SUBCUTANEOUS at 15:09

## 2024-11-01 RX ADMIN — LISINOPRIL 20 MG: 20 TABLET ORAL at 08:39

## 2024-11-01 RX ADMIN — Medication 100 MG: at 08:37

## 2024-11-01 RX ADMIN — SODIUM CHLORIDE, PRESERVATIVE FREE 10 ML: 5 INJECTION INTRAVENOUS at 10:34

## 2024-11-01 RX ADMIN — ASPIRIN 81 MG: 81 TABLET, COATED ORAL at 08:37

## 2024-11-01 RX ADMIN — AMLODIPINE BESYLATE 10 MG: 10 TABLET ORAL at 08:38

## 2024-11-01 RX ADMIN — PANCRELIPASE LIPASE, PANCRELIPASE PROTEASE, PANCRELIPASE AMYLASE 5000 UNITS: 5000; 17000; 24000 CAPSULE, DELAYED RELEASE ORAL at 16:33

## 2024-11-01 RX ADMIN — FOLIC ACID 1 MG: 1 TABLET ORAL at 08:37

## 2024-11-01 RX ADMIN — HEPARIN SODIUM 5000 UNITS: 5000 INJECTION INTRAVENOUS; SUBCUTANEOUS at 06:01

## 2024-11-01 RX ADMIN — ATORVASTATIN CALCIUM 20 MG: 10 TABLET, FILM COATED ORAL at 08:37

## 2024-11-01 ASSESSMENT — ENCOUNTER SYMPTOMS
COUGH: 0
WHEEZING: 0
ABDOMINAL DISTENTION: 0
VOMITING: 0
STRIDOR: 0
SHORTNESS OF BREATH: 0
CONSTIPATION: 0
NAUSEA: 0
DIARRHEA: 0
ABDOMINAL PAIN: 0

## 2024-11-01 NOTE — PLAN OF CARE
Problem: Chronic Conditions and Co-morbidities  Goal: Patient's chronic conditions and co-morbidity symptoms are monitored and maintained or improved  Outcome: Progressing  Flowsheets (Taken 10/31/2024 2000)  Care Plan - Patient's Chronic Conditions and Co-Morbidity Symptoms are Monitored and Maintained or Improved:   Monitor and assess patient's chronic conditions and comorbid symptoms for stability, deterioration, or improvement   Collaborate with multidisciplinary team to address chronic and comorbid conditions and prevent exacerbation or deterioration   Update acute care plan with appropriate goals if chronic or comorbid symptoms are exacerbated and prevent overall improvement and discharge     Problem: Discharge Planning  Goal: Discharge to home or other facility with appropriate resources  Outcome: Progressing  Flowsheets (Taken 10/31/2024 2000)  Discharge to home or other facility with appropriate resources:   Identify barriers to discharge with patient and caregiver   Arrange for needed discharge resources and transportation as appropriate   Identify discharge learning needs (meds, wound care, etc)   Refer to discharge planning if patient needs post-hospital services based on physician order or complex needs related to functional status, cognitive ability or social support system     Problem: Pain  Goal: Verbalizes/displays adequate comfort level or baseline comfort level  Outcome: Progressing  Flowsheets (Taken 10/31/2024 2045)  Verbalizes/displays adequate comfort level or baseline comfort level:   Encourage patient to monitor pain and request assistance   Assess pain using appropriate pain scale   Administer analgesics based on type and severity of pain and evaluate response   Implement non-pharmacological measures as appropriate and evaluate response   Notify Licensed Independent Practitioner if interventions unsuccessful or patient reports new pain     Problem: Skin/Tissue Integrity  Goal: Absence of

## 2024-11-01 NOTE — PLAN OF CARE
Problem: Chronic Conditions and Co-morbidities  Goal: Patient's chronic conditions and co-morbidity symptoms are monitored and maintained or improved  11/1/2024 1624 by Mackenzie Mcmahon RN  Outcome: Adequate for Discharge  11/1/2024 1416 by Mackenzie Mcmahon RN  Outcome: Progressing     Problem: Discharge Planning  Goal: Discharge to home or other facility with appropriate resources  11/1/2024 1624 by Mackenzie Mcmahon RN  Outcome: Adequate for Discharge  11/1/2024 1416 by Mackenzie Mcmahon RN  Outcome: Progressing     Problem: Pain  Goal: Verbalizes/displays adequate comfort level or baseline comfort level  11/1/2024 1624 by Mackenzie Mcmahon RN  Outcome: Adequate for Discharge  11/1/2024 1416 by Mackenzie Mcmahon RN  Outcome: Progressing     Problem: Skin/Tissue Integrity  Goal: Absence of new skin breakdown  Description: 1.  Monitor for areas of redness and/or skin breakdown  2.  Assess vascular access sites hourly  3.  Every 4-6 hours minimum:  Change oxygen saturation probe site  4.  Every 4-6 hours:  If on nasal continuous positive airway pressure, respiratory therapy assess nares and determine need for appliance change or resting period.  11/1/2024 1624 by Mackenzie Mcmahon RN  Outcome: Adequate for Discharge  11/1/2024 1416 by Mackenzie Mcmahon RN  Outcome: Progressing     Problem: ABCDS Injury Assessment  Goal: Absence of physical injury  11/1/2024 1624 by Mackenzie Mcmahon RN  Outcome: Adequate for Discharge  11/1/2024 1416 by Mackenzie Mcmahon RN  Outcome: Progressing     Problem: Safety - Adult  Goal: Free from fall injury  11/1/2024 1624 by Mackenzie Mcmahon RN  Outcome: Adequate for Discharge  11/1/2024 1416 by Mackenzie Mcmahon RN  Outcome: Progressing

## 2024-11-01 NOTE — PROGRESS NOTES
Nutrition Assessment     Type and Reason for Visit: Initial, LOS    Nutrition Recommendations/Plan:   Continue current diet as tolerated     Malnutrition Assessment:  Malnutrition Status: No malnutrition    Nutrition Assessment:  LOS. Tolerating regular diet with PO intake of % per documentation. No updated labs since 10/28. LBM 10/31. No edema. Meds: Folvite, Thiamine, Protonix.    Nutrition Related Findings:   Meds/labs reviewed Wound Type: Surgical Incision    Current Nutrition Therapies:    ADULT DIET; Regular    Anthropometric Measures:  Height: 170.2 cm (5' 7\")  Current Body Wt: 70.8 kg (156 lb)   BMI: 24.4        Nutrition Diagnosis:   No nutrition diagnosis at this time    Nutrition Interventions:   Food and/or Nutrient Delivery: Continue Current Diet  Nutrition Education/Counseling: No recommendation at this time  Coordination of Nutrition Care: No recommendation at this time    Goals:  Goals: Meet at least 75% of estimated needs, prior to discharge  Type of Goal: New goal  Previous Goal Met: Goal(s) Achieved    Nutrition Monitoring and Evaluation:   Behavioral-Environmental Outcomes: None Identified  Food/Nutrient Intake Outcomes: Food and Nutrient Intake  Physical Signs/Symptoms Outcomes: Biochemical Data, Weight    Discharge Planning:    No discharge needs at this time     Josie Valdes MS, RDN, LDN  Contact: [phone]

## 2024-11-01 NOTE — DISCHARGE INSTR - COC
Continuity of Care Form    Patient Name: Matthieu Barba   :  1970  MRN:  [id number]    Admit date:  10/25/2024  Discharge date:  2024    Code Status Order: Full Code   Advance Directives:   Advance Care Flowsheet Documentation             Admitting Physician:  Saturnino You MD  PCP: Glenn Freeman MD    Discharging Nurse: Mackenzie FLOREZ  Discharging Hospital Unit/Room#: 0124/0124-01  Discharging Unit Phone Number: [phone]    Emergency Contact:   Extended Emergency Contact Information  Primary Emergency Contact: Simin Anderson  Address: [address] [place] [address]  Home Phone: [phone]  Relation: Parent  Hearing or visual needs: None  Other needs: None  Preferred language: English   needed? No    Past Surgical History:  Past Surgical History:   Procedure Laterality Date    COLONOSCOPY      UPPER GASTROINTESTINAL ENDOSCOPY         Immunization History:   Immunization History   Administered Date(s) Administered    COVID-19, PFIZER PURPLE top, DILUTE for use, (age 12 y+), 30mcg/0.3mL 10/13/2021, 2021    Hep B, ENGERIX-B, (age 20y+), IM, 1mL 10/15/2024    Influenza A ([id number]) Vaccine Nasal 10/26/2009    Influenza Vaccine, unspecified formulation 10/24/2012, 2013, 10/20/2015, 10/12/2016, 10/03/2017    Influenza Virus Vaccine 2017, 10/28/2019    Influenza, AFLURIA (age 3 y+), FLUZONE, (age 6 mo+), Quadv MDV, 0.5mL 2017, 10/28/2019    Influenza, AFLURIA, FLUZONE, (age4 y+), IM, Trivalent MDV, 0.5mL 10/12/2016    Influenza, FLUARIX, FLULAVAL, FLUZONE (age 6 mo+) and AFLURIA, (age 3 y+), Quadv PF, 0.5mL 10/04/2018, 10/12/2020    Influenza, FLUARIX, FLULAVAL, FLUZONE, (age 6 mo+), AFLURIA, (age 3 y+), IM, Trivalent PF, 0.5mL 10/15/2024    Influenza, FLUBLOK, (age 18 y+), Quadv PF, 0.5mL 2017    Influenza, Trivalent Vaccine 6-35 Months, IM, Preservative Free 10/12/2016    Pneumococcal, PPSV23, PNEUMOVAX 23, (age 2y+), SC/IM, 0.5mL  10/12/2016    TDaP, ADACEL (age 10y-64y), BOOSTRIX (age 10y+), IM, 0.5mL 10/08/2016       Active Problems:  Patient Active Problem List   Diagnosis Code    Depression F32.A    Intervertebral lumbar disc disorder with myelopathy, lumbar region M51.06    Uncontrolled hypertension I10    Pure hypercholesterolemia E78.00    Prediabetes R73.03    Tobacco abuse Z72.0    Alcohol use, unspecified with unspecified alcohol-induced disorder ([facility]) F10.99    Cocaine abuse ([facility]ly [facility]) F14.10    Alcohol abuse F10.10    Atopic dermatitis L20.9    H/O acute pancreatitis Z87.19    Ischemic stroke ([facility]) I63.9    Hematemesis with nausea K92.0    Gastroesophageal reflux disease K21.9    Polysubstance abuse ([facility]) F19.10    Alcohol use disorder, moderate, dependence ([facility]) F10.20    Primary hypertension I10    Tobacco use disorder F17.200    Cocaine use disorder ([facility]) F14.10    Cerebrovascular accident (CVA) ([facility]) I63.9    Acute stroke due to occlusion of left cerebellar artery ([facility]) I63.542       Isolation/Infection:   Isolation            No Isolation          Patient Infection Status       Infection Onset Added Last Indicated Last Indicated By Review Planned Expiration Resolved Resolved By    None active    Resolved    COVID-19 22 COVID-19   22 Infection                        Nurse Assessment:  Last Vital Signs: /66   Pulse 81   Temp 98.1 °F (36.7 °C) (Oral)   Resp 17   Ht 1.702 m (5' 7\")   Wt 70.8 kg (156 lb)   SpO2 98%   BMI 24.43 kg/m²     Last documented pain score (0-10 scale): Pain Level: 0  Last Weight:   Wt Readings from Last 1 Encounters:   10/26/24 70.8 kg (156 lb)     Mental Status:  oriented    IV Access:  - None    Nursing Mobility/ADLs:  Walking   Independent  Transfer  Independent  Bathing  Independent  Dressing  Independent  Toileting  Independent  Feeding  Independent  Med Admin  Independent  Med Delivery   none    Wound Care Documentation and Therapy:  Incision 10/30/24  Readmission:  15           Discharging to Facility/ Agency   Name: [facility]  Address:  Phone:  Fax:    Dialysis Facility (if applicable)   Name:  Address:  Dialysis Schedule:  Phone:  Fax:    / signature: Electronically signed by Latesha Deal RN on 11/1/24 at 2:25 PM EDT    PHYSICIAN SECTION    Prognosis: Good    Condition at Discharge: Stable    Rehab Potential (if transferring to Rehab): Good    Recommended Labs or Other Treatments After Discharge:     Physician Certification: I certify the above information and transfer of Matthieu Barba  is necessary for the continuing treatment of the diagnosis listed and that he requires Acute Rehab for greater 30 days.     Update Admission H&P: No change in H&P    PHYSICIAN SIGNATURE:  Electronically signed by Jay Barron DO on 11/1/24 at 2:49 PM EDT

## 2024-11-01 NOTE — PROGRESS NOTES
tremors, seizures, syncope, speech difficulty, weakness, light-headedness, numbness and headaches.       Physical Exam:   /66   Pulse 81   Temp 98.1 °F (36.7 °C) (Oral)   Resp 17   Ht 1.702 m (5' 7\")   Wt 70.8 kg (156 lb)   SpO2 98%   BMI 24.43 kg/m²   Temp (24hrs), Av.1 °F (36.7 °C), Min:97.7 °F (36.5 °C), Max:98.6 °F (37 °C)    No results for input(s): \"POCGLU\" in the last 72 hours.    Intake/Output Summary (Last 24 hours) at 2024 1500  Last data filed at 2024 0600  Gross per 24 hour   Intake 1200 ml   Output 1775 ml   Net -575 ml         Neurologic Exam     GENERAL  Appears comfortable and in no distress   HEENT  NC/ AT   HEART  S1 and S2 heard; palpation of pulses: radial pulse    NECK  Supple and no bruits heard   MENTAL STATUS:  Alert, oriented, intact memory, no confusion, normal speech, normal language, no hallucination or delusion   CRANIAL NERVES: II     -      Visual fields intact to confrontation  III,IV,VI -  PERR, EOMs full, no ptosis  V     -     Normal facial sensation   VII    -     Normal facial symmetry  VIII   -     Intact hearing   IX,X -     Symmetrical palate  XI    -     Symmetrical shoulder shrug  XII   -     Midline tongue, no atrophy    MOTOR FUNCTION: RUE: Significant for good strength of grade 5/5 in proximal and distal muscle groups   LUE: Significant for good strength of grade 5/5 in proximal and distal muscle groups   RLE: Significant for good strength of grade 5/5 in proximal and distal muscle groups   LLE: Significant for good strength of grade 5/5 in proximal and distal muscle groups      Normal bulk, normal tone and no involuntary movements, no tremor   SENSORY FUNCTION:  Normal touch, normal pinprick, normal vibration, normal proprioception   CEREBELLAR FUNCTION:  Intact fine motor control over upper limbs and lower limbs   REFLEX FUNCTION:  Symmetric in upper and lower extremities   STATION and GAIT deferred       Investigations:      Laboratory  nausea [K92.0] 10/26/2024    Ischemic stroke (HCC) [I63.9] 10/25/2024       54 year old male with PMHx HTH, cocaine use, alcohol use who presented with acute onset dizziness and vertigo with nausea and vomitting. He donated plasma the day prior to admission, ate fast food, drank beer, then smoked crack. One hour later, he had vertigo and vomitting coffee ground emesisi. He was positive for occult blood in ED. MRI brain showed acute left cerebellar infarct. Not a tnk candidate. CTA showed no LVO or stenosis. Admitted to NICU overnight. GI consulted and recommended PP gtt for 24 hours, no intervention.         Plan:     Acute left cerebellar infarct likely 2/2 cocaine use  -Continue Asa 81 mg daily   -SBP goal < 160   -Amlodipine 10 mg , lisinopril 10 mg   -Echo 55-60%  -ANEL:no thrombi or vegetation, loop recorder placed  -PMR: acute rehab  -stable for DC; accepted to [facility]    Hematemesis  --No further hematemesis  -hemoglobin stable today 13.5  -Continue Asa 81 mg daily   -Continue protonix 40 mg daily  -GI consulted   -PT/OT    Hx of acute pancreatitis  -continue lipase protease and amylase capsule     Alcoholism  -Thiamine and folate      Patient or family members expressed understanding on topics that were discussed and all their questions were answered.      Follow-up further recommendations after discussing case with the attending.  The plan was discussed with the patient, patient's family and the medical staff.   Consultations:   IP CONSULT TO NEUROLOGY  IP CONSULT TO NEUROCRITICAL CARE  IP CONSULT TO STROKE TEAM  IP CONSULT TO GI  IP CONSULT TO SOCIAL WORK  IP CONSULT TO PHYSICAL MEDICINE REHAB  IP CONSULT TO CARDIOLOGY    Patient is admitted as inpatient status because of co-morbidities listed above, severity of signs and symptoms as outlined, requirement for current medical therapies and most importantly because of direct risk to patient if care not provided in a hospital setting.    Vignesh Chahal,  MD  Neurology Resident PGY-3  11/1/2024  3:00 PM    Copy sent to Glenn Maher MD

## 2024-11-01 NOTE — CARE COORDINATION
Transitional planning: Phone call from [facility] that they have auth and can take patient today. Will arrange transport.    1417 Phone call from GAUTAM Mann requesting update on [facility]. Update provided. She will notify patient.    1428 PS message to Dr. Chahal requesting discharge order with med rec and MARGOT completion by attending with immediate read and affirmative response.    1435 [facility] with [facility]pm requested transport time.    1533 Phone call to Antonette with [facility] to confirm transport at 5pm.    1537 Phone call to Elsie with [facility] to notify of transport time. Report #[phone].     1553 MARGOT and 3 day MAR faxed to [facility].    Discharge Report    [facility]  Clinical Case Management Department  Written by: Latesha Deal RN    Patient Name: Matthieu MALDONADO Jameson  Attending Provider: Jay Barron DO  Admit Date: 10/25/2024  4:34 PM  MRN: [id number]  Account: [id number]                     : 1970  Discharge Date: 2024      Disposition: Acute inpatient rehab    Latesha Deal RN

## 2024-11-02 ENCOUNTER — HOSPITAL ENCOUNTER (OUTPATIENT)
Age: 54
Setting detail: SPECIMEN
Discharge: HOME | End: 2024-11-02

## 2024-11-02 LAB
ALBUMIN SERPL-MCNC: 3.8 G/DL (ref 3.5–5.2)
ALP SERPL-CCNC: 115 U/L (ref 40–129)
ALT SERPL-CCNC: 28 U/L (ref 5–41)
ANION GAP SERPL CALCULATED.3IONS-SCNC: 10 MMOL/L (ref 9–17)
AST SERPL-CCNC: 33 U/L
BASOPHILS # BLD: 0.08 K/UL (ref 0–0.2)
BASOPHILS NFR BLD: 1 % (ref 0–2)
BILIRUB SERPL-MCNC: 0.2 MG/DL (ref 0.3–1.2)
BUN SERPL-MCNC: 11 MG/DL (ref 6–20)
BUN/CREAT SERPL: 12 (ref 9–20)
CALCIUM SERPL-MCNC: 9 MG/DL (ref 8.6–10.4)
CHLORIDE SERPL-SCNC: 104 MMOL/L (ref 98–107)
CO2 SERPL-SCNC: 25 MMOL/L (ref 20–31)
CREAT SERPL-MCNC: 0.9 MG/DL (ref 0.7–1.2)
EOSINOPHIL # BLD: 0.17 K/UL (ref 0–0.44)
EOSINOPHILS RELATIVE PERCENT: 2 % (ref 1–4)
ERYTHROCYTE [DISTWIDTH] IN BLOOD BY AUTOMATED COUNT: 12 % (ref 11.8–14.4)
GFR, ESTIMATED: >90 ML/MIN/1.73M2
GLUCOSE SERPL-MCNC: 86 MG/DL (ref 70–99)
HCT VFR BLD AUTO: 42 % (ref 40.7–50.3)
HGB BLD-MCNC: 13.8 G/DL (ref 13–17)
IMM GRANULOCYTES # BLD AUTO: 0.02 K/UL (ref 0–0.3)
IMM GRANULOCYTES NFR BLD: 0 %
LYMPHOCYTES NFR BLD: 1.39 K/UL (ref 1.1–3.7)
LYMPHOCYTES RELATIVE PERCENT: 20 % (ref 24–43)
MAGNESIUM SERPL-MCNC: 1.9 MG/DL (ref 1.6–2.6)
MCH RBC QN AUTO: 30.1 PG (ref 25.2–33.5)
MCHC RBC AUTO-ENTMCNC: 32.9 G/DL (ref 28.4–34.8)
MCV RBC AUTO: 91.5 FL (ref 82.6–102.9)
MONOCYTES NFR BLD: 1 K/UL (ref 0.1–1.2)
MONOCYTES NFR BLD: 14 % (ref 3–12)
NEUTROPHILS NFR BLD: 63 % (ref 36–65)
NEUTS SEG NFR BLD: 4.46 K/UL (ref 1.5–8.1)
NRBC BLD-RTO: 0 PER 100 WBC
PHOSPHATE SERPL-MCNC: 3.9 MG/DL (ref 2.5–4.5)
PLATELET # BLD AUTO: 260 K/UL (ref 138–453)
PMV BLD AUTO: 10.9 FL (ref 8.1–13.5)
POTASSIUM SERPL-SCNC: 4.2 MMOL/L (ref 3.7–5.3)
PROT S FREE AG ACT/NOR PPP IA: 123 % (ref 74–147)
PROT SERPL-MCNC: 6.6 G/DL (ref 6.4–8.3)
RBC # BLD AUTO: 4.59 M/UL (ref 4.21–5.77)
SODIUM SERPL-SCNC: 139 MMOL/L (ref 135–144)
WBC OTHER # BLD: 7.1 K/UL (ref 3.5–11.3)

## 2024-11-02 PROCEDURE — 84100 ASSAY OF PHOSPHORUS: CPT

## 2024-11-02 PROCEDURE — 83735 ASSAY OF MAGNESIUM: CPT

## 2024-11-02 PROCEDURE — 80053 COMPREHEN METABOLIC PANEL: CPT

## 2024-11-02 PROCEDURE — P9603 ONE-WAY ALLOW PRORATED MILES: HCPCS

## 2024-11-02 PROCEDURE — 36415 COLL VENOUS BLD VENIPUNCTURE: CPT

## 2024-11-02 PROCEDURE — 85025 COMPLETE CBC W/AUTO DIFF WBC: CPT

## 2024-11-04 LAB
MTHFR INTERPRETATION: NORMAL
MTHFR MUTATION A1286C: NORMAL
MTHFR MUTATION C665T: NEGATIVE
SPECIMEN: NORMAL

## 2024-11-04 NOTE — DISCHARGE SUMMARY
[facility]     Department of Neurology    INPATIENT DISCHARGE SUMMARY        Patient Identification:  Matthieu Barba is a 54 y.o. male.  :  1970  MRN: [id number]     Acct: [id number]   Admit Date:  10/25/2024  Discharge date and time: 2024  5:46 PM   Attending Provider: No att. providers found                                     Admission Diagnoses:   Acute ischemic stroke (HCC) [I63.9]  Ischemic stroke (HCC) [I63.9]  Cerebrovascular accident (CVA), unspecified mechanism (HCC) [I63.9]    Discharge Diagnoses:   Principal Problem:    Ischemic stroke (HCC)  Active Problems:    Hematemesis with nausea    Gastroesophageal reflux disease    Polysubstance abuse (HCC)    Alcohol use disorder, moderate, dependence (HCC)    Primary hypertension    Tobacco use disorder    Cocaine use disorder (HCC)    Cerebrovascular accident (CVA) (HCC)    Acute stroke due to occlusion of left cerebellar artery (HCC)  Resolved Problems:    * No resolved hospital problems. *       Consults:   cardiology    Brief Inpatient course:    54 year old male with PMHx HTH, cocaine use, alcohol use who presented with acute onset dizziness and vertigo with nausea and vomitting. He donated plasma the day prior to admission, ate fast food, drank beer, then smoked crack. One hour later, he had vertigo and vomitting coffee ground emesisi. He was positive for occult blood in ED. MRI brain showed acute left cerebellar infarct. Not a tnk candidate. CTA showed no LVO or stenosis. Admitted to NICU overnight. GI consulted and recommended PP gtt for 24 hours, no intervention.      Accepted to [facility]    Patient is stable from neurological standpoint to be discharged.    Procedures:  ANEL and loop , MRI brain     Any Hospital Acquired Infections: none    Discharge Functional Status:  stable    Disposition: acute rehab    Patient Instructions:     Follow up outpatient     Discharge Medications:  Discharge Medication List as of

## 2024-11-05 ENCOUNTER — HOSPITAL ENCOUNTER (OUTPATIENT)
Age: 54
Setting detail: SPECIMEN
Discharge: HOME | End: 2024-11-05

## 2024-11-05 LAB
ALBUMIN SERPL-MCNC: 3.8 G/DL (ref 3.5–5.2)
ALP SERPL-CCNC: 122 U/L (ref 40–129)
ALT SERPL-CCNC: 26 U/L (ref 5–41)
ANION GAP SERPL CALCULATED.3IONS-SCNC: 13 MMOL/L (ref 9–17)
AST SERPL-CCNC: 21 U/L
AT III ACT/NOR PPP CHRO: 96 % (ref 83–122)
BASOPHILS # BLD: 0.11 K/UL (ref 0–0.2)
BASOPHILS NFR BLD: 1 % (ref 0–2)
BILIRUB SERPL-MCNC: <0.1 MG/DL (ref 0.3–1.2)
BUN SERPL-MCNC: 18 MG/DL (ref 6–20)
BUN/CREAT SERPL: 16 (ref 9–20)
CALCIUM SERPL-MCNC: 9.5 MG/DL (ref 8.6–10.4)
CHLORIDE SERPL-SCNC: 103 MMOL/L (ref 98–107)
CO2 SERPL-SCNC: 24 MMOL/L (ref 20–31)
CREAT SERPL-MCNC: 1.1 MG/DL (ref 0.7–1.2)
EOSINOPHIL # BLD: 0.24 K/UL (ref 0–0.44)
EOSINOPHILS RELATIVE PERCENT: 3 % (ref 1–4)
ERYTHROCYTE [DISTWIDTH] IN BLOOD BY AUTOMATED COUNT: 12.2 % (ref 11.8–14.4)
F5 P.R506Q BLD/T QL: NEGATIVE
FACTOR VIII ACTIVITY: 102 % (ref 50–150)
GFR, ESTIMATED: 81 ML/MIN/1.73M2
GLUCOSE SERPL-MCNC: 85 MG/DL (ref 70–99)
HCT VFR BLD AUTO: 41.3 % (ref 40.7–50.3)
HGB BLD-MCNC: 13.3 G/DL (ref 13–17)
IMM GRANULOCYTES # BLD AUTO: 0.05 K/UL (ref 0–0.3)
IMM GRANULOCYTES NFR BLD: 1 %
LYMPHOCYTES NFR BLD: 2.14 K/UL (ref 1.1–3.7)
LYMPHOCYTES RELATIVE PERCENT: 23 % (ref 24–43)
MCH RBC QN AUTO: 29.7 PG (ref 25.2–33.5)
MCHC RBC AUTO-ENTMCNC: 32.2 G/DL (ref 28.4–34.8)
MCV RBC AUTO: 92.2 FL (ref 82.6–102.9)
MONOCYTES NFR BLD: 1.35 K/UL (ref 0.1–1.2)
MONOCYTES NFR BLD: 14 % (ref 3–12)
NEUTROPHILS NFR BLD: 58 % (ref 36–65)
NEUTS SEG NFR BLD: 5.62 K/UL (ref 1.5–8.1)
NRBC BLD-RTO: 0 PER 100 WBC
PLATELET # BLD AUTO: 267 K/UL (ref 138–453)
PMV BLD AUTO: 10.8 FL (ref 8.1–13.5)
POTASSIUM SERPL-SCNC: 3.9 MMOL/L (ref 3.7–5.3)
PROT SERPL-MCNC: 6.7 G/DL (ref 6.4–8.3)
PROTEIN C ACTIVITY: 89 %
PROTEIN S ACTIVITY: 84 % (ref 77–116)
RBC # BLD AUTO: 4.48 M/UL (ref 4.21–5.77)
SODIUM SERPL-SCNC: 140 MMOL/L (ref 135–144)
SPECIMEN SOURCE: NORMAL
WBC OTHER # BLD: 9.5 K/UL (ref 3.5–11.3)

## 2024-11-05 PROCEDURE — 80053 COMPREHEN METABOLIC PANEL: CPT

## 2024-11-05 PROCEDURE — P9603 ONE-WAY ALLOW PRORATED MILES: HCPCS

## 2024-11-05 PROCEDURE — 85025 COMPLETE CBC W/AUTO DIFF WBC: CPT

## 2024-11-05 PROCEDURE — 36415 COLL VENOUS BLD VENIPUNCTURE: CPT

## 2024-11-08 ENCOUNTER — HOSPITAL ENCOUNTER (OUTPATIENT)
Age: 54
Setting detail: SPECIMEN
Discharge: HOME | End: 2024-11-08

## 2024-11-08 LAB
ALBUMIN SERPL-MCNC: 3.8 G/DL (ref 3.5–5.2)
ALBUMIN/GLOB SERPL: 1.4 {RATIO} (ref 1–2.5)
ALP SERPL-CCNC: 94 U/L (ref 40–129)
ALT SERPL-CCNC: 20 U/L (ref 10–50)
ANION GAP SERPL CALCULATED.3IONS-SCNC: 12 MMOL/L (ref 9–16)
AST SERPL-CCNC: 21 U/L (ref 10–50)
BASOPHILS # BLD: 0.09 K/UL (ref 0–0.2)
BASOPHILS NFR BLD: 1 % (ref 0–2)
BILIRUB SERPL-MCNC: <0.2 MG/DL (ref 0–1.2)
BUN SERPL-MCNC: 13 MG/DL (ref 6–20)
CALCIUM SERPL-MCNC: 9.5 MG/DL (ref 8.6–10.4)
CHLORIDE SERPL-SCNC: 107 MMOL/L (ref 98–107)
CO2 SERPL-SCNC: 27 MMOL/L (ref 20–31)
CREAT SERPL-MCNC: 1 MG/DL (ref 0.7–1.2)
EOSINOPHIL # BLD: 0.4 K/UL (ref 0–0.44)
EOSINOPHILS RELATIVE PERCENT: 5 % (ref 1–4)
ERYTHROCYTE [DISTWIDTH] IN BLOOD BY AUTOMATED COUNT: 12.3 % (ref 11.8–14.4)
GFR, ESTIMATED: 86 ML/MIN/1.73M2
GLUCOSE SERPL-MCNC: 78 MG/DL (ref 74–99)
HCT VFR BLD AUTO: 40 % (ref 40.7–50.3)
HGB BLD-MCNC: 13 G/DL (ref 13–17)
IMM GRANULOCYTES # BLD AUTO: 0.05 K/UL (ref 0–0.3)
IMM GRANULOCYTES NFR BLD: 1 %
LYMPHOCYTES NFR BLD: 2.2 K/UL (ref 1.1–3.7)
LYMPHOCYTES RELATIVE PERCENT: 26 % (ref 24–43)
MCH RBC QN AUTO: 30.2 PG (ref 25.2–33.5)
MCHC RBC AUTO-ENTMCNC: 32.5 G/DL (ref 28.4–34.8)
MCV RBC AUTO: 92.8 FL (ref 82.6–102.9)
MONOCYTES NFR BLD: 1.12 K/UL (ref 0.1–1.2)
MONOCYTES NFR BLD: 13 % (ref 3–12)
NEUTROPHILS NFR BLD: 54 % (ref 36–65)
NEUTS SEG NFR BLD: 4.56 K/UL (ref 1.5–8.1)
NRBC BLD-RTO: 0 PER 100 WBC
PLATELET # BLD AUTO: 278 K/UL (ref 138–453)
PMV BLD AUTO: 10.7 FL (ref 8.1–13.5)
POTASSIUM SERPL-SCNC: 4 MMOL/L (ref 3.7–5.3)
PROT SERPL-MCNC: 6.4 G/DL (ref 6.6–8.7)
RBC # BLD AUTO: 4.31 M/UL (ref 4.21–5.77)
SODIUM SERPL-SCNC: 145 MMOL/L (ref 136–145)
WBC OTHER # BLD: 8.4 K/UL (ref 3.5–11.3)

## 2024-11-08 PROCEDURE — 80053 COMPREHEN METABOLIC PANEL: CPT

## 2024-11-08 PROCEDURE — 36415 COLL VENOUS BLD VENIPUNCTURE: CPT

## 2024-11-08 PROCEDURE — P9603 ONE-WAY ALLOW PRORATED MILES: HCPCS

## 2024-11-08 PROCEDURE — 85025 COMPLETE CBC W/AUTO DIFF WBC: CPT

## 2024-11-11 NOTE — PROGRESS NOTES
Physician Progress Note      PATIENT:               FROYLAN NAZARIO  CSN #:                  [id number]  :                       1970  ADMIT DATE:       10/25/2024 4:34 PM  DISCH DATE:        2024 5:46 PM  RESPONDING  PROVIDER #:        Jay Barron DO          QUERY TEXT:    Patient admitted  on 10/25 with Acute CVA. Noted documentation of  CVA   etiology likely 2/2 cocaine per stroke consult notes on 10/25 and also per   cardiology consult note on 10/29. Neuro progress notes from 10/29- states   Acute stroke etiology suspected to be cardioembolic with noted history of   cocaine abuse. Please clarify one of the following:    The medical record reflects the following:  Risk Factors: cocaine abuse, etoh abuse , htn  Clinical Indicators: Patient admitted  on 10/25 with Acute CVA. Noted   documentation of  CVA etiology likely 2/2 cocaine per stroke consult notes on   10/25 and also per cardiology consult note on 10/29. Neuro progress notes from   10/29- states Acute stroke etiology suspected to be cardioembolic with   noted history of cocaine abuse  Treatment: Cardiology consult, loop recorder placement    Thank You Chaka FLOREZ BSN CCD  Options provided:  -- Acute CVA likely from a cardioembolic event  -- Acute CVA likely due to cocaine  -- Acute CVA likely due to a cardioembolic event complicated by the use of   cocaine  -- Other - I will add my own diagnosis  -- Disagree - Not applicable / Not valid  -- Disagree - Clinically unable to determine / Unknown  -- Refer to Clinical Documentation Reviewer    PROVIDER RESPONSE TEXT:    After study, Acute CVA is likely from a cardioembolic event.    Query created by: Love Vazquez on 2024 12:16 PM      Electronically signed by:  Jay Barron DO 2024 9:42 AM

## 2025-01-07 ENCOUNTER — OFFICE VISIT (OUTPATIENT)
Dept: INTERNAL MEDICINE | Age: 55
End: 2025-01-07
Payer: MEDICAID

## 2025-01-07 VITALS
SYSTOLIC BLOOD PRESSURE: 130 MMHG | DIASTOLIC BLOOD PRESSURE: 82 MMHG | BODY MASS INDEX: 29.35 KG/M2 | HEIGHT: 67 IN | TEMPERATURE: 98.2 F | OXYGEN SATURATION: 98 % | WEIGHT: 187 LBS

## 2025-01-07 DIAGNOSIS — R35.1 BENIGN PROSTATIC HYPERPLASIA WITH NOCTURIA: ICD-10-CM

## 2025-01-07 DIAGNOSIS — R42 DIZZINESS: ICD-10-CM

## 2025-01-07 DIAGNOSIS — Z86.73 H/O: STROKE: ICD-10-CM

## 2025-01-07 DIAGNOSIS — I10 PRIMARY HYPERTENSION: ICD-10-CM

## 2025-01-07 DIAGNOSIS — K21.9 GASTROESOPHAGEAL REFLUX DISEASE, UNSPECIFIED WHETHER ESOPHAGITIS PRESENT: Primary | ICD-10-CM

## 2025-01-07 DIAGNOSIS — G47.9 SLEEP DIFFICULTIES: ICD-10-CM

## 2025-01-07 DIAGNOSIS — N40.1 BENIGN PROSTATIC HYPERPLASIA WITH NOCTURIA: ICD-10-CM

## 2025-01-07 DIAGNOSIS — F10.20 ALCOHOL USE DISORDER, MODERATE, DEPENDENCE (HCC): ICD-10-CM

## 2025-01-07 PROBLEM — I63.9 ISCHEMIC STROKE (HCC): Status: RESOLVED | Noted: 2024-10-25 | Resolved: 2025-01-07

## 2025-01-07 PROBLEM — K92.0 HEMATEMESIS WITH NAUSEA: Status: RESOLVED | Noted: 2024-10-26 | Resolved: 2025-01-07

## 2025-01-07 PROCEDURE — G8427 DOCREV CUR MEDS BY ELIG CLIN: HCPCS

## 2025-01-07 PROCEDURE — 4004F PT TOBACCO SCREEN RCVD TLK: CPT

## 2025-01-07 PROCEDURE — 3017F COLORECTAL CA SCREEN DOC REV: CPT

## 2025-01-07 PROCEDURE — G8419 CALC BMI OUT NRM PARAM NOF/U: HCPCS

## 2025-01-07 PROCEDURE — 3075F SYST BP GE 130 - 139MM HG: CPT

## 2025-01-07 PROCEDURE — 99211 OFF/OP EST MAY X REQ PHY/QHP: CPT | Performed by: INTERNAL MEDICINE

## 2025-01-07 PROCEDURE — 3079F DIAST BP 80-89 MM HG: CPT

## 2025-01-07 PROCEDURE — 99213 OFFICE O/P EST LOW 20 MIN: CPT

## 2025-01-07 RX ORDER — PANTOPRAZOLE SODIUM 40 MG/1
40 TABLET, DELAYED RELEASE ORAL DAILY
Qty: 30 TABLET | Refills: 3 | Status: SHIPPED | OUTPATIENT
Start: 2025-01-07

## 2025-01-07 RX ORDER — ATORVASTATIN CALCIUM 20 MG/1
20 TABLET, FILM COATED ORAL DAILY
Qty: 30 TABLET | Refills: 3 | Status: SHIPPED | OUTPATIENT
Start: 2025-01-07

## 2025-01-07 RX ORDER — LISINOPRIL 20 MG/1
20 TABLET ORAL DAILY
Qty: 30 TABLET | Refills: 3 | Status: SHIPPED | OUTPATIENT
Start: 2025-01-07

## 2025-01-07 RX ORDER — MECLIZINE HYDROCHLORIDE 25 MG/1
25 TABLET ORAL 3 TIMES DAILY PRN
Qty: 30 TABLET | Refills: 0 | Status: SHIPPED | OUTPATIENT
Start: 2025-01-07 | End: 2025-01-17

## 2025-01-07 RX ORDER — TAMSULOSIN HYDROCHLORIDE 0.4 MG/1
0.4 CAPSULE ORAL DAILY
Qty: 90 CAPSULE | Refills: 1 | Status: SHIPPED | OUTPATIENT
Start: 2025-01-07

## 2025-01-07 ASSESSMENT — ENCOUNTER SYMPTOMS
SHORTNESS OF BREATH: 0
CHEST TIGHTNESS: 0
COUGH: 0
NAUSEA: 0
VOMITING: 0
ABDOMINAL PAIN: 0
CONSTIPATION: 0
DIARRHEA: 0

## 2025-01-07 NOTE — PROGRESS NOTES
Attending Physician Statement  I have discussed the care of Matthieu Barba, including pertinent history and exam findings with the resident. I have reviewed the key elements of all parts of the encounter with the resident. I agree with the assessment, and status of the problem list as documented.    Diagnosis Orders   1. Gastroesophageal reflux disease, unspecified whether esophagitis present  Ken Olivas MD, Gastroenterology, [facility]    pantoprazole (PROTONIX) 40 MG tablet      2. Sleep difficulties  melatonin 5 MG TABS tablet      3. Dizziness  meclizine (ANTIVERT) 25 MG tablet      4. Benign prostatic hyperplasia with nocturia  tamsulosin (FLOMAX) 0.4 MG capsule      5. H/O: stroke  atorvastatin (LIPITOR) 20 MG tablet      6. Primary hypertension  lisinopril (PRINIVIL;ZESTRIL) 20 MG tablet      7. Alcohol use disorder, moderate, dependence (HCC)          The plan and orders should include No orders of the defined types were placed in this encounter.   and this was also documented by the resident.  I agree with the referral to GI.The medication list was reviewed with the resident and is up to date. The return visit should be in 6 months .    Dr Marie Nazario MD, FACP  Associate , Internal Medicine Residency Program  Residency Clinic , [facility] IM  Chair, Department of Internal Medicine  [facility] Internal Medicine Clerkship         1/7/2025, 11:44 AM        
  Psychiatric:         Mood and Affect: Mood normal.         Behavior: Behavior normal.         Thought Content: Thought content normal.         Judgment: Judgment normal.           DIAGNOSTIC FINDINGS:  CBC:  Lab Results   Component Value Date/Time    WBC 8.4 11/08/2024 05:59 AM    HGB 13.0 11/08/2024 05:59 AM     11/08/2024 05:59 AM       BMP:    Lab Results   Component Value Date/Time     11/08/2024 05:59 AM    K 4.0 11/08/2024 05:59 AM     11/08/2024 05:59 AM    CO2 27 11/08/2024 05:59 AM    BUN 13 11/08/2024 05:59 AM    CREATININE 1.0 11/08/2024 05:59 AM    GLUCOSE 78 11/08/2024 05:59 AM       HEMOGLOBIN A1C:   Lab Results   Component Value Date/Time    LABA1C 5.9 10/26/2024 03:05 PM       FASTING LIPID PANEL:  Lab Results   Component Value Date    CHOL 129 10/26/2024    HDL 67 10/26/2024    TRIG 139 10/26/2024       ASSESSMENT AND PLAN:  Matthieu was seen today for follow-up.    Diagnoses and all orders for this visit:    Gastroesophageal reflux disease, unspecified whether esophagitis present  - Was recommended to follow-up with GI as outpatient for possible EGD due to complaints of coffee-ground emesis at time of admission in October 2024, will give referral to GI  -Continue Protonix daily    Sleep difficulties  -Patient states melatonin gives him the best sleep he has had in his life  - Will order melatonin 5 mg to take prior to sleeping    Dizziness  -Will give meclizine to use as this was controlling [facility]  - Discussed with patient that if this dizziness and continues he should reach out to neurology for further recommendations    Benign prostatic hyperplasia with nocturia  -Discussed with patient to give Flomax another try, discussed with him to give it at least 3-4 weeks to see if any symptom improvement and if there is no improvement we will refer over to urology for further evaluation    H/O: stroke  -Had a left cerebellar ischemic infarct in October 2024  -

## 2025-01-10 ENCOUNTER — TELEPHONE (OUTPATIENT)
Dept: GASTROENTEROLOGY | Age: 55
End: 2025-01-10

## 2025-01-10 NOTE — TELEPHONE ENCOUNTER
attempt 1 unable to lvm just rings np ov with aziz-   attempt 2 letter sent-[place]   Referred for acid reflux

## 2025-02-03 DIAGNOSIS — R42 DIZZINESS: ICD-10-CM

## 2025-02-03 NOTE — TELEPHONE ENCOUNTER
..Request for   Requested Prescriptions     Pending Prescriptions Disp Refills    meclizine (ANTIVERT) 25 MG tablet [Pharmacy Med Name: Meclizine HCl 25 MG Oral Tablet] 30 tablet 0     Sig: TAKE 1 TABLET BY MOUTH THREE TIMES DAILY AS NEEDED FOR DIZZINESS    .      Please review and e-scribe to pharmacy listed in chart if appropriate. Thank you.      Last Visit Date: 1/7/2025  Next Visit Date: Visit date not found    Future Appointments   Date Time Provider Department Center   2/15/2025  8:30 AM SCHEDULE, [facility] [facility] [facility]   3/3/2025  9:45 AM Donny Cameron MD  GI [facility]   3/12/2025  1:45 PM Brent Bentley, APRN - NP [facility] [facility]   6/9/2025 11:30 AM Suyapa Montes MD [facility]       Health Maintenance   Topic Date Due    Pneumococcal 0-64 years Vaccine (2 of 2 - PCV) 10/12/2017    Colorectal Cancer Screen  Never done    Shingles vaccine (1 of 2) Never done    COVID-19 Vaccine (3 - 2023-24 season) 09/01/2024    Hepatitis B vaccine (2 of 3 - 19+ 3-dose series) 11/12/2024    Depression Monitoring  10/15/2025    A1C test (Diabetic or Prediabetic)  10/26/2025    Lipids  10/26/2025    DTaP/Tdap/Td vaccine (2 - Td or Tdap) 10/08/2026    Flu vaccine  Completed    Hepatitis C screen  Completed    HIV screen  Completed    Hepatitis A vaccine  Aged Out    Hib vaccine  Aged Out    Polio vaccine  Aged Out    Meningococcal (ACWY) vaccine  Aged Out    Diabetic foot exam  Discontinued       Hemoglobin A1C (%)   Date Value   10/26/2024 5.9   10/17/2024 5.6   06/08/2022 6.0             ( goal A1C is < 7)   No components found for: \"LABMICR\"  No components found for: \"LDLCHOLESTEROL\", \"LDLCALC\"    (goal LDL is <100)   AST (U/L)   Date Value   11/08/2024 21     ALT (U/L)   Date Value   11/08/2024 20     BUN (mg/dL)   Date Value   11/08/2024 13     BP Readings from Last 3 Encounters:   01/07/25 130/82   10/30/24 130/81   11/01/24 128/66          (goal 120/80)    All Future

## 2025-02-04 RX ORDER — MECLIZINE HYDROCHLORIDE 25 MG/1
25 TABLET ORAL 3 TIMES DAILY PRN
Qty: 30 TABLET | Refills: 0 | Status: SHIPPED | OUTPATIENT
Start: 2025-02-04 | End: 2025-03-04

## 2025-02-27 ENCOUNTER — TELEPHONE (OUTPATIENT)
Dept: INTERNAL MEDICINE | Age: 55
End: 2025-02-27

## 2025-02-27 NOTE — TELEPHONE ENCOUNTER
[employer] asking if he could have a letter stating he is cleared to start school hell be doing on line classes I have format that needs to be hand written out

## 2025-02-28 NOTE — TELEPHONE ENCOUNTER
I do not see any reason why he would be unable to do online classes. Will route this back to myself as a reminder, but will plan to write handwritten letter when I'm in the clinic on Tuesday.    Glenn Freeman MD  Internal Medicine Resident, PGY-3  [facility]  02/28/25  2:04 PM

## 2025-03-02 NOTE — PROGRESS NOTES
Not on file   Occupational History    Not on file   Tobacco Use    Smoking status: Every Day     Current packs/day: 0.50     Average packs/day: 0.5 packs/day for 30.0 years (15.0 ttl pk-yrs)     Types: Cigarettes    Smokeless tobacco: Never    Tobacco comments:     6-10 per day    Substance and Sexual Activity    Alcohol use: Yes     Alcohol/week: 3.0 standard drinks of alcohol     Types: 3 Cans of beer per week     Comment: 3 24 oz cans of beer daily    Drug use: Yes     Types: Cocaine    Sexual activity: Not on file   Other Topics Concern    Not on file   Social History Narrative    Not on file     Social Determinants of Health     Financial Resource Strain: High Risk (10/15/2024)    Overall Financial Resource Strain (CARDIA)     Difficulty of Paying Living Expenses: Hard   Food Insecurity: Food Insecurity Present (10/25/2024)    Hunger Vital Sign     Worried About Running Out of Food in the Last Year: Sometimes true     Ran Out of Food in the Last Year: Sometimes true   Transportation Needs: Unmet Transportation Needs (10/25/2024)    PRAPARE - Transportation     Lack of Transportation (Medical): Yes     Lack of Transportation (Non-Medical): Yes   Physical Activity: Not on file   Stress: Not on file   Social Connections: Not on file   Intimate Partner Violence: Not on file   Housing Stability: High Risk (10/25/2024)    Housing Stability Vital Sign     Unable to Pay for Housing in the Last Year: Yes     Number of Times Moved in the Last Year: 0     Homeless in the Last Year: No       REVIEW OF SYSTEMS:     A 12-point review of systems was obtained and pertinent positives and negatives were listed below.   Constitutional: No fever, no chills, no lethargy, no weakness.  HEENT:  No headache, otalgia, itchy eyes, nasal discharge or sore throat.  Cardiac:  No chest pain, dyspnea, orthopnea or PND.  Chest:   No cough, phlegm or wheezing.  Abdomen:      Detailed by MA and HPI above  Neuro:  No focal weakness, abnormal

## 2025-03-03 ENCOUNTER — PREP FOR PROCEDURE (OUTPATIENT)
Dept: GASTROENTEROLOGY | Age: 55
End: 2025-03-03

## 2025-03-03 ENCOUNTER — OFFICE VISIT (OUTPATIENT)
Dept: GASTROENTEROLOGY | Age: 55
End: 2025-03-03
Payer: COMMERCIAL

## 2025-03-03 VITALS
OXYGEN SATURATION: 98 % | BODY MASS INDEX: 30.92 KG/M2 | DIASTOLIC BLOOD PRESSURE: 106 MMHG | HEIGHT: 67 IN | RESPIRATION RATE: 18 BRPM | WEIGHT: 197 LBS | SYSTOLIC BLOOD PRESSURE: 193 MMHG | HEART RATE: 89 BPM

## 2025-03-03 DIAGNOSIS — K52.9 CHRONIC DIARRHEA: ICD-10-CM

## 2025-03-03 DIAGNOSIS — Z12.11 SCREENING FOR COLON CANCER: ICD-10-CM

## 2025-03-03 DIAGNOSIS — R42 DIZZINESS: ICD-10-CM

## 2025-03-03 DIAGNOSIS — K21.9 GASTROESOPHAGEAL REFLUX DISEASE, UNSPECIFIED WHETHER ESOPHAGITIS PRESENT: Primary | ICD-10-CM

## 2025-03-03 DIAGNOSIS — K92.0 COFFEE GROUND EMESIS: ICD-10-CM

## 2025-03-03 DIAGNOSIS — Z12.11 COLON CANCER SCREENING: ICD-10-CM

## 2025-03-03 PROCEDURE — 3080F DIAST BP >= 90 MM HG: CPT | Performed by: STUDENT IN AN ORGANIZED HEALTH CARE EDUCATION/TRAINING PROGRAM

## 2025-03-03 PROCEDURE — 4004F PT TOBACCO SCREEN RCVD TLK: CPT | Performed by: STUDENT IN AN ORGANIZED HEALTH CARE EDUCATION/TRAINING PROGRAM

## 2025-03-03 PROCEDURE — 99214 OFFICE O/P EST MOD 30 MIN: CPT | Performed by: STUDENT IN AN ORGANIZED HEALTH CARE EDUCATION/TRAINING PROGRAM

## 2025-03-03 PROCEDURE — G8417 CALC BMI ABV UP PARAM F/U: HCPCS | Performed by: STUDENT IN AN ORGANIZED HEALTH CARE EDUCATION/TRAINING PROGRAM

## 2025-03-03 PROCEDURE — 3077F SYST BP >= 140 MM HG: CPT | Performed by: STUDENT IN AN ORGANIZED HEALTH CARE EDUCATION/TRAINING PROGRAM

## 2025-03-03 PROCEDURE — 3017F COLORECTAL CA SCREEN DOC REV: CPT | Performed by: STUDENT IN AN ORGANIZED HEALTH CARE EDUCATION/TRAINING PROGRAM

## 2025-03-03 PROCEDURE — G8427 DOCREV CUR MEDS BY ELIG CLIN: HCPCS | Performed by: STUDENT IN AN ORGANIZED HEALTH CARE EDUCATION/TRAINING PROGRAM

## 2025-03-03 RX ORDER — BISACODYL 5 MG/1
TABLET, DELAYED RELEASE ORAL
Qty: 4 TABLET | Refills: 0 | Status: SHIPPED | OUTPATIENT
Start: 2025-03-03

## 2025-03-03 RX ORDER — POLYETHYLENE GLYCOL 3350, SODIUM SULFATE ANHYDROUS, SODIUM BICARBONATE, SODIUM CHLORIDE, POTASSIUM CHLORIDE 236; 22.74; 6.74; 5.86; 2.97 G/4L; G/4L; G/4L; G/4L; G/4L
POWDER, FOR SOLUTION ORAL
Qty: 4000 ML | Refills: 0 | Status: SHIPPED | OUTPATIENT
Start: 2025-03-03

## 2025-03-03 NOTE — TELEPHONE ENCOUNTER
Matthieu Barba is calling to request a refill on the following medication(s):    Medication Request:  Requested Prescriptions     Pending Prescriptions Disp Refills    meclizine (ANTIVERT) 25 MG tablet [Pharmacy Med Name: Meclizine HCl 25 MG Oral Tablet] 30 tablet 0     Sig: TAKE 1 TABLET BY MOUTH THREE TIMES DAILY AS NEEDED FOR DIZZINESS       Last Visit Date (If Applicable):  1/7/2025    Next Visit Date:    Visit date not found

## 2025-03-03 NOTE — TELEPHONE ENCOUNTER
Procedure scheduled/Dr Cameron  Procedure:colon/egd  Dx:gerd/screening   Date::05/22/2025  [id number] pm  Hospital:[facility] phone call:tbd  Bowel Prep instructions given:radha/dulcolax  In office/via phone: in office  Clearance needed:no  GLP - 1:

## 2025-03-04 RX ORDER — MECLIZINE HYDROCHLORIDE 25 MG/1
25 TABLET ORAL 3 TIMES DAILY PRN
Qty: 30 TABLET | Refills: 2 | Status: SHIPPED | OUTPATIENT
Start: 2025-03-04

## 2025-03-04 NOTE — TELEPHONE ENCOUNTER
Note written in office today.    Glenn Freeman MD  Internal Medicine Resident, PGY-3  [facility]  03/04/25  4:08 PM

## 2025-03-21 ENCOUNTER — HOSPITAL ENCOUNTER (EMERGENCY)
Age: 55
Discharge: HOME | End: 2025-03-21
Payer: MEDICAID

## 2025-03-21 VITALS
OXYGEN SATURATION: 95 % | RESPIRATION RATE: 18 BRPM | TEMPERATURE: 97.7 F | HEART RATE: 75 BPM | WEIGHT: 201 LBS | SYSTOLIC BLOOD PRESSURE: 156 MMHG | BODY MASS INDEX: 31.48 KG/M2 | DIASTOLIC BLOOD PRESSURE: 99 MMHG

## 2025-03-21 DIAGNOSIS — I10 ASYMPTOMATIC HYPERTENSION: Primary | ICD-10-CM

## 2025-03-21 PROCEDURE — 6370000000 HC RX 637 (ALT 250 FOR IP)

## 2025-03-21 PROCEDURE — 99283 EMERGENCY DEPT VISIT LOW MDM: CPT

## 2025-03-21 RX ORDER — BLOOD PRESSURE TEST KIT
1 KIT MISCELLANEOUS DAILY
Qty: 1 KIT | Refills: 0 | Status: SHIPPED | OUTPATIENT
Start: 2025-03-21

## 2025-03-21 RX ORDER — AMLODIPINE BESYLATE 5 MG/1
10 TABLET ORAL ONCE
Status: COMPLETED | OUTPATIENT
Start: 2025-03-21 | End: 2025-03-21

## 2025-03-21 RX ORDER — CLONIDINE HYDROCHLORIDE 0.1 MG/1
0.1 TABLET ORAL ONCE
Status: COMPLETED | OUTPATIENT
Start: 2025-03-21 | End: 2025-03-21

## 2025-03-21 RX ADMIN — AMLODIPINE BESYLATE 10 MG: 5 TABLET ORAL at 11:10

## 2025-03-21 RX ADMIN — CLONIDINE HYDROCHLORIDE 0.1 MG: 0.1 TABLET ORAL at 11:11

## 2025-03-21 ASSESSMENT — ENCOUNTER SYMPTOMS
SHORTNESS OF BREATH: 0
COUGH: 0
CHEST TIGHTNESS: 0
PHOTOPHOBIA: 0
ABDOMINAL PAIN: 0
NAUSEA: 0
VOMITING: 0

## 2025-03-21 ASSESSMENT — PAIN - FUNCTIONAL ASSESSMENT: PAIN_FUNCTIONAL_ASSESSMENT: NONE - DENIES PAIN

## 2025-03-21 ASSESSMENT — LIFESTYLE VARIABLES: HOW MANY STANDARD DRINKS CONTAINING ALCOHOL DO YOU HAVE ON A TYPICAL DAY: PATIENT DOES NOT DRINK

## 2025-03-21 NOTE — DISCHARGE INSTRUCTIONS
Go to your follow-up appointment next week as planned.    Take your lisinopril, amlodipine and clonidine as prescribed.    Limit salt intake, reviewed DASH diet handout.    Make sure to record a daily blood pressure and take to your follow-up appointment.    You may need to take blood pressure medications for the remainder of your life.  Limit the amount of salt that you use. Increase amount of exercise (3 - 4 times a week for minimal of 20 minutes each times).  Eat fresh foods.    PLEASE RETURN TO THE EMERGENCY DEPARTMENT IMMEDIATELY for worsening symptoms, or if you develop any concerning symptoms such as: high fever not relieved by acetaminophen (Tylenol) and/or ibuprofen (Motrin / Advil), chills, shortness of breath, chest pain, feeling of your heart fluttering or racing, persistent nausea and/or vomiting, vomiting up blood, blood in your stool, numbness, loss of consciousness, weakness or tingling in the arms or legs or change in color of the extremities, changes in mental status, persistent headache, blurry vision, loss of bladder / bowel control, unable to follow up with your physician, or other any other care or concern.

## 2025-03-21 NOTE — ED PROVIDER NOTES
[facility] Emergency Department  [facility] [address]  Phone: [phone]        Pt Name: Matthieu Barba  MRN: [id number]  Birthdate 1970  Date of evaluation: 3/21/25    CHIEFCOMPLAINT       Chief Complaint   Patient presents with    Hypertension     [facility]       HISTORY OF PRESENT ILLNESS (Location/Symptom, Timing/Onset, Context/Setting, Quality, Duration, Modifying Factors, Severity)      Matthieu Barba is a 54 y.o. male with pertinent PMH of hypertension, CVA, GERD, hyperlipidemia who presents to the ED via private auto with complaint of elevated blood pressure with a systolic blood pressure reading of 200 this morning.  [facility] staying at [facility], states he took his 40 mg of lisinopril around 9 AM.  States he did not have any headache, chest pain or shortness of breath.  No blurred or double vision.  No swelling to his legs.  Was feeling completely fine.  States he is here as a precaution.  Patient does report that he has amlodipine going to his house that he will need to  tomorrow or the next day.  States this is ordered by cardiology.  Had recent appointment and they added the amlodipine to his lisinopril, reports that his lisinopril is now 40 mg daily.  Also states [facility] doctor started him on clonidine 3 times daily which he also did not take it today.  No cough, fever or chills, nausea or vomiting.  Reports he has an appointment with his interventional neurologist/stroke doctor on Monday to follow-up.    PAST MEDICAL / SURGICAL / SOCIAL / FAMILY HISTORY     PMH:  has a past medical history of Arthritis, Chronic sinusitis, Eczema, Environmental allergies, GERD (gastroesophageal reflux disease), Hyperlipidemia, Hypertension, Sepsis (HCC), and Snores.  Surgical History:  has a past surgical history that includes Upper gastrointestinal endoscopy and Colonoscopy.  Social History:  reports that he has been smoking cigarettes. He has a 15  or complaints.       CONSULTS:  None    PROCEDURES:  None    FINAL IMPRESSION      1. Asymptomatic hypertension          DISPOSITION / PLAN     CONDITION ON DISPOSITION:   Good / Stable for discharge.     PATIENT REFERRED TO:  Glenn Freeman MD  [address] [facility] [id number] [phone]          [facility] Emergency Department  [address] [facility] [address] [address]  [phone]  Go to   New or worsening symptoms    Elvin Araujo MD  [address] [facility] [address] [phone]            DISCHARGE MEDICATIONS:  New Prescriptions    No medications on file       LATOSHA Rojas CNP   Emergency Medicine Nurse Practitioner    (Please note that portions of this note were completed with a voice recognition program.  Efforts were made to edit the dictations but occasionally words aremis-transcribed.)        Miladis Lau APRN - CNP  03/21/25 [id number]

## 2025-03-22 ASSESSMENT — PATIENT HEALTH QUESTIONNAIRE - PHQ9
SUM OF ALL RESPONSES TO PHQ QUESTIONS 1-9: 7
8. MOVING OR SPEAKING SO SLOWLY THAT OTHER PEOPLE COULD HAVE NOTICED. OR THE OPPOSITE, BEING SO FIGETY OR RESTLESS THAT YOU HAVE BEEN MOVING AROUND A LOT MORE THAN USUAL: NOT AT ALL
SUM OF ALL RESPONSES TO PHQ QUESTIONS 1-9: 7
5. POOR APPETITE OR OVEREATING: SEVERAL DAYS
4. FEELING TIRED OR HAVING LITTLE ENERGY: SEVERAL DAYS
9. THOUGHTS THAT YOU WOULD BE BETTER OFF DEAD, OR OF HURTING YOURSELF: NOT AT ALL
7. TROUBLE CONCENTRATING ON THINGS, SUCH AS READING THE NEWSPAPER OR WATCHING TELEVISION: MORE THAN HALF THE DAYS
1. LITTLE INTEREST OR PLEASURE IN DOING THINGS: SEVERAL DAYS
SUM OF ALL RESPONSES TO PHQ QUESTIONS 1-9: 7
SUM OF ALL RESPONSES TO PHQ QUESTIONS 1-9: 7
6. FEELING BAD ABOUT YOURSELF - OR THAT YOU ARE A FAILURE OR HAVE LET YOURSELF OR YOUR FAMILY DOWN: SEVERAL DAYS
3. TROUBLE FALLING OR STAYING ASLEEP: NOT AT ALL
2. FEELING DOWN, DEPRESSED OR HOPELESS: SEVERAL DAYS
10. IF YOU CHECKED OFF ANY PROBLEMS, HOW DIFFICULT HAVE THESE PROBLEMS MADE IT FOR YOU TO DO YOUR WORK, TAKE CARE OF THINGS AT HOME, OR GET ALONG WITH OTHER PEOPLE: SOMEWHAT DIFFICULT

## 2025-03-25 ASSESSMENT — PATIENT HEALTH QUESTIONNAIRE - PHQ9
10. IF YOU CHECKED OFF ANY PROBLEMS, HOW DIFFICULT HAVE THESE PROBLEMS MADE IT FOR YOU TO DO YOUR WORK, TAKE CARE OF THINGS AT HOME, OR GET ALONG WITH OTHER PEOPLE: SOMEWHAT DIFFICULT
3. TROUBLE FALLING OR STAYING ASLEEP: NOT AT ALL
1. LITTLE INTEREST OR PLEASURE IN DOING THINGS: SEVERAL DAYS
7. TROUBLE CONCENTRATING ON THINGS, SUCH AS READING THE NEWSPAPER OR WATCHING TELEVISION: MORE THAN HALF THE DAYS
6. FEELING BAD ABOUT YOURSELF - OR THAT YOU ARE A FAILURE OR HAVE LET YOURSELF OR YOUR FAMILY DOWN: SEVERAL DAYS
4. FEELING TIRED OR HAVING LITTLE ENERGY: SEVERAL DAYS
5. POOR APPETITE OR OVEREATING: SEVERAL DAYS
SUM OF ALL RESPONSES TO PHQ QUESTIONS 1-9: 7
9. THOUGHTS THAT YOU WOULD BE BETTER OFF DEAD, OR OF HURTING YOURSELF: NOT AT ALL
2. FEELING DOWN, DEPRESSED OR HOPELESS: SEVERAL DAYS
8. MOVING OR SPEAKING SO SLOWLY THAT OTHER PEOPLE COULD HAVE NOTICED. OR THE OPPOSITE - BEING SO FIDGETY OR RESTLESS THAT YOU HAVE BEEN MOVING AROUND A LOT MORE THAN USUAL: NOT AT ALL

## 2025-04-01 ENCOUNTER — OFFICE VISIT (OUTPATIENT)
Dept: INTERNAL MEDICINE | Age: 55
End: 2025-04-01
Payer: MEDICAID

## 2025-04-01 VITALS
HEIGHT: 67 IN | BODY MASS INDEX: 31.71 KG/M2 | WEIGHT: 202 LBS | DIASTOLIC BLOOD PRESSURE: 94 MMHG | OXYGEN SATURATION: 98 % | HEART RATE: 93 BPM | SYSTOLIC BLOOD PRESSURE: 140 MMHG

## 2025-04-01 DIAGNOSIS — Z23 NEED FOR SHINGLES VACCINE: ICD-10-CM

## 2025-04-01 DIAGNOSIS — I10 PRIMARY HYPERTENSION: ICD-10-CM

## 2025-04-01 DIAGNOSIS — Z23 NEED FOR HEPATITIS B VACCINATION: ICD-10-CM

## 2025-04-01 DIAGNOSIS — Z23 NEED FOR PNEUMOCOCCAL VACCINE: ICD-10-CM

## 2025-04-01 DIAGNOSIS — K21.9 GASTROESOPHAGEAL REFLUX DISEASE, UNSPECIFIED WHETHER ESOPHAGITIS PRESENT: ICD-10-CM

## 2025-04-01 DIAGNOSIS — R73.03 PREDIABETES: Primary | ICD-10-CM

## 2025-04-01 PROCEDURE — 3077F SYST BP >= 140 MM HG: CPT

## 2025-04-01 PROCEDURE — G8427 DOCREV CUR MEDS BY ELIG CLIN: HCPCS

## 2025-04-01 PROCEDURE — 3080F DIAST BP >= 90 MM HG: CPT

## 2025-04-01 PROCEDURE — G0009 ADMIN PNEUMOCOCCAL VACCINE: HCPCS | Performed by: STUDENT IN AN ORGANIZED HEALTH CARE EDUCATION/TRAINING PROGRAM

## 2025-04-01 PROCEDURE — G8417 CALC BMI ABV UP PARAM F/U: HCPCS

## 2025-04-01 PROCEDURE — 99213 OFFICE O/P EST LOW 20 MIN: CPT

## 2025-04-01 PROCEDURE — 3017F COLORECTAL CA SCREEN DOC REV: CPT

## 2025-04-01 PROCEDURE — 4004F PT TOBACCO SCREEN RCVD TLK: CPT

## 2025-04-01 RX ORDER — CLONIDINE HYDROCHLORIDE 0.1 MG/1
0.1 TABLET ORAL 2 TIMES DAILY
COMMUNITY
Start: 2025-03-21

## 2025-04-01 RX ORDER — ZOSTER VACCINE RECOMBINANT, ADJUVANTED 50 MCG/0.5
0.5 KIT INTRAMUSCULAR SEE ADMIN INSTRUCTIONS
Qty: 0.5 ML | Refills: 0 | Status: SHIPPED | OUTPATIENT
Start: 2025-04-01 | End: 2025-09-28

## 2025-04-01 RX ORDER — LISINOPRIL 40 MG/1
40 TABLET ORAL DAILY
Qty: 30 TABLET | Refills: 2 | Status: SHIPPED | OUTPATIENT
Start: 2025-04-01 | End: 2025-06-30

## 2025-04-01 RX ORDER — BUPROPION HYDROCHLORIDE 150 MG/1
150 TABLET ORAL EVERY MORNING
COMMUNITY
Start: 2025-02-20

## 2025-04-01 RX ORDER — PANTOPRAZOLE SODIUM 40 MG/1
40 TABLET, DELAYED RELEASE ORAL DAILY
Qty: 90 TABLET | Refills: 2 | Status: SHIPPED | OUTPATIENT
Start: 2025-04-01

## 2025-04-01 RX ORDER — ASPIRIN 81 MG/1
81 TABLET ORAL DAILY
Qty: 30 TABLET | Refills: 3 | Status: SHIPPED | OUTPATIENT
Start: 2025-04-01

## 2025-04-01 RX ORDER — TAMSULOSIN HYDROCHLORIDE 0.4 MG/1
0.4 CAPSULE ORAL DAILY
COMMUNITY
Start: 2025-03-26

## 2025-04-01 ASSESSMENT — ENCOUNTER SYMPTOMS
SHORTNESS OF BREATH: 0
CHEST TIGHTNESS: 0
VOMITING: 0
CONSTIPATION: 0
WHEEZING: 0
COUGH: 0
DIARRHEA: 0
ABDOMINAL PAIN: 0
NAUSEA: 0

## 2025-04-01 NOTE — PROGRESS NOTES
Attending Physician Statement  I have discussed the care of Matthieu Barba, including pertinent history and exam findings with the resident. I have reviewed the key elements of all parts of the encounter with the resident. I agree with the assessment, and status of the problem list as documented.    and this was also documented by the resident.The medication list was reviewed with the resident and is up to date. The return visit should be in 2 months .     Diagnosis Orders   1. Prediabetes  Hemoglobin A1C      2. Gastroesophageal reflux disease, unspecified whether esophagitis present  pantoprazole (PROTONIX) 40 MG tablet      3. Primary hypertension  DME Order for (Specify) as OP    aspirin 81 MG EC tablet    lisinopril (PRINIVIL;ZESTRIL) 40 MG tablet      4. Need for shingles vaccine  zoster recombinant adjuvanted vaccine (SHINGRIX) 50 MCG/0.5ML SUSR injection      5. Need for pneumococcal vaccine  Pneumococcal, PCV20, PREVNAR 20, (age 6w+), IM, PF    IMMUNIZ ADMIN,1 SINGLE/COMB VAC/TOXOID      6. Need for hepatitis B vaccination  IMMUNIZ ADMIN,1 SINGLE/COMB VAC/TOXOID           Dede Winchester MD   Attending Physician, [facility]   Faculty, Internal Medicine Residency Program  [facility]

## 2025-04-01 NOTE — PROGRESS NOTES
[facility]  [facility]  [address] [place] [address]  Dept: [phone]  Dept Fax: [phone]    Office Progress/Follow Up Note  Date ofpatient's visit: 4/1/2025  Patient's Name:  Matthieu Barba YOB: 1970            Patient Care Team:  Glenn Fereman MD as PCP - General (Internal Medicine)  Boni Hercules MD as Consulting Physician (Gastroenterology)  ================================================================    REASON FOR VISIT/CHIEF COMPLAINT:  Hypertension (Has had multiple high bp readings and was recently in Er for it)      HISTORY OF PRESENTING ILLNESS:  History was obtained from: patient, electronic medical record. Matthieu Barba is a 54 y.o. male is here for a follow-up on HTN.   Patient stated that he is been having some difficulty with his blood pressure.  He currently is on Norvasc 10 and lisinopril 20.  Stated that he went to the emergency room on 21 March due to having high blood pressure.  On chart review it looks like his blood pressure was systolic 200s at the time.  He was not having any symptoms at the time.  He does not have a blood pressure cuff at home so is not checking his blood pressures.  He does state that he takes his blood pressure medication daily as prescribed.  He states he even tries to avoid salt as best he can in his diet as well.  Currently he denies any headache, shortness of breath, chest pain, blurred vision or any leg swelling.   Otherwise patient just curious as to what other tests or screenings that he needs to get done.  He knows he is due for colonoscopy which was previously ordered, additionally he is interested in starting the hepatitis B vaccine series and the pneumococcal vaccine.  He also stated he is interested in knowing his A1c, last time it was checked it was 5.9 he does have a history of prediabetes.      Patient Active Problem List   Diagnosis    Depression    Intervertebral lumbar disc

## 2025-04-02 PROBLEM — Z12.11 COLON CANCER SCREENING: Status: RESOLVED | Noted: 2025-03-03 | Resolved: 2025-04-02

## 2025-04-12 DIAGNOSIS — Z86.73 H/O: STROKE: ICD-10-CM

## 2025-04-14 NOTE — TELEPHONE ENCOUNTER
Last visit: 4/1/25  Last Med refill: 1/7/25  Does patient have enough medication for 72 hours: No:     Next Visit Date: 5/20/25  Future Appointments   Date Time Provider Department Center   4/16/2025 12:15 PM SCHEDULE, [facility] [facility] [facility]   5/8/2025  9:30 AM [facility] PAT RM 4 [facility] PAT [facility]   5/20/2025 10:30 AM Glenn Freeman MD [facility] ECC DEP   6/9/2025 11:30 AM Suyapa Montes MD [facility]   6/13/2025  8:30 AM Donny Cameron MD [facility]   9/24/2025  1:30 PM Brent Bentley, APRN - NP [facility] [facility]       Health Maintenance   Topic Date Due    Colorectal Cancer Screen  Never done    Shingles vaccine (1 of 2) Never done    Hepatitis B vaccine (2 of 3 - 19+ 3-dose series) 11/12/2024    COVID-19 Vaccine (3 - 2024-25 season) 04/01/2026 (Originally 9/1/2024)    A1C test (Diabetic or Prediabetic)  10/26/2025    Lipids  10/26/2025    Depression Monitoring  03/22/2026    DTaP/Tdap/Td vaccine (2 - Td or Tdap) 10/08/2026    Flu vaccine  Completed    Pneumococcal 50+ years Vaccine  Completed    Hepatitis C screen  Completed    HIV screen  Completed    Hepatitis A vaccine  Aged Out    Hib vaccine  Aged Out    Polio vaccine  Aged Out    Meningococcal (ACWY) vaccine  Aged Out    Meningococcal B vaccine  Aged Out    Diabetic foot exam  Discontinued    Pneumococcal 0-49 years Vaccine  Discontinued       Hemoglobin A1C (%)   Date Value   10/26/2024 5.9   10/17/2024 5.6   06/08/2022 6.0             ( goal A1C is < 7)   No components found for: \"LABMICR\"  No components found for: \"LDLCHOLESTEROL\", \"LDLCALC\"    (goal LDL is <100)   AST (U/L)   Date Value   11/08/2024 21     ALT (U/L)   Date Value   11/08/2024 20     BUN (mg/dL)   Date Value   11/08/2024 13     BP Readings from Last 3 Encounters:   04/01/25 (!) 140/94   03/21/25 (!) 156/99   03/12/25 (!) 176/98          (goal 120/80)    All Future Testing planned in [facility]  Lab Frequency Next Occurrence   EGD

## 2025-04-15 RX ORDER — ATORVASTATIN CALCIUM 20 MG/1
20 TABLET, FILM COATED ORAL DAILY
Qty: 28 TABLET | Refills: 3 | Status: SHIPPED | OUTPATIENT
Start: 2025-04-15

## 2025-04-15 NOTE — TELEPHONE ENCOUNTER
Lipid profile from 10/26/2024 shows LDL at goal, can continue lipitor 20 at this time.    Glenn Freeman MD  Internal Medicine Resident, PGY-3  [facility]  04/15/25  12:22 PM

## 2025-04-30 DIAGNOSIS — G47.9 SLEEP DIFFICULTIES: ICD-10-CM

## 2025-04-30 DIAGNOSIS — K21.9 GASTROESOPHAGEAL REFLUX DISEASE, UNSPECIFIED WHETHER ESOPHAGITIS PRESENT: ICD-10-CM

## 2025-04-30 NOTE — TELEPHONE ENCOUNTER
Request for   Requested Prescriptions      No prescriptions requested or ordered in this encounter    .      Please review and e-scribe to pharmacy listed in chart if appropriate. Thank you.      Last Visit Date: Visit date not found  Next Visit Date: 4/30/2025    Future Appointments   Date Time Provider Department Center   5/8/2025  9:30 AM ST[facility] PAT RM 4 [facility]CZ PAT [facility]   5/16/2025 12:15 PM SCHEDULE, [facility] [facility] [facility]   5/20/2025 10:30 AM Glenn Freeman MD [facility] DEP   6/9/2025 11:30 [facility]   6/13/2025  8[facility]30 [facility]   9/24/2025  1:30 PM Brent Bentley, APRN - NP [facility] [place] [facility]       Health Maintenance   Topic Date Due    Colorectal Cancer Screen  Never done    Shingles vaccine (1 of 2) Never done    Hepatitis B vaccine (2 of 3 - 19+ 3-dose series) 11/12/2024    COVID-19 Vaccine (3 - 2024-25 season) 04/01/2026 (Originally 9/1/2024)    A1C test (Diabetic or Prediabetic)  10/26/2025    Lipids  10/26/2025    Depression Monitoring  03/22/2026    DTaP/Tdap/Td vaccine (2 - Td or Tdap) 10/08/2026    Flu vaccine  Completed    Pneumococcal 50+ years Vaccine  Completed    Hepatitis C screen  Completed    HIV screen  Completed    Hepatitis A vaccine  Aged Out    Hib vaccine  Aged Out    Polio vaccine  Aged Out    Meningococcal (ACWY) vaccine  Aged Out    Meningococcal B vaccine  Aged Out    Diabetic foot exam  Discontinued    Pneumococcal 0-49 years Vaccine  Discontinued       Hemoglobin A1C (%)   Date Value   10/26/2024 5.9   10/17/2024 5.6   06/08/2022 6.0             ( goal A1C is < 7)   No components found for: \"LABMICR\"  No components found for: \"LDLCHOLESTEROL\", \"LDLCALC\"    (goal LDL is <100)   AST (U/L)   Date Value   11/08/2024 21     ALT (U/L)   Date Value   11/08/2024 20     BUN (mg/dL)   Date Value   11/08/2024 13     BP Readings from Last 3 Encounters:   04/01/25 (!) 140/94   03/21/25 (!)

## 2025-05-01 RX ORDER — PANTOPRAZOLE SODIUM 40 MG/1
40 TABLET, DELAYED RELEASE ORAL DAILY
Qty: 90 TABLET | Refills: 2 | Status: SHIPPED | OUTPATIENT
Start: 2025-05-01

## 2025-05-07 ENCOUNTER — HOSPITAL ENCOUNTER (EMERGENCY)
Age: 55
Discharge: HOME | End: 2025-05-07
Attending: EMERGENCY MEDICINE
Payer: MEDICAID

## 2025-05-07 ENCOUNTER — APPOINTMENT (OUTPATIENT)
Dept: CT IMAGING | Age: 55
End: 2025-05-07
Payer: MEDICAID

## 2025-05-07 VITALS
TEMPERATURE: 97.9 F | RESPIRATION RATE: 15 BRPM | DIASTOLIC BLOOD PRESSURE: 94 MMHG | SYSTOLIC BLOOD PRESSURE: 184 MMHG | BODY MASS INDEX: 32.11 KG/M2 | HEART RATE: 76 BPM | WEIGHT: 205 LBS | OXYGEN SATURATION: 99 %

## 2025-05-07 DIAGNOSIS — Z86.73 HISTORY OF STROKE: ICD-10-CM

## 2025-05-07 DIAGNOSIS — R42 DIZZINESS: Primary | ICD-10-CM

## 2025-05-07 DIAGNOSIS — I10 ESSENTIAL HYPERTENSION: ICD-10-CM

## 2025-05-07 LAB
ANION GAP SERPL CALCULATED.3IONS-SCNC: 11 MMOL/L (ref 9–16)
BASOPHILS # BLD: 0.08 K/UL (ref 0–0.2)
BASOPHILS NFR BLD: 1 % (ref 0–2)
BUN SERPL-MCNC: 11 MG/DL (ref 6–20)
CALCIUM SERPL-MCNC: 8.7 MG/DL (ref 8.6–10.4)
CHLORIDE SERPL-SCNC: 105 MMOL/L (ref 98–107)
CO2 SERPL-SCNC: 23 MMOL/L (ref 20–31)
CREAT SERPL-MCNC: 1.1 MG/DL (ref 0.7–1.2)
EOSINOPHIL # BLD: 0.24 K/UL (ref 0–0.44)
EOSINOPHILS RELATIVE PERCENT: 3 % (ref 1–4)
ERYTHROCYTE [DISTWIDTH] IN BLOOD BY AUTOMATED COUNT: 12.8 % (ref 11.8–14.4)
GFR, ESTIMATED: 85 ML/MIN/1.73M2
GLUCOSE SERPL-MCNC: 85 MG/DL (ref 74–99)
HCT VFR BLD AUTO: 38.2 % (ref 40.7–50.3)
HGB BLD-MCNC: 12.8 G/DL (ref 13–17)
IMM GRANULOCYTES # BLD AUTO: 0.02 K/UL (ref 0–0.3)
IMM GRANULOCYTES NFR BLD: 0 %
LYMPHOCYTES NFR BLD: 1.96 K/UL (ref 1.1–3.7)
LYMPHOCYTES RELATIVE PERCENT: 28 % (ref 24–43)
MCH RBC QN AUTO: 27.6 PG (ref 25.2–33.5)
MCHC RBC AUTO-ENTMCNC: 33.5 G/DL (ref 28.4–34.8)
MCV RBC AUTO: 82.5 FL (ref 82.6–102.9)
MONOCYTES NFR BLD: 0.57 K/UL (ref 0.1–1.2)
MONOCYTES NFR BLD: 8 % (ref 3–12)
MYOGLOBIN SERPL-MCNC: 52 NG/ML (ref 28–72)
NEUTROPHILS NFR BLD: 60 % (ref 36–65)
NEUTS SEG NFR BLD: 4.15 K/UL (ref 1.5–8.1)
NRBC BLD-RTO: 0 PER 100 WBC
PLATELET # BLD AUTO: 249 K/UL (ref 138–453)
PMV BLD AUTO: 9.9 FL (ref 8.1–13.5)
POTASSIUM SERPL-SCNC: 3.5 MMOL/L (ref 3.7–5.3)
RBC # BLD AUTO: 4.63 M/UL (ref 4.21–5.77)
RBC # BLD: ABNORMAL 10*6/UL
SODIUM SERPL-SCNC: 138 MMOL/L (ref 136–145)
TROPONIN I SERPL HS-MCNC: 14 NG/L (ref 0–22)
WBC OTHER # BLD: 7 K/UL (ref 3.5–11.3)

## 2025-05-07 PROCEDURE — 84484 ASSAY OF TROPONIN QUANT: CPT

## 2025-05-07 PROCEDURE — 85025 COMPLETE CBC W/AUTO DIFF WBC: CPT

## 2025-05-07 PROCEDURE — 99284 EMERGENCY DEPT VISIT MOD MDM: CPT

## 2025-05-07 PROCEDURE — 80048 BASIC METABOLIC PNL TOTAL CA: CPT

## 2025-05-07 PROCEDURE — 83874 ASSAY OF MYOGLOBIN: CPT

## 2025-05-07 PROCEDURE — 70450 CT HEAD/BRAIN W/O DYE: CPT

## 2025-05-07 PROCEDURE — 93005 ELECTROCARDIOGRAM TRACING: CPT | Performed by: PHYSICIAN ASSISTANT

## 2025-05-07 NOTE — ED PROVIDER NOTES
[facility] EMERGENCY DEPARTMENT  eMERGENCY dEPARTMENT eNCOUnter      Pt Name: Matthieu Barba  MRN: [id number]  Birthdate 1970  Date of evaluation: 5/7/2025  Provider: Jillian Son PA-C    CHIEF COMPLAINT       Chief Complaint   Patient presents with    Dizziness     Px arrives complaining of dizziness/lightheadedness when going from sitting to standing that initiated yesterday. Px denies chest pain or SOB         HISTORY OF PRESENT ILLNESS  (Location/Symptom, Timing/Onset, Context/Setting, Quality, Duration, Modifying Factors, Severity.)   Matthieu Barba is a 54 y.o. male who presents to the emergency department.  Patient presents with dizziness and \"stars\" in his vision since yesterday.  Patient states the \"stars\" come and go and so does the dizziness.  Patient states \"I am not sure if I feel dizzy or if the stars are making me dizzy.\"  Patient states he has a history of a stroke in October 2024.  Patient states he is on a daily aspirin, and is compliant with his medications. Patient denies chest pain, shortness of breath, headaches, abdominal pain, N/V, headache, lightheadedness, syncope, eye pain, eye redness, eye sensitivity to light, difficulty with speech, N/T, difficulty ambulating, change in urinary or bowel habits.  Patient states he is not in pain.    Nursing Notes were reviewed.    ALLERGIES     Patient has no known allergies.    CURRENT MEDICATIONS       Discharge Medication List as of 5/7/2025  6:37 PM        CONTINUE these medications which have NOT CHANGED    Details   melatonin 5 MG TABS tablet Take 1 tablet by mouth daily, Disp-30 tablet, R-2Normal      pantoprazole (PROTONIX) 40 MG tablet Take 1 tablet by mouth daily, Disp-90 tablet, R-2Normal      atorvastatin (LIPITOR) 20 MG tablet TAKE 1 TABLET BY MOUTH DAILY, Disp-28 tablet, R-3BUBBLE PACKAGING MEDICATION FOR ADHERENCE.  NEXT REFILL DUE 4/21/25--NO REFILLS REMAINING.  THANK YOU!Normal      aspirin 81 MG EC tablet Take 1

## 2025-05-08 ENCOUNTER — HOSPITAL ENCOUNTER (OUTPATIENT)
Dept: PREADMISSION TESTING | Age: 55
Discharge: HOME | End: 2025-05-12

## 2025-05-08 VITALS — WEIGHT: 205 LBS | HEIGHT: 67 IN | BODY MASS INDEX: 32.18 KG/M2

## 2025-05-08 LAB
EKG ATRIAL RATE: 70 BPM
EKG P AXIS: 36 DEGREES
EKG P-R INTERVAL: 188 MS
EKG Q-T INTERVAL: 390 MS
EKG QRS DURATION: 78 MS
EKG QTC CALCULATION (BAZETT): 421 MS
EKG R AXIS: 0 DEGREES
EKG T AXIS: 77 DEGREES
EKG VENTRICULAR RATE: 70 BPM

## 2025-05-08 ASSESSMENT — ENCOUNTER SYMPTOMS
CONSTIPATION: 0
ABDOMINAL PAIN: 0
VOMITING: 0
COLOR CHANGE: 0
PHOTOPHOBIA: 0
NAUSEA: 0
EYE REDNESS: 0
CHEST TIGHTNESS: 0
DIARRHEA: 0
EYE PAIN: 0

## 2025-05-08 NOTE — PROGRESS NOTES
Pre-op Instructions For Out-Patient Endoscopy Surgery    Medication Instructions:  Please stop herbs and any supplements now (includes vitamins and minerals).    For these medications:  Dulaglutide (Trulicity), Exenatide (Byetta and Bydureon, Liraglutide (Victoza), Lixisenatide (Adlyxin), Semaglutide (Ozempic and Rybelsus), Tirzepatide (Mounjaro, Zepbound)- Stop 1 week prior if taking weekly or 1 day prior if taking every 12 hours or daily.     Please contact your surgeon and prescribing physician for pre-op instructions for any blood thinners. ASPIRIN FOR 5 DAYS PER CARDIOLOGY    If you have inhalers/aerosol treatments at home, please use them the morning of your surgery and bring the inhalers with you to the hospital.    Please take the following medications the morning of your surgery with a sip of water:    amlodipine, clonidine    Surgery Instructions:  After midnight before surgery:  Do not eat or drink anything, including water, mints, gum, and hard candy.  You may brush your teeth without swallowing.  No smoking, chewing tobacco, or street drugs.     ** Please Follow Bowel Prep instructions if given by surgeon's office**    Please shower or bathe before surgery.       Please do not wear any cologne, lotion, powder, jewelry, piercings, perfume, makeup, nail polish, hair accessories, or hair spray on the day of surgery.  Wear loose comfortable clothing.    Leave your valuables at home but bring a payment source for any after-surgery prescriptions you plan to fill at [facility].  Bring a storage case for any glasses/contacts.    An adult who is responsible for you MUST drive you home and should be with you for the first 24 hours after surgery.     The Day of Surgery:  Arrive at [facility] Entr[facility] at the time directed by your surgeon and check in at the desk.     If you have a living will or healthcare power of , please bring a copy.    You will be taken to the pre-op

## 2025-05-20 NOTE — PRE-PROCEDURE INSTRUCTIONS
No answer, left message ?   yes                          Unable to leave message ?    When were you told to arrive at hospital ?  1015    Do you have a  ?    Are you on any blood thinners ?   Yes asa                 If yes when did you stop taking ?    Do you have your prep Rx filled and instruction ?      Nothing to eat the day before , only clear liquids.    Are you experiencing any covid symptoms ?     Do you have any infections or rash we should be aware of ?      Do you have the Hibiclens soap to use the night before and the morning of surgery ?    Nothing to eat or drink after midnight, only a sip of water to take any medication instructed to take the night before.  Wear comfortable clothing, leave any valuables at home, remove any jewelry and body piercing .

## 2025-05-21 ENCOUNTER — ANESTHESIA EVENT (OUTPATIENT)
Dept: ENDOSCOPY | Age: 55
End: 2025-05-21
Payer: MEDICAID

## 2025-05-21 PROBLEM — Z12.11 COLON CANCER SCREENING: Status: ACTIVE | Noted: 2025-03-03

## 2025-05-22 ENCOUNTER — ANESTHESIA (OUTPATIENT)
Dept: ENDOSCOPY | Age: 55
End: 2025-05-22
Payer: MEDICAID

## 2025-05-22 ENCOUNTER — HOSPITAL ENCOUNTER (OUTPATIENT)
Age: 55
Setting detail: OUTPATIENT SURGERY
Discharge: HOME | End: 2025-05-22
Attending: STUDENT IN AN ORGANIZED HEALTH CARE EDUCATION/TRAINING PROGRAM | Admitting: STUDENT IN AN ORGANIZED HEALTH CARE EDUCATION/TRAINING PROGRAM
Payer: MEDICAID

## 2025-05-22 VITALS
BODY MASS INDEX: 31.86 KG/M2 | SYSTOLIC BLOOD PRESSURE: 157 MMHG | HEIGHT: 67 IN | TEMPERATURE: 98.6 F | OXYGEN SATURATION: 97 % | WEIGHT: 203 LBS | DIASTOLIC BLOOD PRESSURE: 90 MMHG | HEART RATE: 66 BPM | RESPIRATION RATE: 19 BRPM

## 2025-05-22 DIAGNOSIS — K21.9 GERD (GASTROESOPHAGEAL REFLUX DISEASE): ICD-10-CM

## 2025-05-22 DIAGNOSIS — Z12.11 COLON CANCER SCREENING: ICD-10-CM

## 2025-05-22 PROBLEM — K64.8 INTERNAL HEMORRHOID: Status: ACTIVE | Noted: 2025-05-22

## 2025-05-22 PROBLEM — D50.0 IRON DEFICIENCY ANEMIA DUE TO CHRONIC BLOOD LOSS: Status: ACTIVE | Noted: 2025-05-22

## 2025-05-22 PROBLEM — K29.70 GASTRITIS WITHOUT BLEEDING: Status: ACTIVE | Noted: 2025-05-22

## 2025-05-22 PROBLEM — D12.3 ADENOMATOUS POLYP OF TRANSVERSE COLON: Status: ACTIVE | Noted: 2025-05-22

## 2025-05-22 PROCEDURE — 7100000011 HC PHASE II RECOVERY - ADDTL 15 MIN: Performed by: STUDENT IN AN ORGANIZED HEALTH CARE EDUCATION/TRAINING PROGRAM

## 2025-05-22 PROCEDURE — 3700000001 HC ADD 15 MINUTES (ANESTHESIA): Performed by: STUDENT IN AN ORGANIZED HEALTH CARE EDUCATION/TRAINING PROGRAM

## 2025-05-22 PROCEDURE — C1889 IMPLANT/INSERT DEVICE, NOC: HCPCS | Performed by: STUDENT IN AN ORGANIZED HEALTH CARE EDUCATION/TRAINING PROGRAM

## 2025-05-22 PROCEDURE — 3609012400 HC EGD TRANSORAL BIOPSY SINGLE/MULTIPLE: Performed by: STUDENT IN AN ORGANIZED HEALTH CARE EDUCATION/TRAINING PROGRAM

## 2025-05-22 PROCEDURE — 6360000002 HC RX W HCPCS: Performed by: NURSE ANESTHETIST, CERTIFIED REGISTERED

## 2025-05-22 PROCEDURE — 43239 EGD BIOPSY SINGLE/MULTIPLE: CPT | Performed by: STUDENT IN AN ORGANIZED HEALTH CARE EDUCATION/TRAINING PROGRAM

## 2025-05-22 PROCEDURE — 2709999900 HC NON-CHARGEABLE SUPPLY: Performed by: STUDENT IN AN ORGANIZED HEALTH CARE EDUCATION/TRAINING PROGRAM

## 2025-05-22 PROCEDURE — 88305 TISSUE EXAM BY PATHOLOGIST: CPT

## 2025-05-22 PROCEDURE — 45380 COLONOSCOPY AND BIOPSY: CPT | Performed by: STUDENT IN AN ORGANIZED HEALTH CARE EDUCATION/TRAINING PROGRAM

## 2025-05-22 PROCEDURE — 3609010600 HC COLONOSCOPY POLYPECTOMY SNARE/COLD BIOPSY: Performed by: STUDENT IN AN ORGANIZED HEALTH CARE EDUCATION/TRAINING PROGRAM

## 2025-05-22 PROCEDURE — 45385 COLONOSCOPY W/LESION REMOVAL: CPT | Performed by: STUDENT IN AN ORGANIZED HEALTH CARE EDUCATION/TRAINING PROGRAM

## 2025-05-22 PROCEDURE — 7100000010 HC PHASE II RECOVERY - FIRST 15 MIN: Performed by: STUDENT IN AN ORGANIZED HEALTH CARE EDUCATION/TRAINING PROGRAM

## 2025-05-22 PROCEDURE — 3700000000 HC ANESTHESIA ATTENDED CARE: Performed by: STUDENT IN AN ORGANIZED HEALTH CARE EDUCATION/TRAINING PROGRAM

## 2025-05-22 PROCEDURE — 2580000003 HC RX 258: Performed by: ANESTHESIOLOGY

## 2025-05-22 DEVICE — WORKING LENGTH 235CM, WORKING CHANNEL 2.8MM
Type: IMPLANTABLE DEVICE | Site: SIGMOID COLON | Status: FUNCTIONAL
Brand: RESOLUTION 360 CLIP

## 2025-05-22 RX ORDER — LIDOCAINE HYDROCHLORIDE 20 MG/ML
INJECTION, SOLUTION INFILTRATION; PERINEURAL
Status: DISCONTINUED | OUTPATIENT
Start: 2025-05-22 | End: 2025-05-22 | Stop reason: SDUPTHER

## 2025-05-22 RX ORDER — PROPOFOL 10 MG/ML
INJECTION, EMULSION INTRAVENOUS
Status: DISCONTINUED | OUTPATIENT
Start: 2025-05-22 | End: 2025-05-22 | Stop reason: SDUPTHER

## 2025-05-22 RX ORDER — LIDOCAINE HYDROCHLORIDE 10 MG/ML
1 INJECTION, SOLUTION EPIDURAL; INFILTRATION; INTRACAUDAL; PERINEURAL
Status: DISCONTINUED | OUTPATIENT
Start: 2025-05-22 | End: 2025-05-22 | Stop reason: HOSPADM

## 2025-05-22 RX ORDER — OMEPRAZOLE 40 MG/1
40 CAPSULE, DELAYED RELEASE ORAL
Qty: 60 CAPSULE | Refills: 2 | Status: SHIPPED | OUTPATIENT
Start: 2025-05-22

## 2025-05-22 RX ORDER — SODIUM CHLORIDE 0.9 % (FLUSH) 0.9 %
5-40 SYRINGE (ML) INJECTION EVERY 12 HOURS SCHEDULED
Status: DISCONTINUED | OUTPATIENT
Start: 2025-05-22 | End: 2025-05-22 | Stop reason: HOSPADM

## 2025-05-22 RX ORDER — MIDAZOLAM HYDROCHLORIDE 2 MG/2ML
INJECTION, SOLUTION INTRAMUSCULAR; INTRAVENOUS
Status: DISCONTINUED | OUTPATIENT
Start: 2025-05-22 | End: 2025-05-22 | Stop reason: SDUPTHER

## 2025-05-22 RX ORDER — SODIUM CHLORIDE, SODIUM LACTATE, POTASSIUM CHLORIDE, CALCIUM CHLORIDE 600; 310; 30; 20 MG/100ML; MG/100ML; MG/100ML; MG/100ML
INJECTION, SOLUTION INTRAVENOUS CONTINUOUS
Status: DISCONTINUED | OUTPATIENT
Start: 2025-05-22 | End: 2025-05-22 | Stop reason: HOSPADM

## 2025-05-22 RX ORDER — SODIUM CHLORIDE 9 MG/ML
INJECTION, SOLUTION INTRAVENOUS PRN
Status: DISCONTINUED | OUTPATIENT
Start: 2025-05-22 | End: 2025-05-22 | Stop reason: HOSPADM

## 2025-05-22 RX ORDER — SODIUM CHLORIDE 0.9 % (FLUSH) 0.9 %
5-40 SYRINGE (ML) INJECTION PRN
Status: DISCONTINUED | OUTPATIENT
Start: 2025-05-22 | End: 2025-05-22 | Stop reason: HOSPADM

## 2025-05-22 RX ADMIN — MIDAZOLAM HYDROCHLORIDE 2 MG: 1 INJECTION, SOLUTION INTRAMUSCULAR; INTRAVENOUS at 12:29

## 2025-05-22 RX ADMIN — PROPOFOL 50 MG: 10 INJECTION, EMULSION INTRAVENOUS at 12:53

## 2025-05-22 RX ADMIN — PROPOFOL 50 MG: 10 INJECTION, EMULSION INTRAVENOUS at 12:49

## 2025-05-22 RX ADMIN — PROPOFOL 50 MG: 10 INJECTION, EMULSION INTRAVENOUS at 13:09

## 2025-05-22 RX ADMIN — SODIUM CHLORIDE, POTASSIUM CHLORIDE, SODIUM LACTATE AND CALCIUM CHLORIDE: 600; 310; 30; 20 INJECTION, SOLUTION INTRAVENOUS at 10:58

## 2025-05-22 RX ADMIN — LIDOCAINE HYDROCHLORIDE 100 MG: 20 INJECTION, SOLUTION INFILTRATION; PERINEURAL at 12:37

## 2025-05-22 RX ADMIN — PROPOFOL 50 MG: 10 INJECTION, EMULSION INTRAVENOUS at 13:15

## 2025-05-22 RX ADMIN — PROPOFOL 50 MG: 10 INJECTION, EMULSION INTRAVENOUS at 12:59

## 2025-05-22 RX ADMIN — PROPOFOL 50 MG: 10 INJECTION, EMULSION INTRAVENOUS at 13:03

## 2025-05-22 RX ADMIN — PROPOFOL 50 MG: 10 INJECTION, EMULSION INTRAVENOUS at 12:41

## 2025-05-22 RX ADMIN — PROPOFOL 50 MG: 10 INJECTION, EMULSION INTRAVENOUS at 13:22

## 2025-05-22 RX ADMIN — PROPOFOL 80 MG: 10 INJECTION, EMULSION INTRAVENOUS at 12:37

## 2025-05-22 ASSESSMENT — ENCOUNTER SYMPTOMS
SINUS PRESSURE: 0
NAUSEA: 0
SHORTNESS OF BREATH: 0
ABDOMINAL PAIN: 0

## 2025-05-22 ASSESSMENT — PAIN - FUNCTIONAL ASSESSMENT
PAIN_FUNCTIONAL_ASSESSMENT: 0-10
PAIN_FUNCTIONAL_ASSESSMENT: 0-10

## 2025-05-22 ASSESSMENT — LIFESTYLE VARIABLES: SMOKING_STATUS: 1

## 2025-05-22 NOTE — DISCHARGE INSTRUCTIONS
Sedation or General Anesthesia, Adult  Care After  Refer to this sheet in the next 24 hours. These instructions provide you with information on caring for yourself after your procedure. Your caregiver may also give you more specific instructions. Your treatment has been planned according to current medical practices, but problems sometimes occur. Call your caregiver if you have any problems or questions after your procedure.   HOME CARE INSTRUCTIONS   Do not participate in any activities that require you to be alert or coordinated. Do not:  Drive.  Swim.  Ride a bicycle.  Operate heavy machinery.  Cook.  Use power tools.  Climb ladders.  Work at heights.  Take a bath.  Do not drink alcohol.  Do not make any important decisions or sign legal documents.  Stay with an adult.  The first meal following your procedure should be light and small. Avoid solid foods if you feel sick to your stomach (nauseous) or if you throw up (vomit).  Drink enough fluids to keep your urine clear or pale yellow.  Only take your usual medicines or new medicines if your caregiver approves them.  Only take over-the-counter or prescription medicines for pain, discomfort, or fever as directed by your caregiver.  Keep all follow-up appointments as directed by your caregiver.  SEEK IMMEDIATE MEDICAL CARE IF:   You are not feeling normal or behaving normally after 24 hours.  You have persistent nausea and vomiting.  You are unable to drink fluids or eat food.  You have difficulty urinating.  You have difficulty breathing or speaking.  You have blue or gray skin.  There is difficulty waking or you cannot be woken up.  You have heavy bleeding, redness, or a lot of swelling where the sedative or anesthesia entered your skin (intravenous site).  You have a rash.  MAKE SURE YOU:  Understand these instructions.  Will watch your condition.  Will get help right away if you are not doing well or get worse.  Document Released: 12/18/2006 Document Revised:  MAXIMILIANO, RD   Updated: 3/29/2011        Hemorrhoids: Care Instructions  Overview     Hemorrhoids are swollen veins that develop in the anal canal. Bleeding during bowel movements, itching, and rectal pain are the most common symptoms. Hemorrhoids can be uncomfortable at times, but rarely are they a serious problem.  Most of the time, you can treat them with simple changes to your diet and bowel habits. These changes include eating more fiber and not straining to pass stools. Most hemorrhoids don't need surgery or other treatment unless they are very large and painful or bleed a lot.  Follow-up care is a key part of your treatment and safety. Be sure to make and go to all appointments, and call your doctor if you are having problems. It's also a good idea to know your test results and keep a list of the medicines you take.  How can you care for yourself at home?  Sit in a few inches of warm water (sitz bath) 3 times a day and after bowel movements. The warm water helps with pain and itching.  Put ice on your anal area several times a day for 10 minutes at a time. Put a thin cloth between the ice and your skin. Follow this by placing a warm, wet towel on the area for another 10 to 20 minutes.  Take pain medicines exactly as directed.  If the doctor gave you a prescription medicine for pain, take it as prescribed.  If you are not taking a prescription pain medicine, ask your doctor if you can take an over-the-counter medicine.  Keep the anal area clean, but be gentle. Use water and a fragrance-free soap, or use baby wipes or medicated pads such as Tucks.  Wear cotton underwear and loose clothing to decrease moisture in the anal area.  Eat more fiber. Include foods such as whole-grain breads and cereals, raw vegetables, raw and dried fruits, and beans.  Drink plenty of fluids. If you have kidney, heart, or liver disease and have to limit fluids, talk with your doctor before you increase the amount of fluids you drink.  Use

## 2025-05-22 NOTE — H&P
HISTORY and PHYSICAL  [facility]       NAME:  Matthieu Barba  MRN: [id number]   YOB: 1970   Date: 5/22/2025   Age: 54 y.o.  Gender: male       COMPLAINT AND PRESENT HISTORY:        Matthieu Barba is 54 y.o.,   female, here for Procedure(s):  ESOPHAGOGASTRODUODENOSCOPY BIOPSY  COLONOSCOPY DIAGNOSTIC    Pt is being seen for :  Pre-Op Diagnosis Codes:      * GERD (gastroesophageal reflux disease)      * Colon cancer screening     Last colonoscopy and EGD done in 2018 at [facility].      No  abdominal pain  No   dysphagia, heartburn.   Pt has hx of GERD. Pt is on Protonix that is managing  Yes  nausea, vomiting, diarrhea, constipation.  No  blood in stool, dark tarry stools.  No  changes in appetite and unintended weight loss.  No  family history of colon cancer.  Yes  hx of smoking.     Medical history reviewed. Htn, hld, CVA-has occasional  dizziness as deficit s/p  Loop recorder and had device check done on 5/16/25.   Denies chest pain/pressure, palpitations,  Pt admits to  SOB, recent URI, fever or chills.     Completed and followed prescribed prep.     blood thinning medications:  aspirin    Patient denies any personal or family problems with anesthesia.     RECENT LABS, IMAGING AND TESTING     Lab Results   Component Value Date    WBC 7.0 05/07/2025    RBC 4.63 05/07/2025    HGB 12.8 (L) 05/07/2025    HCT 38.2 (L) 05/07/2025    MCV 82.5 (L) 05/07/2025    MCH 27.6 05/07/2025    MCHC 33.5 05/07/2025    RDW 12.8 05/07/2025     05/07/2025    MPV 9.9 05/07/2025        Lab Results   Component Value Date     05/07/2025    K 3.5 (L) 05/07/2025     05/07/2025    CO2 23 05/07/2025    BUN 11 05/07/2025    CREATININE 1.1 05/07/2025    GLUCOSE 85 05/07/2025    CALCIUM 8.7 05/07/2025    BILITOT <0.2 11/08/2024    ALKPHOS 94 11/08/2024    AST 21 11/08/2024    ALT 20 11/08/2024         PAST MEDICAL HISTORY     Past Medical History:   Diagnosis Date    Arthritis     Chronic

## 2025-05-22 NOTE — ANESTHESIA PRE PROCEDURE
Department of Anesthesiology  Preprocedure Note       Name:  Matthieu Barba   Age:  54 y.o.  :  1970                                          MRN:  [id number]         Date:  2025      Surgeon: Surgeon(s):  Donny Cameron MD    Procedure: Procedure(s):  ESOPHAGOGASTRODUODENOSCOPY BIOPSY  COLONOSCOPY DIAGNOSTIC    Medications prior to admission:   Prior to Admission medications    Medication Sig Start Date End Date Taking? Authorizing Provider   pantoprazole (PROTONIX) 40 MG tablet Take 1 tablet by mouth daily 25  Yes Glenn Freeman MD   atorvastatin (LIPITOR) 20 MG tablet TAKE 1 TABLET BY MOUTH DAILY 4/15/25  Yes Glenn Freeman MD   buPROPion (WELLBUTRIN XL) 150 MG extended release tablet Take 1 tablet by mouth every morning 25  Yes ProviderRenée MD   cloNIDine (CATAPRES) 0.1 MG tablet Take 1 tablet by mouth 2 times daily 3/21/25  Yes Renée Madrigal MD   tamsulosin (FLOMAX) 0.4 MG capsule Take 1 capsule by mouth daily 3/26/25  Yes ProviderRenée MD   lisinopril (PRINIVIL;ZESTRIL) 40 MG tablet Take 1 tablet by mouth daily 25 Yes Dede Winchester MD   amLODIPine (NORVASC) 10 MG tablet Take 1 tablet by mouth daily 3/12/25  Yes Brent Bentley, LATOSHA - NP   meclizine (ANTIVERT) 25 MG tablet TAKE 1 TABLET BY MOUTH THREE TIMES DAILY AS NEEDED FOR DIZZINESS 3/4/25  Yes Glenn Freeman MD   folic acid (FOLVITE) 1 MG tablet Take 1 tablet by mouth daily 10/15/24  Yes Glenn Freeman MD   thiamine 100 MG tablet Take 1 tablet by mouth daily 10/15/24  Yes Glenn Freeman MD   melatonin 5 MG TABS tablet Take 1 tablet by mouth daily 25   Glenn Freeman MD   aspirin 81 MG EC tablet Take 1 tablet by mouth daily 25   Dede Winchester MD   zoster recombinant adjuvanted vaccine (SHINGRIX) 50 MCG/0.5ML SUSR injection Inject 0.5 mLs into the muscle See Admin Instructions 1 dose now and repeat in 2-6 months 25  Annabella,

## 2025-05-22 NOTE — ANESTHESIA POSTPROCEDURE EVALUATION
Department of Anesthesiology  Postprocedure Note    Patient: Matthieu Barba  MRN: [id number]  YOB: 1970  Date of evaluation: 5/22/2025    Procedure Summary       Date: 05/22/25 Room / Location: [place] [id number] / [facility]    Anesthesia Start: 1232 Anesthesia Stop: 1333    Procedures:       ESOPHAGOGASTRODUODENOSCOPY BIOPSY (Esophagus)      COLONOSCOPY BIOPSY HOT/COLD SNARE POLYPECTOMY WITH CLIP PLACEMENT X1 (Rectum) Diagnosis:       GERD (gastroesophageal reflux disease)      Colon cancer screening      (GERD (gastroesophageal reflux disease) [K21.9])      (Colon cancer screening [Z12.11])    Surgeons: Donny Cameron MD Responsible Provider: Nancy Fox MD    Anesthesia Type: General ASA Status: 3            Anesthesia Type: General    Keira Phase I:      Keira Phase II: Keira Score: 10    Anesthesia Post Evaluation    Comments: POST- ANESTHESIA EVALUATION       Pt Name: Matthieu Barba  MRN: [id number]  YOB: 1970  Date of evaluation: 5/22/2025  Time:  4:22 PM      BP (!) 157/90   Pulse 66   Temp 98.6 °F (37 °C) (Infrared)   Resp 19   Ht 1.702 m (5' 7\")   Wt 92.1 kg (203 lb)   SpO2 97%   BMI 31.79 kg/m²      Consciousness Level  Awake  Cardiopulmonary Status  Stable  Pain Adequately Treated YES  Nausea / Vomiting  NO  Adequate Hydration  YES  Anesthesia Related Complications NONE      Electronically signed by Nancy Fox MD on 5/22/2025 at 4:22 PM      No notable events documented.

## 2025-05-22 NOTE — OP NOTE
COLONOSCOPY    DATE OF PROCEDURE: 5/22/2025    SURGEON: Donny Cameron MD  Facility : [facility]  ASSISTANT: None  PREOPERATIVE DIAGNOSIS: Screening colonoscopy, incidental change in bowel habits, diarrhea    POSTOPERATIVE DIAGNOSIS: as described below    OPERATION: Total colonoscopy with biopsy and snare polypectomy    ANESTHESIA: Monitored Anesthesia Care (MAC)    ESTIMATED BLOOD LOSS: less than 50 cc    COMPLICATIONS: None.     SPECIMENS:  Was Obtained:     ID Type Source Tests Collected by Time Destination   A : DUODENUM BIOPSY Tissue Duodenum SURGICAL PATHOLOGY Donny Cameron MD 5/22/2025 1240    B : GE JUNCTION BIOPSY Tissue GE Junction Biopsy SURGICAL PATHOLOGY Donny Cameron MD 5/22/2025 1240    C : STOMACH BIOPSY Tissue Stomach SURGICAL PATHOLOGY Donny Cameron MD 5/22/2025 1240    D : RANDOM COLON BIOPSY Tissue Colon SURGICAL PATHOLOGY Donny Cameron MD 5/22/2025 1255    E : TRANSVERSE COLON POLYP Tissue Colon-Transverse SURGICAL PATHOLOGY Donny Cameron MD 5/22/2025 1302    F : SIGMOID COLON POLYP Tissue Sigmoid Colon SURGICAL PATHOLOGY Donny Cameron MD 5/22/2025 1315    G : LARGE SIGMOID COLON POLYP Tissue Sigmoid Colon SURGICAL PATHOLOGY Donny Cameron MD 5/22/2025 1319    H : RECTAL POLYP Tissue Rectum SURGICAL PATHOLOGY Donny Cameron MD 5/22/2025 1322         HISTORY: The patient is a 54 y.o. year old male with history of above preop diagnosis.  I recommended colonoscopy with possible biopsy or polypectomy and I explained the risk, benefits, expected outcome, and alternatives to the procedure.  Risks included but are not limited to medication allergy, medication reaction, cardiovascular and respiratory problems, bleeding, perforation, infection, and/or missed diagnosis.  The patient understands and is in agreement.        PROCEDURE: Following arrival in the endoscopy room, the patient was placed in the left lateral decubitus position and final time-out

## 2025-05-22 NOTE — OP NOTE
Esophagogastroduodenoscopy    DATE OF PROCEDURE: 5/22/2025     SURGEON: Donny Cameron MD  Facility: [facility]  ASSISTANT: None  PREOPERATIVE DIAGNOSIS: GERD, anemia, History of coffee-ground    Diagnosis:    POSTOPERATIVE DIAGNOSIS: As described below (see findings and impression)    OPERATION: Upper GI endoscopy with Biopsy    ANESTHESIA: Monitored Anesthesia Care (MAC)     ESTIMATED BLOOD LOSS: Less than 50 ml    COMPLICATIONS: None.     SPECIMENS:  Was Obtained:     ID Type Source Tests Collected by Time Destination   A : DUODENUM BIOPSY Tissue Duodenum SURGICAL PATHOLOGY Donny Cameron MD 5/22/2025 1240    B : GE JUNCTION BIOPSY Tissue GE Junction Biopsy SURGICAL PATHOLOGY Donny Cameron MD 5/22/2025 1240    C : STOMACH BIOPSY Tissue Stomach SURGICAL PATHOLOGY Donny Cameron MD 5/22/2025 1240         HISTORY: The patient is a 54 y.o. year old male with history of above preop diagnosis.  I recommended esophagogastroduodenoscopy with possible biopsy and I explained the risk, benefits, expected outcome, and alternatives to the procedure.  Risks included but are not limited to bleeding, infection, respiratory distress, hypotension, and perforation of the esophagus, stomach, or duodenum.  Patient understands and is in agreement.      PROCEDURE: The patient was given IV Monitored Anesthesia Care (MAC) and vitals monitoring per anesthesia department.  The patient was placed in the left lateral decubitus position. The patient was monitored continuously with ECG tracing, pulse oximetry, blood pressure monitoring, and direct observation. The gastroscope was inserted into the mouth and advanced under direct vision to second portion of the duodenum.  A careful inspection was made as the gastroscope was withdrawn, including a retroflexed view of the proximal stomach; findings and interventions are described below. Appropriate photodocumentation was obtained. Post sedation patient was stable with

## 2025-05-27 LAB — SURGICAL PATHOLOGY REPORT: NORMAL

## 2025-06-03 ENCOUNTER — OFFICE VISIT (OUTPATIENT)
Age: 55
End: 2025-06-03
Payer: MEDICAID

## 2025-06-03 VITALS
SYSTOLIC BLOOD PRESSURE: 138 MMHG | OXYGEN SATURATION: 98 % | DIASTOLIC BLOOD PRESSURE: 82 MMHG | WEIGHT: 198 LBS | TEMPERATURE: 98.1 F | HEART RATE: 92 BPM | HEIGHT: 67 IN | BODY MASS INDEX: 31.08 KG/M2

## 2025-06-03 DIAGNOSIS — E11.9 TYPE 2 DIABETES MELLITUS WITHOUT COMPLICATION, WITHOUT LONG-TERM CURRENT USE OF INSULIN (HCC): Primary | ICD-10-CM

## 2025-06-03 DIAGNOSIS — N52.9 ERECTILE DYSFUNCTION, UNSPECIFIED ERECTILE DYSFUNCTION TYPE: ICD-10-CM

## 2025-06-03 PROCEDURE — G8427 DOCREV CUR MEDS BY ELIG CLIN: HCPCS

## 2025-06-03 PROCEDURE — 3046F HEMOGLOBIN A1C LEVEL >9.0%: CPT

## 2025-06-03 PROCEDURE — 3075F SYST BP GE 130 - 139MM HG: CPT

## 2025-06-03 PROCEDURE — 2022F DILAT RTA XM EVC RTNOPTHY: CPT

## 2025-06-03 PROCEDURE — 4004F PT TOBACCO SCREEN RCVD TLK: CPT

## 2025-06-03 PROCEDURE — 3079F DIAST BP 80-89 MM HG: CPT

## 2025-06-03 PROCEDURE — 99212 OFFICE O/P EST SF 10 MIN: CPT

## 2025-06-03 PROCEDURE — 99213 OFFICE O/P EST LOW 20 MIN: CPT

## 2025-06-03 PROCEDURE — G8417 CALC BMI ABV UP PARAM F/U: HCPCS

## 2025-06-03 PROCEDURE — 3017F COLORECTAL CA SCREEN DOC REV: CPT

## 2025-06-03 RX ORDER — SILDENAFIL 50 MG/1
50 TABLET, FILM COATED ORAL PRN
Qty: 30 TABLET | Refills: 3 | Status: SHIPPED | OUTPATIENT
Start: 2025-06-03

## 2025-06-03 RX ORDER — METFORMIN HYDROCHLORIDE 500 MG/1
500 TABLET, EXTENDED RELEASE ORAL
Qty: 90 TABLET | Refills: 1 | Status: SHIPPED | OUTPATIENT
Start: 2025-06-03

## 2025-06-03 ASSESSMENT — PATIENT HEALTH QUESTIONNAIRE - PHQ9
1. LITTLE INTEREST OR PLEASURE IN DOING THINGS: SEVERAL DAYS
2. FEELING DOWN, DEPRESSED OR HOPELESS: SEVERAL DAYS
SUM OF ALL RESPONSES TO PHQ QUESTIONS 1-9: 7
9. THOUGHTS THAT YOU WOULD BE BETTER OFF DEAD, OR OF HURTING YOURSELF: NOT AT ALL
4. FEELING TIRED OR HAVING LITTLE ENERGY: SEVERAL DAYS
6. FEELING BAD ABOUT YOURSELF - OR THAT YOU ARE A FAILURE OR HAVE LET YOURSELF OR YOUR FAMILY DOWN: SEVERAL DAYS
7. TROUBLE CONCENTRATING ON THINGS, SUCH AS READING THE NEWSPAPER OR WATCHING TELEVISION: MORE THAN HALF THE DAYS
SUM OF ALL RESPONSES TO PHQ QUESTIONS 1-9: 7
8. MOVING OR SPEAKING SO SLOWLY THAT OTHER PEOPLE COULD HAVE NOTICED. OR THE OPPOSITE, BEING SO FIGETY OR RESTLESS THAT YOU HAVE BEEN MOVING AROUND A LOT MORE THAN USUAL: NOT AT ALL
3. TROUBLE FALLING OR STAYING ASLEEP: NOT AT ALL
5. POOR APPETITE OR OVEREATING: SEVERAL DAYS
10. IF YOU CHECKED OFF ANY PROBLEMS, HOW DIFFICULT HAVE THESE PROBLEMS MADE IT FOR YOU TO DO YOUR WORK, TAKE CARE OF THINGS AT HOME, OR GET ALONG WITH OTHER PEOPLE: SOMEWHAT DIFFICULT

## 2025-06-03 ASSESSMENT — ENCOUNTER SYMPTOMS
DIARRHEA: 0
VOMITING: 0
SHORTNESS OF BREATH: 0
COUGH: 0
CONSTIPATION: 0
ABDOMINAL PAIN: 0
CHEST TIGHTNESS: 0
BLOOD IN STOOL: 0
WHEEZING: 0
NAUSEA: 0

## 2025-06-03 NOTE — PROGRESS NOTES
Attending Physician Statement  I have discussed the care of Matthieu Barba, including pertinent history and exam findings with the resident. I have reviewed the key elements of all parts of the encounter with the resident. I agree with the assessment, and status of the problem list as documented.    Diagnosis Orders   1. Type 2 diabetes mellitus without complication, without long-term current use of insulin (HCC)  metFORMIN (GLUCOPHAGE-XR) 500 MG extended release tablet      2. Erectile dysfunction, unspecified erectile dysfunction type  sildenafil (VIAGRA) 50 MG tablet        The plan and orders should include No orders of the defined types were placed in this encounter.   and this was also documented by the resident.  .    Dr Marie Nazario MD, FACP  Associate , Internal Medicine Residency Program  Residency Clinic , [facility]  Chair, Department of Internal Medicine  [facility] Internal Medicine Clerkship         6/3/2025, 1:04 PM        
chest tightness, shortness of breath and wheezing.    Cardiovascular:  Negative for chest pain, palpitations and leg swelling.   Gastrointestinal:  Negative for abdominal pain, blood in stool, constipation, diarrhea, nausea and vomiting.   Endocrine: Negative for polydipsia, polyphagia and polyuria.   Genitourinary:  Negative for dysuria.   Neurological:  Negative for dizziness and headaches.       PHYSICAL EXAM:  Vitals:    06/03/25 1015   BP: 138/82   BP Site: Right Upper Arm   Patient Position: Sitting   Pulse: 92   Temp: 98.1 °F (36.7 °C)   SpO2: 98%   Weight: 89.8 kg (198 lb)   Height: 1.702 m (5' 7.01\")     BP Readings from Last 3 Encounters:   06/03/25 138/82   05/22/25 (!) 157/90   05/07/25 (!) 184/94        Physical Exam  Constitutional:       General: He is not in acute distress.     Appearance: Normal appearance. He is obese.   Eyes:      General:         Right eye: No discharge.         Left eye: No discharge.      Extraocular Movements: Extraocular movements intact.      Conjunctiva/sclera: Conjunctivae normal.      Pupils: Pupils are equal, round, and reactive to light.   Cardiovascular:      Rate and Rhythm: Normal rate and regular rhythm.      Pulses: Normal pulses.      Heart sounds: Normal heart sounds.   Pulmonary:      Effort: Pulmonary effort is normal.      Breath sounds: Normal breath sounds. No wheezing, rhonchi or rales.   Abdominal:      General: Abdomen is flat. Bowel sounds are normal.      Tenderness: There is no abdominal tenderness. There is no guarding or rebound.   Skin:     General: Skin is warm and dry.      Coloration: Skin is not jaundiced.   Neurological:      General: No focal deficit present.      Mental Status: He is alert and oriented to person, place, and time. Mental status is at baseline.   Psychiatric:         Mood and Affect: Mood normal.         Behavior: Behavior normal.         Thought Content: Thought content normal.         Judgment: Judgment normal.

## 2025-06-13 DIAGNOSIS — R35.1 BENIGN PROSTATIC HYPERPLASIA WITH NOCTURIA: Primary | ICD-10-CM

## 2025-06-13 DIAGNOSIS — N40.1 BENIGN PROSTATIC HYPERPLASIA WITH NOCTURIA: Primary | ICD-10-CM

## 2025-06-13 NOTE — TELEPHONE ENCOUNTER
Matthieu Barba is calling to request a refill on the following medication(s):    Medication Request:  Requested Prescriptions     Pending Prescriptions Disp Refills    tamsulosin (FLOMAX) 0.4 MG capsule [Pharmacy Med Name: Tamsulosin HCl 0.4MG CAPS] 28 capsule      Sig: TAKE 1 CAPSULE BY MOUTH DAILY       Last Visit Date (If Applicable):  6/3/2025    Next Visit Date:    7/1/2025

## 2025-06-14 RX ORDER — TAMSULOSIN HYDROCHLORIDE 0.4 MG/1
0.4 CAPSULE ORAL DAILY
Qty: 28 CAPSULE | Refills: 2 | Status: SHIPPED | OUTPATIENT
Start: 2025-06-14

## 2025-06-16 ENCOUNTER — TELEPHONE (OUTPATIENT)
Age: 55
End: 2025-06-16

## 2025-06-16 DIAGNOSIS — R06.09 DYSPNEA ON EXERTION: Primary | ICD-10-CM

## 2025-06-16 NOTE — TELEPHONE ENCOUNTER
Patient placed call to office stating he was recently seen in office and failed to mention he has been having increased SOB especially when exercising and walking far distances. He is requesting a PFT to rule out asthma. Please advise

## 2025-06-20 PROBLEM — Z12.11 COLON CANCER SCREENING: Status: RESOLVED | Noted: 2025-03-03 | Resolved: 2025-06-20

## 2025-06-25 NOTE — TELEPHONE ENCOUNTER
I will order the PFTs, but the difficulty will be that I will not be around to address them if they are positive and he also may need other testing. Best course of action would be a NTP visit or an acute care visit to see if he may need an echo or stress test.    Glenn Freeman MD  Internal Medicine Resident, PGY-3  [facility]  06/25/25  12:27 PM

## 2025-06-26 ENCOUNTER — HOSPITAL ENCOUNTER (EMERGENCY)
Age: 55
Discharge: HOME | End: 2025-06-26
Attending: EMERGENCY MEDICINE
Payer: MEDICAID

## 2025-06-26 ENCOUNTER — APPOINTMENT (OUTPATIENT)
Dept: GENERAL RADIOLOGY | Age: 55
End: 2025-06-26
Payer: MEDICAID

## 2025-06-26 VITALS
OXYGEN SATURATION: 97 % | SYSTOLIC BLOOD PRESSURE: 178 MMHG | HEART RATE: 97 BPM | TEMPERATURE: 98.4 F | DIASTOLIC BLOOD PRESSURE: 77 MMHG | RESPIRATION RATE: 18 BRPM

## 2025-06-26 DIAGNOSIS — R05.2 SUBACUTE COUGH: Primary | ICD-10-CM

## 2025-06-26 DIAGNOSIS — J40 BRONCHITIS: ICD-10-CM

## 2025-06-26 PROCEDURE — 71046 X-RAY EXAM CHEST 2 VIEWS: CPT

## 2025-06-26 PROCEDURE — 99283 EMERGENCY DEPT VISIT LOW MDM: CPT | Performed by: EMERGENCY MEDICINE

## 2025-06-26 PROCEDURE — 6370000000 HC RX 637 (ALT 250 FOR IP): Performed by: EMERGENCY MEDICINE

## 2025-06-26 RX ORDER — AZITHROMYCIN 250 MG/1
TABLET, FILM COATED ORAL
Qty: 6 TABLET | Refills: 0 | Status: SHIPPED | OUTPATIENT
Start: 2025-06-26 | End: 2025-07-06

## 2025-06-26 RX ORDER — BENZONATATE 100 MG/1
100 CAPSULE ORAL ONCE
Status: COMPLETED | OUTPATIENT
Start: 2025-06-26 | End: 2025-06-26

## 2025-06-26 RX ORDER — BENZONATATE 100 MG/1
100 CAPSULE ORAL 3 TIMES DAILY PRN
Qty: 10 CAPSULE | Refills: 0 | Status: SHIPPED | OUTPATIENT
Start: 2025-06-26 | End: 2025-07-03

## 2025-06-26 RX ORDER — ALBUTEROL SULFATE 90 UG/1
2 INHALANT RESPIRATORY (INHALATION) 4 TIMES DAILY PRN
Qty: 18 G | Refills: 3 | Status: SHIPPED | OUTPATIENT
Start: 2025-06-26

## 2025-06-26 RX ADMIN — BENZONATATE 100 MG: 100 CAPSULE ORAL at 19:38

## 2025-06-26 ASSESSMENT — ENCOUNTER SYMPTOMS
WHEEZING: 0
NAUSEA: 0
VOMITING: 0
BACK PAIN: 0
ABDOMINAL PAIN: 0
SHORTNESS OF BREATH: 0
DIARRHEA: 0
COUGH: 1

## 2025-06-26 ASSESSMENT — PAIN - FUNCTIONAL ASSESSMENT: PAIN_FUNCTIONAL_ASSESSMENT: NONE - DENIES PAIN

## 2025-06-26 NOTE — ED NOTES
Pt presents to ED through triage with c/o cough  Pt states he has had cough for 7 days   Pt states his cough gets worse when he lays down  Pt denies chest pain or shortness of breath  Pt denies pain at this time   Pt is A&Ox4, even unlabored RR NAD   Pt resting comfortably on cot with call light within reach

## 2025-06-26 NOTE — ED PROVIDER NOTES
[facility] EMERGENCY DEPARTMENT  EMERGENCY DEPARTMENT ENCOUNTER      Pt Name: Matthieu Barba  MRN: [id number]  Birthdate 1970  Date of evaluation: 6/26/25  PCP:  Glenn Freeman MD    CHIEF COMPLAINT:   Chief Complaint   Patient presents with    Cough     HISTORY OF PRESENT ILLNESS   Matthieu Barba is a 54 y.o. male whopresents with with cough he states he has had a cough for the past couple weeks.  He states he is in a rehab center around other people he things are sick.  No chest pain or shortness of breath cough is productive of grayish-white sputum he denies fevers chills or systemic symptoms.  He has no official diagnosis of COPD or asthma.  Fortunately he still smokes.  Denies any abdominal pain nausea vomiting sore throat headache or neck        REVIEW OF SYSTEMS       Review of Systems   Constitutional:  Negative for chills and fever.   Respiratory:  Positive for cough. Negative for shortness of breath and wheezing.    Cardiovascular:  Negative for chest pain and palpitations.   Gastrointestinal:  Negative for abdominal pain, diarrhea, nausea and vomiting.   Genitourinary:  Negative for dysuria and urgency.   Musculoskeletal:  Negative for back pain and neck pain.            PAST MEDICAL HISTORY   PMH:  has a past medical history of Arthritis, Chronic sinusitis, CVA (cerebral vascular accident) ([facility]), Eczema, Environmental allergies, GERD (gastroesophageal reflux disease), Hyperlipidemia, Hypertension, Murmur, Sepsis ([facility]), and Snores.  SurgicalHistory:  has a past surgical history that includes Upper gastrointestinal endoscopy; Colonoscopy; Insertable Cardiac Monitor (Left, 11/2024); Upper gastrointestinal endoscopy (N/A, 5/22/2025); and Colonoscopy (N/A, 5/22/2025).  Social History:  reports that he has been smoking cigarettes. He has a 15 pack-year smoking history. He has never used smokeless tobacco. He reports that he does not currently use alcohol after a past usage of about 3.0

## 2025-07-01 ENCOUNTER — OFFICE VISIT (OUTPATIENT)
Age: 55
End: 2025-07-01
Payer: MEDICAID

## 2025-07-01 VITALS
DIASTOLIC BLOOD PRESSURE: 80 MMHG | HEIGHT: 67 IN | BODY MASS INDEX: 31.55 KG/M2 | WEIGHT: 201 LBS | OXYGEN SATURATION: 98 % | HEART RATE: 94 BPM | SYSTOLIC BLOOD PRESSURE: 110 MMHG

## 2025-07-01 DIAGNOSIS — Z12.11 ENCOUNTER FOR COLORECTAL CANCER SCREENING: ICD-10-CM

## 2025-07-01 DIAGNOSIS — K21.9 GASTROESOPHAGEAL REFLUX DISEASE, UNSPECIFIED WHETHER ESOPHAGITIS PRESENT: ICD-10-CM

## 2025-07-01 DIAGNOSIS — N40.1 BENIGN PROSTATIC HYPERPLASIA WITH NOCTURIA: Primary | ICD-10-CM

## 2025-07-01 DIAGNOSIS — I10 PRIMARY HYPERTENSION: ICD-10-CM

## 2025-07-01 DIAGNOSIS — R42 DIZZINESS: ICD-10-CM

## 2025-07-01 DIAGNOSIS — N52.9 ERECTILE DYSFUNCTION, UNSPECIFIED ERECTILE DYSFUNCTION TYPE: ICD-10-CM

## 2025-07-01 DIAGNOSIS — Z86.73 H/O: STROKE: ICD-10-CM

## 2025-07-01 DIAGNOSIS — Z12.12 ENCOUNTER FOR COLORECTAL CANCER SCREENING: ICD-10-CM

## 2025-07-01 DIAGNOSIS — K86.89 PANCREATIC INSUFFICIENCY: ICD-10-CM

## 2025-07-01 DIAGNOSIS — E78.49 OTHER HYPERLIPIDEMIA: ICD-10-CM

## 2025-07-01 DIAGNOSIS — R06.09 DYSPNEA ON EXERTION: ICD-10-CM

## 2025-07-01 DIAGNOSIS — E11.9 TYPE 2 DIABETES MELLITUS WITHOUT COMPLICATION, WITHOUT LONG-TERM CURRENT USE OF INSULIN (HCC): ICD-10-CM

## 2025-07-01 DIAGNOSIS — F10.20 ALCOHOL USE DISORDER, MODERATE, DEPENDENCE (HCC): ICD-10-CM

## 2025-07-01 DIAGNOSIS — R35.1 BENIGN PROSTATIC HYPERPLASIA WITH NOCTURIA: Primary | ICD-10-CM

## 2025-07-01 PROCEDURE — 3074F SYST BP LT 130 MM HG: CPT | Performed by: HOSPITALIST

## 2025-07-01 PROCEDURE — 3079F DIAST BP 80-89 MM HG: CPT | Performed by: HOSPITALIST

## 2025-07-01 PROCEDURE — 99214 OFFICE O/P EST MOD 30 MIN: CPT | Performed by: HOSPITALIST

## 2025-07-01 PROCEDURE — 99213 OFFICE O/P EST LOW 20 MIN: CPT | Performed by: HOSPITALIST

## 2025-07-01 PROCEDURE — 3017F COLORECTAL CA SCREEN DOC REV: CPT | Performed by: HOSPITALIST

## 2025-07-01 PROCEDURE — G8428 CUR MEDS NOT DOCUMENT: HCPCS | Performed by: HOSPITALIST

## 2025-07-01 PROCEDURE — 4004F PT TOBACCO SCREEN RCVD TLK: CPT | Performed by: HOSPITALIST

## 2025-07-01 PROCEDURE — 3046F HEMOGLOBIN A1C LEVEL >9.0%: CPT | Performed by: HOSPITALIST

## 2025-07-01 PROCEDURE — 2022F DILAT RTA XM EVC RTNOPTHY: CPT | Performed by: HOSPITALIST

## 2025-07-01 PROCEDURE — G8417 CALC BMI ABV UP PARAM F/U: HCPCS | Performed by: HOSPITALIST

## 2025-07-01 RX ORDER — METFORMIN HYDROCHLORIDE 500 MG/1
500 TABLET, EXTENDED RELEASE ORAL
Qty: 90 TABLET | Refills: 1 | Status: SHIPPED | OUTPATIENT
Start: 2025-07-01

## 2025-07-01 SDOH — HEALTH STABILITY: PHYSICAL HEALTH: ON AVERAGE, HOW MANY DAYS PER WEEK DO YOU ENGAGE IN MODERATE TO STRENUOUS EXERCISE (LIKE A BRISK WALK)?: 1 DAY

## 2025-07-01 SDOH — HEALTH STABILITY: PHYSICAL HEALTH: ON AVERAGE, HOW MANY MINUTES DO YOU ENGAGE IN EXERCISE AT THIS LEVEL?: 10 MIN

## 2025-07-01 ASSESSMENT — PATIENT HEALTH QUESTIONNAIRE - PHQ9
1. LITTLE INTEREST OR PLEASURE IN DOING THINGS: NOT AT ALL
SUM OF ALL RESPONSES TO PHQ QUESTIONS 1-9: 0
2. FEELING DOWN, DEPRESSED OR HOPELESS: NOT AT ALL
SUM OF ALL RESPONSES TO PHQ QUESTIONS 1-9: 0

## 2025-07-01 ASSESSMENT — ENCOUNTER SYMPTOMS
EYES NEGATIVE: 1
ALLERGIC/IMMUNOLOGIC NEGATIVE: 1
RESPIRATORY NEGATIVE: 1
GASTROINTESTINAL NEGATIVE: 1

## 2025-07-01 NOTE — PROGRESS NOTES
[facility]      Date of Patient's Visit: 7/1/2025    Progress note    Patient Care Team:  Haile Rico MD as PCP - General (Internal Medicine)  Boni Hercules MD as Consulting Physician (Gastroenterology)      CHIEF COMPLAINT  Chief Complaint   Patient presents with    Established New Doctor     Shubham KYLE  Matthieu Barba is a 54 y.o. male who presents to the clinic for routine follow-up.  Patient was last seen by PCP about a month ago.  He has a history of alcohol abuse and was hospitalized towards the end of last year for acute stroke.  There was concern that he could have paroxysmal atrial fibrillation which has caused the stroke.  An implantable loop recorder has been placed.  He has seen cardiology once his outpatient and has not seen them since.  GI has seen the patient for upper GI bleed in the hospital.  He followed up with them as outpatient and underwent EGD and colonoscopy.  Appears to be stable.  Patient was recently in the hospital for trouble breathing.  He was told to follow-up with his PCP for possible PFTs.  He was discharged on albuterol, azithromycin and benzonatate.  Patient is requesting for refill on metformin.  Patient has stayed sober for almost 7 months now.  He has not used cocaine or alcohol.  He states he has hard time following up with his doctors appointment as he is staying at a recovery house.  He continues to smoke cigarettes.  No other complaints.      ROS  All other review of systems negative, except for those noted.    Review of Systems   Constitutional: Negative.    HENT: Negative.     Eyes: Negative.    Respiratory: Negative.     Cardiovascular: Negative.    Gastrointestinal: Negative.    Endocrine: Negative.    Genitourinary: Negative.    Musculoskeletal: Negative.    Skin: Negative.    Allergic/Immunologic: Negative.    Neurological: Negative.    Hematological: Negative.

## 2025-07-02 DIAGNOSIS — R42 DIZZINESS: ICD-10-CM

## 2025-07-02 RX ORDER — MECLIZINE HYDROCHLORIDE 25 MG/1
TABLET ORAL
Qty: 30 TABLET | Refills: 2 | Status: SHIPPED | OUTPATIENT
Start: 2025-07-02

## 2025-07-02 NOTE — TELEPHONE ENCOUNTER
Matthieu Barba is calling to request a refill on the following medication(s):    Medication Request:  Requested Prescriptions     Pending Prescriptions Disp Refills    meclizine (ANTIVERT) 25 MG tablet [Pharmacy Med Name: Meclizine HCl 25MG TABS] 30 tablet 2     Sig: TAKE 1 TABLET BY MOUTH THREE TIMES A DAY AS NEEDED FOR DIZZINESS       Last Visit Date (If Applicable):  7/1/2025    Next Visit Date:    8/26/2025

## 2025-07-07 ENCOUNTER — TELEPHONE (OUTPATIENT)
Age: 55
End: 2025-07-07

## 2025-07-07 NOTE — TELEPHONE ENCOUNTER
Patient called and stated that at his previous appointment with you, he forgot to mention that he is getting numbness and tingling in his tongue and lips at least once daily. Patient stated that this is a concern because he had a stroke on 10/25/24 and is scared that something similar may happen. Patient is asking if you have any recommendations for him or should he make another appointment to be seen? Please advise.

## 2025-07-08 ENCOUNTER — HOSPITAL ENCOUNTER (OUTPATIENT)
Dept: PULMONOLOGY | Age: 55
Discharge: HOME | End: 2025-07-08
Attending: HOSPITALIST
Payer: MEDICAID

## 2025-07-08 DIAGNOSIS — R06.09 DYSPNEA ON EXERTION: ICD-10-CM

## 2025-07-08 DIAGNOSIS — G47.9 SLEEP DIFFICULTIES: ICD-10-CM

## 2025-07-08 PROCEDURE — 94640 AIRWAY INHALATION TREATMENT: CPT

## 2025-07-08 PROCEDURE — 94726 PLETHYSMOGRAPHY LUNG VOLUMES: CPT

## 2025-07-08 PROCEDURE — 94060 EVALUATION OF WHEEZING: CPT

## 2025-07-08 PROCEDURE — 94729 DIFFUSING CAPACITY: CPT

## 2025-07-08 PROCEDURE — 94664 DEMO&/EVAL PT USE INHALER: CPT

## 2025-07-09 NOTE — TELEPHONE ENCOUNTER
Matthieu Barba is calling to request a refill on the following medication(s):    Medication Request:  Requested Prescriptions     Pending Prescriptions Disp Refills    melatonin 5 MG TABS tablet [Pharmacy Med Name: Melatonin 5MG TABS] 28 tablet      Sig: TAKE 1 TABLET BY MOUTH DAILY       Last Visit Date (If Applicable):  7/1/2025    Next Visit Date:    8/26/2025

## 2025-07-16 ENCOUNTER — OFFICE VISIT (OUTPATIENT)
Age: 55
End: 2025-07-16
Payer: MEDICAID

## 2025-07-16 ENCOUNTER — HOSPITAL ENCOUNTER (OUTPATIENT)
Age: 55
Setting detail: SPECIMEN
Discharge: HOME | End: 2025-07-16

## 2025-07-16 VITALS
OXYGEN SATURATION: 93 % | DIASTOLIC BLOOD PRESSURE: 72 MMHG | HEART RATE: 84 BPM | SYSTOLIC BLOOD PRESSURE: 118 MMHG | HEIGHT: 67 IN | TEMPERATURE: 98.1 F | BODY MASS INDEX: 31.55 KG/M2 | WEIGHT: 201 LBS

## 2025-07-16 DIAGNOSIS — R20.2 TINGLING IN EXTREMITIES: ICD-10-CM

## 2025-07-16 DIAGNOSIS — R42 DIZZINESS: ICD-10-CM

## 2025-07-16 DIAGNOSIS — R20.0 NUMBNESS: ICD-10-CM

## 2025-07-16 DIAGNOSIS — R73.03 PREDIABETES: ICD-10-CM

## 2025-07-16 DIAGNOSIS — I63.9 ACUTE STROKE DUE TO ISCHEMIA (HCC): Primary | ICD-10-CM

## 2025-07-16 LAB
EST. AVERAGE GLUCOSE BLD GHB EST-MCNC: 134 MG/DL
FOLATE SERPL-MCNC: 11.3 NG/ML (ref 4.8–24.2)
HBA1C MFR BLD: 6.3 % (ref 4–6)
VIT B12 SERPL-MCNC: 673 PG/ML (ref 232–1245)

## 2025-07-16 PROCEDURE — 99214 OFFICE O/P EST MOD 30 MIN: CPT | Performed by: INTERNAL MEDICINE

## 2025-07-16 PROCEDURE — 99215 OFFICE O/P EST HI 40 MIN: CPT | Performed by: INTERNAL MEDICINE

## 2025-07-16 PROCEDURE — G8428 CUR MEDS NOT DOCUMENT: HCPCS | Performed by: INTERNAL MEDICINE

## 2025-07-16 PROCEDURE — 3017F COLORECTAL CA SCREEN DOC REV: CPT | Performed by: INTERNAL MEDICINE

## 2025-07-16 PROCEDURE — G8417 CALC BMI ABV UP PARAM F/U: HCPCS | Performed by: INTERNAL MEDICINE

## 2025-07-16 PROCEDURE — 4004F PT TOBACCO SCREEN RCVD TLK: CPT | Performed by: INTERNAL MEDICINE

## 2025-07-16 PROCEDURE — 3074F SYST BP LT 130 MM HG: CPT | Performed by: INTERNAL MEDICINE

## 2025-07-16 PROCEDURE — 3078F DIAST BP <80 MM HG: CPT | Performed by: INTERNAL MEDICINE

## 2025-07-16 RX ORDER — CLOPIDOGREL BISULFATE 75 MG/1
75 TABLET ORAL DAILY
Qty: 30 TABLET | Refills: 0 | Status: SHIPPED | OUTPATIENT
Start: 2025-07-16

## 2025-07-16 RX ORDER — MECLIZINE HYDROCHLORIDE 25 MG/1
25 TABLET ORAL 3 TIMES DAILY PRN
Qty: 30 TABLET | Refills: 0 | Status: SHIPPED | OUTPATIENT
Start: 2025-07-16

## 2025-07-16 NOTE — PROGRESS NOTES
[facility]      Date of Patient's Visit: 7/16/2025    Progress note    Patient Care Team:  Haile Rico MD as PCP - General (Internal Medicine)  Haile Rico MD as PCP - Empaneled Provider  Boni Hercules MD as Consulting Physician (Gastroenterology)      CHIEF COMPLAINT  Chief Complaint   Patient presents with    Numbness     Patient states he has some numbness and tingling in his lips. Patient also states he has a history of stroke.       SUBJECTIVE  Matthieu Barba is a 54 y.o. male who presents for 1 month h/o dizziness, intermittent tingling , numbness on the right side of the lip and cheek and along the left hand.   He has been feeling light headed on standing up   Bp is normal   He has h/o stroke       ROS  All other review of systems negative, except for those noted.    Review of Systems    Past Medical History:   Diagnosis Date    Arthritis     Chronic sinusitis     CVA (cerebral vascular accident) ([facility])     Oct 25, 2024.  pt admits to some dizziness for deficits    Eczema     Environmental allergies     GERD (gastroesophageal reflux disease)     Hyperlipidemia     Hypertension     Murmur     has had whole life    Sepsis ([facility]) 06/08/2022    Snores        Past Surgical History:   Procedure Laterality Date    COLONOSCOPY      2018    COLONOSCOPY N/A 5/22/2025    COLONOSCOPY BIOPSY HOT/COLD SNARE POLYPECTOMY WITH CLIP PLACEMENT X1 performed by Donny Cameron MD at [facility]    INSERTABLE CARDIAC MONITOR Left 11/2024    loop recorder    UPPER GASTROINTESTINAL ENDOSCOPY      2024    UPPER GASTROINTESTINAL ENDOSCOPY N/A 5/22/2025    ESOPHAGOGASTRODUODENOSCOPY BIOPSY performed by Donny Cameron MD at [facility]       Family History   Problem Relation Age of Onset    Cancer Paternal Grandmother     High Blood Pressure Mother        Social History     Socioeconomic History    Marital status: Legally    Tobacco Use

## 2025-07-17 DIAGNOSIS — I10 PRIMARY HYPERTENSION: ICD-10-CM

## 2025-07-18 RX ORDER — LISINOPRIL 40 MG/1
40 TABLET ORAL DAILY
Qty: 30 TABLET | Refills: 2 | Status: SHIPPED | OUTPATIENT
Start: 2025-07-18 | End: 2025-10-16

## 2025-07-19 NOTE — TELEPHONE ENCOUNTER
Done  
Matthieu Barba is calling to request a refill on the following medication(s):    Medication Request:  Requested Prescriptions     Pending Prescriptions Disp Refills    lisinopril (PRINIVIL;ZESTRIL) 40 MG tablet 30 tablet 2     Sig: Take 1 tablet by mouth daily       Last Visit Date (If Applicable):  7/16/2025    Next Visit Date:    8/26/2025    
No

## 2025-07-24 DIAGNOSIS — R35.1 BENIGN PROSTATIC HYPERPLASIA WITH NOCTURIA: ICD-10-CM

## 2025-07-24 DIAGNOSIS — I10 PRIMARY HYPERTENSION: ICD-10-CM

## 2025-07-24 DIAGNOSIS — N40.1 BENIGN PROSTATIC HYPERPLASIA WITH NOCTURIA: ICD-10-CM

## 2025-07-24 DIAGNOSIS — Z86.73 H/O: STROKE: ICD-10-CM

## 2025-07-24 RX ORDER — ASPIRIN 81 MG/1
81 TABLET ORAL DAILY
Qty: 30 TABLET | Refills: 5 | Status: SHIPPED | OUTPATIENT
Start: 2025-07-24

## 2025-07-24 RX ORDER — TAMSULOSIN HYDROCHLORIDE 0.4 MG/1
0.4 CAPSULE ORAL DAILY
Qty: 28 CAPSULE | Refills: 5 | Status: SHIPPED | OUTPATIENT
Start: 2025-07-24

## 2025-07-24 RX ORDER — ATORVASTATIN CALCIUM 20 MG/1
20 TABLET, FILM COATED ORAL DAILY
Qty: 28 TABLET | Refills: 5 | Status: SHIPPED | OUTPATIENT
Start: 2025-07-24

## 2025-07-24 NOTE — TELEPHONE ENCOUNTER
07/16/2025 6.3 (H)   10/26/2024 5.9   10/17/2024 5.6             ( goal A1C is < 7)   No components found for: \"LABMICR\"  No components found for: \"LDLCHOLESTEROL\", \"LDLCALC\"    (goal LDL is <100)   AST (U/L)   Date Value   11/08/2024 21     ALT (U/L)   Date Value   11/08/2024 20     BUN (mg/dL)   Date Value   05/07/2025 11     BP Readings from Last 3 Encounters:   07/16/25 118/72   07/01/25 110/80   06/26/25 (!) 178/77          (goal 120/80)    All Future Testing planned in [facility]  Lab Frequency Next Occurrence   EGD Routine Once 03/09/2025   COLONOSCOPY (Screening) Once 03/10/2025   Hemoglobin A1C Once 04/01/2025   Spirometry With Bronchodilator Once 06/25/2025   MRI BRAIN WO CONTRAST Once 07/16/2025         Patient Active Problem List:     Depression     Intervertebral lumbar disc disorder with myelopathy, lumbar region     Uncontrolled hypertension     Pure hypercholesterolemia     Prediabetes     Tobacco abuse     Alcohol use, unspecified with unspecified alcohol-induced disorder     Cocaine abuse (HCC)     Alcohol abuse     Esophagitis     Atopic dermatitis     H/O acute pancreatitis     Gastroesophageal reflux disease     Polysubstance abuse (HCC)     Alcohol use disorder, moderate, dependence (HCC)     Primary hypertension     Tobacco use disorder     Cocaine use disorder (HCC)     Cerebrovascular accident (CVA) (HCC)     Acute stroke due to occlusion of left cerebellar artery (HCC)     GERD (gastroesophageal reflux disease)     Iron deficiency anemia due to chronic blood loss     Gastritis without bleeding     Adenomatous polyp of transverse colon     Internal hemorrhoid

## 2025-07-31 ENCOUNTER — OFFICE VISIT (OUTPATIENT)
Age: 55
End: 2025-07-31
Payer: MEDICAID

## 2025-07-31 VITALS
RESPIRATION RATE: 20 BRPM | OXYGEN SATURATION: 98 % | BODY MASS INDEX: 32.33 KG/M2 | SYSTOLIC BLOOD PRESSURE: 128 MMHG | WEIGHT: 206 LBS | HEART RATE: 81 BPM | HEIGHT: 67 IN | TEMPERATURE: 97.3 F | DIASTOLIC BLOOD PRESSURE: 88 MMHG

## 2025-07-31 DIAGNOSIS — E11.9 TYPE 2 DIABETES MELLITUS WITHOUT COMPLICATION, WITHOUT LONG-TERM CURRENT USE OF INSULIN (HCC): ICD-10-CM

## 2025-07-31 DIAGNOSIS — R35.1 BENIGN PROSTATIC HYPERPLASIA WITH NOCTURIA: ICD-10-CM

## 2025-07-31 DIAGNOSIS — K86.89 PANCREATIC INSUFFICIENCY: ICD-10-CM

## 2025-07-31 DIAGNOSIS — I10 PRIMARY HYPERTENSION: ICD-10-CM

## 2025-07-31 DIAGNOSIS — N40.1 BENIGN PROSTATIC HYPERPLASIA WITH NOCTURIA: ICD-10-CM

## 2025-07-31 DIAGNOSIS — Z23 NEED FOR SHINGLES VACCINE: ICD-10-CM

## 2025-07-31 DIAGNOSIS — Z12.11 ENCOUNTER FOR COLORECTAL CANCER SCREENING: ICD-10-CM

## 2025-07-31 DIAGNOSIS — R42 DIZZINESS: ICD-10-CM

## 2025-07-31 DIAGNOSIS — Z86.73 H/O: STROKE: ICD-10-CM

## 2025-07-31 DIAGNOSIS — K21.9 GASTROESOPHAGEAL REFLUX DISEASE, UNSPECIFIED WHETHER ESOPHAGITIS PRESENT: ICD-10-CM

## 2025-07-31 DIAGNOSIS — Z12.12 ENCOUNTER FOR COLORECTAL CANCER SCREENING: ICD-10-CM

## 2025-07-31 DIAGNOSIS — R20.0 NUMBNESS: ICD-10-CM

## 2025-07-31 DIAGNOSIS — J43.9 PULMONARY EMPHYSEMA, UNSPECIFIED EMPHYSEMA TYPE (HCC): Primary | ICD-10-CM

## 2025-07-31 DIAGNOSIS — F10.20 ALCOHOL USE DISORDER, MODERATE, DEPENDENCE (HCC): ICD-10-CM

## 2025-07-31 DIAGNOSIS — R20.2 TINGLING IN EXTREMITIES: ICD-10-CM

## 2025-07-31 DIAGNOSIS — I63.9 ACUTE STROKE DUE TO ISCHEMIA (HCC): ICD-10-CM

## 2025-07-31 PROCEDURE — 3079F DIAST BP 80-89 MM HG: CPT | Performed by: HOSPITALIST

## 2025-07-31 PROCEDURE — 99213 OFFICE O/P EST LOW 20 MIN: CPT | Performed by: HOSPITALIST

## 2025-07-31 PROCEDURE — G8417 CALC BMI ABV UP PARAM F/U: HCPCS | Performed by: HOSPITALIST

## 2025-07-31 PROCEDURE — 3017F COLORECTAL CA SCREEN DOC REV: CPT | Performed by: HOSPITALIST

## 2025-07-31 PROCEDURE — 99214 OFFICE O/P EST MOD 30 MIN: CPT | Performed by: HOSPITALIST

## 2025-07-31 PROCEDURE — 3074F SYST BP LT 130 MM HG: CPT | Performed by: HOSPITALIST

## 2025-07-31 PROCEDURE — 4004F PT TOBACCO SCREEN RCVD TLK: CPT | Performed by: HOSPITALIST

## 2025-07-31 PROCEDURE — 3023F SPIROM DOC REV: CPT | Performed by: HOSPITALIST

## 2025-07-31 PROCEDURE — G8427 DOCREV CUR MEDS BY ELIG CLIN: HCPCS | Performed by: HOSPITALIST

## 2025-07-31 PROCEDURE — 2022F DILAT RTA XM EVC RTNOPTHY: CPT | Performed by: HOSPITALIST

## 2025-07-31 PROCEDURE — 3044F HG A1C LEVEL LT 7.0%: CPT | Performed by: HOSPITALIST

## 2025-07-31 RX ORDER — ZOSTER VACCINE RECOMBINANT, ADJUVANTED 50 MCG/0.5
0.5 KIT INTRAMUSCULAR SEE ADMIN INSTRUCTIONS
Qty: 0.5 ML | Refills: 0 | Status: SHIPPED | OUTPATIENT
Start: 2025-07-31 | End: 2026-01-27

## 2025-07-31 RX ORDER — MECLIZINE HYDROCHLORIDE 25 MG/1
25 TABLET ORAL 3 TIMES DAILY PRN
Qty: 30 TABLET | Refills: 0 | Status: SHIPPED | OUTPATIENT
Start: 2025-07-31

## 2025-07-31 RX ORDER — CLOPIDOGREL BISULFATE 75 MG/1
75 TABLET ORAL DAILY
Qty: 30 TABLET | Refills: 0 | Status: SHIPPED | OUTPATIENT
Start: 2025-07-31

## 2025-07-31 RX ORDER — ALBUTEROL SULFATE 90 UG/1
2 INHALANT RESPIRATORY (INHALATION) 4 TIMES DAILY PRN
Qty: 18 G | Refills: 5 | Status: SHIPPED | OUTPATIENT
Start: 2025-07-31

## 2025-07-31 SDOH — ECONOMIC STABILITY: FOOD INSECURITY: WITHIN THE PAST 12 MONTHS, THE FOOD YOU BOUGHT JUST DIDN'T LAST AND YOU DIDN'T HAVE MONEY TO GET MORE.: NEVER TRUE

## 2025-07-31 SDOH — ECONOMIC STABILITY: FOOD INSECURITY: WITHIN THE PAST 12 MONTHS, YOU WORRIED THAT YOUR FOOD WOULD RUN OUT BEFORE YOU GOT MONEY TO BUY MORE.: NEVER TRUE

## 2025-07-31 ASSESSMENT — PATIENT HEALTH QUESTIONNAIRE - PHQ9
9. THOUGHTS THAT YOU WOULD BE BETTER OFF DEAD, OR OF HURTING YOURSELF: NOT AT ALL
6. FEELING BAD ABOUT YOURSELF - OR THAT YOU ARE A FAILURE OR HAVE LET YOURSELF OR YOUR FAMILY DOWN: NOT AT ALL
4. FEELING TIRED OR HAVING LITTLE ENERGY: NOT AT ALL
SUM OF ALL RESPONSES TO PHQ QUESTIONS 1-9: 0
8. MOVING OR SPEAKING SO SLOWLY THAT OTHER PEOPLE COULD HAVE NOTICED. OR THE OPPOSITE, BEING SO FIGETY OR RESTLESS THAT YOU HAVE BEEN MOVING AROUND A LOT MORE THAN USUAL: NOT AT ALL
DEPRESSION UNABLE TO ASSESS: PT REFUSES
SUM OF ALL RESPONSES TO PHQ QUESTIONS 1-9: 0
SUM OF ALL RESPONSES TO PHQ QUESTIONS 1-9: 0
3. TROUBLE FALLING OR STAYING ASLEEP: NOT AT ALL
10. IF YOU CHECKED OFF ANY PROBLEMS, HOW DIFFICULT HAVE THESE PROBLEMS MADE IT FOR YOU TO DO YOUR WORK, TAKE CARE OF THINGS AT HOME, OR GET ALONG WITH OTHER PEOPLE: NOT DIFFICULT AT ALL
SUM OF ALL RESPONSES TO PHQ QUESTIONS 1-9: 0
1. LITTLE INTEREST OR PLEASURE IN DOING THINGS: NOT AT ALL
5. POOR APPETITE OR OVEREATING: NOT AT ALL
2. FEELING DOWN, DEPRESSED OR HOPELESS: NOT AT ALL
7. TROUBLE CONCENTRATING ON THINGS, SUCH AS READING THE NEWSPAPER OR WATCHING TELEVISION: NOT AT ALL

## 2025-08-04 ASSESSMENT — ENCOUNTER SYMPTOMS
ALLERGIC/IMMUNOLOGIC NEGATIVE: 1
EYES NEGATIVE: 1
RESPIRATORY NEGATIVE: 1
GASTROINTESTINAL NEGATIVE: 1

## 2025-08-08 ENCOUNTER — HOSPITAL ENCOUNTER (OUTPATIENT)
Dept: MRI IMAGING | Age: 55
Discharge: HOME | End: 2025-08-10
Attending: INTERNAL MEDICINE
Payer: MEDICAID

## 2025-08-08 DIAGNOSIS — I63.9 ACUTE STROKE DUE TO ISCHEMIA (HCC): ICD-10-CM

## 2025-08-08 DIAGNOSIS — R42 DIZZINESS: ICD-10-CM

## 2025-08-08 DIAGNOSIS — R20.0 NUMBNESS: ICD-10-CM

## 2025-08-08 DIAGNOSIS — R20.2 TINGLING IN EXTREMITIES: ICD-10-CM

## 2025-08-08 PROCEDURE — 70551 MRI BRAIN STEM W/O DYE: CPT

## 2025-08-08 RX ORDER — PANTOPRAZOLE SODIUM 40 MG/1
40 TABLET, DELAYED RELEASE ORAL
Qty: 90 TABLET | Refills: 1 | Status: SHIPPED | OUTPATIENT
Start: 2025-08-08

## 2025-08-19 ENCOUNTER — OFFICE VISIT (OUTPATIENT)
Age: 55
End: 2025-08-19
Payer: MEDICAID

## 2025-08-19 VITALS
BODY MASS INDEX: 33.06 KG/M2 | SYSTOLIC BLOOD PRESSURE: 136 MMHG | TEMPERATURE: 98.4 F | OXYGEN SATURATION: 94 % | HEART RATE: 77 BPM | WEIGHT: 210.6 LBS | DIASTOLIC BLOOD PRESSURE: 70 MMHG | HEIGHT: 67 IN

## 2025-08-19 DIAGNOSIS — R42 DIZZINESS: ICD-10-CM

## 2025-08-19 DIAGNOSIS — J43.9 PULMONARY EMPHYSEMA, UNSPECIFIED EMPHYSEMA TYPE (HCC): Primary | ICD-10-CM

## 2025-08-19 DIAGNOSIS — I10 PRIMARY HYPERTENSION: ICD-10-CM

## 2025-08-19 PROCEDURE — 3017F COLORECTAL CA SCREEN DOC REV: CPT

## 2025-08-19 PROCEDURE — G8417 CALC BMI ABV UP PARAM F/U: HCPCS

## 2025-08-19 PROCEDURE — 3023F SPIROM DOC REV: CPT

## 2025-08-19 PROCEDURE — G8427 DOCREV CUR MEDS BY ELIG CLIN: HCPCS

## 2025-08-19 PROCEDURE — 3074F SYST BP LT 130 MM HG: CPT

## 2025-08-19 PROCEDURE — 99213 OFFICE O/P EST LOW 20 MIN: CPT

## 2025-08-19 PROCEDURE — 3078F DIAST BP <80 MM HG: CPT

## 2025-08-19 PROCEDURE — 4004F PT TOBACCO SCREEN RCVD TLK: CPT

## 2025-08-19 PROCEDURE — 99212 OFFICE O/P EST SF 10 MIN: CPT

## 2025-08-19 RX ORDER — LISINOPRIL 40 MG/1
40 TABLET ORAL DAILY
Qty: 30 TABLET | Refills: 2 | Status: SHIPPED | OUTPATIENT
Start: 2025-08-19 | End: 2025-11-17

## 2025-08-19 RX ORDER — MECLIZINE HYDROCHLORIDE 25 MG/1
25 TABLET ORAL 3 TIMES DAILY PRN
Qty: 30 TABLET | Refills: 0 | Status: SHIPPED | OUTPATIENT
Start: 2025-08-19

## 2025-08-19 RX ORDER — ASPIRIN 81 MG/1
81 TABLET ORAL DAILY
Qty: 30 TABLET | Refills: 5 | Status: SHIPPED | OUTPATIENT
Start: 2025-08-19

## 2025-08-19 ASSESSMENT — ENCOUNTER SYMPTOMS
STRIDOR: 0
VOICE CHANGE: 0
GASTROINTESTINAL NEGATIVE: 1
APNEA: 0
ABDOMINAL DISTENTION: 0
COUGH: 1
CONSTIPATION: 0
SHORTNESS OF BREATH: 1
CHEST TIGHTNESS: 0
VOMITING: 0
SINUS PRESSURE: 0
CHOKING: 0
BLOOD IN STOOL: 0
COLOR CHANGE: 0
TROUBLE SWALLOWING: 0
DIARRHEA: 0
WHEEZING: 0

## 2025-08-27 ENCOUNTER — HOSPITAL ENCOUNTER (OUTPATIENT)
Dept: SLEEP CENTER | Age: 55
Discharge: HOME | End: 2025-08-29
Payer: MEDICAID

## 2025-08-27 DIAGNOSIS — R42 DIZZINESS: ICD-10-CM

## 2025-08-27 PROCEDURE — G0399 HOME SLEEP TEST/TYPE 3 PORTA: HCPCS

## (undated) DEVICE — FORCEPS BX L240CM WRK CHN 2.8MM STD CAP W/ NDL MIC MESH

## (undated) DEVICE — GLOVE ORANGE PI 8 1/2   MSG9085

## (undated) DEVICE — BITEBLOCK 54FR W/ DENT RIM BLOX

## (undated) DEVICE — GOWN,POLY REINFORCED,LG: Brand: MEDLINE

## (undated) DEVICE — SNARE ENDO CAPTIVATOR W10MM X L240CM RND INSUL STIFF-UOM BOX OF 10

## (undated) DEVICE — GAUZE,SPONGE,4"X4",16PLY,STRL,LF,10/TRAY: Brand: MEDLINE

## (undated) DEVICE — ENDOSCOPIC KIT 1.1+ 10 FT OP4 CA DE